# Patient Record
Sex: MALE | Race: BLACK OR AFRICAN AMERICAN | Employment: FULL TIME | URBAN - METROPOLITAN AREA
[De-identification: names, ages, dates, MRNs, and addresses within clinical notes are randomized per-mention and may not be internally consistent; named-entity substitution may affect disease eponyms.]

---

## 2018-01-23 ENCOUNTER — APPOINTMENT (OUTPATIENT)
Dept: CT IMAGING | Age: 43
DRG: 392 | End: 2018-01-23
Attending: EMERGENCY MEDICINE
Payer: SELF-PAY

## 2018-01-23 ENCOUNTER — HOSPITAL ENCOUNTER (INPATIENT)
Age: 43
LOS: 3 days | Discharge: HOME | DRG: 392 | End: 2018-01-26
Attending: EMERGENCY MEDICINE | Admitting: SURGERY
Payer: SELF-PAY

## 2018-01-23 ENCOUNTER — APPOINTMENT (OUTPATIENT)
Dept: GENERAL RADIOLOGY | Age: 43
DRG: 392 | End: 2018-01-23
Attending: EMERGENCY MEDICINE
Payer: SELF-PAY

## 2018-01-23 DIAGNOSIS — K57.20 DIVERTICULITIS OF LARGE INTESTINE WITH PERFORATION AND ABSCESS WITHOUT BLEEDING: Primary | ICD-10-CM

## 2018-01-23 PROBLEM — K57.92 DIVERTICULITIS: Status: ACTIVE | Noted: 2018-01-23

## 2018-01-23 LAB
ALBUMIN SERPL-MCNC: 3.3 G/DL (ref 3.5–5)
ALBUMIN/GLOB SERPL: 0.7 {RATIO} (ref 1.1–2.2)
ALP SERPL-CCNC: 72 U/L (ref 45–117)
ALT SERPL-CCNC: 17 U/L (ref 12–78)
ANION GAP SERPL CALC-SCNC: 9 MMOL/L (ref 5–15)
APPEARANCE UR: CLEAR
AST SERPL-CCNC: 23 U/L (ref 15–37)
BACTERIA URNS QL MICRO: NEGATIVE /HPF
BASOPHILS # BLD: 0 K/UL (ref 0–0.1)
BASOPHILS NFR BLD: 0 % (ref 0–1)
BILIRUB SERPL-MCNC: 0.5 MG/DL (ref 0.2–1)
BILIRUB UR QL: NEGATIVE
BUN SERPL-MCNC: 16 MG/DL (ref 6–20)
BUN/CREAT SERPL: 16 (ref 12–20)
CALCIUM SERPL-MCNC: 8.9 MG/DL (ref 8.5–10.1)
CHLORIDE SERPL-SCNC: 105 MMOL/L (ref 97–108)
CO2 SERPL-SCNC: 25 MMOL/L (ref 21–32)
COLOR UR: ABNORMAL
CREAT SERPL-MCNC: 1.02 MG/DL (ref 0.7–1.3)
DIFFERENTIAL METHOD BLD: ABNORMAL
EOSINOPHIL # BLD: 0.3 K/UL (ref 0–0.4)
EOSINOPHIL NFR BLD: 3 % (ref 0–7)
EPITH CASTS URNS QL MICRO: ABNORMAL /LPF
ERYTHROCYTE [DISTWIDTH] IN BLOOD BY AUTOMATED COUNT: 18.7 % (ref 11.5–14.5)
GLOBULIN SER CALC-MCNC: 4.6 G/DL (ref 2–4)
GLUCOSE SERPL-MCNC: 114 MG/DL (ref 65–100)
GLUCOSE UR STRIP.AUTO-MCNC: NEGATIVE MG/DL
HCT VFR BLD AUTO: 42.8 % (ref 36.6–50.3)
HGB BLD-MCNC: 15.1 G/DL (ref 12.1–17)
HGB UR QL STRIP: ABNORMAL
HYALINE CASTS URNS QL MICRO: ABNORMAL /LPF (ref 0–5)
KETONES UR QL STRIP.AUTO: 15 MG/DL
LEUKOCYTE ESTERASE UR QL STRIP.AUTO: NEGATIVE
LIPASE SERPL-CCNC: 39 U/L (ref 73–393)
LYMPHOCYTES # BLD: 1.4 K/UL (ref 0.8–3.5)
LYMPHOCYTES NFR BLD: 15 % (ref 12–49)
MCH RBC QN AUTO: 24.2 PG (ref 26–34)
MCHC RBC AUTO-ENTMCNC: 35.3 G/DL (ref 30–36.5)
MCV RBC AUTO: 68.6 FL (ref 80–99)
MONOCYTES # BLD: 0.9 K/UL (ref 0–1)
MONOCYTES NFR BLD: 10 % (ref 5–13)
NEUTS SEG # BLD: 6.4 K/UL (ref 1.8–8)
NEUTS SEG NFR BLD: 72 % (ref 32–75)
NITRITE UR QL STRIP.AUTO: NEGATIVE
PH UR STRIP: 5.5 [PH] (ref 5–8)
PLATELET # BLD AUTO: 267 K/UL (ref 150–400)
POTASSIUM SERPL-SCNC: 3.7 MMOL/L (ref 3.5–5.1)
PROT SERPL-MCNC: 7.9 G/DL (ref 6.4–8.2)
PROT UR STRIP-MCNC: NEGATIVE MG/DL
RBC # BLD AUTO: 6.24 M/UL (ref 4.1–5.7)
RBC #/AREA URNS HPF: ABNORMAL /HPF (ref 0–5)
RBC MORPH BLD: ABNORMAL
RBC MORPH BLD: ABNORMAL
SODIUM SERPL-SCNC: 139 MMOL/L (ref 136–145)
SP GR UR REFRACTOMETRY: 1.01 (ref 1–1.03)
UROBILINOGEN UR QL STRIP.AUTO: 2 EU/DL (ref 0.2–1)
WBC # BLD AUTO: 9 K/UL (ref 4.1–11.1)
WBC URNS QL MICRO: ABNORMAL /HPF (ref 0–4)

## 2018-01-23 PROCEDURE — 81001 URINALYSIS AUTO W/SCOPE: CPT | Performed by: EMERGENCY MEDICINE

## 2018-01-23 PROCEDURE — 74011250636 HC RX REV CODE- 250/636: Performed by: NURSE PRACTITIONER

## 2018-01-23 PROCEDURE — 74011000258 HC RX REV CODE- 258: Performed by: EMERGENCY MEDICINE

## 2018-01-23 PROCEDURE — 96365 THER/PROPH/DIAG IV INF INIT: CPT

## 2018-01-23 PROCEDURE — 74011250636 HC RX REV CODE- 250/636: Performed by: SURGERY

## 2018-01-23 PROCEDURE — 96367 TX/PROPH/DG ADDL SEQ IV INF: CPT

## 2018-01-23 PROCEDURE — 36415 COLL VENOUS BLD VENIPUNCTURE: CPT | Performed by: EMERGENCY MEDICINE

## 2018-01-23 PROCEDURE — 74177 CT ABD & PELVIS W/CONTRAST: CPT

## 2018-01-23 PROCEDURE — 74011000250 HC RX REV CODE- 250: Performed by: EMERGENCY MEDICINE

## 2018-01-23 PROCEDURE — 99284 EMERGENCY DEPT VISIT MOD MDM: CPT

## 2018-01-23 PROCEDURE — 74019 RADEX ABDOMEN 2 VIEWS: CPT

## 2018-01-23 PROCEDURE — 74011000258 HC RX REV CODE- 258: Performed by: SURGERY

## 2018-01-23 PROCEDURE — 83690 ASSAY OF LIPASE: CPT | Performed by: EMERGENCY MEDICINE

## 2018-01-23 PROCEDURE — 96366 THER/PROPH/DIAG IV INF ADDON: CPT

## 2018-01-23 PROCEDURE — 85025 COMPLETE CBC W/AUTO DIFF WBC: CPT | Performed by: EMERGENCY MEDICINE

## 2018-01-23 PROCEDURE — 74011250636 HC RX REV CODE- 250/636: Performed by: STUDENT IN AN ORGANIZED HEALTH CARE EDUCATION/TRAINING PROGRAM

## 2018-01-23 PROCEDURE — 74011636320 HC RX REV CODE- 636/320: Performed by: EMERGENCY MEDICINE

## 2018-01-23 PROCEDURE — 80053 COMPREHEN METABOLIC PANEL: CPT | Performed by: EMERGENCY MEDICINE

## 2018-01-23 PROCEDURE — 65270000032 HC RM SEMIPRIVATE

## 2018-01-23 PROCEDURE — 74011250636 HC RX REV CODE- 250/636: Performed by: EMERGENCY MEDICINE

## 2018-01-23 RX ORDER — METRONIDAZOLE 500 MG/100ML
500 INJECTION, SOLUTION INTRAVENOUS EVERY 6 HOURS
Status: DISCONTINUED | OUTPATIENT
Start: 2018-01-23 | End: 2018-01-23

## 2018-01-23 RX ORDER — SODIUM CHLORIDE AND POTASSIUM CHLORIDE .9; .15 G/100ML; G/100ML
SOLUTION INTRAVENOUS CONTINUOUS
Status: DISCONTINUED | OUTPATIENT
Start: 2018-01-23 | End: 2018-01-26 | Stop reason: HOSPADM

## 2018-01-23 RX ORDER — SODIUM CHLORIDE 0.9 % (FLUSH) 0.9 %
10 SYRINGE (ML) INJECTION
Status: COMPLETED | OUTPATIENT
Start: 2018-01-23 | End: 2018-01-23

## 2018-01-23 RX ORDER — MORPHINE SULFATE 4 MG/ML
4 INJECTION, SOLUTION INTRAMUSCULAR; INTRAVENOUS
Status: DISCONTINUED | OUTPATIENT
Start: 2018-01-23 | End: 2018-01-25

## 2018-01-23 RX ORDER — METRONIDAZOLE 500 MG/100ML
500 INJECTION, SOLUTION INTRAVENOUS
Status: COMPLETED | OUTPATIENT
Start: 2018-01-23 | End: 2018-01-23

## 2018-01-23 RX ORDER — MORPHINE SULFATE 4 MG/ML
4 INJECTION, SOLUTION INTRAMUSCULAR; INTRAVENOUS ONCE
Status: COMPLETED | OUTPATIENT
Start: 2018-01-23 | End: 2018-01-23

## 2018-01-23 RX ORDER — LEVOFLOXACIN 5 MG/ML
750 INJECTION, SOLUTION INTRAVENOUS
Status: COMPLETED | OUTPATIENT
Start: 2018-01-23 | End: 2018-01-23

## 2018-01-23 RX ADMIN — MORPHINE SULFATE 4 MG: 4 INJECTION, SOLUTION INTRAMUSCULAR; INTRAVENOUS at 20:30

## 2018-01-23 RX ADMIN — SODIUM CHLORIDE AND POTASSIUM CHLORIDE: 9; 1.49 INJECTION, SOLUTION INTRAVENOUS at 22:21

## 2018-01-23 RX ADMIN — SODIUM CHLORIDE 100 ML: 900 INJECTION, SOLUTION INTRAVENOUS at 05:00

## 2018-01-23 RX ADMIN — METRONIDAZOLE 500 MG: 500 INJECTION, SOLUTION INTRAVENOUS at 05:54

## 2018-01-23 RX ADMIN — SODIUM CHLORIDE 1000 ML: 900 INJECTION, SOLUTION INTRAVENOUS at 05:54

## 2018-01-23 RX ADMIN — LEVOFLOXACIN 750 MG: 5 INJECTION, SOLUTION INTRAVENOUS at 06:58

## 2018-01-23 RX ADMIN — MORPHINE SULFATE 4 MG: 4 INJECTION, SOLUTION INTRAMUSCULAR; INTRAVENOUS at 11:21

## 2018-01-23 RX ADMIN — MORPHINE SULFATE 4 MG: 4 INJECTION, SOLUTION INTRAMUSCULAR; INTRAVENOUS at 23:19

## 2018-01-23 RX ADMIN — SODIUM CHLORIDE AND POTASSIUM CHLORIDE: 9; 1.49 INJECTION, SOLUTION INTRAVENOUS at 16:05

## 2018-01-23 RX ADMIN — MORPHINE SULFATE 4 MG: 4 INJECTION, SOLUTION INTRAMUSCULAR; INTRAVENOUS at 09:40

## 2018-01-23 RX ADMIN — PIPERACILLIN SODIUM AND TAZOBACTAM SODIUM 3.38 G: 3; .375 INJECTION, POWDER, LYOPHILIZED, FOR SOLUTION INTRAVENOUS at 11:15

## 2018-01-23 RX ADMIN — Medication 10 ML: at 05:00

## 2018-01-23 RX ADMIN — MORPHINE SULFATE 4 MG: 4 INJECTION, SOLUTION INTRAMUSCULAR; INTRAVENOUS at 14:35

## 2018-01-23 RX ADMIN — MORPHINE SULFATE 4 MG: 4 INJECTION, SOLUTION INTRAMUSCULAR; INTRAVENOUS at 17:23

## 2018-01-23 RX ADMIN — PIPERACILLIN AND TAZOBACTAM 10.12 G: 3; .375 INJECTION, POWDER, FOR SOLUTION INTRAVENOUS at 12:13

## 2018-01-23 RX ADMIN — IOPAMIDOL 100 ML: 755 INJECTION, SOLUTION INTRAVENOUS at 05:00

## 2018-01-23 NOTE — ED PROVIDER NOTES
Patient is a 36 y.o. male presenting with abdominal pain and constipation. Abdominal Pain    Associated symptoms include constipation. Pertinent negatives include no fever, no diarrhea, no nausea, no vomiting, no dysuria, no hematuria, no headaches and no chest pain. Constipation    Associated symptoms include abdominal pain and constipation. Pertinent negatives include no dysuria, no chills, no fever, no nausea, no vomiting and no diarrhea. 37 yo AAM presents with constipation. Pt states he's been having trouble having a BM over the past few days. Has done 2 enemas and drank mg citrate. Passed a small amount of stool on Sunday and had some relief. Today symptoms worsening. Pt thinks this started after he recently changed his diet. Denies fever, chills, nausea, vomiting. C/o rectal pain and mid abdominal pain, 9/10. Is now having trouble urinating. History reviewed. No pertinent past medical history. History reviewed. No pertinent surgical history. History reviewed. No pertinent family history. Social History     Social History    Marital status: SINGLE     Spouse name: N/A    Number of children: N/A    Years of education: N/A     Occupational History    Not on file. Social History Main Topics    Smoking status: Current Every Day Smoker     Packs/day: 1.00     Years: 15.00    Smokeless tobacco: Never Used    Alcohol use Not on file    Drug use: No    Sexual activity: Not on file     Other Topics Concern    Not on file     Social History Narrative    No narrative on file         ALLERGIES: Review of patient's allergies indicates no known allergies. Review of Systems   Constitutional: Negative for chills and fever. Respiratory: Negative for cough and shortness of breath. Cardiovascular: Negative for chest pain. Gastrointestinal: Positive for abdominal pain and constipation. Negative for blood in stool, diarrhea, nausea and vomiting.    Genitourinary: Negative for dysuria and hematuria. Skin: Negative for rash and wound. Neurological: Negative for headaches. All other systems reviewed and are negative.       Vitals:    01/23/18 0313   BP: 136/78   Pulse: (!) 103   Resp: 16   Temp: 98.3 °F (36.8 °C)   SpO2: 97%   Weight: 146.1 kg (322 lb 3.2 oz)   Height: 5' 6\" (1.676 m)            Physical Exam   Physical Examination: General appearance - alert, mild distress, oriented to person, place, and time and normal appearing weight  Eyes - pupils equal and reactive, extraocular eye movements intact  Neck - supple, no significant adenopathy  Chest - clear to auscultation, no wheezes, rales or rhonchi, symmetric air entry  Heart - normal rate, regular rhythm, normal S1, S2, no murmurs, rubs, clicks or gallops  Abdomen - obese, mild lower abdominal tenderness, no rebound/guarding/peritoneal sign, soft, nondistended, no masses or organomegaly  Back exam - full range of motion, no tenderness, palpable spasm or pain on motion  Neurological - alert, oriented, normal speech, no focal findings or movement disorder noted  Musculoskeletal - no joint tenderness, deformity or swelling  Extremities - peripheral pulses normal, no pedal edema, no clubbing or cyanosis  Skin - normal coloration and turgor, no rashes, no suspicious skin lesions noted  Rectal-nontender, no gross blood, no stool in rectum  MDM  Number of Diagnoses or Management Options  Diverticulitis of large intestine with perforation and abscess without bleeding:      Amount and/or Complexity of Data Reviewed  Clinical lab tests: ordered and reviewed  Tests in the radiology section of CPT®: ordered and reviewed  Decide to obtain previous medical records or to obtain history from someone other than the patient: yes  Review and summarize past medical records: yes  Discuss the patient with other providers: yes (General surgery)  Independent visualization of images, tracings, or specimens: yes    Patient Progress  Patient progress: improved    ED Course       Procedures  5:43 AM  Pt updated on test results and plan for admission for perforated diverticulitis. 5:50 AM  Discussed with Dr. Shani Christian, general surgery. Will see and admit.

## 2018-01-23 NOTE — ROUTINE PROCESS
TRANSFER - OUT REPORT:    Verbal report given to Lulu COVARRUBIAS(name) on Layla Lin  being transferred to [facility](unit) for routine progression of care       Report consisted of patients Situation, Background, Assessment and   Recommendations(SBAR). Information from the following report(s) ED Summary was reviewed with the receiving nurse. Lines:   Peripheral IV 01/23/18 Right Antecubital (Active)   Site Assessment Clean, dry, & intact 1/23/2018  4:07 AM   Phlebitis Assessment 0 1/23/2018  4:07 AM   Infiltration Assessment 0 1/23/2018  4:07 AM   Dressing Status Clean, dry, & intact 1/23/2018  4:07 AM   Dressing Type Topical skin adhesive;Transparent 1/23/2018  4:07 AM   Hub Color/Line Status Pink;Capped;Flushed;Patent 1/23/2018  4:07 AM   Action Taken Blood drawn 1/23/2018  4:07 AM        Opportunity for questions and clarification was provided.       Patient transported with:   [employer]

## 2018-01-23 NOTE — IP AVS SNAPSHOT
[address] 
[phone] Patient: Jerri Bermudez MRN: [id number] MLQ:[id number] About your hospitalization You were admitted on:  January 23, 2018 You last received care in the:  [place] [address] You were discharged on:  January 26, 2018 Why you were hospitalized Your primary diagnosis was:  Not on File Your diagnoses also included:  Diverticulitis Of Colon With Perforation, Diverticulitis Follow-up Information Follow up With Details Comments Contact Info [place] Financial Counselor  Call for questions regarding Care Card application status and hospital bill arrangements. [phone] Jonathon Santamaria NP On 2/8/2018 Hospital follow up new PCP appointment on Thursday, 2/8/18 @ 1:30 p.m. Patient needs to arrive 30 minutes early with picture ID, insurance card and a listing of all medications. [address] [place] [address],[address] [place] [address] 
[phone] None   None (395) Patient stated that they have no PCP Your Scheduled Appointments Thursday February 08, 2018  1:30 PM EST PHYSICAL PRE OP with Jonathon Santamaria NP  
[address] [facility] [address] [place] [address] [id number] [phone] Discharge Orders None A check syed indicates which time of day the medication should be taken. My Medications START taking these medications Instructions Each Dose to Equal  
 Morning Noon Evening Bedtime  
 amoxicillin-clavulanate 875-125 mg per tablet Commonly known as:  AUGMENTIN Your last dose was: Your next dose is: Take 1 Tab by mouth two (2) times a day for 10 days. 1 Tab HYDROcodone-acetaminophen 5-325 mg per tablet Commonly known as:  Callahan Helms Your last dose was: Your next dose is: Take 1 Tab by mouth every six (6) hours as needed for Pain. Max Daily Amount: 4 Tabs. 1 Tab CONTINUE taking these medications Instructions Each Dose to Equal  
 Morning Noon Evening Bedtime BC HEADACHE POWDER PO Your last dose was: Your next dose is: Take  by mouth as needed. Where to Get Your Medications Information on where to get these meds will be given to you by the nurse or doctor. ! Ask your nurse or doctor about these medications  
  amoxicillin-clavulanate 875-125 mg per tablet HYDROcodone-acetaminophen 5-325 mg per tablet Discharge Instructions Please call for a 2 week follow up appointment with your surgeon Dr Eulalio Alvarado: 
 
[place] [facility] [address] [facility], [address] [place]  [address]. 
[phone] Diverticulitis: Care Instructions Your Care Instructions Diverticulitis occurs when pouches form in the wall of the colon and become inflamed or infected. It can be very painful. Doctors aren't sure what causes diverticulitis. There is no proof that foods such as nuts, seeds, or berries cause it or make it worse. A low-fiber diet may cause the colon to work harder to push stool forward. Pouches may form because of this extra work. It may be hard to think about healthy eating while you're in pain. But as you recover, you might think about how you can use healthy eating for overall better health. Healthy eating may help you avoid future attacks. Follow-up care is a key part of your treatment and safety. Be sure to make and go to all appointments, and call your doctor if you are having problems. It's also a good idea to know your test results and keep a list of the medicines you take. How can you care for yourself at home?  
· Drink plenty of fluids, enough so that your urine is light yellow or clear like water. If you have kidney, heart, or liver disease and have to limit fluids, talk with your doctor before you increase the amount of fluids you drink. · Stick to liquids or a bland diet (plain rice, bananas, dry toast or crackers, applesauce) until you are feeling better. Then you can return to regular foods and gradually increase the amount of fiber in your diet. · Use a heating pad set on low on your belly to relieve mild cramps and pain. · Get extra rest until you are feeling better. · Be safe with medicines. Read and follow all instructions on the label. ¨ If the doctor gave you a prescription medicine for pain, take it as prescribed. ¨ If you are not taking a prescription pain medicine, ask your doctor if you can take an over-the-counter medicine. · If your doctor prescribed antibiotics, take them as directed. Do not stop taking them just because you feel better. You need to take the full course of antibiotics. To prevent future attacks of diverticulitis · Avoid constipation: 
¨ Include fruits, vegetables, beans, and whole grains in your diet each day. These foods are high in fiber. ¨ Drink plenty of fluids, enough so that your urine is light yellow or clear like water. If you have kidney, heart, or liver disease and have to limit fluids, talk with your doctor before you increase the amount of fluids you drink. ¨ Get some exercise every day. Build up slowly to 30 to 60 minutes a day on 5 or more days of the week. ¨ Take a fiber supplement, such as Citrucel or Metamucil, every day if needed. Read and follow all instructions on the label. ¨ Schedule time each day for a bowel movement. Having a daily routine may help. Take your time and do not strain when having a bowel movement. When should you call for help? Call your doctor now or seek immediate medical care if: 
? · You have a fever. ? · You are vomiting. ? · You have new or worse belly pain. ? · You cannot pass stools or gas. ?Watch closely for changes in your health, and be sure to contact your doctor if you have any problems. Where can you learn more? Go to http://lanie-vandana.info/. Enter H901 in the search box to learn more about \"Diverticulitis: Care Instructions. \" Current as of: May 12, 2017 Content Version: 11.4 © 2829-7546 [employer]. Care instructions adapted under license by [employer] (which disclaims liability or warranty for this information). If you have questions about a medical condition or this instruction, always ask your healthcare professional. [address] any warranty or liability for your use of this information. Voice123 Announcement We are excited to announce that we are making your provider's discharge notes available to you in Voice123. You will see these notes when they are completed and signed by the physician that discharged you from your recent hospital stay. If you have any questions or concerns about any information you see in Voice123, please call the Health Information Department where you were seen or reach out to your Primary Care Provider for more information about your plan of care. Introducing [facility]! Anayeli Gallardo introduces Voice123 patient portal. Now you can access parts of your medical record, email your doctor's office, and request medication refills online. 1. In your internet browser, go to [email or web address] 2. Click on the First Time User? Click Here link in the Sign In box. You will see the New Member Sign Up page. 3. Enter your Voice123 Access Code exactly as it appears below. You will not need to use this code after youve completed the sign-up process. If you do not sign up before the expiration date, you must request a new code. · Voice123 Access Code: [email or web address] Expires: 4/25/2018  2:39 PM 
 
 4. Enter the last four digits of your Social Security Number (xxxx) and Date of Birth (mm/dd/yyyy) as indicated and click Submit. You will be taken to the next sign-up page. 5. Create a [email or web address] ID. This will be your [email or web address] login ID and cannot be changed, so think of one that is secure and easy to remember. 6. Create a [email or web address] password. You can change your password at any time. 7. Enter your Password Reset Question and Answer. This can be used at a later time if you forget your password. 8. Enter your e-mail address. You will receive e-mail notification when new information is available in [address] 9. Click Sign Up. You can now view and download portions of your medical record. 10. Click the Download Summary menu link to download a portable copy of your medical information. If you have questions, please visit the Frequently Asked Questions section of the [email or web address] website. Remember, [email or web address] is NOT to be used for urgent needs. For medical emergencies, dial 911. Now available from your iPhone and Android! Providers Seen During Your Hospitalization Provider Specialty Primary office phone Deepa Lake MD Emergency Medicine [phone] Anders Reza MD Surgery [phone] Your Primary Care Physician (PCP) Primary Care Physician Office Phone Office Fax NONE ** None ** ** None ** You are allergic to the following No active allergies Recent Documentation Height Weight BMI Smoking Status 1.676 m 146.1 kg 52 kg/m2 Current Every Day Smoker Emergency Contacts Name Discharge Info Relation Home Work Mobile Franchesca Swift YES [1] Sister [23] [phone] Patient Belongings The following personal items are in your possession at time of discharge: 
  Dental Appliances: None  Visual Aid: None      Home Medications: None   Jewelry: None  Clothing: Socks, Footwear, Pants, Shirt    Other Valuables: Cell Phone Please provide this summary of care documentation to your next provider. Signatures-by signing, you are acknowledging that this After Visit Summary has been reviewed with you and you have received a copy. Patient Signature:  ____________________________________________________________ Date:  ____________________________________________________________  
  
[facility] Provider Signature:  ____________________________________________________________ Date:  ____________________________________________________________

## 2018-01-23 NOTE — ED TRIAGE NOTES
Triage note: Patient arriving c/o constipation after changing his diet. Reports trying 'everything' but  Nothing is working. Patient reports 'whatever it is, must be too big to come out'.

## 2018-01-23 NOTE — PROGRESS NOTES
TRANSFER - IN REPORT:    Verbal report received from Arlin(name) on Martha Gretchen  being received from E.R for routine progression of care      Report consisted of patients Situation, Background, Assessment and   Recommendations(SBAR). Information from the following report(s) ED Summary was reviewed with the receiving nurse. Opportunity for questions and clarification was provided. Assessment completed upon patients arrival to unit and care assumed.

## 2018-01-23 NOTE — PROGRESS NOTES
Admission Medication Reconciliation:    Comments/Recommendations: This medication history was obtained from patient; (s)he appears to be a reliable historian. An RX Query is not available. Medications added: none  Medications deleted: none  Medications amended: none    Last doses of medication: patient reports taking BC powder approx at midnight  Review of Allergies: nkda      Significant PMH/Disease States:   History reviewed. No pertinent past medical history. Chief Complaint for this Admission:    Chief Complaint   Patient presents with    Abdominal Pain    Constipation       Allergies:  Review of patient's allergies indicates no known allergies. Prior to Admission Medications:   Prior to Admission Medications   Prescriptions Last Dose Informant Patient Reported? Taking? ASPIRIN/SALICYLAMIDE/CAFFEINE (BC HEADACHE POWDER PO) 1/23/2018 at 0000  Yes Yes   Sig: Take  by mouth as needed. Facility-Administered Medications: None         Thank you for allowing me to participate in the care of this patient. Please contact the pharmacy () or the medication reconciliation pharmacy () with any questions.     Jose Bonds, IwonaD

## 2018-01-23 NOTE — CONSULTS
Surgery consultation    Patient: Darryl Bain MRN: [id number]  SSN: [id number]    YOB: 1975  Age: 36 y.o. Sex: male      Subjective:      Darryl Bain is a 36 y.o. male who presents with abdominal pain since Friday (today is Tues) who felt he was constipated and he actually felt better after a small BM on Sunday- but then Monday had a great increase in lower abdominal pain and the feeling that he needed to have a BM. He presented to ER with abdominal pain and CT suggests acute diverticulitis with perforation- see below. History reviewed. No pertinent past medical history. History reviewed. No pertinent surgical history. History reviewed. No pertinent family history. Social History   Substance Use Topics    Smoking status: Current Every Day Smoker     Packs/day: 1.00     Years: 15.00    Smokeless tobacco: Never Used    Alcohol use Not on file      Prior to Admission medications    Not on File        No Known Allergies    Review of Systems:  Constitutional: negative for fevers, chills and weight loss  Cardiovascular: negative for chest pain, dyspnea, palpitations  Gastrointestinal: negative for odynophagia, nausea, vomiting and jaundice     Patient does report a brief syncopal episode while sitting on the toilet previously    Objective:     Vitals:    01/23/18 0313 01/23/18 0548 01/23/18 0600 01/23/18 0700   BP: 136/78 126/81 134/79 131/82   Pulse: (!) 103      Resp: 16      Temp: 98.3 °F (36.8 °C)      SpO2: 97%  97% 95%   Weight: 322 lb 3.2 oz (146.1 kg)      Height: 5' 6\" (1.676 m)        Body mass index is 52 kg/(m^2). Physical Exam:  GENERAL: alert, cooperative, no distress, appears stated age  HEART: regular rate and rhythm  ABDOMEN: soft, non-tender.  Bowel sounds normal. No masses,  no organomegaly  EXTREMITIES:  extremities normal, atraumatic, no cyanosis or edema    CT images reviewed by me in detail on monitor - para rectal air/fluid collection - perhaps early abscess  Small locules of free air seen elsewhere as well    Radiology report-  FINDINGS:   LUNG BASES: Clear. INCIDENTALLY IMAGED HEART AND MEDIASTINUM: Unremarkable. LIVER: No mass or biliary dilatation. GALLBLADDER: Unremarkable. SPLEEN: No mass. PANCREAS: No mass or ductal dilatation. ADRENALS: Unremarkable. KIDNEYS: No mass, calculus, or hydronephrosis. STOMACH: Unremarkable. SMALL BOWEL: No dilatation or wall thickening. COLON: There is wall thickening of the sigmoid colon with several diverticula  within this. There is stranding in the adjacent fat. There is an air-fluid  collection along the left side of the rectum on series 3 image 74 measuring 2.6  x 3.1 cm. This continues up along the left iliac vessels towards the  bifurcation. APPENDIX: Unremarkable. PERITONEUM: Multiple locules of free air are seen rising into the upper abdomen. These interdigitate along the hepatic hilum. RETROPERITONEUM: No lymphadenopathy or aortic aneurysm. REPRODUCTIVE ORGANS: The bladder and seminal vesicles are unremarkable. URINARY BLADDER: No mass or calculus. BONES: No destructive bone lesion. ADDITIONAL COMMENTS: There is a fat-containing umbilical hernia.     IMPRESSION  IMPRESSION:  Findings likely represent perforated acute diverticulitis with an air-fluid  collection in the left perirectal space extending up along the left pelvic  sidewall. Multiple locules of free air rise into the upper abdomen.       Assessment:     Hospital Problems  Never Reviewed          Codes Class Noted POA    Diverticulitis of colon with perforation ICD-10-CM: K57.20  ICD-9-CM: 562.11  1/23/2018 Unknown           Morbid Obesity BMI 50-59.9    Plan:     Npo, iv ABX, serial exams  May need re-eval by follow up CT for possible abscess development pararectal / L pelvic sidewall area     Signed By: Nohemi Hayes MD     January 23, 2018

## 2018-01-23 NOTE — LETTER
NOTIFICATION OF RETURN TO WORK / SCHOOL 
 
1/26/2018 11:20 AM 
 
Mr. Azalea Reynolds [address] [address] [place] To Whom It May Concern: 
 
Azalea Reynolds was hospitalized under the care of Dr. Prisca Knowles  from 1/22/2018  to present . He will be able to return to work/school on Tuesday 1/30/2018  with no restrictions. If there are questions or concerns please have the patient contact our office. Sincerely, Konrad Dolan PAC

## 2018-01-24 PROCEDURE — 74011250636 HC RX REV CODE- 250/636: Performed by: SURGERY

## 2018-01-24 PROCEDURE — 74011250636 HC RX REV CODE- 250/636: Performed by: NURSE PRACTITIONER

## 2018-01-24 PROCEDURE — 65270000032 HC RM SEMIPRIVATE

## 2018-01-24 PROCEDURE — 74011250637 HC RX REV CODE- 250/637: Performed by: NURSE PRACTITIONER

## 2018-01-24 RX ORDER — HYDROCODONE BITARTRATE AND ACETAMINOPHEN 5; 325 MG/1; MG/1
1-2 TABLET ORAL
Status: DISCONTINUED | OUTPATIENT
Start: 2018-01-24 | End: 2018-01-26 | Stop reason: HOSPADM

## 2018-01-24 RX ORDER — ONDANSETRON 2 MG/ML
4 INJECTION INTRAMUSCULAR; INTRAVENOUS
Status: DISCONTINUED | OUTPATIENT
Start: 2018-01-24 | End: 2018-01-25

## 2018-01-24 RX ORDER — DIPHENHYDRAMINE HYDROCHLORIDE 50 MG/ML
25 INJECTION, SOLUTION INTRAMUSCULAR; INTRAVENOUS
Status: DISCONTINUED | OUTPATIENT
Start: 2018-01-24 | End: 2018-01-26 | Stop reason: HOSPADM

## 2018-01-24 RX ADMIN — SODIUM CHLORIDE AND POTASSIUM CHLORIDE: 9; 1.49 INJECTION, SOLUTION INTRAVENOUS at 16:18

## 2018-01-24 RX ADMIN — HYDROCODONE BITARTRATE AND ACETAMINOPHEN 1 TABLET: 5; 325 TABLET ORAL at 16:20

## 2018-01-24 RX ADMIN — PIPERACILLIN AND TAZOBACTAM 10.12 G: 3; .375 INJECTION, POWDER, FOR SOLUTION INTRAVENOUS at 11:54

## 2018-01-24 RX ADMIN — SODIUM CHLORIDE AND POTASSIUM CHLORIDE: 9; 1.49 INJECTION, SOLUTION INTRAVENOUS at 06:26

## 2018-01-24 RX ADMIN — HYDROCODONE BITARTRATE AND ACETAMINOPHEN 1 TABLET: 5; 325 TABLET ORAL at 20:26

## 2018-01-24 RX ADMIN — HYDROCODONE BITARTRATE AND ACETAMINOPHEN 1 TABLET: 5; 325 TABLET ORAL at 11:56

## 2018-01-24 NOTE — PROGRESS NOTES
Bedside shift change report given to Shelia (oncoming nurse) by Hilary Doherty (offgoing nurse). Report included the following information SBAR.

## 2018-01-24 NOTE — PROGRESS NOTES
Problem: Patient Education: Go to Patient Education Activity  Goal: Patient/Family Education  NUTRITION         Pt and family visited per pt request regarding diet education. Many questions regarding diet progression s/p perforated diverticulitis answered. Reviewed low fiber with gradual advancement to high fiber diet after 2 weeks. Handouts provided. Pt and family with good understanding and no additional questions at this time. Will follow for diet advancement/tolerance per surgery.    Maggy Jiang RD

## 2018-01-24 NOTE — PROGRESS NOTES
Daily Progress Note  [facility] at [address] Date: 2018    Subjective:     Last 24 hrs: Denies pain, + flatus. Only c/o is some soreness around is rectum. Eager to get something to drink. Many questions about diet going forward. Afebrile, on zosyn. Objective:     Blood pressure 126/80, pulse 85, temperature 98.6 °F (37 °C), resp. rate 18, height 5' 6\" (1.676 m), weight 146.1 kg (322 lb 3.2 oz), SpO2 97 %. Temp (24hrs), Av.3 °F (36.8 °C), Min:97.5 °F (36.4 °C), Max:98.8 °F (37.1 °C)      _____________________  Physical Exam:     Alert and Oriented, sitting up on side of bed, in no acute distress. Cardiovascular: RRR, no peripheral edema  Lungs:CTAB   Abdomen: + BS, obese, soft, NT. Umbilical hernia which is soft and reduces easily.        Assessment:   Active Problems:    Diverticulitis of colon with perforation (2018)      Diverticulitis (2018)            Plan:     Continue zosyn  Start clear liquids  Labs in am  Repeat imaging per Dr. Slim Powell, [address] [facility] at [place] [address], [address] [place] [address]  [phone]    Data Review:    Recent Labs      18   0408   WBC  9.0   HGB  15.1   HCT  42.8   PLT  267     Recent Labs      18   0408   NA  139   K  3.7   CL  105   CO2  25   GLU  114*   BUN  16   CREA  1.02   CA  8.9   ALB  3.3*   TBILI  0.5   SGOT  23   ALT  17     Recent Labs      18   0408   LPSE  39*           ______________________  Medications:    Current Facility-Administered Medications   Medication Dose Route Frequency    morphine injection 4 mg  4 mg IntraVENous Q3H PRN    piperacillin-tazobactam (ZOSYN) 10.125 g in  mL continuous 24 hr infusion  10.125 g IntraVENous Q24H    0.9% sodium chloride with KCl 20 mEq/L infusion   IntraVENous CONTINUOUS

## 2018-01-25 LAB
ANION GAP SERPL CALC-SCNC: 5 MMOL/L (ref 5–15)
BUN SERPL-MCNC: 12 MG/DL (ref 6–20)
BUN/CREAT SERPL: 12 (ref 12–20)
CALCIUM SERPL-MCNC: 8.5 MG/DL (ref 8.5–10.1)
CHLORIDE SERPL-SCNC: 106 MMOL/L (ref 97–108)
CO2 SERPL-SCNC: 28 MMOL/L (ref 21–32)
CREAT SERPL-MCNC: 1 MG/DL (ref 0.7–1.3)
ERYTHROCYTE [DISTWIDTH] IN BLOOD BY AUTOMATED COUNT: 18.4 % (ref 11.5–14.5)
GLUCOSE SERPL-MCNC: 84 MG/DL (ref 65–100)
HCT VFR BLD AUTO: 38.8 % (ref 36.6–50.3)
HGB BLD-MCNC: 13 G/DL (ref 12.1–17)
MCH RBC QN AUTO: 23.8 PG (ref 26–34)
MCHC RBC AUTO-ENTMCNC: 33.5 G/DL (ref 30–36.5)
MCV RBC AUTO: 70.9 FL (ref 80–99)
PLATELET # BLD AUTO: 243 K/UL (ref 150–400)
POTASSIUM SERPL-SCNC: 3.9 MMOL/L (ref 3.5–5.1)
RBC # BLD AUTO: 5.47 M/UL (ref 4.1–5.7)
SODIUM SERPL-SCNC: 139 MMOL/L (ref 136–145)
WBC # BLD AUTO: 4.9 K/UL (ref 4.1–11.1)

## 2018-01-25 PROCEDURE — 74011250636 HC RX REV CODE- 250/636: Performed by: SURGERY

## 2018-01-25 PROCEDURE — 74011250637 HC RX REV CODE- 250/637: Performed by: SURGERY

## 2018-01-25 PROCEDURE — 36415 COLL VENOUS BLD VENIPUNCTURE: CPT | Performed by: NURSE PRACTITIONER

## 2018-01-25 PROCEDURE — 74011250636 HC RX REV CODE- 250/636: Performed by: NURSE PRACTITIONER

## 2018-01-25 PROCEDURE — 80048 BASIC METABOLIC PNL TOTAL CA: CPT | Performed by: NURSE PRACTITIONER

## 2018-01-25 PROCEDURE — 65270000032 HC RM SEMIPRIVATE

## 2018-01-25 PROCEDURE — 74011250637 HC RX REV CODE- 250/637: Performed by: NURSE PRACTITIONER

## 2018-01-25 PROCEDURE — 85027 COMPLETE CBC AUTOMATED: CPT | Performed by: NURSE PRACTITIONER

## 2018-01-25 RX ORDER — ONDANSETRON 4 MG/1
4 TABLET, ORALLY DISINTEGRATING ORAL
Status: DISCONTINUED | OUTPATIENT
Start: 2018-01-25 | End: 2018-01-26 | Stop reason: HOSPADM

## 2018-01-25 RX ORDER — POLYETHYLENE GLYCOL 3350 17 G/17G
17 POWDER, FOR SOLUTION ORAL 2 TIMES DAILY
Status: DISCONTINUED | OUTPATIENT
Start: 2018-01-25 | End: 2018-01-26 | Stop reason: HOSPADM

## 2018-01-25 RX ADMIN — HYDROCODONE BITARTRATE AND ACETAMINOPHEN 2 TABLET: 5; 325 TABLET ORAL at 05:21

## 2018-01-25 RX ADMIN — POLYETHYLENE GLYCOL 3350 17 G: 17 POWDER, FOR SOLUTION ORAL at 10:58

## 2018-01-25 RX ADMIN — HYDROCODONE BITARTRATE AND ACETAMINOPHEN 2 TABLET: 5; 325 TABLET ORAL at 21:50

## 2018-01-25 RX ADMIN — HYDROCODONE BITARTRATE AND ACETAMINOPHEN 1 TABLET: 5; 325 TABLET ORAL at 15:47

## 2018-01-25 RX ADMIN — POLYETHYLENE GLYCOL 3350 17 G: 17 POWDER, FOR SOLUTION ORAL at 17:12

## 2018-01-25 RX ADMIN — HYDROCODONE BITARTRATE AND ACETAMINOPHEN 1 TABLET: 5; 325 TABLET ORAL at 10:58

## 2018-01-25 RX ADMIN — PIPERACILLIN AND TAZOBACTAM 10.12 G: 3; .375 INJECTION, POWDER, FOR SOLUTION INTRAVENOUS at 11:01

## 2018-01-25 RX ADMIN — SODIUM CHLORIDE AND POTASSIUM CHLORIDE: 9; 1.49 INJECTION, SOLUTION INTRAVENOUS at 11:03

## 2018-01-25 NOTE — PROGRESS NOTES
Bedside shift change report given to [address] (oncoming nurse) by Damion Garcia RN (offgoing nurse). Report included the following information SBAR, Kardex, Procedure Summary, Intake/Output, MAR, Accordion and Recent Results.

## 2018-01-25 NOTE — PROGRESS NOTES
Patient Vitals for the past 12 hrs:   Temp Pulse Resp BP SpO2   01/25/18 0819 97.6 °F (36.4 °C) 66 18 (!) 146/97 100 %   01/25/18 0054 97.5 °F (36.4 °C) 75 18 114/62 100 %     Feels well  Still feels he needs to move  His bowels more but did pass a small amount of stool yesterday  abd is much less tender with minimal vol guarding deep in lower abdomen no peritoneal signs    Plan  Tolerating po liquids- will advance to GI lite diet  Will give po laxatives- no enemas due to diverticulitis with microperf  Possible dc home in 1-2 days if continues to improve      CBC W/O DIFF    Collection Time: 01/25/18  5:33 AM   Result Value Ref Range    WBC 4.9 4.1 - 11.1 K/uL    RBC 5.47 4.10 - 5.70 M/uL    HGB 13.0 12.1 - 17.0 g/dL    HCT 38.8 36.6 - 50.3 %    MCV 70.9 (L) 80.0 - 99.0 FL    MCH 23.8 (L) 26.0 - 34.0 PG    MCHC 33.5 30.0 - 36.5 g/dL    RDW 18.4 (H) 11.5 - 14.5 %    PLATELET 904 951 - 976 K/uL

## 2018-01-25 NOTE — PROGRESS NOTES
Bedside shift change report given to Tere Francois RN (oncoming nurse) by Gianna Desir RN (offgoing nurse). Report included the following information SBAR, Kardex, Procedure Summary, Intake/Output, MAR and Recent Results.

## 2018-01-25 NOTE — PROGRESS NOTES
Patient listed as not having a primary care physician. Hospital follow-up PCP transitional care appointment has been scheduled with Elham Wallace NP for Thursday, 2/8/18 at 1:30 p.m. Pending patient discharge.   Nancy Sanchez, Care Management Specialist.

## 2018-01-25 NOTE — PROGRESS NOTES
Bedside and Verbal shift change report given to [place] [address] (oncoming nurse) by [facility] (offgoing nurse). Report included the following information SBAR.

## 2018-01-25 NOTE — PROGRESS NOTES
Patient is a 35 y/o single  male, self-pay/uninsured, admitted to [facility] 1/23/18 for diverticulitis of colon with perforation. Per surgery notes, anticipate medically clear for discharge 1-2 days. CM visited patient bedside. Patient A&Ox4, confirmed demographics on facesheet. Patient lives alone in [facility] apartment, independent with ADL/IADL's, no home DME, works. Patient states that he will have health insurance through his employer as of 2/1/18. Patient with no PCP, agrees for CM specialist to schedule appt with a [facility] provider any date after 2/1/18. CM provided patient with Care Card application and added  contact info to AVS. Also s/w CM specialist Hina to request PCP setup. Expect discharge home with family and no further CM needs. CM available for consult should new needs arise. ADAM Ovalles/MONICA    Care Management Interventions  PCP Verified by CM:  (NO PCP)  Mode of Transport at Discharge:  Other (see comment) (Family)  MyChart Signup: No  Discharge Durable Medical Equipment: No  Physical Therapy Consult: No  Occupational Therapy Consult: No  Speech Therapy Consult: No  Current Support Network: Lives Alone, Family Lives Nearby  Confirm Follow Up Transport: Family  Plan discussed with Pt/Family/Caregiver: Yes  Discharge Location  Discharge Placement: Home

## 2018-01-26 VITALS
RESPIRATION RATE: 16 BRPM | DIASTOLIC BLOOD PRESSURE: 72 MMHG | HEIGHT: 66 IN | BODY MASS INDEX: 50.62 KG/M2 | OXYGEN SATURATION: 95 % | WEIGHT: 315 LBS | HEART RATE: 70 BPM | SYSTOLIC BLOOD PRESSURE: 115 MMHG | TEMPERATURE: 97.8 F

## 2018-01-26 LAB
ERYTHROCYTE [DISTWIDTH] IN BLOOD BY AUTOMATED COUNT: 18.9 % (ref 11.5–14.5)
HCT VFR BLD AUTO: 36.3 % (ref 36.6–50.3)
HGB BLD-MCNC: 12.5 G/DL (ref 12.1–17)
MCH RBC QN AUTO: 24.1 PG (ref 26–34)
MCHC RBC AUTO-ENTMCNC: 34.4 G/DL (ref 30–36.5)
MCV RBC AUTO: 70.1 FL (ref 80–99)
NRBC # BLD: 0 K/UL (ref 0–0.01)
NRBC BLD-RTO: 0 PER 100 WBC
PLATELET # BLD AUTO: 252 K/UL (ref 150–400)
PMV BLD AUTO: 9.9 FL (ref 8.9–12.9)
RBC # BLD AUTO: 5.18 M/UL (ref 4.1–5.7)
WBC # BLD AUTO: 5.1 K/UL (ref 4.1–11.1)

## 2018-01-26 PROCEDURE — 85027 COMPLETE CBC AUTOMATED: CPT | Performed by: NURSE PRACTITIONER

## 2018-01-26 PROCEDURE — 36415 COLL VENOUS BLD VENIPUNCTURE: CPT | Performed by: NURSE PRACTITIONER

## 2018-01-26 PROCEDURE — 74011250637 HC RX REV CODE- 250/637: Performed by: SURGERY

## 2018-01-26 PROCEDURE — 74011250637 HC RX REV CODE- 250/637: Performed by: NURSE PRACTITIONER

## 2018-01-26 RX ORDER — AMOXICILLIN AND CLAVULANATE POTASSIUM 875; 125 MG/1; MG/1
1 TABLET, FILM COATED ORAL 2 TIMES DAILY
Qty: 20 TAB | Refills: 0 | Status: SHIPPED | OUTPATIENT
Start: 2018-01-26 | End: 2018-02-05

## 2018-01-26 RX ORDER — HYDROCODONE BITARTRATE AND ACETAMINOPHEN 5; 325 MG/1; MG/1
1 TABLET ORAL
Qty: 10 TAB | Refills: 0 | Status: SHIPPED | OUTPATIENT
Start: 2018-01-26 | End: 2019-02-01

## 2018-01-26 RX ADMIN — POLYETHYLENE GLYCOL 3350 17 G: 17 POWDER, FOR SOLUTION ORAL at 08:55

## 2018-01-26 RX ADMIN — HYDROCODONE BITARTRATE AND ACETAMINOPHEN 2 TABLET: 5; 325 TABLET ORAL at 04:54

## 2018-01-26 NOTE — DISCHARGE SUMMARY
Physician Discharge Summary     Patient ID:  Luis Enrique Heath  [id number]  54 y.o.  1975    Allergies: Review of patient's allergies indicates no known allergies. Admit Date: 1/23/2018    Discharge Date: 1/26/2018    * Admission Diagnoses: Diverticulitis of colon with perforation  Diverticulitis    * Discharge Diagnoses:    Hospital Problems as of 1/26/2018  Never Reviewed          Codes Class Noted - Resolved POA    Diverticulitis of colon with perforation ICD-10-CM: K57.20  ICD-9-CM: 562.11  1/23/2018 - Present Unknown        Diverticulitis ICD-10-CM: K57.92  ICD-9-CM: 562.11  1/23/2018 - Present Unknown               Admission Condition: Fair    * Discharge Condition: good and improved    * Procedures: * No surgery found [facility] Course:   Pt admitted with abdominal pain, CT reveals diverticulitis and abscess, collection, not amenable to drainage , pt admitted for IV abx, bowel rest and monitoring. Symptoms improved, bowel function WNL, pan resolved, tolerating regular diet    Pt DC to home on oral abx with 2 week follow up planned--earlier if symptoms recur. Consults: None    Significant Diagnostic Studies: radiology: CT scan: on admission     COLON: There is wall thickening of the sigmoid colon with several diverticula  within this. There is stranding in the adjacent fat. There is an air-fluid  collection along the left side of the rectum on series 3 image 74 measuring 2.6  x 3.1 cm. This continues up along the left iliac vessels towards the  bifurcation. APPENDIX: Unremarkable. PERITONEUM: Multiple locules of free air are seen rising into the upper abdomen. These interdigitate along the hepatic hilum. RETROPERITONEUM: No lymphadenopathy or aortic aneurysm. REPRODUCTIVE ORGANS: The bladder and seminal vesicles are unremarkable. URINARY BLADDER: No mass or calculus. BONES: No destructive bone lesion.   ADDITIONAL COMMENTS: There is a fat-containing umbilical hernia.     IMPRESSION  IMPRESSION:  Findings likely represent perforated acute diverticulitis with an air-fluid  collection in the left perirectal space extending up along the left pelvic  sidewall. Multiple locules of free air rise into the upper abdomen. * Disposition: Home    Discharge Medications:   Current Discharge Medication List      START taking these medications    Details   amoxicillin-clavulanate (AUGMENTIN) 875-125 mg per tablet Take 1 Tab by mouth two (2) times a day for 10 days. Qty: 20 Tab, Refills: 0      HYDROcodone-acetaminophen (NORCO) 5-325 mg per tablet Take 1 Tab by mouth every six (6) hours as needed for Pain. Max Daily Amount: 4 Tabs. Qty: 10 Tab, Refills: 0    Associated Diagnoses: Diverticulitis of large intestine with perforation and abscess without bleeding         CONTINUE these medications which have NOT CHANGED    Details   ASPIRIN/SALICYLAMIDE/CAFFEINE (BC HEADACHE POWDER PO) Take  by mouth as needed. * Follow-up Care/Patient Instructions: Activity: No driving while on analgesics and No heavy lifting for 2 weeks  Diet: Resume previous diet  Wound Care: None needed    Please call for a 2 week follow up appointment with your surgeon:    [id number] [facility] at [place] [address], [facility]  [phone]    Follow-up Information     Follow up With Details Comments [address] [facility]  Call for questions regarding Care Card application status and hospital bill arrangements. [phone]    Ly Roldan NP On 2/8/2018 Hospital follow up new PCP appointment on Thursday, 2/8/18 @ 1:30 p.m. Patient needs to arrive 30 minutes early with picture ID, insurance card and a listing of all medications.   [address] [facility]  [phone]      None   None ([phone]) Patient stated that they have no PCP          Follow-up tests/labs per Dr. Michael Beach at follow up visit RADHAMES Schreiber  1/26/2018  10:58 AM

## 2018-01-26 NOTE — PROGRESS NOTES
Spiritual Care Partner Volunteer visited patient in room 517/01 on 1.26.18 . Documented by: : Rev. Lindy Stuart.  Fernando Handing; [facility], to contact [phone] [address] call: [phone]

## 2018-01-26 NOTE — PROGRESS NOTES
· Surgery Progress Note    1/26/2018    Admit Date: 1/23/2018    Subjective:       Pt has complaint of wanting to go home, no acute issues overnight, denies pain, no issue taking po, overall feels quite well . Pts pain hs resolved . No SOB. No CP. Pt is ambulating. Patient 's current diet is gi lite, he ate Belgian toast for breakfast .. Pt reports  no nausea and no vomiting. Pt reports no fever or chills    Bowel Movements: Normal and Flatus        Objective:     Blood pressure 115/72, pulse 70, temperature 97.8 °F (36.6 °C), resp. rate 16, height 5' 6\" (1.676 m), weight 322 lb 3.2 oz (146.1 kg), SpO2 95 %.               General appearance: alert, cooperative, morbidly obese, oob in the chair   Lungs: clear to auscultation bilaterally  Heart: regular rate and rhythm  Abd:soft, nondistended, nontender, without guarding, without rebound  Extremities: extremities normal, atraumatic, no cyanosis or edema, no edema  Neurologic: Grossly normal    Data Review    Recent Results (from the past 12 hour(s))   CBC W/O DIFF    Collection Time: 01/26/18  4:27 AM   Result Value Ref Range    WBC 5.1 4.1 - 11.1 K/uL    RBC 5.18 4.10 - 5.70 M/uL    HGB 12.5 12.1 - 17.0 g/dL    HCT 36.3 (L) 36.6 - 50.3 %    MCV 70.1 (L) 80.0 - 99.0 FL    MCH 24.1 (L) 26.0 - 34.0 PG    MCHC 34.4 30.0 - 36.5 g/dL    RDW 18.9 (H) 11.5 - 14.5 %    PLATELET 012 279 - 142 K/uL    MPV 9.9 8.9 - 12.9 FL    NRBC 0.0 0  WBC    ABSOLUTE NRBC 0.00 0.00 - 0.01 K/uL       Assessment:     Active Problems:    Diverticulitis of colon with perforation (1/23/2018)      Diverticulitis (1/23/2018)        Plan:   Diettolerating GI lite with good po intake   Pain management  OOB  Antibiotics:  Zosyn--transition to Augmentin   OK for DC to home on PO abx x 10 days and will need outpatient follow up with Dr. Saul De La Paz in 2 weeks, earlier if symptoms recur   Instructions and plan reviewed with pt   Further/ plan per Dr Marianne Martin PA-C

## 2018-01-26 NOTE — PROGRESS NOTES
Reviewed discharge instructions with patient including follow up appointments, recommended diet and activity, medications, and signs and symptoms to notify physician about. Patient verbalizes no further questions. IV removed tip intact.

## 2018-01-26 NOTE — PROGRESS NOTES
Patient up ambulating in halls ad josephine up in chair for most of this morning, tolerating his diet well with no nausea/vomitting. Had two bowel movements overnight. Complains of mild abd pain, but is eager to go home.

## 2018-01-26 NOTE — PROGRESS NOTES
Bedside shift change report given to [address] (oncoming nurse) by Chana Vazquez RN (offgoing nurse). Report included the following information SBAR, Kardex, ED Summary, Intake/Output, MAR, Accordion and Recent Results.

## 2018-01-26 NOTE — DISCHARGE INSTRUCTIONS
Please call for a 2 week follow up appointment with your surgeon Dr Eulalio Alvarado:    [facility] at [facility], [address] [facility], [facility].  [phone]         Diverticulitis: Care Instructions  Your Care Instructions    Diverticulitis occurs when pouches form in the wall of the colon and become inflamed or infected. It can be very painful. Doctors aren't sure what causes diverticulitis. There is no proof that foods such as nuts, seeds, or berries cause it or make it worse. A low-fiber diet may cause the colon to work harder to push stool forward. Pouches may form because of this extra work. It may be hard to think about healthy eating while you're in pain. But as you recover, you might think about how you can use healthy eating for overall better health. Healthy eating may help you avoid future attacks. Follow-up care is a key part of your treatment and safety. Be sure to make and go to all appointments, and call your doctor if you are having problems. It's also a good idea to know your test results and keep a list of the medicines you take. How can you care for yourself at home? · Drink plenty of fluids, enough so that your urine is light yellow or clear like water. If you have kidney, heart, or liver disease and have to limit fluids, talk with your doctor before you increase the amount of fluids you drink. · Stick to liquids or a bland diet (plain rice, bananas, dry toast or crackers, applesauce) until you are feeling better. Then you can return to regular foods and gradually increase the amount of fiber in your diet. · Use a heating pad set on low on your belly to relieve mild cramps and pain. · Get extra rest until you are feeling better. · Be safe with medicines. Read and follow all instructions on the label. ¨ If the doctor gave you a prescription medicine for pain, take it as prescribed.   ¨ If you are not taking a prescription pain medicine, ask your doctor if you can take an over-the-counter medicine. · If your doctor prescribed antibiotics, take them as directed. Do not stop taking them just because you feel better. You need to take the full course of antibiotics. To prevent future attacks of diverticulitis  · Avoid constipation:  ¨ Include fruits, vegetables, beans, and whole grains in your diet each day. These foods are high in fiber. ¨ Drink plenty of fluids, enough so that your urine is light yellow or clear like water. If you have kidney, heart, or liver disease and have to limit fluids, talk with your doctor before you increase the amount of fluids you drink. ¨ Get some exercise every day. Build up slowly to 30 to 60 minutes a day on 5 or more days of the week. ¨ Take a fiber supplement, such as Citrucel or Metamucil, every day if needed. Read and follow all instructions on the label. ¨ Schedule time each day for a bowel movement. Having a daily routine may help. Take your time and do not strain when having a bowel movement. When should you call for help? Call your doctor now or seek immediate medical care if:  ? · You have a fever. ? · You are vomiting. ? · You have new or worse belly pain. ? · You cannot pass stools or gas. ? Watch closely for changes in your health, and be sure to contact your doctor if you have any problems. Where can you learn more? Go to http://lanie-vandana.info/. Enter H901 in the search box to learn more about \"Diverticulitis: Care Instructions. \"  Current as of: May 12, 2017  Content Version: 11.4  © 1063-9412 [employer]. Care instructions adapted under license by [employer] (which disclaims liability or warranty for this information). If you have questions about a medical condition or this instruction, always ask your healthcare professional. [address] any warranty or liability for your use of this information.

## 2018-01-26 NOTE — PROGRESS NOTES
Bedside shift change report given to Stanley Meckel, RN (oncoming nurse) by Carola Lopez RN (offgoing nurse). Report included the following information SBAR, Kardex, Intake/Output and Recent Results.

## 2019-01-26 ENCOUNTER — HOSPITAL ENCOUNTER (EMERGENCY)
Age: 44
Discharge: HOME | End: 2019-01-26
Attending: EMERGENCY MEDICINE
Payer: COMMERCIAL

## 2019-01-26 VITALS
DIASTOLIC BLOOD PRESSURE: 107 MMHG | RESPIRATION RATE: 16 BRPM | WEIGHT: 315 LBS | HEART RATE: 93 BPM | SYSTOLIC BLOOD PRESSURE: 179 MMHG | OXYGEN SATURATION: 97 % | BODY MASS INDEX: 50.62 KG/M2 | HEIGHT: 66 IN | TEMPERATURE: 98 F

## 2019-01-26 DIAGNOSIS — G51.0 BELL'S PALSY: Primary | ICD-10-CM

## 2019-01-26 PROCEDURE — 99282 EMERGENCY DEPT VISIT SF MDM: CPT

## 2019-01-26 RX ORDER — PREDNISONE 20 MG/1
60 TABLET ORAL DAILY
Qty: 15 TAB | Refills: 0 | Status: SHIPPED | OUTPATIENT
Start: 2019-01-26 | End: 2019-01-31

## 2019-01-26 RX ORDER — VALACYCLOVIR HYDROCHLORIDE 1 G/1
1000 TABLET, FILM COATED ORAL 3 TIMES DAILY
Qty: 21 TAB | Refills: 0 | Status: SHIPPED | OUTPATIENT
Start: 2019-01-26 | End: 2019-02-02

## 2019-01-26 NOTE — ED PROVIDER NOTES
39 y.o. male with no significant past medical history presents with complaints of left sided facial droop x 3 days. The pt states \"it started off with this pain behind my left ear. I noticed my face feeling numb around the same time three days ago. This morning I noticed the left side of my face didn't look right. \"  Pt denies dysarthria, ataxia, unilateral weakness, or vision changes. There are no other acute medical complaints at this time. PCP: None Cira Acosta PA-C Past Medical History:  
Diagnosis Date  Diverticulitis  Kidney infection No past surgical history on file. No family history on file. Social History Socioeconomic History  Marital status: SINGLE Spouse name: Not on file  Number of children: Not on file  Years of education: Not on file  Highest education level: Not on file Social Needs  Financial resource strain: Not on file  Food insecurity - worry: Not on file  Food insecurity - inability: Not on file  Transportation needs - medical: Not on file  Transportation needs - non-medical: Not on file Occupational History  Not on file Tobacco Use  Smoking status: Current Every Day Smoker Packs/day: 1.00 Years: 15.00 Pack years: 15.00  Smokeless tobacco: Never Used Substance and Sexual Activity  Alcohol use: Not on file  Drug use: No  
 Sexual activity: Not on file Other Topics Concern  Not on file Social History Narrative  Not on file ALLERGIES: Patient has no known allergies. Review of Systems Constitutional: Negative for chills, diaphoresis and fever. HENT: Negative for congestion, postnasal drip, rhinorrhea and sore throat. Eyes: Negative for photophobia, discharge, redness and visual disturbance. Respiratory: Negative for cough, chest tightness, shortness of breath and wheezing. Cardiovascular: Negative for chest pain, palpitations and leg swelling. Gastrointestinal: Negative for abdominal distention, abdominal pain, blood in stool, constipation, diarrhea, nausea and vomiting. Genitourinary: Negative for difficulty urinating, dysuria, frequency, hematuria and urgency. Musculoskeletal: Negative for arthralgias, back pain, joint swelling and myalgias. Skin: Negative for color change and rash. Neurological: Positive for facial asymmetry. Negative for dizziness, speech difficulty, weakness, light-headedness, numbness and headaches. Psychiatric/Behavioral: Negative for confusion. The patient is not nervous/anxious. All other systems reviewed and are negative. Vitals:  
 01/26/19 1253 01/26/19 1322 BP:  (!) 179/107 Pulse: 85 93 Resp:  16 Temp:  98 °F (36.7 °C) SpO2: 98% 97% Weight:  143.7 kg (316 lb 12.8 oz) Height:  5' 6\" (1.676 m) Physical Exam  
Constitutional: He is oriented to person, place, and time. He appears well-developed and well-nourished. No distress. HENT:  
Head: Normocephalic and atraumatic. Head is without raccoon's eyes, without Pierce's sign and without laceration. Right Ear: Hearing, tympanic membrane, external ear and ear canal normal. No foreign bodies. Tympanic membrane is not bulging. No hemotympanum. Left Ear: Hearing, tympanic membrane, external ear and ear canal normal. No foreign bodies. Tympanic membrane is not bulging. No hemotympanum. Nose: Nose normal. No mucosal edema or rhinorrhea. Right sinus exhibits no maxillary sinus tenderness and no frontal sinus tenderness. Left sinus exhibits no maxillary sinus tenderness and no frontal sinus tenderness. Mouth/Throat: Uvula is midline, oropharynx is clear and moist and mucous membranes are normal. No tonsillar abscesses. Eyes: Conjunctivae and EOM are normal. Pupils are equal, round, and reactive to light. Right eye exhibits no discharge. Left eye exhibits no discharge. Neck: Normal range of motion. Neck supple. Cardiovascular: Normal rate, regular rhythm and normal heart sounds. Exam reveals no gallop and no friction rub. No murmur heard. Regular rate and rhythm. No murmurs, gallops, rubs, or clicks. Pulmonary/Chest: Effort normal and breath sounds normal. No respiratory distress. He has no wheezes. He has no rales. No stridor or wheezes. No accessory muscle usage. No nasal flaring. Breath Sounds equal bilaterally. Musculoskeletal: Normal range of motion. He exhibits no edema, tenderness or deformity. Neurological: He is alert and oriented to person, place, and time. Left sided facial droop effecting forehead. Entire 7th cranial nerve appears to be involved. All other cranial nerves individually tested and are intact. Skin: He is not diaphoretic. Nursing note and vitals reviewed. MDM Number of Diagnoses or Management Options Bell's palsy:  
Diagnosis management comments: Pt afebrile and nontoxic appearing. Entire forehead is effected. Pt with trouble closing eye. Appears to have bells palsy. No sings of acute stroke. Bullock start on prednisone and antiviral medication. Discussed signs and symptoms of stroke and pt given strict return precautions. The pt verbalized his understanding of my instructions. Reviewed treatment plan with attending and they agree. Marla Mcintosh PA-C Procedures

## 2019-01-26 NOTE — ED TRIAGE NOTES
Triage note: Pt arrives with c/o left facial numbess and weakness since this morning around 0500. Pt notes a headache x 3 days. No other deficits noted.

## 2019-01-26 NOTE — DISCHARGE INSTRUCTIONS
Patient Education        Bell's Palsy: Care Instructions  Your Care Instructions    Bell's palsy is paralysis or weakness of the muscles on one side of the face. Often people with Bell's palsy have a droop on one side of the mouth and have trouble completely shutting the eye on the same side. Bell's palsy can also interfere with the sense of taste. These things happen when a nerve in your face becomes inflamed. Bell's palsy is not caused by a stroke. The cause of the nerve inflammation is not known. But some experts think that a virus may cause it. Because of this, doctors sometimes prescribe antiviral medicine to treat it. You also may get medicine to reduce swelling. Bell's palsy usually gets better on its own in a few weeks or months. Follow-up care is a key part of your treatment and safety. Be sure to make and go to all appointments, and call your doctor if you are having problems. It's also a good idea to know your test results and keep a list of the medicines you take. How can you care for yourself at home? · Take your medicines exactly as prescribed. Call your doctor if you think you are having a problem with your medicine. You will get more details on the specific medicines your doctor prescribes. · Use artificial tears or ointment if your eyes are too dry. Bell's palsy can make your lower eyelid droop, causing a dry eye. · If you cannot completely close your eye, consider using an eye patch while you sleep. · Help yourself blink by using your finger to close and open your eyelid. This may help keep your eye moist.  · Wear glasses or goggles to keep dust and dirt out of your eye. · As feeling comes back to your face, massage your forehead, cheeks, and lips. Massage may make the muscles in your face stronger. · Brush and floss your teeth often to help prevent tooth decay. Bell's palsy can dry up the spit on one side of your mouth. This increases the risk of tooth decay.   When should you call for help?  Call 911 anytime you think you may need emergency care. For example, call if:    · You have symptoms of a stroke. These may include:  ? Sudden numbness, tingling, weakness, or loss of movement in your face, arm, or leg, especially on only one side of your body. ? Sudden vision changes. ? Sudden trouble speaking. ? Sudden confusion or trouble understanding simple statements. ? Sudden problems with walking or balance. ? A sudden, severe headache that is different from past headaches.    Call your doctor now or seek immediate medical care if:    · You have numbness or weakness that spreads beyond one side of your face.     · You have a skin rash or eye pain or redness, or light bothers your eyes.     · You have a new or worse headache.    Watch closely for changes in your health, and be sure to contact your doctor if:    · You do not get better as expected. Where can you learn more? Go to http://lanie-vandana.info/. Enter P168 in the search box to learn more about \"Bell's Palsy: Care Instructions. \"  Current as of: Stephenie 3, 2018  Content Version: 11.9  © 9121-0559 [employer]. Care instructions adapted under license by [employer] (which disclaims liability or warranty for this information). If you have questions about a medical condition or this instruction, always ask your healthcare professional. [address] any warranty or liability for your use of this information. We hope that we have addressed all of your medical concerns. The examination and treatment you received in the Emergency Department were for an emergent problem and were not intended as complete care. It is important that you follow up with your healthcare provider(s) for ongoing care. If your symptoms worsen or do not improve as expected, and you are unable to reach your usual health care provider(s), you should return to the Emergency Department.       Today's healthcare is undergoing tremendous change, and patient satisfaction surveys are one of the many tools to assess the quality of medical care. You may receive a survey from the [employer] regarding your experience in the Emergency Department. I hope that your experience has been completely positive, particularly the medical care that I provided. As such, please participate in the survey; anything less than excellent does not meet my expectations or intentions. [facility] and [facility] participate in nationally recognized quality of care measures. If your blood pressure is greater than 120/80, as reported below, we urge that you seek medical care to address the potential of high blood pressure, commonly known as hypertension. Hypertension can be hereditary or can be caused by certain medical conditions, pain, stress, or \"white coat syndrome. \"       Please make an appointment with your health care provider(s) for follow up of your Emergency Department visit. VITALS:   Patient Vitals for the past 8 hrs:   Temp Pulse Resp BP SpO2   01/26/19 1322 98 °F (36.7 °C) 93 16 (!) 179/107 97 %   01/26/19 1253 -- 85 -- -- 98 %          Thank you for allowing us to provide you with medical care today. We realize that you have many choices for your emergency care needs. Please choose us in the future for any continued health care needs. Radha Garber, [address]: [phone]            No results found for this or any previous visit (from the past 24 hour(s)). No results found.

## 2019-02-01 ENCOUNTER — APPOINTMENT (OUTPATIENT)
Dept: CT IMAGING | Age: 44
End: 2019-02-01
Attending: EMERGENCY MEDICINE
Payer: COMMERCIAL

## 2019-02-01 ENCOUNTER — HOSPITAL ENCOUNTER (EMERGENCY)
Age: 44
Discharge: HOME | End: 2019-02-01
Attending: EMERGENCY MEDICINE
Payer: COMMERCIAL

## 2019-02-01 VITALS
TEMPERATURE: 98.2 F | OXYGEN SATURATION: 99 % | BODY MASS INDEX: 51.94 KG/M2 | SYSTOLIC BLOOD PRESSURE: 157 MMHG | WEIGHT: 311.73 LBS | RESPIRATION RATE: 16 BRPM | DIASTOLIC BLOOD PRESSURE: 102 MMHG | HEIGHT: 65 IN | HEART RATE: 84 BPM

## 2019-02-01 DIAGNOSIS — G51.0 BELL'S PALSY: Primary | ICD-10-CM

## 2019-02-01 DIAGNOSIS — I10 ESSENTIAL HYPERTENSION: ICD-10-CM

## 2019-02-01 PROCEDURE — 99282 EMERGENCY DEPT VISIT SF MDM: CPT

## 2019-02-01 PROCEDURE — 70450 CT HEAD/BRAIN W/O DYE: CPT

## 2019-02-01 PROCEDURE — 74011250636 HC RX REV CODE- 250/636: Performed by: EMERGENCY MEDICINE

## 2019-02-01 PROCEDURE — 96372 THER/PROPH/DIAG INJ SC/IM: CPT

## 2019-02-01 RX ORDER — GABAPENTIN 300 MG/1
300 CAPSULE ORAL 3 TIMES DAILY
Qty: 42 CAP | Refills: 0 | Status: SHIPPED | OUTPATIENT
Start: 2019-02-01 | End: 2019-02-08

## 2019-02-01 RX ORDER — AMLODIPINE BESYLATE 5 MG/1
5 TABLET ORAL DAILY
Qty: 30 TAB | Refills: 2 | Status: SHIPPED | OUTPATIENT
Start: 2019-02-01 | End: 2019-02-08

## 2019-02-01 RX ORDER — NAPROXEN 500 MG/1
500 TABLET ORAL 2 TIMES DAILY WITH MEALS
Qty: 20 TAB | Refills: 0 | Status: SHIPPED | OUTPATIENT
Start: 2019-02-01 | End: 2019-02-08

## 2019-02-01 RX ORDER — KETOROLAC TROMETHAMINE 30 MG/ML
60 INJECTION, SOLUTION INTRAMUSCULAR; INTRAVENOUS
Status: COMPLETED | OUTPATIENT
Start: 2019-02-01 | End: 2019-02-01

## 2019-02-01 RX ORDER — HYDROCODONE BITARTRATE AND ACETAMINOPHEN 5; 325 MG/1; MG/1
1 TABLET ORAL
Qty: 15 TAB | Refills: 0 | Status: SHIPPED | OUTPATIENT
Start: 2019-02-01 | End: 2019-02-08

## 2019-02-01 RX ADMIN — KETOROLAC TROMETHAMINE 60 MG: 30 INJECTION, SOLUTION INTRAMUSCULAR at 07:20

## 2019-02-01 NOTE — ED TRIAGE NOTES
\"I was here last week with Bell's Palsy and as soon as I finished the medication, the pain and drooping started again. \"  Reports he wants something for the \"pain in my head. \"  LEFT sided facial droop noted involving the entirety of that side. Pt has not made an appt for followup. Noted to be hypertensive in triage, denies history, but was similar during last ER visit. Still taking Valtrex, completed course of prednisone, out of Norco.  Last took Tylenol at 1900, pt is driving.

## 2019-02-01 NOTE — LETTER
[address] 
[address] [facility] [address] 
[phone] Work/School Note Date: 2/1/2019 To Whom It May concern: 
 
Marcelino Jacob was seen and treated today in the emergency room by the following provider(s): 
Attending Provider: Jelena Lane MD.   
 
Marcelino Jacob may return to work on 2/4/19.  
 
Sincerely, 
 
 
 
 
Oscar Torres MD

## 2019-02-01 NOTE — ED PROVIDER NOTES
The patient presents to the ED with L sided facial droop and headache. He began with pain behind his L ear on 1/23. He developed L sided facial droop on 1/26 and was seen in the ED. He ws diagnosed with Bell's Palsy and prescribed Prednisone and Valtrex. He took 5 days of Prednisone. He notes continued L sided facial droop. He has severe L sided facial pain and headache. Pain is 10/10. He has tried [facility] powder with no relief. He denies any visual changes. He denies any difficulty speaking or swallowing. No arm numbness or weakness. He has no other concerns. The history is provided by the patient. Past Medical History:  
Diagnosis Date  Diverticulitis  Kidney infection History reviewed. No pertinent surgical history. History reviewed. No pertinent family history. Social History Socioeconomic History  Marital status: SINGLE Spouse name: Not on file  Number of children: Not on file  Years of education: Not on file  Highest education level: Not on file Social Needs  Financial resource strain: Not on file  Food insecurity - worry: Not on file  Food insecurity - inability: Not on file  Transportation needs - medical: Not on file  Transportation needs - non-medical: Not on file Occupational History  Not on file Tobacco Use  Smoking status: Current Every Day Smoker Packs/day: 1.00 Years: 15.00 Pack years: 15.00  Smokeless tobacco: Never Used Substance and Sexual Activity  Alcohol use: No  
  Frequency: Never  Drug use: No  
 Sexual activity: Not on file Other Topics Concern  Not on file Social History Narrative  Not on file ALLERGIES: Patient has no known allergies. Review of Systems Constitutional: Negative for appetite change and fever. HENT: Negative for congestion, nosebleeds and sore throat. Eyes: Negative for discharge and visual disturbance. Respiratory: Negative for cough and shortness of breath. Cardiovascular: Negative for chest pain. Gastrointestinal: Negative for abdominal pain, diarrhea, nausea and vomiting. Genitourinary: Negative for dysuria. Musculoskeletal: Negative. Skin: Negative for rash. Neurological: Positive for facial asymmetry, numbness (L face) and headaches. Negative for weakness. Hematological: Negative for adenopathy. Psychiatric/Behavioral: Negative. All other systems reviewed and are negative. Vitals:  
 02/01/19 6425 BP: (!) 164/116 Pulse: 90 Resp: 16 Temp: 98.2 °F (36.8 °C) SpO2: 99% Weight: 141.4 kg (311 lb 11.7 oz) Height: 5' 5\" (1.651 m) Physical Exam  
Constitutional: He is oriented to person, place, and time. He appears well-developed and well-nourished. HENT:  
Head: Normocephalic and atraumatic. Eyes: Conjunctivae and EOM are normal. Pupils are equal, round, and reactive to light. Neck: Normal range of motion. Neck supple. Cardiovascular: Normal rate, regular rhythm and normal heart sounds. Pulmonary/Chest: Effort normal and breath sounds normal.  
Abdominal: Soft. Bowel sounds are normal.  
Musculoskeletal: Normal range of motion. Neurological: He is alert and oriented to person, place, and time. A cranial nerve deficit (L sided facial droop. Unable to fully close his eye. ) and sensory deficit (L side of face numb per patient. ) is present. He exhibits normal muscle tone. Coordination normal.  
Skin: Skin is warm and dry. Capillary refill takes less than 2 seconds. Psychiatric: He has a normal mood and affect. Nursing note and vitals reviewed. MDM Procedures 7:30 AM 
CONSULT: 
Dr. Jason Barber - neurology - He notes that at times people with Bell's palsy will have pain. Recommends Neurontin 300 mg by mouth tid and Naproxen. F/U with neurology. A/P: 
1. Bell's palsy - Neurontin, Naproxen, Norco prn. Lacrilube. F/U with neurology 2. HTN - Begin Norvasc. Patient's results have been reviewed with them. Patient and/or family have verbally conveyed their understanding and agreement of the patient's signs, symptoms, diagnosis, treatment and prognosis and additionally agree to follow up as recommended or return to the Emergency Room should their condition change prior to follow-up. Discharge instructions have also been provided to the patient with some educational information regarding their diagnosis as well a list of reasons why they would want to return to the ER prior to their follow-up appointment should their condition change.

## 2019-02-01 NOTE — DISCHARGE INSTRUCTIONS
Patient Education        Bell's Palsy: Care Instructions  Your Care Instructions    Bell's palsy is paralysis or weakness of the muscles on one side of the face. Often people with Bell's palsy have a droop on one side of the mouth and have trouble completely shutting the eye on the same side. Bell's palsy can also interfere with the sense of taste. These things happen when a nerve in your face becomes inflamed. Bell's palsy is not caused by a stroke. The cause of the nerve inflammation is not known. But some experts think that a virus may cause it. Because of this, doctors sometimes prescribe antiviral medicine to treat it. You also may get medicine to reduce swelling. Bell's palsy usually gets better on its own in a few weeks or months. Follow-up care is a key part of your treatment and safety. Be sure to make and go to all appointments, and call your doctor if you are having problems. It's also a good idea to know your test results and keep a list of the medicines you take. How can you care for yourself at home? · Take your medicines exactly as prescribed. Call your doctor if you think you are having a problem with your medicine. You will get more details on the specific medicines your doctor prescribes. · Use artificial tears or ointment if your eyes are too dry. Bell's palsy can make your lower eyelid droop, causing a dry eye. · If you cannot completely close your eye, consider using an eye patch while you sleep. · Help yourself blink by using your finger to close and open your eyelid. This may help keep your eye moist.  · Wear glasses or goggles to keep dust and dirt out of your eye. · As feeling comes back to your face, massage your forehead, cheeks, and lips. Massage may make the muscles in your face stronger. · Brush and floss your teeth often to help prevent tooth decay. Bell's palsy can dry up the spit on one side of your mouth. This increases the risk of tooth decay.   When should you call for help?  Call 911 anytime you think you may need emergency care. For example, call if:    · You have symptoms of a stroke. These may include:  ? Sudden numbness, tingling, weakness, or loss of movement in your face, arm, or leg, especially on only one side of your body. ? Sudden vision changes. ? Sudden trouble speaking. ? Sudden confusion or trouble understanding simple statements. ? Sudden problems with walking or balance. ? A sudden, severe headache that is different from past headaches.    Call your doctor now or seek immediate medical care if:    · You have numbness or weakness that spreads beyond one side of your face.     · You have a skin rash or eye pain or redness, or light bothers your eyes.     · You have a new or worse headache.    Watch closely for changes in your health, and be sure to contact your doctor if:    · You do not get better as expected. Where can you learn more? Go to http://lanie-vandana.info/. Enter P168 in the search box to learn more about \"Bell's Palsy: Care Instructions. \"  Current as of: Stephenie 3, 2018  Content Version: 11.9  © 5066-9773 [employer]. Care instructions adapted under license by [employer] (which disclaims liability or warranty for this information). If you have questions about a medical condition or this instruction, always ask your healthcare professional. [address] any warranty or liability for your use of this information. Patient Education        High Blood Pressure: Care Instructions  Overview    It's normal for blood pressure to go up and down throughout the day. But if it stays up, you have high blood pressure. Another name for high blood pressure is hypertension. Despite what a lot of people think, high blood pressure usually doesn't cause headaches or make you feel dizzy or lightheaded. It usually has no symptoms.  But it does increase your risk of stroke, heart attack, and other problems. You and your doctor will talk about your risks of these problems based on your blood pressure. Your doctor will give you a goal for your blood pressure. Your goal will be based on your health and your age. Lifestyle changes, such as eating healthy and being active, are always important to help lower blood pressure. You might also take medicine to reach your blood pressure goal.  Follow-up care is a key part of your treatment and safety. Be sure to make and go to all appointments, and call your doctor if you are having problems. It's also a good idea to know your test results and keep a list of the medicines you take. How can you care for yourself at home? Medical treatment  · If you stop taking your medicine, your blood pressure will go back up. You may take one or more types of medicine to lower your blood pressure. Be safe with medicines. Take your medicine exactly as prescribed. Call your doctor if you think you are having a problem with your medicine. · Talk to your doctor before you start taking aspirin every day. Aspirin can help certain people lower their risk of a heart attack or stroke. But taking aspirin isn't right for everyone, because it can cause serious bleeding. · See your doctor regularly. You may need to see the doctor more often at first or until your blood pressure comes down. · If you are taking blood pressure medicine, talk to your doctor before you take decongestants or anti-inflammatory medicine, such as ibuprofen. Some of these medicines can raise blood pressure. · Learn how to check your blood pressure at home. Lifestyle changes  · Stay at a healthy weight. This is especially important if you put on weight around the waist. Losing even 10 pounds can help you lower your blood pressure. · If your doctor recommends it, get more exercise. Walking is a good choice. Bit by bit, increase the amount you walk every day. Try for at least 30 minutes on most days of the week.  You also may want to swim, bike, or do other activities. · Avoid or limit alcohol. Talk to your doctor about whether you can drink any alcohol. · Try to limit how much sodium you eat to less than 2,300 milligrams (mg) a day. Your doctor may ask you to try to eat less than 1,500 mg a day. · Eat plenty of fruits (such as bananas and oranges), vegetables, legumes, whole grains, and low-fat dairy products. · Lower the amount of saturated fat in your diet. Saturated fat is found in animal products such as milk, cheese, and meat. Limiting these foods may help you lose weight and also lower your risk for heart disease. · Do not smoke. Smoking increases your risk for heart attack and stroke. If you need help quitting, talk to your doctor about stop-smoking programs and medicines. These can increase your chances of quitting for good. When should you call for help? Call 911 anytime you think you may need emergency care. This may mean having symptoms that suggest that your blood pressure is causing a serious heart or blood vessel problem. Your blood pressure may be over 180/120.   For example, call 911 if:    · You have symptoms of a heart attack. These may include:  ? Chest pain or pressure, or a strange feeling in the chest.  ? Sweating. ? Shortness of breath. ? Nausea or vomiting. ? Pain, pressure, or a strange feeling in the back, neck, jaw, or upper belly or in one or both shoulders or arms. ? Lightheadedness or sudden weakness. ? A fast or irregular heartbeat.     · You have symptoms of a stroke. These may include:  ? Sudden numbness, tingling, weakness, or loss of movement in your face, arm, or leg, especially on only one side of your body. ? Sudden vision changes. ? Sudden trouble speaking. ? Sudden confusion or trouble understanding simple statements. ? Sudden problems with walking or balance. ? A sudden, severe headache that is different from past headaches.     · You have severe back or belly pain.  Do not wait until your blood pressure comes down on its own. Get help right away.   Call your doctor now or seek immediate care if:    · Your blood pressure is much higher than normal (such as 180/120 or higher), but you don't have symptoms.     · You think high blood pressure is causing symptoms, such as:  ? Severe headache.  ? Blurry vision.    Watch closely for changes in your health, and be sure to contact your doctor if:    · Your blood pressure measures higher than your doctor recommends at least 2 times. That means the top number is higher or the bottom number is higher, or both.     · You think you may be having side effects from your blood pressure medicine. Where can you learn more? Go to http://lanie-vandana.info/. Enter Z688 in the search box to learn more about \"High Blood Pressure: Care Instructions. \"  Current as of: July 22, 2018  Content Version: 11.9  © 3496-1078 [employer]. Care instructions adapted under license by [employer] (which disclaims liability or warranty for this information). If you have questions about a medical condition or this instruction, always ask your healthcare professional. [place] [address] any warranty or liability for your use of this information.

## 2019-02-01 NOTE — LETTER
[address] 
[address] [place], [place] [address] [address] 
[phone] Work/School Note Date: 2/1/2019 To Whom It May concern: 
 
Roslyn Michel was seen and treated today in the emergency room by the following provider(s): 
Attending Provider: Kiah Bhandari MD.   
 
Maria Alejandrayarelis Pearson Return to school work: 02/01/2019 Sincerely, Carmela Holstein, RN

## 2019-02-08 ENCOUNTER — HOSPITAL ENCOUNTER (EMERGENCY)
Age: 44
Discharge: HOME | End: 2019-02-08
Attending: EMERGENCY MEDICINE
Payer: COMMERCIAL

## 2019-02-08 VITALS
DIASTOLIC BLOOD PRESSURE: 83 MMHG | OXYGEN SATURATION: 98 % | HEART RATE: 61 BPM | TEMPERATURE: 96.7 F | RESPIRATION RATE: 16 BRPM | SYSTOLIC BLOOD PRESSURE: 129 MMHG

## 2019-02-08 DIAGNOSIS — R42 VERTIGO: Primary | ICD-10-CM

## 2019-02-08 PROCEDURE — 74011250637 HC RX REV CODE- 250/637: Performed by: EMERGENCY MEDICINE

## 2019-02-08 PROCEDURE — 99284 EMERGENCY DEPT VISIT MOD MDM: CPT

## 2019-02-08 PROCEDURE — 74011250636 HC RX REV CODE- 250/636: Performed by: EMERGENCY MEDICINE

## 2019-02-08 PROCEDURE — 74011000250 HC RX REV CODE- 250: Performed by: EMERGENCY MEDICINE

## 2019-02-08 RX ORDER — ONDANSETRON 4 MG/1
4 TABLET, ORALLY DISINTEGRATING ORAL
Status: COMPLETED | OUTPATIENT
Start: 2019-02-08 | End: 2019-02-08

## 2019-02-08 RX ORDER — MECLIZINE HCL 12.5 MG 12.5 MG/1
25 TABLET ORAL
Status: COMPLETED | OUTPATIENT
Start: 2019-02-08 | End: 2019-02-08

## 2019-02-08 RX ORDER — MECLIZINE HYDROCHLORIDE 25 MG/1
25 TABLET ORAL
Qty: 15 TAB | Refills: 0 | Status: ON HOLD | OUTPATIENT
Start: 2019-02-08 | End: 2020-08-10

## 2019-02-08 RX ORDER — ONDANSETRON 4 MG/1
4 TABLET, ORALLY DISINTEGRATING ORAL
Qty: 10 TAB | Refills: 0 | Status: ON HOLD | OUTPATIENT
Start: 2019-02-08 | End: 2020-08-10

## 2019-02-08 RX ADMIN — LIDOCAINE HYDROCHLORIDE 40 ML: 20 SOLUTION ORAL; TOPICAL at 11:31

## 2019-02-08 RX ADMIN — MECLIZINE 25 MG: 12.5 TABLET ORAL at 10:55

## 2019-02-08 RX ADMIN — ONDANSETRON 4 MG: 4 TABLET, ORALLY DISINTEGRATING ORAL at 10:55

## 2019-02-08 NOTE — ED NOTES
The PA has reviewed discharge instructions with the patient. The patient verbalized understanding. Patient ambulatory out of ER with steady gate.

## 2019-02-08 NOTE — ED TRIAGE NOTES
Arrived ambulatory from home for complaints of dizziness, weight loss,  
\" feeling hot\", shortness of breath, nausea, lack of appetite, hiccups, hearing difficulties and one episode of vomiting yesterday. Patient states he was dx with [place] palsy 2 weeks ago and has been having continued symptoms since his dx. [facility]

## 2019-02-08 NOTE — DISCHARGE INSTRUCTIONS
Antivert: 1 pill every 8 hours if needed for dizziness. Zofran:1 pill every 8 hours if needed for nausea. Return to ER for any fever, chills, vomiting despite zofran, headache, weakness. Vertigo: Care Instructions  Your Care Instructions    Vertigo is the feeling that you or your surroundings are moving when there is no actual movement. It is often described as a feeling of spinning, whirling, falling, or tilting. Vertigo may make you vomit or feel nauseated. You may have trouble standing or walking and may lose your balance. Vertigo is often related to an inner ear problem, but it can have other more serious causes. If vertigo continues, you may need more tests to find its cause. Follow-up care is a key part of your treatment and safety. Be sure to make and go to all appointments, and call your doctor if you are having problems. It's also a good idea to know your test results and keep a list of the medicines you take. How can you care for yourself at home? · Do not lie flat on your back. Prop yourself up slightly. This may reduce the spinning feeling. Keep your eyes open. · Move slowly so that you do not fall. · If your doctor recommends medicine, take it exactly as directed. · Do not drive while you are having vertigo. Certain exercises, called Malloy-Daroff exercises, can help decrease vertigo. To do Malloy-Daroff exercises:  · Sit on the edge of a bed or sofa and quickly lie down on the side that causes the worst vertigo. Lie on your side with your ear down. · Stay in this position for at least 30 seconds or until the vertigo goes away. · Sit up. If this causes vertigo, wait for it to stop. · Repeat the procedure on the other side. · Repeat this 10 times. Do these exercises 2 times a day until the vertigo is gone. When should you call for help? Call 911 anytime you think you may need emergency care.  For example, call if:    · You passed out (lost consciousness).     · You have symptoms of a stroke. These may include:  ? Sudden numbness, tingling, weakness, or loss of movement in your face, arm, or leg, especially on only one side of your body. ? Sudden vision changes. ? Sudden trouble speaking. ? Sudden confusion or trouble understanding simple statements. ? Sudden problems with walking or balance. ? A sudden, severe headache that is different from past headaches.    Call your doctor now or seek immediate medical care if:    · Vertigo occurs with a fever, a headache, or ringing in your ears.     · You have new or increased nausea and vomiting.    Watch closely for changes in your health, and be sure to contact your doctor if:    · Vertigo gets worse or happens more often.     · Vertigo has not gotten better after 2 weeks. Where can you learn more? Go to http://lanie-vandana.info/. Enter A062 in the search box to learn more about \"Vertigo: Care Instructions. \"  Current as of: March 27, 2018  Content Version: 11.9  © 3662-0147 [employer]. Care instructions adapted under license by [employer] (which disclaims liability or warranty for this information). If you have questions about a medical condition or this instruction, always ask your healthcare professional. [place] [address] any warranty or liability for your use of this information.

## 2019-02-08 NOTE — ED PROVIDER NOTES
40 yo male with dizziness. Pt was diagnosed with Emmaus Palsy 2 weeks ago. Pt reports continued dizziness. Dizziness described as room spinning sensation. Worse with positional change. Nausea started last night. Vomited x 1. Pt has been steroid twice. Currently on day 3 of 2nd round. Pt has followed up but does not known name of doctor. Pt has also noted decreased in hearing. No fever or chills. No chest pain, shortness of breath. Able to eat and drink. Social hx 
+smoker No alcohol The history is provided by the patient. Nausea Pertinent negatives include no chills, no fever, no abdominal pain, no headaches and no headaches. Past Medical History:  
Diagnosis Date  Diverticulitis  Kidney infection No past surgical history on file. No family history on file. Social History Socioeconomic History  Marital status: SINGLE Spouse name: Not on file  Number of children: Not on file  Years of education: Not on file  Highest education level: Not on file Social Needs  Financial resource strain: Not on file  Food insecurity - worry: Not on file  Food insecurity - inability: Not on file  Transportation needs - medical: Not on file  Transportation needs - non-medical: Not on file Occupational History  Not on file Tobacco Use  Smoking status: Current Every Day Smoker Packs/day: 1.00 Years: 15.00 Pack years: 15.00  Smokeless tobacco: Never Used Substance and Sexual Activity  Alcohol use: No  
  Frequency: Never  Drug use: No  
 Sexual activity: Not on file Other Topics Concern  Not on file Social History Narrative  Not on file ALLERGIES: Patient has no known allergies. Review of Systems Constitutional: Negative for chills and fever. HENT: Negative for congestion, ear discharge and ear pain. Respiratory: Negative for chest tightness and shortness of breath. Cardiovascular: Negative for chest pain. Gastrointestinal: Positive for nausea and vomiting. Negative for abdominal pain. Musculoskeletal: Negative for back pain and neck pain. Skin: Negative for color change and rash. Neurological: Positive for dizziness. Negative for light-headedness and headaches. Vitals:  
 02/08/19 1019 Pulse: 77 SpO2: 97% Physical Exam  
Constitutional: He is oriented to person, place, and time. He appears well-developed and well-nourished. No distress. HENT:  
Head: Normocephalic and atraumatic. Right Ear: External ear normal. No lacerations. No drainage, swelling or tenderness. No foreign bodies. No mastoid tenderness. Tympanic membrane is not injected. No middle ear effusion. No hemotympanum. No decreased hearing is noted. Left Ear: External ear normal. No lacerations. No drainage, swelling or tenderness. No foreign bodies. No mastoid tenderness. Tympanic membrane is not injected. No middle ear effusion. No hemotympanum. No decreased hearing is noted. Mouth/Throat: Oropharynx is clear and moist.  
Eyes: EOM are normal. Pupils are equal, round, and reactive to light. Neck: Normal range of motion. Neck supple. Cardiovascular: Normal rate and regular rhythm. Pulmonary/Chest: Effort normal and breath sounds normal.  
Abdominal: Soft. He exhibits no distension and no mass. There is no tenderness. There is no rebound and no guarding. Musculoskeletal: Normal range of motion. Neurological: He is alert and oriented to person, place, and time. A cranial nerve deficit is present. Unable to fully close left eye. Slight left droop of mouth Skin: Skin is warm and dry. Capillary refill takes less than 2 seconds. Psychiatric: He has a normal mood and affect. His behavior is normal.  
Nursing note and vitals reviewed. MDM Number of Diagnoses or Management Options Diagnosis management comments: 40 yo male presenting for dizziness and nausea. Following bells palsy dx. No headache. No  Weakness. alert and oriented. No distress. P: antivert, meclizine Progress Note:  
Pt has been reexamined by Edvin Beaulieu PA-C. Pt is feeling much better. Symptoms have improved. All available results have been reviewed with pt and any available family. Pt understands sx, dx, and tx in ED. Care plan has been outlined and questions have been answered. Pt is ready to go home. Will send home on zofran and antivert . Outpatient referral with PCP. Written by Edvin Beaulieu PA-C,1:15 PM 
 
Patient's results have been reviewed with them. Patient and/or family have verbally conveyed their understanding and agreement of the patient's signs, symptoms, diagnosis, treatment and prognosis and additionally agree to follow up as recommended or return to the Emergency Room should their condition change prior to follow-up. Discharge instructions have also been provided to the patient with some educational information regarding their diagnosis as well a list of reasons why they would want to return to the ER prior to their follow-up appointment should their condition change. Procedures Pt has been seen and evaluated by attending physician who has reviewed lab work and imaging tests. He agrees with discharge and prescription plan.

## 2019-03-24 ENCOUNTER — HOSPITAL ENCOUNTER (OUTPATIENT)
Dept: MRI IMAGING | Age: 44
Discharge: HOME | End: 2019-03-24
Attending: OTOLARYNGOLOGY
Payer: COMMERCIAL

## 2019-03-24 VITALS — BODY MASS INDEX: 50.75 KG/M2 | WEIGHT: 305 LBS

## 2019-03-24 DIAGNOSIS — H90.72: ICD-10-CM

## 2019-03-24 PROCEDURE — 70553 MRI BRAIN STEM W/O & W/DYE: CPT

## 2019-03-24 PROCEDURE — A9575 INJ GADOTERATE MEGLUMI 0.1ML: HCPCS | Performed by: OTOLARYNGOLOGY

## 2019-03-24 PROCEDURE — 74011250636 HC RX REV CODE- 250/636: Performed by: OTOLARYNGOLOGY

## 2019-03-24 RX ORDER — GADOTERATE MEGLUMINE 376.9 MG/ML
20 INJECTION INTRAVENOUS
Status: COMPLETED | OUTPATIENT
Start: 2019-03-24 | End: 2019-03-24

## 2019-03-24 RX ADMIN — GADOTERATE MEGLUMINE 20 ML: 376.9 INJECTION INTRAVENOUS at 11:00

## 2019-05-10 ENCOUNTER — HOSPITAL ENCOUNTER (EMERGENCY)
Age: 44
Discharge: HOME | End: 2019-05-10
Attending: EMERGENCY MEDICINE
Payer: COMMERCIAL

## 2019-05-10 ENCOUNTER — APPOINTMENT (OUTPATIENT)
Dept: GENERAL RADIOLOGY | Age: 44
End: 2019-05-10
Attending: EMERGENCY MEDICINE
Payer: COMMERCIAL

## 2019-05-10 VITALS
RESPIRATION RATE: 16 BRPM | OXYGEN SATURATION: 96 % | SYSTOLIC BLOOD PRESSURE: 133 MMHG | TEMPERATURE: 98.7 F | HEART RATE: 93 BPM | DIASTOLIC BLOOD PRESSURE: 95 MMHG

## 2019-05-10 DIAGNOSIS — L03.011 FELON OF FINGER OF RIGHT HAND: Primary | ICD-10-CM

## 2019-05-10 PROCEDURE — 74011250636 HC RX REV CODE- 250/636: Performed by: EMERGENCY MEDICINE

## 2019-05-10 PROCEDURE — 74011000250 HC RX REV CODE- 250: Performed by: EMERGENCY MEDICINE

## 2019-05-10 PROCEDURE — 90715 TDAP VACCINE 7 YRS/> IM: CPT | Performed by: EMERGENCY MEDICINE

## 2019-05-10 PROCEDURE — 87205 SMEAR GRAM STAIN: CPT

## 2019-05-10 PROCEDURE — 74011250637 HC RX REV CODE- 250/637: Performed by: EMERGENCY MEDICINE

## 2019-05-10 PROCEDURE — 87147 CULTURE TYPE IMMUNOLOGIC: CPT

## 2019-05-10 PROCEDURE — 75810000289 HC I&D ABSCESS SIMP/COMP/MULT

## 2019-05-10 PROCEDURE — 36415 COLL VENOUS BLD VENIPUNCTURE: CPT

## 2019-05-10 PROCEDURE — 90471 IMMUNIZATION ADMIN: CPT

## 2019-05-10 PROCEDURE — 99283 EMERGENCY DEPT VISIT LOW MDM: CPT

## 2019-05-10 PROCEDURE — 73140 X-RAY EXAM OF FINGER(S): CPT

## 2019-05-10 RX ORDER — CLINDAMYCIN HYDROCHLORIDE 150 MG/1
600 CAPSULE ORAL
Status: COMPLETED | OUTPATIENT
Start: 2019-05-10 | End: 2019-05-10

## 2019-05-10 RX ORDER — BUPIVACAINE HYDROCHLORIDE 5 MG/ML
2 INJECTION, SOLUTION EPIDURAL; INTRACAUDAL
Status: COMPLETED | OUTPATIENT
Start: 2019-05-10 | End: 2019-05-10

## 2019-05-10 RX ORDER — SULFAMETHOXAZOLE AND TRIMETHOPRIM 800; 160 MG/1; MG/1
1 TABLET ORAL 2 TIMES DAILY
Qty: 14 TAB | Refills: 0 | Status: SHIPPED | OUTPATIENT
Start: 2019-05-10 | End: 2019-05-17

## 2019-05-10 RX ADMIN — TETANUS TOXOID, REDUCED DIPHTHERIA TOXOID AND ACELLULAR PERTUSSIS VACCINE, ADSORBED 0.5 ML: 5; 2.5; 8; 8; 2.5 SUSPENSION INTRAMUSCULAR at 12:38

## 2019-05-10 RX ADMIN — BUPIVACAINE HYDROCHLORIDE 10 MG: 5 INJECTION, SOLUTION EPIDURAL; INTRACAUDAL; PERINEURAL at 12:41

## 2019-05-10 RX ADMIN — CLINDAMYCIN HYDROCHLORIDE 600 MG: 150 CAPSULE ORAL at 12:36

## 2019-05-10 NOTE — ED TRIAGE NOTES
Pt reports slamming right third finger in car door this past Sunday. Pt states his finger is more swollen and is throbbing.

## 2019-05-10 NOTE — DISCHARGE INSTRUCTIONS
Patient Education        Felon Infection: Care Instructions  Your Care Instructions  An infection of the pad of the finger is called a felon or pulp-space infection. The finger is made up of several small areas of tissue. Because of this, pus from an infection can build up with no place to go. Then the infection can spread deeper into the finger. Sometimes it can spread into the bone. A finger infection can happen after a cut, a scrape, a puncture, or some other injury. Sometimes the cause isn't known. Your finger may be painful and red. Mild finger infections may be treated with antibiotics alone. You also may soak your finger in warm water. If the infection is deeper or there is a lot of pus, the doctor may open the area to drain the pus. This is sometimes done in an operating room. Follow-up care is a key part of your treatment and safety. Be sure to make and go to all appointments, and call your doctor if you are having problems. It's also a good idea to know your test results and keep a list of the medicines you take. How can you care for yourself at home? · If your doctor prescribed antibiotics, take them as directed. Do not stop taking them just because you feel better. You need to take the full course of antibiotics. · Be safe with medicines. Read and follow all instructions on the label. ? If the doctor gave you a prescription medicine for pain, take it as prescribed. ? If you are not taking a prescription pain medicine, ask your doctor if you can take an over-the-counter medicine. · Prop up your hand on a pillow anytime you sit or lie down during the next 3 days. Try to keep the area above the level of your heart. This will help reduce swelling. · If your doctor told you how to care for your wound, follow your doctor's instructions. If you did not get instructions, follow this general advice:  ? Wash the area with clean water 2 times a day.  Don't use hydrogen peroxide or alcohol, which can slow healing. ? Cover with a nonstick bandage. · If the area was packed with gauze:  ? Go to your follow-up appointments to have the gauze changed or removed. ? Your doctor may ask you to remove the gauze. If so, gently pull out all of the gauze when your doctor tells you to. When should you call for help? Call your doctor now or seek immediate medical care if:    · You have signs that the infection is getting worse, such as:  ? Increased pain, swelling, warmth, or redness. ? Red streaks leading from the area. ? Pus draining from the area. ? A fever.    Watch closely for changes in your health, and be sure to contact your doctor if:    · You do not get better as expected. Where can you learn more? Go to http://lanie-vandaan.info/. Enter K580 in the search box to learn more about \"Felon Infection: Care Instructions. \"  Current as of: September 20, 2018  Content Version: 11.9  © 4475-3499 [employer]. Care instructions adapted under license by [employer] (which disclaims liability or warranty for this information). If you have questions about a medical condition or this instruction, always ask your healthcare professional. [place] [address] any warranty or liability for your use of this information.

## 2019-05-10 NOTE — ED PROVIDER NOTES
HPI Pt states that he caught his right third finger in the car door about 5 days ago. He states that he has been soaking his finger in salt water and taking tylenol without relief. Skin integrity is intact. There is gross swelling of the distal tuft area but no obvious bony deformity. Good neurovascular sensation. No apparent tendon or nerve injury. (see photos) Past Medical History:  
Diagnosis Date  Bell's palsy 01/25/2019  Diverticulitis  Hypertension  Kidney infection  [place] Hunt syndrome (geniculate herpes zoster) History reviewed. No pertinent surgical history. History reviewed. No pertinent family history. Social History Socioeconomic History  Marital status: SINGLE Spouse name: Not on file  Number of children: Not on file  Years of education: Not on file  Highest education level: Not on file Occupational History  Not on file Social Needs  Financial resource strain: Not on file  Food insecurity:  
  Worry: Not on file Inability: Not on file  Transportation needs:  
  Medical: Not on file Non-medical: Not on file Tobacco Use  Smoking status: Current Every Day Smoker Packs/day: 1.00 Years: 15.00 Pack years: 15.00  Smokeless tobacco: Never Used Substance and Sexual Activity  Alcohol use: No  
  Frequency: Never Comment: rarely  Drug use: No  
 Sexual activity: Not on file Lifestyle  Physical activity:  
  Days per week: Not on file Minutes per session: Not on file  Stress: Not on file Relationships  Social connections:  
  Talks on phone: Not on file Gets together: Not on file Attends Latter-day service: Not on file Active member of club or organization: Not on file Attends meetings of clubs or organizations: Not on file Relationship status: Not on file  Intimate partner violence:  
  Fear of current or ex partner: Not on file Emotionally abused: Not on file Physically abused: Not on file Forced sexual activity: Not on file Other Topics Concern  Not on file Social History Narrative  Not on file ALLERGIES: Meclizine Review of Systems Constitutional: Positive for activity change. Negative for appetite change, fever and unexpected weight change. HENT: Negative for congestion and trouble swallowing. Eyes: Negative for visual disturbance. Respiratory: Negative for cough, shortness of breath and wheezing. Cardiovascular: Negative for chest pain, palpitations and leg swelling. Gastrointestinal: Negative for abdominal pain, diarrhea, rectal pain and vomiting. Genitourinary: Negative for dysuria. Musculoskeletal: Positive for joint swelling. Skin: Positive for color change and wound. Neurological: Negative for dizziness, weakness and headaches. All other systems reviewed and are negative. Vitals:  
 05/10/19 1130 05/10/19 1152 BP:  (!) 163/112 Pulse: (!) 110 63 Resp:  16 Temp:  99.1 °F (37.3 °C) SpO2: 98% 95% Physical Exam  
Constitutional: He is oriented to person, place, and time. Morbidly obese black male; smoker HENT:  
Right Ear: External ear normal.  
Left Ear: External ear normal.  
Mouth/Throat: Oropharynx is clear and moist.  
Neck: Normal range of motion. Neck supple. Cardiovascular: Normal rate and regular rhythm. Pulmonary/Chest: Effort normal and breath sounds normal.  
Musculoskeletal: He exhibits edema and tenderness. He exhibits no deformity. Right third finger distal tuft is tender, swollen consistent with felon; Skin integrity is intact. There is no obvious bony deformity. Good neurovascular sensation. No apparent tendon or nerve injury. Lymphadenopathy:  
  He has no cervical adenopathy. Neurological: He is alert and oriented to person, place, and time. Nursing note and vitals reviewed. MDM Procedures Consult ortho [facility]) came to the ED to evaluate; he plans to remove the fingernail; incise and drain the felon and splint. Discussed plan of care with Dr. Merle Adames. 1:24 PM 
Patient's results and plan of care have been reviewed with him. Patient has  verbally conveyed his understanding and agreement of his signs, symptoms, diagnosis, treatment and prognosis and additionally agrees to follow up as recommended or return to the Emergency Room should  his condition change prior to follow-up. Discharge instructions have also been provided to the patient with some educational information regarding his diagnosis as well a list of reasons why he  would want to return to the ER prior to his follow-up appointment should his condition change. Pt is scheduled to be rechecked on Monday by Dr. Alexis Favre.  Dale Maldonado, JERRY

## 2019-05-10 NOTE — CONSULTS
ORTHO HAND CONSULT    Subjective:     Date of Consultation:  May 10, 2019    Escobar Stone is a 38 y.o.  male who is being seen for right long finger pain and swelling. He caught his finger in a car door 4 days ago. His swelling is getting progressively worse. He has noticed a white appearance of the nailbed and tuft of the finger. He denies fever or chills. Xrays are negative for fracture. ER provider is concerned about a finger felon. Patient Active Problem List    Diagnosis Date Noted    Diverticulitis of colon with perforation 01/23/2018    Diverticulitis 01/23/2018     History reviewed. No pertinent family history. Social History     Tobacco Use    Smoking status: Current Every Day Smoker     Packs/day: 1.00     Years: 15.00     Pack years: 15.00    Smokeless tobacco: Never Used   Substance Use Topics    Alcohol use: No     Frequency: Never     Comment: rarely     Past Medical History:   Diagnosis Date    Bell's palsy 01/25/2019    Diverticulitis     Hypertension     Kidney infection     [place] Hunt syndrome (geniculate herpes zoster)       History reviewed. No pertinent surgical history. Prior to Admission medications    Medication Sig Start Date End Date Taking? Authorizing Provider   meclizine (ANTIVERT) 25 mg tablet Take 1 Tab by mouth three (3) times daily as needed. 2/8/19   Scotty Waters PA-C   ondansetron (ZOFRAN ODT) 4 mg disintegrating tablet Take 1 Tab by mouth every eight (8) hours as needed for Nausea. 2/8/19   Scotty Waters PA-C     No current facility-administered medications for this encounter. Current Outpatient Medications   Medication Sig    meclizine (ANTIVERT) 25 mg tablet Take 1 Tab by mouth three (3) times daily as needed.  ondansetron (ZOFRAN ODT) 4 mg disintegrating tablet Take 1 Tab by mouth every eight (8) hours as needed for Nausea.      Allergies   Allergen Reactions    Meclizine Rash        Review of Systems:  A comprehensive review of systems was negative except for that written in the HPI. Objective:     Patient Vitals for the past 8 hrs:   BP Temp Pulse Resp SpO2   05/10/19 1152 (!) 163/112 99.1 °F (37.3 °C) 63 16 95 %   05/10/19 1130   (!) 110  98 %     Temp (24hrs), Av.1 °F (37.3 °C), Min:99.1 °F (37.3 °C), Max:99.1 °F (37.3 °C)    PHYSICAL EXAM: General: alert, cooperative, no distress, appears stated age  Musculoskeletal: Right long finger swollen from MCP to the tip, nail is elevated from the nailbed, apparent pus beneath skin around the nail and beneath the nail itself, finger is exquisitely tender   Neurological: motor and sensation intact in right long finger  Skin: see images, erythema and depigmentation noted on right long finger    IMAGING REVIEW:  XR 3RD FINGER RT MIN 2 V  INDICATION: Trauma. Shut right middle finger in car door  with swelling  COMPARISON: None. FINDINGS: Three views of the right third finger demonstrate no fracture or  malalignment. There is soft tissue swelling at the base of the nailbed. Subchondral lucencies are noted at the base middle phalanx of the middle finger. While there is no adjacent joint space loss these are likely degenerative. . . IMPRESSION:   No acute fracture.     Assessment/Plan:     Right long finger felon and subungual abscess  I&D and nail removal performed at bedside in ER (see procedure note)  F/U with hand surgery in 3-4 days  Discussed with Dr. Ritesh Swain PA

## 2019-05-10 NOTE — ED TRIAGE NOTES
Pt reports pain to right 3rd (middle) finger after shutting finger in car door on Sunday.  Finger swelling noted/

## 2019-05-10 NOTE — LETTER
NOTIFICATION RETURN TO WORK / SCHOOL 
 
5/10/2019 1:53 PM 
 
Mr. Bernarda Jordan [address] [place] [address] [phone] To Whom It May Concern: 
 
Bernarda Jordan is currently under the care of [facility]. He will return to work/school on: 5/14/19 If there are questions or concerns please have the patient contact our office. Sincerely, Shelby Martini NP

## 2019-05-10 NOTE — ED NOTES
Pt ambulatory out of ED with discharge instructions and prescriptions in hand given by Eleazar Troncoso NP; pt verbalized understanding of discharge paperwork and time allotted for questions. VSS. Pt alert and oriented.

## 2019-05-10 NOTE — PROCEDURES
I&D Finger Operative Report    Preoperative Diagnosis: Right long finger felon, subungual abscess    Postoperative Diagnosis:  Right long finger felon, subungual abscess    Surgeon: RADHAMES Hughes     Anesthesia: Local    Allergy:   Allergies   Allergen Reactions    Meclizine Rash       Procedure Details: The risks,benefits and alternatives of a incision and drainage were explained and consent was obtained for the procedure. The procedure is being done for therapeutic purposes. The right long finger was cleansed using Betadine and draped. Anesthetic using 0.5% marcaine was infiltrated locally to block the digit. Using a #15 blade, a small incision was made to the ulnar aspect of the right long finger tuft. Purulence was immediately encountered and culture was taken. A hemostat was used to break up loculations in the finger tuft. The nail was removed easily and purulence was noted beneath the nail as well. The nailbed and incisional area was then copiously irrigated with betadine tinged sterile saline. Xeroform and a sterile bulky dressing was applied to the finger. The patient tolerated the procedure well. Orders on Fluid: culture and sensitivity    Estimated Blood Loss: none    Specimens: culture of drainge         Complications:  None; patient tolerated the procedure well.              Signed By: RADHAMES Hughes

## 2019-05-12 LAB
BACTERIA SPEC CULT: ABNORMAL
GRAM STN SPEC: ABNORMAL
GRAM STN SPEC: ABNORMAL
SERVICE CMNT-IMP: ABNORMAL

## 2019-05-12 RX ORDER — AMOXICILLIN 500 MG/1
500 TABLET, FILM COATED ORAL 2 TIMES DAILY
Qty: 14 TAB | Refills: 0 | Status: SHIPPED | OUTPATIENT
Start: 2019-05-12 | End: 2019-05-19

## 2019-05-12 NOTE — PROGRESS NOTES
Attempted to reach patient.  Message left for call back concerning culture result and update on finger condition

## 2019-05-12 NOTE — PROGRESS NOTES
Pt returned call. His finger has been bandaged since procedure. Has not seen ortho yet.  
He would like to treat finger with amoxicillin 
erx for amoxicillin 500 mg bid x 7 d to [facility] and alec

## 2020-08-09 ENCOUNTER — HOSPITAL ENCOUNTER (INPATIENT)
Age: 45
LOS: 4 days | Discharge: LEFT BEFORE DISCHARGE | DRG: 871 | End: 2020-08-13
Attending: EMERGENCY MEDICINE | Admitting: INTERNAL MEDICINE
Payer: COMMERCIAL

## 2020-08-09 ENCOUNTER — APPOINTMENT (OUTPATIENT)
Dept: CT IMAGING | Age: 45
DRG: 871 | End: 2020-08-09
Attending: EMERGENCY MEDICINE
Payer: COMMERCIAL

## 2020-08-09 DIAGNOSIS — E66.01 CLASS 3 SEVERE OBESITY DUE TO EXCESS CALORIES WITH BODY MASS INDEX (BMI) OF 50.0 TO 59.9 IN ADULT, UNSPECIFIED WHETHER SERIOUS COMORBIDITY PRESENT (HCC): ICD-10-CM

## 2020-08-09 DIAGNOSIS — K57.20 COLONIC DIVERTICULAR ABSCESS: ICD-10-CM

## 2020-08-09 DIAGNOSIS — K57.92 DIVERTICULITIS: Primary | ICD-10-CM

## 2020-08-09 DIAGNOSIS — K57.20 DIVERTICULITIS OF LARGE INTESTINE WITH PERFORATION WITHOUT BLEEDING: ICD-10-CM

## 2020-08-09 LAB
ALBUMIN SERPL-MCNC: 3.2 G/DL (ref 3.5–5)
ALBUMIN/GLOB SERPL: 0.7 {RATIO} (ref 1.1–2.2)
ALP SERPL-CCNC: 97 U/L (ref 45–117)
ALT SERPL-CCNC: 78 U/L (ref 12–78)
ANION GAP SERPL CALC-SCNC: 6 MMOL/L (ref 5–15)
APPEARANCE UR: CLEAR
AST SERPL-CCNC: 60 U/L (ref 15–37)
BACTERIA URNS QL MICRO: NEGATIVE /HPF
BASOPHILS # BLD: 0 K/UL (ref 0–0.1)
BASOPHILS NFR BLD: 0 % (ref 0–1)
BILIRUB SERPL-MCNC: 2.1 MG/DL (ref 0.2–1)
BILIRUB UR QL CFM: POSITIVE
BUN SERPL-MCNC: 10 MG/DL (ref 6–20)
BUN/CREAT SERPL: 10 (ref 12–20)
CALCIUM SERPL-MCNC: 9 MG/DL (ref 8.5–10.1)
CHLORIDE SERPL-SCNC: 103 MMOL/L (ref 97–108)
CO2 SERPL-SCNC: 26 MMOL/L (ref 21–32)
COLOR UR: ABNORMAL
COMMENT, HOLDF: NORMAL
CREAT SERPL-MCNC: 0.97 MG/DL (ref 0.7–1.3)
DIFFERENTIAL METHOD BLD: ABNORMAL
EOSINOPHIL # BLD: 0 K/UL (ref 0–0.4)
EOSINOPHIL NFR BLD: 0 % (ref 0–7)
EPITH CASTS URNS QL MICRO: ABNORMAL /LPF
ERYTHROCYTE [DISTWIDTH] IN BLOOD BY AUTOMATED COUNT: 19.3 % (ref 11.5–14.5)
GLOBULIN SER CALC-MCNC: 4.4 G/DL (ref 2–4)
GLUCOSE SERPL-MCNC: 118 MG/DL (ref 65–100)
GLUCOSE UR STRIP.AUTO-MCNC: NEGATIVE MG/DL
HCT VFR BLD AUTO: 50 % (ref 36.6–50.3)
HGB BLD-MCNC: 17 G/DL (ref 12.1–17)
HGB UR QL STRIP: NEGATIVE
IMM GRANULOCYTES # BLD AUTO: 0.1 K/UL (ref 0–0.04)
IMM GRANULOCYTES NFR BLD AUTO: 1 % (ref 0–0.5)
KETONES UR QL STRIP.AUTO: 15 MG/DL
LEUKOCYTE ESTERASE UR QL STRIP.AUTO: ABNORMAL
LIPASE SERPL-CCNC: 18 U/L (ref 73–393)
LYMPHOCYTES # BLD: 1 K/UL (ref 0.8–3.5)
LYMPHOCYTES NFR BLD: 11 % (ref 12–49)
MCH RBC QN AUTO: 25.3 PG (ref 26–34)
MCHC RBC AUTO-ENTMCNC: 34 G/DL (ref 30–36.5)
MCV RBC AUTO: 74.3 FL (ref 80–99)
MONOCYTES # BLD: 0.8 K/UL (ref 0–1)
MONOCYTES NFR BLD: 8 % (ref 5–13)
NEUTS SEG # BLD: 8 K/UL (ref 1.8–8)
NEUTS SEG NFR BLD: 80 % (ref 32–75)
NITRITE UR QL STRIP.AUTO: POSITIVE
NRBC # BLD: 0 K/UL (ref 0–0.01)
NRBC BLD-RTO: 0 PER 100 WBC
PH UR STRIP: 6.5 [PH] (ref 5–8)
PLATELET # BLD AUTO: 192 K/UL (ref 150–400)
PMV BLD AUTO: 10.2 FL (ref 8.9–12.9)
POTASSIUM SERPL-SCNC: 3.6 MMOL/L (ref 3.5–5.1)
PROT SERPL-MCNC: 7.6 G/DL (ref 6.4–8.2)
PROT UR STRIP-MCNC: NEGATIVE MG/DL
RBC # BLD AUTO: 6.73 M/UL (ref 4.1–5.7)
RBC #/AREA URNS HPF: ABNORMAL /HPF (ref 0–5)
SAMPLES BEING HELD,HOLD: NORMAL
SODIUM SERPL-SCNC: 135 MMOL/L (ref 136–145)
SP GR UR REFRACTOMETRY: 1.02 (ref 1–1.03)
UR CULT HOLD, URHOLD: NORMAL
UROBILINOGEN UR QL STRIP.AUTO: 1 EU/DL (ref 0.2–1)
WBC # BLD AUTO: 9.9 K/UL (ref 4.1–11.1)
WBC URNS QL MICRO: ABNORMAL /HPF (ref 0–4)

## 2020-08-09 PROCEDURE — 93005 ELECTROCARDIOGRAM TRACING: CPT

## 2020-08-09 PROCEDURE — 65660000000 HC RM CCU STEPDOWN

## 2020-08-09 PROCEDURE — 81001 URINALYSIS AUTO W/SCOPE: CPT

## 2020-08-09 PROCEDURE — 74011250636 HC RX REV CODE- 250/636: Performed by: EMERGENCY MEDICINE

## 2020-08-09 PROCEDURE — 74177 CT ABD & PELVIS W/CONTRAST: CPT

## 2020-08-09 PROCEDURE — 99285 EMERGENCY DEPT VISIT HI MDM: CPT

## 2020-08-09 PROCEDURE — 83690 ASSAY OF LIPASE: CPT

## 2020-08-09 PROCEDURE — 96375 TX/PRO/DX INJ NEW DRUG ADDON: CPT

## 2020-08-09 PROCEDURE — 80053 COMPREHEN METABOLIC PANEL: CPT

## 2020-08-09 PROCEDURE — 36415 COLL VENOUS BLD VENIPUNCTURE: CPT

## 2020-08-09 PROCEDURE — 74011000258 HC RX REV CODE- 258: Performed by: RADIOLOGY

## 2020-08-09 PROCEDURE — 74011000258 HC RX REV CODE- 258: Performed by: INTERNAL MEDICINE

## 2020-08-09 PROCEDURE — 74011250636 HC RX REV CODE- 250/636: Performed by: INTERNAL MEDICINE

## 2020-08-09 PROCEDURE — 96374 THER/PROPH/DIAG INJ IV PUSH: CPT

## 2020-08-09 PROCEDURE — 74011250637 HC RX REV CODE- 250/637: Performed by: INTERNAL MEDICINE

## 2020-08-09 PROCEDURE — 74011636320 HC RX REV CODE- 636/320: Performed by: RADIOLOGY

## 2020-08-09 PROCEDURE — 85025 COMPLETE CBC W/AUTO DIFF WBC: CPT

## 2020-08-09 RX ORDER — HYDROMORPHONE HYDROCHLORIDE 2 MG/ML
1 INJECTION, SOLUTION INTRAMUSCULAR; INTRAVENOUS; SUBCUTANEOUS ONCE
Status: COMPLETED | OUTPATIENT
Start: 2020-08-09 | End: 2020-08-09

## 2020-08-09 RX ORDER — ACETAMINOPHEN 650 MG/1
650 SUPPOSITORY RECTAL
Status: DISCONTINUED | OUTPATIENT
Start: 2020-08-09 | End: 2020-08-13 | Stop reason: HOSPADM

## 2020-08-09 RX ORDER — ONDANSETRON 2 MG/ML
4 INJECTION INTRAMUSCULAR; INTRAVENOUS
Status: COMPLETED | OUTPATIENT
Start: 2020-08-09 | End: 2020-08-09

## 2020-08-09 RX ORDER — SODIUM CHLORIDE 9 MG/ML
100 INJECTION, SOLUTION INTRAVENOUS CONTINUOUS
Status: DISCONTINUED | OUTPATIENT
Start: 2020-08-09 | End: 2020-08-13 | Stop reason: HOSPADM

## 2020-08-09 RX ORDER — DM/P-EPHED/ACETAMINOPH/DOXYLAM 30-7.5/3
2 LIQUID (ML) ORAL
Status: DISCONTINUED | OUTPATIENT
Start: 2020-08-09 | End: 2020-08-13 | Stop reason: HOSPADM

## 2020-08-09 RX ORDER — HYDROMORPHONE HYDROCHLORIDE 1 MG/ML
1 INJECTION, SOLUTION INTRAMUSCULAR; INTRAVENOUS; SUBCUTANEOUS
Status: DISCONTINUED | OUTPATIENT
Start: 2020-08-09 | End: 2020-08-13 | Stop reason: HOSPADM

## 2020-08-09 RX ORDER — SODIUM CHLORIDE 0.9 % (FLUSH) 0.9 %
10 SYRINGE (ML) INJECTION
Status: COMPLETED | OUTPATIENT
Start: 2020-08-09 | End: 2020-08-09

## 2020-08-09 RX ORDER — SODIUM CHLORIDE 0.9 % (FLUSH) 0.9 %
5-40 SYRINGE (ML) INJECTION EVERY 8 HOURS
Status: DISCONTINUED | OUTPATIENT
Start: 2020-08-09 | End: 2020-08-13 | Stop reason: HOSPADM

## 2020-08-09 RX ORDER — MORPHINE SULFATE 2 MG/ML
1 INJECTION, SOLUTION INTRAMUSCULAR; INTRAVENOUS
Status: DISCONTINUED | OUTPATIENT
Start: 2020-08-09 | End: 2020-08-09

## 2020-08-09 RX ORDER — LEVOFLOXACIN 5 MG/ML
750 INJECTION, SOLUTION INTRAVENOUS EVERY 24 HOURS
Status: DISCONTINUED | OUTPATIENT
Start: 2020-08-10 | End: 2020-08-12

## 2020-08-09 RX ORDER — IBUPROFEN 200 MG
1 TABLET ORAL EVERY 24 HOURS
Status: DISCONTINUED | OUTPATIENT
Start: 2020-08-09 | End: 2020-08-13 | Stop reason: HOSPADM

## 2020-08-09 RX ORDER — ONDANSETRON 2 MG/ML
4 INJECTION INTRAMUSCULAR; INTRAVENOUS
Status: DISCONTINUED | OUTPATIENT
Start: 2020-08-09 | End: 2020-08-13 | Stop reason: HOSPADM

## 2020-08-09 RX ORDER — ACETAMINOPHEN 325 MG/1
650 TABLET ORAL
Status: DISCONTINUED | OUTPATIENT
Start: 2020-08-09 | End: 2020-08-13 | Stop reason: HOSPADM

## 2020-08-09 RX ORDER — METRONIDAZOLE 500 MG/100ML
500 INJECTION, SOLUTION INTRAVENOUS EVERY 12 HOURS
Status: DISCONTINUED | OUTPATIENT
Start: 2020-08-09 | End: 2020-08-12

## 2020-08-09 RX ORDER — PROMETHAZINE HYDROCHLORIDE 25 MG/1
12.5 TABLET ORAL
Status: DISCONTINUED | OUTPATIENT
Start: 2020-08-09 | End: 2020-08-13 | Stop reason: HOSPADM

## 2020-08-09 RX ORDER — SODIUM CHLORIDE 0.9 % (FLUSH) 0.9 %
5-40 SYRINGE (ML) INJECTION AS NEEDED
Status: DISCONTINUED | OUTPATIENT
Start: 2020-08-09 | End: 2020-08-13 | Stop reason: HOSPADM

## 2020-08-09 RX ADMIN — HYDROMORPHONE HYDROCHLORIDE 1 MG: 2 INJECTION INTRAMUSCULAR; INTRAVENOUS; SUBCUTANEOUS at 18:43

## 2020-08-09 RX ADMIN — SODIUM CHLORIDE 100 ML: 900 INJECTION, SOLUTION INTRAVENOUS at 18:52

## 2020-08-09 RX ADMIN — NICOTINE POLACRILEX 2 MG: 2 LOZENGE ORAL at 22:21

## 2020-08-09 RX ADMIN — HYDROMORPHONE HYDROCHLORIDE 1 MG: 2 INJECTION INTRAMUSCULAR; INTRAVENOUS; SUBCUTANEOUS at 22:22

## 2020-08-09 RX ADMIN — PIPERACILLIN AND TAZOBACTAM 3.38 G: 3; .375 INJECTION, POWDER, LYOPHILIZED, FOR SOLUTION INTRAVENOUS at 22:22

## 2020-08-09 RX ADMIN — IOPAMIDOL 100 ML: 755 INJECTION, SOLUTION INTRAVENOUS at 18:52

## 2020-08-09 RX ADMIN — ONDANSETRON 4 MG: 2 INJECTION INTRAMUSCULAR; INTRAVENOUS at 18:43

## 2020-08-09 RX ADMIN — Medication 10 ML: at 18:52

## 2020-08-09 RX ADMIN — SODIUM CHLORIDE 1000 ML: 9 INJECTION, SOLUTION INTRAVENOUS at 22:22

## 2020-08-09 NOTE — ED PROVIDER NOTES
HPI .  Patient has a history of Bell's palsy, pyelonephritis, diverticulitis, hypertension, and herpes zoster/Rosebud Hernandez syndrome. Patient thinks he had kidney stone pain as a teenager but not since. Patient reports similar pain with diverticulitis in the past.  Pain is described as severe and sharp. Pain is periumbilical and constant. He says if he pushes on his umbilical area it causes him to burp. Milk of magnesia and Prilosec did not decrease the pain. Patient has been hungry today but only ate one banana because he was concerned food would make the pain worse. Past Medical History:   Diagnosis Date    Bell's palsy 01/25/2019    Diverticulitis     Hypertension     Kidney infection     [place] Hunt syndrome (geniculate herpes zoster)        History reviewed. No pertinent surgical history. History reviewed. No pertinent family history.     Social History     Socioeconomic History    Marital status: SINGLE     Spouse name: Not on file    Number of children: Not on file    Years of education: Not on file    Highest education level: Not on file   Occupational History    Not on file   Social Needs    Financial resource strain: Not on file    Food insecurity     Worry: Not on file     Inability: Not on file    Transportation needs     Medical: Not on file     Non-medical: Not on file   Tobacco Use    Smoking status: Current Every Day Smoker     Packs/day: 1.00     Years: 15.00     Pack years: 15.00    Smokeless tobacco: Never Used   Substance and Sexual Activity    Alcohol use: No     Frequency: Never     Comment: rarely    Drug use: No    Sexual activity: Not on file   Lifestyle    Physical activity     Days per week: Not on file     Minutes per session: Not on file    Stress: Not on file   Relationships    Social connections     Talks on phone: Not on file     Gets together: Not on file     Attends Adventism service: Not on file     Active member of club or organization: Not on file Attends meetings of clubs or organizations: Not on file     Relationship status: Not on file    Intimate partner violence     Fear of current or ex partner: Not on file     Emotionally abused: Not on file     Physically abused: Not on file     Forced sexual activity: Not on file   Other Topics Concern    Not on file   Social History Narrative    Not on file         ALLERGIES: Meclizine    Review of Systems   Constitutional: Negative for appetite change. HENT: Positive for congestion. Eyes: Negative for redness. Respiratory: Negative for chest tightness and shortness of breath. Cardiovascular: Negative for chest pain. Gastrointestinal: Positive for abdominal pain. Negative for abdominal distention. Genitourinary: Positive for difficulty urinating. Urine seems to be a red color   Neurological: Negative for speech difficulty. Psychiatric/Behavioral: Positive for agitation and behavioral problems. The patient is nervous/anxious. Vitals:    08/09/20 1748   BP: (!) 155/100   Pulse: (!) 123   Resp: 16   Temp: 100 °F (37.8 °C)   SpO2: 96%            Physical Exam  Vitals signs and nursing note reviewed. Constitutional:       Comments: Morbidly obese   HENT:      Head: Normocephalic and atraumatic. Eyes:      Pupils: Pupils are equal, round, and reactive to light. Neck:      Musculoskeletal: Normal range of motion and neck supple. Cardiovascular:      Rate and Rhythm: Regular rhythm. Heart sounds: Normal heart sounds. No murmur. No friction rub. No gallop. Comments: Pulse rate 105  Pulmonary:      Effort: Pulmonary effort is normal. No respiratory distress. Breath sounds: No wheezing or rales. Abdominal:      Palpations: Abdomen is soft. Tenderness: There is generalized abdominal tenderness. There is no rebound. Musculoskeletal: Normal range of motion. General: No tenderness. Skin:     Findings: No erythema.    Neurological:      Mental Status: He is alert. Cranial Nerves: No cranial nerve deficit. Comments: Motor; symmetric   Psychiatric:         Behavior: Behavior normal.          MDM       Procedures        ED EKG interpretation:  Rhythm: sinus tachycardia; and regular . Rate (approx.): 105; Axis: normal; P wave: normal; QRS interval: normal ; ST/T wave: non-specific changes; in  Lead: V1  and V2 ; Other findings: . This EKG was interpreted by Rubin Mcmillan MD,ED Provider. 6:36 PM           Hospitalist [place] Text for Admission  9:34 PM    ED Room Number: ER12/12  Patient Name and age:  Galo Patterson 54 y.o.  male  Working Diagnosis:   1. Diverticulitis    2.  Colonic diverticular abscess      Readmission: no  Isolation Requirements:  no  Recommended Level of Care:  med/surg  Code Status:  FULL  Other:    Department:[facility] Adult ED - 21

## 2020-08-09 NOTE — ED TRIAGE NOTES
TRIAGE NOTE:  Patient arrives with c/o lower abdominal pain that sometimes radiates into right side. Patient reports still have appendix and a history of diverticulitis.        Late at yesterday morning at breakfast.

## 2020-08-10 LAB
ALBUMIN SERPL-MCNC: 2.9 G/DL (ref 3.5–5)
ALBUMIN/GLOB SERPL: 0.7 {RATIO} (ref 1.1–2.2)
ALP SERPL-CCNC: 90 U/L (ref 45–117)
ALT SERPL-CCNC: 66 U/L (ref 12–78)
ANION GAP SERPL CALC-SCNC: 8 MMOL/L (ref 5–15)
AST SERPL-CCNC: 54 U/L (ref 15–37)
ATRIAL RATE: 107 BPM
BILIRUB DIRECT SERPL-MCNC: 3.5 MG/DL (ref 0–0.2)
BILIRUB SERPL-MCNC: 3 MG/DL (ref 0.2–1)
BUN SERPL-MCNC: 11 MG/DL (ref 6–20)
BUN/CREAT SERPL: 10 (ref 12–20)
CALCIUM SERPL-MCNC: 8.8 MG/DL (ref 8.5–10.1)
CALCULATED R AXIS, ECG10: -25 DEGREES
CALCULATED T AXIS, ECG11: 7 DEGREES
CHLORIDE SERPL-SCNC: 102 MMOL/L (ref 97–108)
CO2 SERPL-SCNC: 25 MMOL/L (ref 21–32)
CREAT SERPL-MCNC: 1.05 MG/DL (ref 0.7–1.3)
DIAGNOSIS, 93000: NORMAL
ERYTHROCYTE [DISTWIDTH] IN BLOOD BY AUTOMATED COUNT: 19 % (ref 11.5–14.5)
GLOBULIN SER CALC-MCNC: 4.3 G/DL (ref 2–4)
GLUCOSE BLD STRIP.AUTO-MCNC: 101 MG/DL (ref 65–100)
GLUCOSE BLD STRIP.AUTO-MCNC: 118 MG/DL (ref 65–100)
GLUCOSE BLD STRIP.AUTO-MCNC: 98 MG/DL (ref 65–100)
GLUCOSE SERPL-MCNC: 97 MG/DL (ref 65–100)
HCT VFR BLD AUTO: 46.9 % (ref 36.6–50.3)
HGB BLD-MCNC: 15.8 G/DL (ref 12.1–17)
LACTATE SERPL-SCNC: 1 MMOL/L (ref 0.4–2)
MCH RBC QN AUTO: 25.2 PG (ref 26–34)
MCHC RBC AUTO-ENTMCNC: 33.7 G/DL (ref 30–36.5)
MCV RBC AUTO: 74.8 FL (ref 80–99)
NRBC # BLD: 0 K/UL (ref 0–0.01)
NRBC BLD-RTO: 0 PER 100 WBC
P-R INTERVAL, ECG05: 160 MS
PLATELET # BLD AUTO: 175 K/UL (ref 150–400)
PMV BLD AUTO: 9.8 FL (ref 8.9–12.9)
POTASSIUM SERPL-SCNC: 3.6 MMOL/L (ref 3.5–5.1)
PROT SERPL-MCNC: 7.2 G/DL (ref 6.4–8.2)
Q-T INTERVAL, ECG07: 304 MS
QRS DURATION, ECG06: 98 MS
QTC CALCULATION (BEZET), ECG08: 405 MS
RBC # BLD AUTO: 6.27 M/UL (ref 4.1–5.7)
SERVICE CMNT-IMP: ABNORMAL
SERVICE CMNT-IMP: ABNORMAL
SERVICE CMNT-IMP: NORMAL
SODIUM SERPL-SCNC: 135 MMOL/L (ref 136–145)
VENTRICULAR RATE, ECG03: 107 BPM
WBC # BLD AUTO: 14.6 K/UL (ref 4.1–11.1)

## 2020-08-10 PROCEDURE — 65270000032 HC RM SEMIPRIVATE

## 2020-08-10 PROCEDURE — 99222 1ST HOSP IP/OBS MODERATE 55: CPT | Performed by: SURGERY

## 2020-08-10 PROCEDURE — 36415 COLL VENOUS BLD VENIPUNCTURE: CPT

## 2020-08-10 PROCEDURE — 74011250637 HC RX REV CODE- 250/637: Performed by: INTERNAL MEDICINE

## 2020-08-10 PROCEDURE — 85027 COMPLETE CBC AUTOMATED: CPT

## 2020-08-10 PROCEDURE — 74011250636 HC RX REV CODE- 250/636: Performed by: INTERNAL MEDICINE

## 2020-08-10 PROCEDURE — 74011250637 HC RX REV CODE- 250/637: Performed by: SURGERY

## 2020-08-10 PROCEDURE — 80053 COMPREHEN METABOLIC PANEL: CPT

## 2020-08-10 PROCEDURE — 82962 GLUCOSE BLOOD TEST: CPT

## 2020-08-10 PROCEDURE — 82248 BILIRUBIN DIRECT: CPT

## 2020-08-10 PROCEDURE — 83605 ASSAY OF LACTIC ACID: CPT

## 2020-08-10 RX ORDER — OXYCODONE HYDROCHLORIDE 5 MG/1
5 TABLET ORAL
Status: DISCONTINUED | OUTPATIENT
Start: 2020-08-10 | End: 2020-08-13 | Stop reason: HOSPADM

## 2020-08-10 RX ORDER — IBUPROFEN 200 MG
200-400 TABLET ORAL AS NEEDED
COMMUNITY
End: 2020-08-14

## 2020-08-10 RX ADMIN — NICOTINE POLACRILEX 2 MG: 2 LOZENGE ORAL at 17:52

## 2020-08-10 RX ADMIN — SODIUM CHLORIDE 100 ML/HR: 900 INJECTION, SOLUTION INTRAVENOUS at 02:05

## 2020-08-10 RX ADMIN — LEVOFLOXACIN 750 MG: 5 INJECTION, SOLUTION INTRAVENOUS at 05:47

## 2020-08-10 RX ADMIN — HYDROMORPHONE HYDROCHLORIDE 1 MG: 1 INJECTION, SOLUTION INTRAMUSCULAR; INTRAVENOUS; SUBCUTANEOUS at 07:23

## 2020-08-10 RX ADMIN — Medication 10 ML: at 21:32

## 2020-08-10 RX ADMIN — HYDROMORPHONE HYDROCHLORIDE 1 MG: 1 INJECTION, SOLUTION INTRAMUSCULAR; INTRAVENOUS; SUBCUTANEOUS at 02:15

## 2020-08-10 RX ADMIN — ACETAMINOPHEN 650 MG: 325 TABLET ORAL at 03:42

## 2020-08-10 RX ADMIN — METRONIDAZOLE 500 MG: 500 INJECTION, SOLUTION INTRAVENOUS at 22:35

## 2020-08-10 RX ADMIN — HYDROMORPHONE HYDROCHLORIDE 1 MG: 1 INJECTION, SOLUTION INTRAMUSCULAR; INTRAVENOUS; SUBCUTANEOUS at 13:54

## 2020-08-10 RX ADMIN — OXYCODONE 5 MG: 5 TABLET ORAL at 21:32

## 2020-08-10 RX ADMIN — ACETAMINOPHEN 650 MG: 325 TABLET ORAL at 14:29

## 2020-08-10 RX ADMIN — METRONIDAZOLE 500 MG: 500 INJECTION, SOLUTION INTRAVENOUS at 11:34

## 2020-08-10 RX ADMIN — OXYCODONE 5 MG: 5 TABLET ORAL at 17:34

## 2020-08-10 NOTE — PROGRESS NOTES
Clinical Pharmacy Note: Metronidazole Dosing    Please note that the metronidazole dose for Amaya Bennett has been changed to 500 mg  q12h per [facility]-approved protocol. Please contact the pharmacy with any questions.     Parth [facility]

## 2020-08-10 NOTE — ROUTINE PROCESS
TRANSFER - OUT REPORT: 
 
Verbal report given to [place] on Racquel Loving  being transferred to [facility] for routine progression of care Report consisted of patients Situation, Background, Assessment and  
Recommendations(SBAR). Information from the following report(s) SBAR, Kardex, ED Summary, Procedure Summary, Intake/Output, MAR, Recent Results and Cardiac Rhythm NSR was reviewed with the receiving nurse. Lines:  
Peripheral IV 08/10/20 Right Antecubital (Active) Site Assessment Clean, dry, & intact 08/10/20 1206 Phlebitis Assessment 0 08/10/20 1206 Infiltration Assessment 0 08/10/20 1206 Dressing Status Clean, dry, & intact 08/10/20 1206 Dressing Type Transparent 08/10/20 1206 Hub Color/Line Status Pink;Capped 08/10/20 1206 Alcohol Cap Used Yes 08/10/20 1206 Opportunity for questions and clarification was provided. Patient transported with: 
 Monitor Tech

## 2020-08-10 NOTE — ROUTINE PROCESS
Bedside shift change report given to Gregoria Figueroa (oncoming nurse) by Gloria Wren RN (offgoing nurse). Report included the following information SBAR, Kardex, Intake/Output, MAR and Recent Results.

## 2020-08-10 NOTE — PROGRESS NOTES
Transition of Care:   RUR: 5%    Reason for Admission:  diverticulitis                    RUR Score: 5%                    Plan for utilizing home health:  No needs indicated at this time       PCP: First and Last name:  Patient states he does not have a PCP; CM will give patient the PCP list   Name of Practice: n/a   Are you a current patient: Yes/No: n/a   Approximate date of last visit: n/a   Can you participate in a virtual visit with your PCP: n/a                    Current Advanced Directive/Advance Care Plan: no ACP docs; Full code    Care Management Interventions  PCP Verified by CM: No(pt states he has no PCP; will give him list)  Mode of Transport at Discharge: Other (see comment)(home with sister by car)  Transition of Care Consult (CM Consult):  Other(no HH/CM orders at this time)  Physical Therapy Consult: No  Occupational Therapy Consult: No  Speech Therapy Consult: No  Current Support Network: Lives Alone  Confirm Follow Up Transport: Family(with sister by car)  Discharge Location  Discharge Placement: Home                         Transition of Care Plan:      CM met with patient at bedside; patient is alert and oriented; patient lives in an apartment alone and uses no assistive devices nor any home O2; patients emergency contact is his sister; Adolfo Rice [phone]); his sister will pick him up at discharge time; patient states he does not have a PCP; CM will give him updated PCP list; patients preferred pharmacy is the [facility] on [address] [place] [address]  Baseline: patient is independent, still works and lives alone in an apartment  Disposition: per general surgery-Patient is currently getting IV antibiotics for acute sigmoid diverticulitis and will need a CT scan in 2-3 days and possibly discharged on oral antibiotics; no CM orders at this time; patient to likely discharge home; CM will continue to follow    1630: PCP list given to patient    CM following  Terrie Olson RN, CRM

## 2020-08-10 NOTE — H&P
Hospitalist Admission Note    NAME: Sahara Light   :  1975   MRN:  [id number]     Date/Time:  2020 10:21 PM    Patient PCP: None  ________________________________________________________________________    My assessment of this patient's clinical condition and my plan of care is as follows. Assessment / Plan:  Acute sigmoid diverticulitis with possible small abscess POA  -CT confirms diverticulitis with possibility of small abscess  -Keep n.p.o.  Start IV hydration with normal saline. Start Levaquin and Flagyl. Consult general surgery. Pain control. Elevated hemoglobin likely from hemoconcentration  -Hemoglobin is 17 and patient is clinically dehydrated  -Continue IV hydration with normal saline and check CBC in a.m.    Mild elevation of AST  Total Hyperbilirubinemia  -AST is elevated at 60. Could be related to obesity and hepatic steatosis. Check CMP in a.m.  -Further work up on based on cmp in am.   -I spoke to him about the importance of weight loss    Morbid obesity  History of diverticulitis in the past  History of Bell's palsy  Hypertension  -He was counseled regarding weight loss  -Not on any medications for blood pressure at home  -Pharmacy consulted for medication reconciliation  -Consider starting on antihypertensives if blood pressure is high          Code Status: Full code      DVT Prophylaxis: SCDs in view of anticipated surgery      Baseline: From home independent        Subjective:   CHIEF COMPLAINT: Abdominal pain, nausea    HISTORY OF PRESENT ILLNESS:     Bree Chambers is a 54 y.o.   male who presents with past medical history of diverticulitis coming the hospital chief complaints of abdominal pain, nausea vomiting. Patient reports pain is generalized, 3 x 10, radiating to the back, without any aggravating or relieving factors. He reports nausea along with vomiting and decreased oral intake. He does not report any fever or chills.   Denies chest pain, shortness of breath, cough or phlegm. He reports that Prilosec and milk of magnesia did not provide any improvement in his symptoms. Denies any diarrhea or constipation. Denies weakness, tingling or numbness. On arrival to the hospital, he was tachycardic and blood pressure was 155/100. On labs CBC was within normal limits except for hemoglobin of 17. CMP showed mild elevation of AST. Lipase was normal.  Creatinine was normal.  He had a CT emergency department which showed evidence of diverticulitis with small abscess. He was given Zosyn in the emergency department. We were asked to admit for work up and evaluation of the above problems. Past Medical History:   Diagnosis Date    Bell's palsy 01/25/2019    Diverticulitis     Hypertension     Kidney infection     Haresh Hunt syndrome (geniculate herpes zoster)         History reviewed. No pertinent surgical history. Social History     Tobacco Use    Smoking status: Current Every Day Smoker     Packs/day: 1.00     Years: 15.00     Pack years: 15.00    Smokeless tobacco: Never Used   Substance Use Topics    Alcohol use: No     Frequency: Never     Comment: rarely        Family history  No coronary artery disease in the family  Allergies   Allergen Reactions    Meclizine Rash        Prior to Admission medications    Medication Sig Start Date End Date Taking? Authorizing Provider   meclizine (ANTIVERT) 25 mg tablet Take 1 Tab by mouth three (3) times daily as needed. 2/8/19   Rasta Holloway PA-C   ondansetron (ZOFRAN ODT) 4 mg disintegrating tablet Take 1 Tab by mouth every eight (8) hours as needed for Nausea. 2/8/19   Rasta Holloway PA-C       REVIEW OF SYSTEMS:     I am not able to complete the review of systems because:    The patient is intubated and sedated    The patient has altered mental status due to his acute medical problems    The patient has baseline aphasia from prior stroke(s)    The patient has baseline dementia and is not reliable historian    The patient is in acute medical distress and unable to provide information           Total of 12 systems reviewed as follows:       POSITIVE= underlined text  Negative = text not underlined  General:  fever, chills, sweats, generalized weakness, weight loss/gain,      loss of appetite   Eyes:    blurred vision, eye pain, loss of vision, double vision  ENT:    rhinorrhea, pharyngitis   Respiratory:   cough, sputum production, SOB, ARREOLA, wheezing, pleuritic pain   Cardiology:   chest pain, palpitations, orthopnea, PND, edema, syncope   Gastrointestinal:  abdominal pain , N/V, diarrhea, dysphagia, constipation, bleeding   Genitourinary:  frequency, urgency, dysuria, hematuria, incontinence   Muskuloskeletal :  arthralgia, myalgia, back pain  Hematology:  easy bruising, nose or gum bleeding, lymphadenopathy   Dermatological: rash, ulceration, pruritis, color change / jaundice  Endocrine:   hot flashes or polydipsia   Neurological:  headache, dizziness, confusion, focal weakness, paresthesia,     Speech difficulties, memory loss, gait difficulty  Psychological: Feelings of anxiety, depression, agitation    Objective:   VITALS:    Visit Vitals  /70   Pulse (!) 123   Temp 100 °F (37.8 °C)   Resp 16   SpO2 91%       PHYSICAL EXAM:    General:    Alert, cooperative, no distress, appears stated age. HEENT: Atraumatic, anicteric sclerae, pink conjunctivae     No oral ulcers, mucosa moist, throat clear, dentition fair  Neck:  Supple, symmetrical,  thyroid: non tender  Lungs:   Clear to auscultation bilaterally. No Wheezing or Rhonchi. No rales. Chest wall:  No tenderness  No Accessory muscle use. Heart:   Regular  rhythm,  No  murmur   No edema  Abdomen:   Soft, obese, lower abdominal tenderness present  Extremities: No cyanosis. No clubbing,      Skin turgor normal, Capillary refill normal, Radial dial pulse 2+  Skin:     Not pale.   Not Jaundiced  No rashes   Psych:  Not anxious or agitated. Neurologic: EOMs intact. No facial asymmetry. No aphasia or slurred speech. Symmetrical strength, Sensation grossly intact. Alert and oriented X 4.     _______________________________________________________________________  Care Plan discussed with:    Comments   Patient y    Family      RN y    Care Manager                    Consultant:      _______________________________________________________________________  Expected  Disposition:   Home with Family y   HH/PT/OT/RN    SNF/LTC    HOPE    ________________________________________________________________________  TOTAL TIME:  61  Minutes    Critical Care Provided     Minutes non procedure based      Comments    y Reviewed previous records   >50% of visit spent in counseling and coordination of care y Discussion with patient and/or family and questions answered       ________________________________________________________________________  Signed: Abhishek Arana MD    Procedures: see electronic medical records for all procedures/Xrays and details which were not copied into this note but were reviewed prior to creation of Plan.     LAB DATA REVIEWED:    Recent Results (from the past 24 hour(s))   EKG, 12 LEAD, INITIAL    Collection Time: 08/09/20  6:32 PM   Result Value Ref Range    Ventricular Rate 107 BPM    Atrial Rate 107 BPM    P-R Interval 160 ms    QRS Duration 98 ms    Q-T Interval 304 ms    QTC Calculation (Bezet) 405 ms    Calculated R Axis -25 degrees    Calculated T Axis 7 degrees    Diagnosis       Sinus tachycardia  Septal infarct , age undetermined  When compared with ECG of 09-JUN-2009 21:56,  Nonspecific T wave abnormality, worse in Inferior leads     CBC WITH AUTOMATED DIFF    Collection Time: 08/09/20  6:37 PM   Result Value Ref Range    WBC 9.9 4.1 - 11.1 K/uL    RBC 6.73 (H) 4.10 - 5.70 M/uL    HGB 17.0 12.1 - 17.0 g/dL    HCT 50.0 36.6 - 50.3 %    MCV 74.3 (L) 80.0 - 99.0 FL    MCH 25.3 (L) 26.0 - 34.0 PG    MCHC 34.0 30.0 - 36.5 g/dL    RDW 19.3 (H) 11.5 - 14.5 %    PLATELET 506 045 - 036 K/uL    MPV 10.2 8.9 - 12.9 FL    NRBC 0.0 0  WBC    ABSOLUTE NRBC 0.00 0.00 - 0.01 K/uL    NEUTROPHILS 80 (H) 32 - 75 %    LYMPHOCYTES 11 (L) 12 - 49 %    MONOCYTES 8 5 - 13 %    EOSINOPHILS 0 0 - 7 %    BASOPHILS 0 0 - 1 %    IMMATURE GRANULOCYTES 1 (H) 0.0 - 0.5 %    ABS. NEUTROPHILS 8.0 1.8 - 8.0 K/UL    ABS. LYMPHOCYTES 1.0 0.8 - 3.5 K/UL    ABS. MONOCYTES 0.8 0.0 - 1.0 K/UL    ABS. EOSINOPHILS 0.0 0.0 - 0.4 K/UL    ABS. BASOPHILS 0.0 0.0 - 0.1 K/UL    ABS. IMM. GRANS. 0.1 (H) 0.00 - 0.04 K/UL    DF AUTOMATED     METABOLIC PANEL, COMPREHENSIVE    Collection Time: 08/09/20  6:37 PM   Result Value Ref Range    Sodium 135 (L) 136 - 145 mmol/L    Potassium 3.6 3.5 - 5.1 mmol/L    Chloride 103 97 - 108 mmol/L    CO2 26 21 - 32 mmol/L    Anion gap 6 5 - 15 mmol/L    Glucose 118 (H) 65 - 100 mg/dL    BUN 10 6 - 20 MG/DL    Creatinine 0.97 0.70 - 1.30 MG/DL    BUN/Creatinine ratio 10 (L) 12 - 20      GFR est AA >60 >60 ml/min/1.73m2    GFR est non-AA >60 >60 ml/min/1.73m2    Calcium 9.0 8.5 - 10.1 MG/DL    Bilirubin, total 2.1 (H) 0.2 - 1.0 MG/DL    ALT (SGPT) 78 12 - 78 U/L    AST (SGOT) 60 (H) 15 - 37 U/L    Alk.  phosphatase 97 45 - 117 U/L    Protein, total 7.6 6.4 - 8.2 g/dL    Albumin 3.2 (L) 3.5 - 5.0 g/dL    Globulin 4.4 (H) 2.0 - 4.0 g/dL    A-G Ratio 0.7 (L) 1.1 - 2.2     LIPASE    Collection Time: 08/09/20  6:37 PM   Result Value Ref Range    Lipase 18 (L) 73 - 393 U/L   URINALYSIS W/MICROSCOPIC    Collection Time: 08/09/20  6:37 PM   Result Value Ref Range    Color DARK YELLOW      Appearance CLEAR CLEAR      Specific gravity 1.024 1.003 - 1.030      pH (UA) 6.5 5.0 - 8.0      Protein Negative NEG mg/dL    Glucose Negative NEG mg/dL    Ketone 15 (A) NEG mg/dL    Blood Negative NEG      Urobilinogen 1.0 0.2 - 1.0 EU/dL    Nitrites Positive (A) NEG      Leukocyte Esterase SMALL (A) NEG      WBC 0-4 0 - 4 /hpf    RBC 0-5 0 - 5 /hpf Epithelial cells FEW FEW /lpf    Bacteria Negative NEG /hpf   URINE CULTURE HOLD SAMPLE    Collection Time: 08/09/20  6:37 PM    Specimen: Serum; Urine   Result Value Ref Range    Urine culture hold        Urine on hold in Microbiology dept for 2 days. If unpreserved urine is submitted, it cannot be used for addtional testing after 24 hours, recollection will be required. SAMPLES BEING HELD    Collection Time: 08/09/20  6:37 PM   Result Value Ref Range    SAMPLES BEING HELD 1red,1blu     COMMENT        Add-on orders for these samples will be processed based on acceptable specimen integrity and analyte stability, which may vary by analyte.    BILIRUBIN, CONFIRM    Collection Time: 08/09/20  6:37 PM   Result Value Ref Range    Bilirubin UA, confirm Positive (A) NEG

## 2020-08-10 NOTE — PROGRESS NOTES
Admission Medication Reconciliation:    Information obtained from:  Patient  RxQuery data available¹:  NO    Comments/Recommendations: Updated PTA meds/reviewed patient's allergies. 1)  Spoke with patient via telephone. Patient reports no prescription medications taken at home, just some occasional Advil as needed and vitamins. 2)  Medication changes (since last review): Added  - Cyanocobalamin 1tab po daily (unknown strength)  - Calcium 1tab po daily (unknown salt, strength)  - Ibuprofen 200mg 1tab po as needed    Removed  - Meclizine 25mg 1tab po 3 times daily as needed  - Ondansetron ODT 4mg 1tab po every 8 hours as needed     ¹RxQuery pharmacy benefit data reflects medications filled and processed through the patient's insurance, however   this data does NOT capture whether the medication was picked up or is currently being taken by the patient. Allergies:  Meclizine    Significant PMH/Disease States:   Past Medical History:   Diagnosis Date    Bell's palsy 01/25/2019    Diverticulitis     Hypertension     Kidney infection     [place] Hunt syndrome (geniculate herpes zoster)      Chief Complaint for this Admission:    Chief Complaint   Patient presents with    Abdominal Pain     Prior to Admission Medications:   Prior to Admission Medications   Prescriptions Last Dose Informant Taking? CALCIUM CARBONATE PO   Yes   Sig: Take 1 Tab by mouth daily. Unknown strength   CYANOCOBALAMIN, VITAMIN B-12, PO   Yes   Sig: Take 1 Tab by mouth daily. Unknown strength   ibuprofen (MOTRIN) 200 mg tablet   Yes   Sig: Take 200 mg by mouth as needed for Pain. Facility-Administered Medications: None       Please contact the main inpatient pharmacy with any questions or concerns at [phone] and we will direct you to the clinical pharmacist covering this patient's care while in-house.    Nash Lara, ALFREDOD

## 2020-08-10 NOTE — CONSULTS
Consult Date: 8/10/2020    Consults  #1-diverticulitis, recurrent with 2 cm air perforation medially in the pelvis, likely difficult to drain percutaneously. Patient not toxic or acutely surgical    Recommendations  -Continue with IV antibiotics, would not advance past p.o. clear liquids until sure that patient's white blood cell count is trending downward, was elevated at 14,000 today, despite being normal on admission, would recommend follow-up CT scan in 2 or 3 days and if no worse and/or improving then discharged home on oral antibiotics. Patient is aware that of the potential risks of surgery urgently bleeding infection colostomy which will be very challenging and difficult given his morbid obesity. Did recommend patient consider surgical follow-up to consider elective colectomy since he has had diverticulitis in the past.  For now nothing acutely surgical    #2-multiple medical issues including morbid obesity, I did recommend patient lose weight    #3-hyperbilirubinemia, total was elevated at 3 this morning early and direct is 3.5 suggesting this is not Guilbert syndrome. Defer work-up to hospitalist, patient does not give biliary symptoms and nothing noted on CT scan.,  May be fatty liver    Subjective   Patient is a 58-year-old male with morbid obesity, patient admitted with day or 2 history of left lower quadrant abdominal pain, no blood per rectum hematuria hematemesis or significant nausea or vomiting, history of diverticulitis in the past admitted to the medical service with evidence of diverticulitis with 2 cm area of air and fluid off the sigmoid colon on CT scan consistent with that diverticulitis perforation but abdomen not surgical.  Patient also has morbid obesity, and hyperbilirubinemia. The hyperbilirubinemia is mostly direct suggesting not Guilbert syndrome. See discussion above.   Past Medical History:   Diagnosis Date    Bell's palsy 01/25/2019    Diverticulitis     Hypertension     Kidney infection     Haresh Hunt syndrome (geniculate herpes zoster)       History reviewed. No pertinent surgical history. History reviewed. No pertinent family history. Social History     Tobacco Use    Smoking status: Current Every Day Smoker     Packs/day: 1.00     Years: 15.00     Pack years: 15.00    Smokeless tobacco: Never Used   Substance Use Topics    Alcohol use: No     Frequency: Never     Comment: rarely       Current Facility-Administered Medications   Medication Dose Route Frequency Provider Last Rate Last Dose    nicotine buccal (POLACRILEX) lozenge 2 mg  2 mg Oral Q2H PRN Mikayla Rodriguez MD   2 mg at 08/09/20 2221    nicotine (NICODERM CQ) 14 mg/24 hr patch 1 Patch  1 Patch TransDERmal Q24H Mikayla Rodriguez MD   1 Patch at 08/09/20 2221    sodium chloride (NS) flush 5-40 mL  5-40 mL IntraVENous Q8H Azucena Kapadia MD        sodium chloride (NS) flush 5-40 mL  5-40 mL IntraVENous PRN Mikayla Rodriguez MD        acetaminophen (TYLENOL) tablet 650 mg  650 mg Oral Q6H PRN Mikayla Rodriguez MD   650 mg at 08/10/20 9363    Or    acetaminophen (TYLENOL) suppository 650 mg  650 mg Rectal Q6H PRN Mikayla Rodriguez MD        promethazine (PHENERGAN) tablet 12.5 mg  12.5 mg Oral Q6H PRN Mikayla Rodriguez MD        Or    ondansetron (ZOFRAN) injection 4 mg  4 mg IntraVENous Q6H PRN Mikayla Rodriguez MD        0.9% sodium chloride infusion  100 mL/hr IntraVENous CONTINUOUS Mikayla Rodriguez  mL/hr at 08/10/20 0205 100 mL/hr at 08/10/20 0205    metroNIDAZOLE (FLAGYL) IVPB premix 500 mg  500 mg IntraVENous Q12H Mikayla Rodriguez MD        levoFLOXacin (LEVAQUIN) 750 mg in D5W IVPB  750 mg IntraVENous Q24H Camilla CHEN  mL/hr at 08/10/20 0547 750 mg at 08/10/20 0547    HYDROmorphone (PF) (DILAUDID) injection 1 mg  1 mg IntraVENous Q4H PRN Mikayla Rodriguez MD   1 mg at 08/10/20 8800        Review of Systems   Constitutional: Positive for fatigue. Respiratory: Negative.     Cardiovascular: Negative. Gastrointestinal: Positive for abdominal pain. Musculoskeletal: Negative. Neurological: Negative. Psychiatric/Behavioral: Negative. All other systems reviewed and are negative. Objective     Vital signs for last 24 hours:  Visit Vitals  BP 98/62 (BP Patient Position: At rest)   Pulse 94   Temp 99 °F (37.2 °C)   Resp 18   Ht 5' 5\" (1.651 m)   Wt 334 lb 10.5 oz (151.8 kg)   SpO2 99%   BMI 55.69 kg/m²       Intake/Output this shift:  Current Shift: No intake/output data recorded. Last 3 Shifts: No intake/output data recorded. Data Review:   Recent Results (from the past 24 hour(s))   EKG, 12 LEAD, INITIAL    Collection Time: 08/09/20  6:32 PM   Result Value Ref Range    Ventricular Rate 107 BPM    Atrial Rate 107 BPM    P-R Interval 160 ms    QRS Duration 98 ms    Q-T Interval 304 ms    QTC Calculation (Bezet) 405 ms    Calculated R Axis -25 degrees    Calculated T Axis 7 degrees    Diagnosis       Sinus tachycardia  Septal infarct , age undetermined  When compared with ECG of 09-JUN-2009 21:56,  Nonspecific T wave abnormality, worse in Inferior leads     CBC WITH AUTOMATED DIFF    Collection Time: 08/09/20  6:37 PM   Result Value Ref Range    WBC 9.9 4.1 - 11.1 K/uL    RBC 6.73 (H) 4.10 - 5.70 M/uL    HGB 17.0 12.1 - 17.0 g/dL    HCT 50.0 36.6 - 50.3 %    MCV 74.3 (L) 80.0 - 99.0 FL    MCH 25.3 (L) 26.0 - 34.0 PG    MCHC 34.0 30.0 - 36.5 g/dL    RDW 19.3 (H) 11.5 - 14.5 %    PLATELET 808 732 - 700 K/uL    MPV 10.2 8.9 - 12.9 FL    NRBC 0.0 0  WBC    ABSOLUTE NRBC 0.00 0.00 - 0.01 K/uL    NEUTROPHILS 80 (H) 32 - 75 %    LYMPHOCYTES 11 (L) 12 - 49 %    MONOCYTES 8 5 - 13 %    EOSINOPHILS 0 0 - 7 %    BASOPHILS 0 0 - 1 %    IMMATURE GRANULOCYTES 1 (H) 0.0 - 0.5 %    ABS. NEUTROPHILS 8.0 1.8 - 8.0 K/UL    ABS. LYMPHOCYTES 1.0 0.8 - 3.5 K/UL    ABS. MONOCYTES 0.8 0.0 - 1.0 K/UL    ABS. EOSINOPHILS 0.0 0.0 - 0.4 K/UL    ABS. BASOPHILS 0.0 0.0 - 0.1 K/UL    ABS. IMM.  GRANS. 0.1 (H) 0.00 - 0.04 K/UL    DF AUTOMATED     METABOLIC PANEL, COMPREHENSIVE    Collection Time: 08/09/20  6:37 PM   Result Value Ref Range    Sodium 135 (L) 136 - 145 mmol/L    Potassium 3.6 3.5 - 5.1 mmol/L    Chloride 103 97 - 108 mmol/L    CO2 26 21 - 32 mmol/L    Anion gap 6 5 - 15 mmol/L    Glucose 118 (H) 65 - 100 mg/dL    BUN 10 6 - 20 MG/DL    Creatinine 0.97 0.70 - 1.30 MG/DL    BUN/Creatinine ratio 10 (L) 12 - 20      GFR est AA >60 >60 ml/min/1.73m2    GFR est non-AA >60 >60 ml/min/1.73m2    Calcium 9.0 8.5 - 10.1 MG/DL    Bilirubin, total 2.1 (H) 0.2 - 1.0 MG/DL    ALT (SGPT) 78 12 - 78 U/L    AST (SGOT) 60 (H) 15 - 37 U/L    Alk. phosphatase 97 45 - 117 U/L    Protein, total 7.6 6.4 - 8.2 g/dL    Albumin 3.2 (L) 3.5 - 5.0 g/dL    Globulin 4.4 (H) 2.0 - 4.0 g/dL    A-G Ratio 0.7 (L) 1.1 - 2.2     LIPASE    Collection Time: 08/09/20  6:37 PM   Result Value Ref Range    Lipase 18 (L) 73 - 393 U/L   URINALYSIS W/MICROSCOPIC    Collection Time: 08/09/20  6:37 PM   Result Value Ref Range    Color DARK YELLOW      Appearance CLEAR CLEAR      Specific gravity 1.024 1.003 - 1.030      pH (UA) 6.5 5.0 - 8.0      Protein Negative NEG mg/dL    Glucose Negative NEG mg/dL    Ketone 15 (A) NEG mg/dL    Blood Negative NEG      Urobilinogen 1.0 0.2 - 1.0 EU/dL    Nitrites Positive (A) NEG      Leukocyte Esterase SMALL (A) NEG      WBC 0-4 0 - 4 /hpf    RBC 0-5 0 - 5 /hpf    Epithelial cells FEW FEW /lpf    Bacteria Negative NEG /hpf   URINE CULTURE HOLD SAMPLE    Collection Time: 08/09/20  6:37 PM    Specimen: Serum; Urine   Result Value Ref Range    Urine culture hold        Urine on hold in Microbiology dept for 2 days. If unpreserved urine is submitted, it cannot be used for addtional testing after 24 hours, recollection will be required.    SAMPLES BEING HELD    Collection Time: 08/09/20  6:37 PM   Result Value Ref Range    SAMPLES BEING HELD 1red,1blu     COMMENT        Add-on orders for these samples will be processed based on acceptable specimen integrity and analyte stability, which may vary by analyte. BILIRUBIN, CONFIRM    Collection Time: 08/09/20  6:37 PM   Result Value Ref Range    Bilirubin UA, confirm Positive (A) NEG     METABOLIC PANEL, COMPREHENSIVE    Collection Time: 08/10/20  2:20 AM   Result Value Ref Range    Sodium 135 (L) 136 - 145 mmol/L    Potassium 3.6 3.5 - 5.1 mmol/L    Chloride 102 97 - 108 mmol/L    CO2 25 21 - 32 mmol/L    Anion gap 8 5 - 15 mmol/L    Glucose 97 65 - 100 mg/dL    BUN 11 6 - 20 MG/DL    Creatinine 1.05 0.70 - 1.30 MG/DL    BUN/Creatinine ratio 10 (L) 12 - 20      GFR est AA >60 >60 ml/min/1.73m2    GFR est non-AA >60 >60 ml/min/1.73m2    Calcium 8.8 8.5 - 10.1 MG/DL    Bilirubin, total 3.0 (H) 0.2 - 1.0 MG/DL    ALT (SGPT) 66 12 - 78 U/L    AST (SGOT) 54 (H) 15 - 37 U/L    Alk. phosphatase 90 45 - 117 U/L    Protein, total 7.2 6.4 - 8.2 g/dL    Albumin 2.9 (L) 3.5 - 5.0 g/dL    Globulin 4.3 (H) 2.0 - 4.0 g/dL    A-G Ratio 0.7 (L) 1.1 - 2.2     CBC W/O DIFF    Collection Time: 08/10/20  2:20 AM   Result Value Ref Range    WBC 14.6 (H) 4.1 - 11.1 K/uL    RBC 6.27 (H) 4.10 - 5.70 M/uL    HGB 15.8 12.1 - 17.0 g/dL    HCT 46.9 36.6 - 50.3 %    MCV 74.8 (L) 80.0 - 99.0 FL    MCH 25.2 (L) 26.0 - 34.0 PG    MCHC 33.7 30.0 - 36.5 g/dL    RDW 19.0 (H) 11.5 - 14.5 %    PLATELET 267 351 - 281 K/uL    MPV 9.8 8.9 - 12.9 FL    NRBC 0.0 0  WBC    ABSOLUTE NRBC 0.00 0.00 - 0.01 K/uL   GLUCOSE, POC    Collection Time: 08/10/20  7:51 AM   Result Value Ref Range    Glucose (POC) 118 (H) 65 - 100 mg/dL    Performed by Ivone Jackson, DIRECT    Collection Time: 08/10/20 10:01 AM   Result Value Ref Range    Bilirubin, direct 3.5 (H) 0.0 - 0.2 MG/DL   GLUCOSE, POC    Collection Time: 08/10/20 11:06 AM   Result Value Ref Range    Glucose (POC) 101 (H) 65 - 100 mg/dL    Performed by Mera Yates        Physical Exam  Vitals signs and nursing note reviewed.    Constitutional: General: He is not in acute distress. Appearance: Normal appearance. He is obese. He is not ill-appearing. Cardiovascular:      Rate and Rhythm: Normal rate and regular rhythm. Pulmonary:      Effort: Pulmonary effort is normal.      Breath sounds: Normal breath sounds. Abdominal:      Comments: Very obese, soft, minimal tenderness left lower abdomen rest of the abdomen is soft flat nontender with no peritoneal signs or guarding or rebound tenderness, no obvious hernias but could be obscured by his obesity   Skin:     General: Skin is warm and dry. Neurological:      General: No focal deficit present. Mental Status: He is alert and oriented to person, place, and time. Psychiatric:         Mood and Affect: Mood normal.         Behavior: Behavior normal.         Thought Content:  Thought content normal.         Judgment: Judgment normal.       Face-to-face time and review of medical records and imaging, and care of patient 45 minutes

## 2020-08-10 NOTE — PROGRESS NOTES
Hospitalist Service  Progress Note    Daily Progress Note: 8/10/2020    Assessment/Plan:   #Acute sigmoid diverticulitis with possible small abscess present on admission improving   Feels significantly better  - I will continue Levaquin and Flagyl   Advance very clear diet   Possible need for follow-up CT to ensure that the abscess is improving    #Hyperbilirubinemia however the lab reported that total bilirubin is less than direct bilirubin I will repeat the bilirubin level tomorrow there is now no associated elevated LFTs  This could be secondary also to fatty liver disease given the patient morbid obesity    Hypertension uncontrolled   Could be possibly secondary to pain  Monitor for now             Subjective:     Feels significantly      Review of Systems:       Objective:   Physical examination  Visit Vitals  /67   Pulse 99   Temp 100.3 °F (37.9 °C)   Resp 19   Ht 5' 5\" (1.651 m)   Wt 151.8 kg (334 lb 10.5 oz)   SpO2 95%   BMI 55.69 kg/m²      Temp (24hrs), Av.4 °F (38 °C), Min:99 °F (37.2 °C), Max:101.8 °F (38.8 °C)         O2 Device: Room air  Patient Vitals for the past 24 hrs:   Temp Pulse Resp BP SpO2   08/10/20 1415 100.3 °F (37.9 °C) 99 19 108/67 95 %   08/10/20 1206 99.9 °F (37.7 °C) 94 19 99/64 93 %   08/10/20 0830 99 °F (37.2 °C) 94 18 98/62 99 %   08/10/20 0400 (!) 100.7 °F (38.2 °C) 98      08/10/20 0333 (!) 101.8 °F (38.8 °C) 98 18 134/81 91 %   08/10/20 0131 (!) 100.8 °F (38.2 °C) 100 18 117/77 93 %   08/10/20 0000  100      20 2030    133/78 94 %   20    (!) 154/92 90 %   20 1930    144/70 91 %   20 1900    147/67 90 %       Intake/Output Summary (Last 24 hours) at 8/10/2020 1841  Last data filed at 8/10/2020 1206  Gross per 24 hour   Intake 300 ml   Output    Net 300 ml     Last shift:    08/10 07 - 08/10 1900  In: 300 [P.O.:300]  Out: -   Last 3 shifts:    No intake/output data recorded.     General: Alert, cooperative, no acute distress   Head:   Atraumatic   Eyes:   Conjunctivae clear   ENT:  Oral mucosa normal   Neck:  Supple, trachea midline, no adenopathy   No JVD   Back:    No CVA tenderness    Chest wall:    No tenderness or deformities    Lungs:   Clear to auscultation bilaterally    Heart:   Regular rhythm, no murmur   Abdomen:    Soft, non-tender   No masses or organomegaly    Extremities:  No edema or DVT signs   Pulses:  Symmetric all extremities   Skin:  Warm and dry    No rashes or lesions   Neurologic:  Oriented   No focal deficits   Psychiatric:          Data Review:       Recent Labs     08/10/20  0220 08/09/20  1837   WBC 14.6* 9.9   HGB 15.8 17.0   HCT 46.9 50.0    192     Recent Labs     08/10/20  0220 08/09/20  1837   * 135*   K 3.6 3.6    103   CO2 25 26   GLU 97 118*   BUN 11 10   CREA 1.05 0.97   CA 8.8 9.0   ALB 2.9* 3.2*   ALT 66 78     No results for input(s): PH, PCO2, PO2, HCO3, FIO2 in the last 72 hours. Lab Results   Component Value Date/Time    Glucose 97 08/10/2020 02:20 AM        All Micro Results     Procedure Component Value Units Date/Time    URINE CULTURE HOLD SAMPLE [[id number]] Collected:  08/09/20 1837    Order Status:  Completed Specimen:  Urine from Serum Updated:  08/09/20 1853     Urine culture hold       Urine on hold in Microbiology dept for 2 days. If unpreserved urine is submitted, it cannot be used for addtional testing after 24 hours, recollection will be required. No results found for: EN  Lab Results   Component Value Date/Time    Culture result: (A) 05/10/2019 01:31 PM     HEAVY STREPTOCOCCI, BETA HEMOLYTIC GROUP B Penicillin and ampicillin are drugs of choice for treatment of beta-hemolytic streptococcal infections.  Susceptibility testing of penicillins and beta-lactams approved by the FDA for treatment of beta-hemolytic streptococcal infections need not be performed routinely, because nonsusceptible isolates are extremely rare.  CLSI 2012     Medications reviewed  Current Facility-Administered Medications   Medication Dose Route Frequency    oxyCODONE IR (ROXICODONE) tablet 5 mg  5 mg Oral Q4H PRN    nicotine buccal (POLACRILEX) lozenge 2 mg  2 mg Oral Q2H PRN    nicotine (NICODERM CQ) 14 mg/24 hr patch 1 Patch  1 Patch TransDERmal Q24H    sodium chloride (NS) flush 5-40 mL  5-40 mL IntraVENous Q8H    sodium chloride (NS) flush 5-40 mL  5-40 mL IntraVENous PRN    acetaminophen (TYLENOL) tablet 650 mg  650 mg Oral Q6H PRN    Or    acetaminophen (TYLENOL) suppository 650 mg  650 mg Rectal Q6H PRN    promethazine (PHENERGAN) tablet 12.5 mg  12.5 mg Oral Q6H PRN    Or    ondansetron (ZOFRAN) injection 4 mg  4 mg IntraVENous Q6H PRN    0.9% sodium chloride infusion  100 mL/hr IntraVENous CONTINUOUS    metroNIDAZOLE (FLAGYL) IVPB premix 500 mg  500 mg IntraVENous Q12H    levoFLOXacin (LEVAQUIN) 750 mg in D5W IVPB  750 mg IntraVENous Q24H    HYDROmorphone (PF) (DILAUDID) injection 1 mg  1 mg IntraVENous Q4H PRN         Signed:   Antelmo Frye MD   Internist / Hospitalist

## 2020-08-11 ENCOUNTER — APPOINTMENT (OUTPATIENT)
Dept: ULTRASOUND IMAGING | Age: 45
DRG: 871 | End: 2020-08-11
Attending: INTERNAL MEDICINE
Payer: COMMERCIAL

## 2020-08-11 LAB
ALBUMIN SERPL-MCNC: 2.6 G/DL (ref 3.5–5)
ALBUMIN/GLOB SERPL: 0.6 {RATIO} (ref 1.1–2.2)
ALP SERPL-CCNC: 91 U/L (ref 45–117)
ALT SERPL-CCNC: 57 U/L (ref 12–78)
ANION GAP SERPL CALC-SCNC: 9 MMOL/L (ref 5–15)
AST SERPL-CCNC: 69 U/L (ref 15–37)
BILIRUB SERPL-MCNC: 3.6 MG/DL (ref 0.2–1)
BUN SERPL-MCNC: 11 MG/DL (ref 6–20)
BUN/CREAT SERPL: 12 (ref 12–20)
CALCIUM SERPL-MCNC: 8.4 MG/DL (ref 8.5–10.1)
CHLORIDE SERPL-SCNC: 103 MMOL/L (ref 97–108)
CO2 SERPL-SCNC: 23 MMOL/L (ref 21–32)
CREAT SERPL-MCNC: 0.92 MG/DL (ref 0.7–1.3)
ERYTHROCYTE [DISTWIDTH] IN BLOOD BY AUTOMATED COUNT: 18.1 % (ref 11.5–14.5)
GLOBULIN SER CALC-MCNC: 4.3 G/DL (ref 2–4)
GLUCOSE SERPL-MCNC: 88 MG/DL (ref 65–100)
HCT VFR BLD AUTO: 43.2 % (ref 36.6–50.3)
HGB BLD-MCNC: 14.9 G/DL (ref 12.1–17)
LACTATE SERPL-SCNC: 1.4 MMOL/L (ref 0.4–2)
MCH RBC QN AUTO: 25.3 PG (ref 26–34)
MCHC RBC AUTO-ENTMCNC: 34.5 G/DL (ref 30–36.5)
MCV RBC AUTO: 73.3 FL (ref 80–99)
NRBC # BLD: 0 K/UL (ref 0–0.01)
NRBC BLD-RTO: 0 PER 100 WBC
PLATELET # BLD AUTO: 151 K/UL (ref 150–400)
PMV BLD AUTO: 10.1 FL (ref 8.9–12.9)
POTASSIUM SERPL-SCNC: 3.5 MMOL/L (ref 3.5–5.1)
PROT SERPL-MCNC: 6.9 G/DL (ref 6.4–8.2)
RBC # BLD AUTO: 5.89 M/UL (ref 4.1–5.7)
SODIUM SERPL-SCNC: 135 MMOL/L (ref 136–145)
WBC # BLD AUTO: 9.4 K/UL (ref 4.1–11.1)

## 2020-08-11 PROCEDURE — 85027 COMPLETE CBC AUTOMATED: CPT

## 2020-08-11 PROCEDURE — 94760 N-INVAS EAR/PLS OXIMETRY 1: CPT

## 2020-08-11 PROCEDURE — 74011250637 HC RX REV CODE- 250/637: Performed by: INTERNAL MEDICINE

## 2020-08-11 PROCEDURE — 83605 ASSAY OF LACTIC ACID: CPT

## 2020-08-11 PROCEDURE — 74011250636 HC RX REV CODE- 250/636: Performed by: INTERNAL MEDICINE

## 2020-08-11 PROCEDURE — 80053 COMPREHEN METABOLIC PANEL: CPT

## 2020-08-11 PROCEDURE — 74011250637 HC RX REV CODE- 250/637: Performed by: SURGERY

## 2020-08-11 PROCEDURE — 99232 SBSQ HOSP IP/OBS MODERATE 35: CPT | Performed by: SURGERY

## 2020-08-11 PROCEDURE — 36415 COLL VENOUS BLD VENIPUNCTURE: CPT

## 2020-08-11 PROCEDURE — 65270000032 HC RM SEMIPRIVATE

## 2020-08-11 PROCEDURE — 76700 US EXAM ABDOM COMPLETE: CPT

## 2020-08-11 RX ORDER — IBUPROFEN 200 MG
1 TABLET ORAL ONCE
Status: COMPLETED | OUTPATIENT
Start: 2020-08-11 | End: 2020-08-12

## 2020-08-11 RX ADMIN — Medication 10 ML: at 14:00

## 2020-08-11 RX ADMIN — METRONIDAZOLE 500 MG: 500 INJECTION, SOLUTION INTRAVENOUS at 10:34

## 2020-08-11 RX ADMIN — HYDROMORPHONE HYDROCHLORIDE 1 MG: 1 INJECTION, SOLUTION INTRAMUSCULAR; INTRAVENOUS; SUBCUTANEOUS at 17:50

## 2020-08-11 RX ADMIN — LEVOFLOXACIN 750 MG: 5 INJECTION, SOLUTION INTRAVENOUS at 06:58

## 2020-08-11 RX ADMIN — Medication 10 ML: at 07:01

## 2020-08-11 RX ADMIN — OXYCODONE 5 MG: 5 TABLET ORAL at 15:00

## 2020-08-11 RX ADMIN — ONDANSETRON 4 MG: 2 INJECTION INTRAMUSCULAR; INTRAVENOUS at 22:33

## 2020-08-11 RX ADMIN — OXYCODONE 5 MG: 5 TABLET ORAL at 09:36

## 2020-08-11 RX ADMIN — Medication 10 ML: at 22:38

## 2020-08-11 RX ADMIN — OXYCODONE 5 MG: 5 TABLET ORAL at 05:01

## 2020-08-11 RX ADMIN — HYDROMORPHONE HYDROCHLORIDE 1 MG: 1 INJECTION, SOLUTION INTRAMUSCULAR; INTRAVENOUS; SUBCUTANEOUS at 22:33

## 2020-08-11 RX ADMIN — SODIUM CHLORIDE 100 ML/HR: 900 INJECTION, SOLUTION INTRAVENOUS at 17:51

## 2020-08-11 RX ADMIN — METRONIDAZOLE 500 MG: 500 INJECTION, SOLUTION INTRAVENOUS at 22:34

## 2020-08-11 NOTE — PROGRESS NOTES
Hospitalist Service  Progress Note    Daily Progress Note: 2020    Assessment/Plan:   #. Acute sigmoid diverticulitis:  with small pelvic abscess  #. Sepsis: with fever and tachycardia. - continue Levaquin and Flagyl Clear liquid diet- Advance slowly  - Possible need for follow-up CT to ensure that the abscess is improving    #. Hyperbilirubinemia: mainly direct bili, get US RUQ- GI following  #. Hypertension uncontrolled possibly secondary to pain Monitor for now    Code: full  DVT px: scd  Dispo: TBD- per primary team     Subjective:     Feels ok, complained a lot of uncomfortable bed. Causing back pain and lack of sleep      Review of Systems:       Objective:   Physical examination  Visit Vitals  /74 (BP 1 Location: Left arm, BP Patient Position: At rest)   Pulse 88   Temp 98.3 °F (36.8 °C)   Resp 20   Ht 5' 5\" (1.651 m)   Wt 151.8 kg (334 lb 10.5 oz)   SpO2 96%   BMI 55.69 kg/m²      Temp (24hrs), Av.2 °F (37.3 °C), Min:98.3 °F (36.8 °C), Max:100.3 °F (37.9 °C)         O2 Device: Room air  Patient Vitals for the past 24 hrs:   Temp Pulse Resp BP SpO2   20 0710 98.3 °F (36.8 °C) 88 20 122/74 96 %   20 0411 98.5 °F (36.9 °C) 95 18 (!) 138/91 96 %   20 0031 99.4 °F (37.4 °C) (!) 110 18 120/78 97 %   08/10/20 2023 98.7 °F (37.1 °C) 87 18 120/82 95 %   08/10/20 1415 100.3 °F (37.9 °C) 99 19 108/67 95 %   08/10/20 1206 99.9 °F (37.7 °C) 94 19 99/64 93 %       Intake/Output Summary (Last 24 hours) at 2020 0957  Last data filed at 2020 0500  Gross per 24 hour   Intake 300 ml   Output 1400 ml   Net -1100 ml     Last shift:    No intake/output data recorded.   Last 3 shifts:     1901 -  0700  In: 300 [P.O.:300]  Out: 1400 [Urine:1400]    General:   Alert, cooperative, no acute distress                       Chest wall:    No tenderness or deformities    Lungs:   Clear to auscultation bilaterally    Heart:   Regular rhythm, no murmur Abdomen:    Soft, non-tender   No masses or organomegaly    Extremities:  No edema or DVT signs       Skin:  Warm and dry    No rashes or lesions   Neurologic:  Oriented   No focal deficits   Psychiatric:  no agitation         Data Review:       Recent Labs     08/11/20  0517 08/10/20  0220 08/09/20  1837   WBC 9.4 14.6* 9.9   HGB 14.9 15.8 17.0   HCT 43.2 46.9 50.0    175 192     Recent Labs     08/11/20  0517 08/10/20  0220 08/09/20  1837   * 135* 135*   K 3.5 3.6 3.6    102 103   CO2 23 25 26   GLU 88 97 118*   BUN 11 11 10   CREA 0.92 1.05 0.97   CA 8.4* 8.8 9.0   ALB 2.6* 2.9* 3.2*   ALT 57 66 78     No results for input(s): PH, PCO2, PO2, HCO3, FIO2 in the last 72 hours. Lab Results   Component Value Date/Time    Glucose 88 08/11/2020 05:17 AM        All Micro Results     Procedure Component Value Units Date/Time    URINE CULTURE HOLD SAMPLE [[id number]] Collected:  08/09/20 1837    Order Status:  Completed Specimen:  Urine from Serum Updated:  08/09/20 1853     Urine culture hold       Urine on hold in Microbiology dept for 2 days. If unpreserved urine is submitted, it cannot be used for addtional testing after 24 hours, recollection will be required. No results found for: SDES  Lab Results   Component Value Date/Time    Culture result: (A) 05/10/2019 01:31 PM     HEAVY STREPTOCOCCI, BETA HEMOLYTIC GROUP B Penicillin and ampicillin are drugs of choice for treatment of beta-hemolytic streptococcal infections. Susceptibility testing of penicillins and beta-lactams approved by the FDA for treatment of beta-hemolytic streptococcal infections need not be performed routinely, because nonsusceptible isolates are extremely rare.  CLSI 2012     Medications reviewed  Current Facility-Administered Medications   Medication Dose Route Frequency    oxyCODONE IR (ROXICODONE) tablet 5 mg  5 mg Oral Q4H PRN    nicotine buccal (POLACRILEX) lozenge 2 mg  2 mg Oral Q2H PRN    nicotine (NICODERM CQ) 14 mg/24 hr patch 1 Patch  1 Patch TransDERmal Q24H    sodium chloride (NS) flush 5-40 mL  5-40 mL IntraVENous Q8H    sodium chloride (NS) flush 5-40 mL  5-40 mL IntraVENous PRN    acetaminophen (TYLENOL) tablet 650 mg  650 mg Oral Q6H PRN    Or    acetaminophen (TYLENOL) suppository 650 mg  650 mg Rectal Q6H PRN    promethazine (PHENERGAN) tablet 12.5 mg  12.5 mg Oral Q6H PRN    Or    ondansetron (ZOFRAN) injection 4 mg  4 mg IntraVENous Q6H PRN    0.9% sodium chloride infusion  100 mL/hr IntraVENous CONTINUOUS    metroNIDAZOLE (FLAGYL) IVPB premix 500 mg  500 mg IntraVENous Q12H    levoFLOXacin (LEVAQUIN) 750 mg in D5W IVPB  750 mg IntraVENous Q24H    HYDROmorphone (PF) (DILAUDID) injection 1 mg  1 mg IntraVENous Q4H PRN         Signed:   Chuckie Alexis MD   Internist / Demetrice Boogie

## 2020-08-11 NOTE — ROUTINE PROCESS
Bedside and Verbal shift change report given to Yehuda Child (oncoming nurse) by Alirio Leon (offgoing nurse).  Report included the following information SBAR, Kardex, [facility] and Recent Results. '

## 2020-08-11 NOTE — PROGRESS NOTES
Admit Date: 2020    POD * No surgery found *    Procedure:  * No surgery found *  Levaquin and Flagyl, hospital day #2  Subjective:     Patient patient states he feels much better, less abdominal pain, no right upper quadrant abdominal pain. White blood cell count normal, total bili stable about 3.5 mostly direct, he does not have any previous history of liver disease other than obesity possible fatty liver changes and no biliary colic type symptoms. His lower abdominal pain is improved. He is tolerating clear liquids adequately. Review of Systems   Constitutional: Negative. Eyes: Negative. Respiratory: Negative. Gastrointestinal:        Improved abdominal pain left lower abdomen no other symptoms   Psychiatric/Behavioral: Negative. All other systems reviewed and are negative. Objective:     Blood pressure 122/74, pulse 88, temperature 98.3 °F (36.8 °C), resp. rate 20, height 5' 5\" (1.651 m), weight 334 lb 10.5 oz (151.8 kg), SpO2 96 %. Temp (24hrs), Av.2 °F (37.3 °C), Min:98.3 °F (36.8 °C), Max:100.3 °F (37.9 °C)          Physical Exam  Vitals signs and nursing note reviewed. Constitutional:       General: He is not in acute distress. Appearance: Normal appearance. He is obese. He is not ill-appearing. Cardiovascular:      Rate and Rhythm: Normal rate. Pulmonary:      Effort: Pulmonary effort is normal.   Abdominal:      General: There is no distension. Tenderness: There is no guarding. Comments: Obese soft minimally tender left lower abdomen but the rest of the abdomen is soft flat and nontender no peritoneal signs or guarding   Skin:     General: Skin is dry. Neurological:      General: No focal deficit present. Mental Status: He is oriented to person, place, and time. Psychiatric:         Mood and Affect: Mood normal.         Behavior: Behavior normal.         Thought Content:  Thought content normal.         Judgment: Judgment normal. Labs:   Recent Results (from the past 24 hour(s))   LACTIC ACID    Collection Time: 08/10/20 11:58 AM   Result Value Ref Range    Lactic acid 1.0 0.4 - 2.0 MMOL/L   GLUCOSE, POC    Collection Time: 08/10/20  4:24 PM   Result Value Ref Range    Glucose (POC) 98 65 - 100 mg/dL    Performed by CYNTHIA MARTINEZ    LACTIC ACID    Collection Time: 08/11/20  5:17 AM   Result Value Ref Range    Lactic acid 1.4 0.4 - 2.0 MMOL/L   METABOLIC PANEL, COMPREHENSIVE    Collection Time: 08/11/20  5:17 AM   Result Value Ref Range    Sodium 135 (L) 136 - 145 mmol/L    Potassium 3.5 3.5 - 5.1 mmol/L    Chloride 103 97 - 108 mmol/L    CO2 23 21 - 32 mmol/L    Anion gap 9 5 - 15 mmol/L    Glucose 88 65 - 100 mg/dL    BUN 11 6 - 20 MG/DL    Creatinine 0.92 0.70 - 1.30 MG/DL    BUN/Creatinine ratio 12 12 - 20      GFR est AA >60 >60 ml/min/1.73m2    GFR est non-AA >60 >60 ml/min/1.73m2    Calcium 8.4 (L) 8.5 - 10.1 MG/DL    Bilirubin, total 3.6 (H) 0.2 - 1.0 MG/DL    ALT (SGPT) 57 12 - 78 U/L    AST (SGOT) 69 (H) 15 - 37 U/L    Alk.  phosphatase 91 45 - 117 U/L    Protein, total 6.9 6.4 - 8.2 g/dL    Albumin 2.6 (L) 3.5 - 5.0 g/dL    Globulin 4.3 (H) 2.0 - 4.0 g/dL    A-G Ratio 0.6 (L) 1.1 - 2.2     CBC W/O DIFF    Collection Time: 08/11/20  5:17 AM   Result Value Ref Range    WBC 9.4 4.1 - 11.1 K/uL    RBC 5.89 (H) 4.10 - 5.70 M/uL    HGB 14.9 12.1 - 17.0 g/dL    HCT 43.2 36.6 - 50.3 %    MCV 73.3 (L) 80.0 - 99.0 FL    MCH 25.3 (L) 26.0 - 34.0 PG    MCHC 34.5 30.0 - 36.5 g/dL    RDW 18.1 (H) 11.5 - 14.5 %    PLATELET 689 750 - 133 K/uL    MPV 10.1 8.9 - 12.9 FL    NRBC 0.0 0  WBC    ABSOLUTE NRBC 0.00 0.00 - 0.01 K/uL       Data Review images and reports reviewed    Assessment:     Active Problems:    Diverticulitis (1/23/2018)        Plan/Recommendations/Medical Decision Making:     Continue present treatment   Continue IV Levaquin and Flagyl, clear liquids,    Have ordered CT scan abdomen and pelvis follow-up tomorrow and if improved or stable and tolerating diet then advance to GI light and discharge home with outpatient follow-up in surgery office and with PCP.   Patient understands need for colonoscopy and possible elective colectomy in the future benefits risks alternatives reviewed    Torito Jones MD , [facility]

## 2020-08-12 ENCOUNTER — APPOINTMENT (OUTPATIENT)
Dept: CT IMAGING | Age: 45
DRG: 871 | End: 2020-08-12
Attending: SURGERY
Payer: COMMERCIAL

## 2020-08-12 PROBLEM — E66.01 CLASS 3 SEVERE OBESITY IN ADULT (HCC): Status: ACTIVE | Noted: 2020-08-12

## 2020-08-12 PROCEDURE — 74177 CT ABD & PELVIS W/CONTRAST: CPT

## 2020-08-12 PROCEDURE — 65270000032 HC RM SEMIPRIVATE

## 2020-08-12 PROCEDURE — 74011250637 HC RX REV CODE- 250/637: Performed by: NURSE PRACTITIONER

## 2020-08-12 PROCEDURE — 99232 SBSQ HOSP IP/OBS MODERATE 35: CPT | Performed by: SURGERY

## 2020-08-12 PROCEDURE — 74011636320 HC RX REV CODE- 636/320: Performed by: RADIOLOGY

## 2020-08-12 PROCEDURE — 74011250636 HC RX REV CODE- 250/636: Performed by: SURGERY

## 2020-08-12 PROCEDURE — 74011000258 HC RX REV CODE- 258: Performed by: SURGERY

## 2020-08-12 PROCEDURE — 74011000258 HC RX REV CODE- 258: Performed by: RADIOLOGY

## 2020-08-12 PROCEDURE — 74011250636 HC RX REV CODE- 250/636: Performed by: INTERNAL MEDICINE

## 2020-08-12 PROCEDURE — 74011250637 HC RX REV CODE- 250/637: Performed by: INTERNAL MEDICINE

## 2020-08-12 PROCEDURE — 74011250637 HC RX REV CODE- 250/637: Performed by: SURGERY

## 2020-08-12 RX ORDER — SODIUM CHLORIDE 0.9 % (FLUSH) 0.9 %
10 SYRINGE (ML) INJECTION
Status: COMPLETED | OUTPATIENT
Start: 2020-08-12 | End: 2020-08-12

## 2020-08-12 RX ORDER — PANTOPRAZOLE SODIUM 40 MG/1
40 TABLET, DELAYED RELEASE ORAL
Status: DISCONTINUED | OUTPATIENT
Start: 2020-08-12 | End: 2020-08-13 | Stop reason: HOSPADM

## 2020-08-12 RX ADMIN — LEVOFLOXACIN 750 MG: 5 INJECTION, SOLUTION INTRAVENOUS at 06:30

## 2020-08-12 RX ADMIN — PIPERACILLIN AND TAZOBACTAM 3.38 G: 3; .375 INJECTION, POWDER, LYOPHILIZED, FOR SOLUTION INTRAVENOUS at 13:06

## 2020-08-12 RX ADMIN — Medication 10 ML: at 07:31

## 2020-08-12 RX ADMIN — ACETAMINOPHEN 650 MG: 325 TABLET ORAL at 13:17

## 2020-08-12 RX ADMIN — OXYCODONE 5 MG: 5 TABLET ORAL at 11:01

## 2020-08-12 RX ADMIN — IOPAMIDOL 100 ML: 755 INJECTION, SOLUTION INTRAVENOUS at 07:31

## 2020-08-12 RX ADMIN — METRONIDAZOLE 500 MG: 500 INJECTION, SOLUTION INTRAVENOUS at 11:02

## 2020-08-12 RX ADMIN — OXYCODONE 5 MG: 5 TABLET ORAL at 18:21

## 2020-08-12 RX ADMIN — SODIUM CHLORIDE 100 ML: 900 INJECTION, SOLUTION INTRAVENOUS at 07:32

## 2020-08-12 RX ADMIN — ACETAMINOPHEN 650 MG: 325 TABLET ORAL at 02:30

## 2020-08-12 RX ADMIN — IOHEXOL 50 ML: 240 INJECTION, SOLUTION INTRATHECAL; INTRAVASCULAR; INTRAVENOUS; ORAL at 07:32

## 2020-08-12 RX ADMIN — PANTOPRAZOLE SODIUM 40 MG: 40 TABLET, DELAYED RELEASE ORAL at 01:14

## 2020-08-12 RX ADMIN — PIPERACILLIN AND TAZOBACTAM 3.38 G: 3; .375 INJECTION, POWDER, LYOPHILIZED, FOR SOLUTION INTRAVENOUS at 19:32

## 2020-08-12 RX ADMIN — Medication 10 ML: at 21:40

## 2020-08-12 RX ADMIN — Medication 10 ML: at 13:11

## 2020-08-12 RX ADMIN — ONDANSETRON 4 MG: 2 INJECTION INTRAMUSCULAR; INTRAVENOUS at 08:20

## 2020-08-12 RX ADMIN — Medication 10 ML: at 06:31

## 2020-08-12 RX ADMIN — PANTOPRAZOLE SODIUM 40 MG: 40 TABLET, DELAYED RELEASE ORAL at 08:20

## 2020-08-12 RX ADMIN — OXYCODONE 5 MG: 5 TABLET ORAL at 02:30

## 2020-08-12 NOTE — PROGRESS NOTES
Admit Date: 2020    POD * No surgery found *    Procedure:  * No surgery found *  Zosyn day #1 previous Levaquin and Flagyl  Subjective:     Patient *feels better, white blood cell count Normal, tolerating liquids previously well, but CT scan abdomen and pelvis with increasing gas and fluid and some small bowel inflammatory changes but no obstruction. Discordance between patient symptom improvement and CT scan findings but will default to treating the patient clinically. Patient's main complaint is of the bed uncomfortable  Review of Systems   Gastrointestinal:        Improving abdominal pain   All other systems reviewed and are negative. Objective:     Blood pressure 124/83, pulse 81, temperature 97.7 °F (36.5 °C), resp. rate 18, height 5' 5\" (1.651 m), weight 334 lb 10.5 oz (151.8 kg), SpO2 98 %. Temp (24hrs), Av.9 °F (37.2 °C), Min:97.7 °F (36.5 °C), Max:99.8 °F (37.7 °C)          Physical Exam  Vitals signs and nursing note reviewed. Constitutional:       General: He is not in acute distress. Appearance: Normal appearance. He is obese. He is not ill-appearing. Cardiovascular:      Rate and Rhythm: Normal rate. Pulmonary:      Effort: Pulmonary effort is normal.   Abdominal:      Comments: Obese soft flat minimally tender, no acute peritoneal signs or guarding   Neurological:      Mental Status: He is alert. Labs: No results found for this or any previous visit (from the past 24 hour(s)). Data Review images and reports reviewed    Assessment:     Active Problems:    Diverticulitis (2018)      Class 3 severe obesity in adult [facility]) (2020)        Plan/Recommendations/Medical Decision Making:     Continue present treatment     Lengthy discussion with patient, do not think he needs surgical intervention at this point but his exam is remarkably benign.   Patient has been on clear liquids, will recommend making n.p.o. entirely, switch his antibiotics to Zosyn, will defer to hospitalist whether we should consider meropenem or other or ID consult, would prefer to avoid surgical intervention on this  patient unless deteriorating or intractable course since his obesity will certainly complicate not only healing if necessary. Benefits risks alternatives reviewed with patient and he concurs.   Gbut also creation of any diverting ostomy or colostomy    We will try to see if there is another bed type available for him for comfort    Face to fce time and imaging review and discussion 25 min     Kalee Samuel MD , [facility]  ED [facility]

## 2020-08-12 NOTE — PROGRESS NOTES
Pt off unit to CT       Bedside and Verbal shift change report given to [facility] (oncoming nurse) by [facility] RN (offgoing nurse). Report included the following information SBAR, Kardex, Intake/Output, MAR and Recent Results.

## 2020-08-12 NOTE — PROGRESS NOTES
8:30 AM  Patient back from CT. Complaining of abdominal pain. PRN pain medication given. 6:42 PM  Patient has been up in chair majority of the day. Complaining of abdominal pain and headaches. Patient stated he is frustrated with providers and isn't sure why he has to stay till Sunday. Mainly saying he doesn't understand medication orders and diet orders. 8:03 PM  Bedside and Verbal shift change report given to Chapincito Gooden RN (oncoming nurse) by [employer], RN (offgoing nurse). Report included the following information SBAR, Kardex, MAR and Recent Results.

## 2020-08-12 NOTE — PROGRESS NOTES
Clinical Pharmacy Note - Extended Infusion Piperacillin/Tazobactam Dosing    This patient has been ordered piperacillin/tazobactam at 4.5 g IV every 8 hours. P&T protocol allows automatic substitution to extended infusion piperacillin/tazobactam and dose adjustment based on indication and renal function (adjustment required if creatinine clearance < 20 mL/min). Indication: Intra-abdominal infection    CrCl: >20 mL/min    Per P&T protocol, the piperacillin/tazobactam dosing will be adjusted to 3.375 g IV every 8 hours (each dose will be infused over 4 hours). Please call pharmacy with any questions.     Pita Valdivia, PharmD  Clinical Pharmacist, Orthopedics and Med/Surg () [id number] [phone]

## 2020-08-12 NOTE — ROUTINE PROCESS
Bedside and Verbal shift change report given to Silvano Maravilla (oncoming nurse) by Matthew King (offgoing nurse). Report included the following information SBAR, Kardex, MAR and Recent Results.

## 2020-08-12 NOTE — PROGRESS NOTES
Hospitalist Service  Progress Note    Daily Progress Note: 2020    Assessment/Plan:   #. Acute sigmoid diverticulitis:  with small pelvic abscess  #. Sepsis: with fever and tachycardia. - continue Levaquin and Flagyl Clear liquid diet- Advance slowly  - Possible need for follow-up CT to ensure that the abscess is improving    #. Hyperbilirubinemia: mainly direct bili, US RUQ: unremarkable- monitor  #. Hypertension uncontrolled possibly secondary to pain Monitor for now    Code: full  DVT px: scd  Dispo: TBD- per primary team     Subjective:     Feels ok, CT findings explained, he is disappointed as he can't be discharged. No fever, mild abdo pain. Review of Systems:       Objective:   Physical examination  Visit Vitals  /83   Pulse 81   Temp 97.7 °F (36.5 °C)   Resp 18   Ht 5' 5\" (1.651 m)   Wt 151.8 kg (334 lb 10.5 oz)   SpO2 98%   BMI 55.69 kg/m²      Temp (24hrs), Av.7 °F (37.1 °C), Min:97.7 °F (36.5 °C), Max:99.8 °F (37.7 °C)         O2 Device: Room air  Patient Vitals for the past 24 hrs:   Temp Pulse Resp BP SpO2   20 0816 97.7 °F (36.5 °C) 81 18 124/83 98 %   20 0030 98.5 °F (36.9 °C) 69 16 113/73 94 %   20 2140 99.8 °F (37.7 °C) (!) 102 20 137/82 97 %   20 1455 99.6 °F (37.6 °C) 94 18 149/87 98 %   20 1202 97.8 °F (36.6 °C) 90 18 136/90 98 %       Intake/Output Summary (Last 24 hours) at 2020 1117  Last data filed at 2020  Gross per 24 hour   Intake    Output 500 ml   Net -500 ml     Last shift:    No intake/output data recorded.   Last 3 shifts:    08/10 1901 -  0700  In: -   Out: 2100 [Urine:2100]    General:   Alert, cooperative, no acute distress                       Chest wall:    No tenderness or deformities    Lungs:   Clear to auscultation bilaterally    Heart:   Regular rhythm, no murmur   Abdomen:    Soft, non-tender   No masses or organomegaly    Extremities:  No edema or DVT signs       Skin:  Warm and dry    No rashes or lesions   Neurologic:  Oriented   No focal deficits   Psychiatric:  no agitation         Data Review:       Recent Labs     08/11/20  0517 08/10/20  0220 08/09/20  1837   WBC 9.4 14.6* 9.9   HGB 14.9 15.8 17.0   HCT 43.2 46.9 50.0    175 192     Recent Labs     08/11/20  0517 08/10/20  0220 08/09/20  1837   * 135* 135*   K 3.5 3.6 3.6    102 103   CO2 23 25 26   GLU 88 97 118*   BUN 11 11 10   CREA 0.92 1.05 0.97   CA 8.4* 8.8 9.0   ALB 2.6* 2.9* 3.2*   ALT 57 66 78     No results for input(s): PH, PCO2, PO2, HCO3, FIO2 in the last 72 hours. Lab Results   Component Value Date/Time    Glucose 88 08/11/2020 05:17 AM        All Micro Results     Procedure Component Value Units Date/Time    URINE CULTURE HOLD SAMPLE [[id number]] Collected:  08/09/20 1837    Order Status:  Completed Specimen:  Urine from Serum Updated:  08/09/20 1853     Urine culture hold       Urine on hold in Microbiology dept for 2 days. If unpreserved urine is submitted, it cannot be used for addtional testing after 24 hours, recollection will be required. No results found for: EN  Lab Results   Component Value Date/Time    Culture result: (A) 05/10/2019 01:31 PM     HEAVY STREPTOCOCCI, BETA HEMOLYTIC GROUP B Penicillin and ampicillin are drugs of choice for treatment of beta-hemolytic streptococcal infections. Susceptibility testing of penicillins and beta-lactams approved by the FDA for treatment of beta-hemolytic streptococcal infections need not be performed routinely, because nonsusceptible isolates are extremely rare.  CLSI 2012     Medications reviewed  Current Facility-Administered Medications   Medication Dose Route Frequency    pantoprazole (PROTONIX) tablet 40 mg  40 mg Oral ACB    nicotine (NICODERM CQ) 14 mg/24 hr patch 1 Patch  1 Patch TransDERmal ONCE    oxyCODONE IR (ROXICODONE) tablet 5 mg  5 mg Oral Q4H PRN    nicotine buccal (POLACRILEX) lozenge 2 mg  2 mg Oral Q2H PRN    nicotine (NICODERM CQ) 14 mg/24 hr patch 1 Patch  1 Patch TransDERmal Q24H    sodium chloride (NS) flush 5-40 mL  5-40 mL IntraVENous Q8H    sodium chloride (NS) flush 5-40 mL  5-40 mL IntraVENous PRN    acetaminophen (TYLENOL) tablet 650 mg  650 mg Oral Q6H PRN    Or    acetaminophen (TYLENOL) suppository 650 mg  650 mg Rectal Q6H PRN    promethazine (PHENERGAN) tablet 12.5 mg  12.5 mg Oral Q6H PRN    Or    ondansetron (ZOFRAN) injection 4 mg  4 mg IntraVENous Q6H PRN    0.9% sodium chloride infusion  100 mL/hr IntraVENous CONTINUOUS    metroNIDAZOLE (FLAGYL) IVPB premix 500 mg  500 mg IntraVENous Q12H    levoFLOXacin (LEVAQUIN) 750 mg in D5W IVPB  750 mg IntraVENous Q24H    HYDROmorphone (PF) (DILAUDID) injection 1 mg  1 mg IntraVENous Q4H PRN         Signed:   Noé Henderson MD   Internist / Rina Sheikh

## 2020-08-13 VITALS
BODY MASS INDEX: 52.48 KG/M2 | WEIGHT: 315 LBS | HEART RATE: 97 BPM | SYSTOLIC BLOOD PRESSURE: 135 MMHG | DIASTOLIC BLOOD PRESSURE: 85 MMHG | OXYGEN SATURATION: 98 % | TEMPERATURE: 98.6 F | RESPIRATION RATE: 18 BRPM | HEIGHT: 65 IN

## 2020-08-13 LAB
ANION GAP SERPL CALC-SCNC: 7 MMOL/L (ref 5–15)
BUN SERPL-MCNC: 6 MG/DL (ref 6–20)
BUN/CREAT SERPL: 8 (ref 12–20)
CALCIUM SERPL-MCNC: 8 MG/DL (ref 8.5–10.1)
CHLORIDE SERPL-SCNC: 103 MMOL/L (ref 97–108)
CO2 SERPL-SCNC: 26 MMOL/L (ref 21–32)
CREAT SERPL-MCNC: 0.75 MG/DL (ref 0.7–1.3)
ERYTHROCYTE [DISTWIDTH] IN BLOOD BY AUTOMATED COUNT: 17.7 % (ref 11.5–14.5)
GLUCOSE SERPL-MCNC: 92 MG/DL (ref 65–100)
HCT VFR BLD AUTO: 41.3 % (ref 36.6–50.3)
HGB BLD-MCNC: 14.1 G/DL (ref 12.1–17)
MCH RBC QN AUTO: 25.1 PG (ref 26–34)
MCHC RBC AUTO-ENTMCNC: 34.1 G/DL (ref 30–36.5)
MCV RBC AUTO: 73.6 FL (ref 80–99)
NRBC # BLD: 0 K/UL (ref 0–0.01)
NRBC BLD-RTO: 0 PER 100 WBC
PLATELET # BLD AUTO: 192 K/UL (ref 150–400)
PMV BLD AUTO: 9.8 FL (ref 8.9–12.9)
POTASSIUM SERPL-SCNC: 3.6 MMOL/L (ref 3.5–5.1)
RBC # BLD AUTO: 5.61 M/UL (ref 4.1–5.7)
SODIUM SERPL-SCNC: 136 MMOL/L (ref 136–145)
WBC # BLD AUTO: 7 K/UL (ref 4.1–11.1)

## 2020-08-13 PROCEDURE — 74011250637 HC RX REV CODE- 250/637: Performed by: SURGERY

## 2020-08-13 PROCEDURE — 74011250637 HC RX REV CODE- 250/637: Performed by: NURSE PRACTITIONER

## 2020-08-13 PROCEDURE — 80048 BASIC METABOLIC PNL TOTAL CA: CPT

## 2020-08-13 PROCEDURE — 85027 COMPLETE CBC AUTOMATED: CPT

## 2020-08-13 PROCEDURE — 99232 SBSQ HOSP IP/OBS MODERATE 35: CPT | Performed by: SURGERY

## 2020-08-13 PROCEDURE — 74011250636 HC RX REV CODE- 250/636: Performed by: SURGERY

## 2020-08-13 PROCEDURE — 74011000258 HC RX REV CODE- 258: Performed by: SURGERY

## 2020-08-13 PROCEDURE — 36415 COLL VENOUS BLD VENIPUNCTURE: CPT

## 2020-08-13 RX ORDER — METRONIDAZOLE 500 MG/1
500 TABLET ORAL 3 TIMES DAILY
Qty: 30 TAB | Refills: 0 | Status: SHIPPED | OUTPATIENT
Start: 2020-08-13 | End: 2020-08-23

## 2020-08-13 RX ORDER — LEVOFLOXACIN 750 MG/1
750 TABLET ORAL DAILY
Qty: 10 TAB | Refills: 0 | Status: SHIPPED | OUTPATIENT
Start: 2020-08-13 | End: 2020-08-23

## 2020-08-13 RX ADMIN — PIPERACILLIN AND TAZOBACTAM 3.38 G: 3; .375 INJECTION, POWDER, LYOPHILIZED, FOR SOLUTION INTRAVENOUS at 04:42

## 2020-08-13 RX ADMIN — OXYCODONE 5 MG: 5 TABLET ORAL at 01:14

## 2020-08-13 RX ADMIN — PANTOPRAZOLE SODIUM 40 MG: 40 TABLET, DELAYED RELEASE ORAL at 08:09

## 2020-08-13 RX ADMIN — OXYCODONE 5 MG: 5 TABLET ORAL at 08:36

## 2020-08-13 NOTE — DISCHARGE INSTRUCTIONS
Discharge Instructions       PATIENT ID: Jessica Reveles  MRN: [id number]   YOB: 1975    DATE OF ADMISSION: 8/9/2020  5:55 PM    DATE OF DISCHARGE: 8/13/2020    PRIMARY CARE PROVIDER: None     ATTENDING PHYSICIAN: Davis Bernardo MD  DISCHARGING PROVIDER: Clay Mayen MD    To contact this individual call [phone] and ask the  to page. If unavailable ask to be transferred the Adult Hospitalist Department. DISCHARGE DIAGNOSES perforation/ diverticulitis    CONSULTATIONS: IP CONSULT TO GENERAL SURGERY  IP CONSULT TO GENERAL SURGERY    PROCEDURES/SURGERIES: * No surgery found *    PENDING TEST RESULTS:   At the time of discharge the following test results are still pending:     FOLLOW UP APPOINTMENTS:   Follow-up Information     Follow up With Specialties Details Why Contact Info    None    None ([phone]) Patient stated that they have no PCP      Damon Jones MD General Surgery In 1 week  [address] [place] [address] 3 [address]  [phone]             ADDITIONAL CARE RECOMMENDATIONS:     DIET: NPO      ACTIVITY: Activity as tolerated    WOUND CARE:     EQUIPMENT needed:       DISCHARGE MEDICATIONS:   See Medication Reconciliation Form    · It is important that you take the medication exactly as they are prescribed. · Keep your medication in the bottles provided by the pharmacist and keep a list of the medication names, dosages, and times to be taken in your wallet. · Do not take other medications without consulting your doctor. NOTIFY YOUR PHYSICIAN FOR ANY OF THE FOLLOWING:   Fever over 101 degrees for 24 hours. Chest pain, shortness of breath, fever, chills, nausea, vomiting, diarrhea, change in mentation, falling, weakness, bleeding. Severe pain or pain not relieved by medications. Or, any other signs or symptoms that you may have questions about.       DISPOSITION:    Home With:   OT  PT  formerly [facility]  RN       SNF/Inpatient Rehab/LTAC Independent/assisted living    Hospice    Other: AMA     CDMP Checked:   Yes x     PROBLEM LIST Updated:  Yes x       Signed:   Jazmyn Seay MD  8/13/2020  11:45 AM

## 2020-08-13 NOTE — ROUTINE PROCESS
Hospital follow-up visit has been scheduled with [facility] for 8/17/2020.  Office will contact patient prior to arrival.  Baldomero Resendiz, Care Management Specialist.

## 2020-08-13 NOTE — PROGRESS NOTES
Hospitalist Service  Progress Note    Daily Progress Note: 2020    Assessment/Plan:   #. Acute sigmoid diverticulitis:  with small pelvic abscess CT from  worsening of the collection discussed with surgery patient unwilling to stay in the hospital he can leave AMA with Levaquin Flagyl if does not want to stay in the hospital but we strongly recommend against leaving the hospital    #. Sepsis: with fever and tachycardia. - continue Levaquin and Flagyl Clear liquid diet- Advance slowly  - Possible need for follow-up CT to ensure that the abscess is improving    #. Hyperbilirubinemia: mainly direct bili, US RUQ: unremarkable- monitor  #. Hypertension uncontrolled possibly secondary to pain Monitor for now    Code: full  DVT px: scd  Dispo: TBD-      Subjective:     Feels ok, CT findings explained, he is disappointed as he can't be discharged. No fever, mild abdo pain. Review of Systems:       Objective:   Physical examination  Visit Vitals  /85   Pulse 97   Temp 98.6 °F (37 °C)   Resp 18   Ht 5' 5\" (1.651 m)   Wt 151.8 kg (334 lb 10.5 oz)   SpO2 98%   BMI 55.69 kg/m²      Temp (24hrs), Av.8 °F (37.1 °C), Min:98.6 °F (37 °C), Max:99 °F (37.2 °C)         O2 Device: Room air  Patient Vitals for the past 24 hrs:   Temp Pulse Resp BP SpO2   20 0826 98.6 °F (37 °C) 97 18 135/85 98 %   20 2345 99 °F (37.2 °C) 99 18 145/81 94 %     No intake or output data in the 24 hours ending 20 1020  Last shift:    No intake/output data recorded.   Last 3 shifts:     1901 -  0700  In: -   Out: 500 [Urine:500]    General:   Alert, cooperative, no acute distress                       Chest wall:    No tenderness or deformities    Lungs:   Clear to auscultation bilaterally    Heart:   Regular rhythm, no murmur   Abdomen:    Soft, non-tender   No masses or organomegaly    Extremities:  No edema or DVT signs       Skin:  Warm and dry    No rashes or lesions   Neurologic:  Oriented No focal deficits   Psychiatric:  no agitation         Data Review:       Recent Labs     08/13/20  0500 08/11/20  0517   WBC 7.0 9.4   HGB 14.1 14.9   HCT 41.3 43.2    151     Recent Labs     08/13/20  0500 08/11/20  0517    135*   K 3.6 3.5    103   CO2 26 23   GLU 92 88   BUN 6 11   CREA 0.75 0.92   CA 8.0* 8.4*   ALB  --  2.6*   ALT  --  57     No results for input(s): PH, PCO2, PO2, HCO3, FIO2 in the last 72 hours. Lab Results   Component Value Date/Time    Glucose 92 08/13/2020 05:00 AM        All Micro Results     Procedure Component Value Units Date/Time    URINE CULTURE HOLD SAMPLE [[id number]] Collected:  08/09/20 1837    Order Status:  Completed Specimen:  Urine from Serum Updated:  08/09/20 1853     Urine culture hold       Urine on hold in Microbiology dept for 2 days. If unpreserved urine is submitted, it cannot be used for addtional testing after 24 hours, recollection will be required. No results found for: SDES  Lab Results   Component Value Date/Time    Culture result: (A) 05/10/2019 01:31 PM     HEAVY STREPTOCOCCI, BETA HEMOLYTIC GROUP B Penicillin and ampicillin are drugs of choice for treatment of beta-hemolytic streptococcal infections. Susceptibility testing of penicillins and beta-lactams approved by the FDA for treatment of beta-hemolytic streptococcal infections need not be performed routinely, because nonsusceptible isolates are extremely rare.  CLSI 2012     Medications reviewed  Current Facility-Administered Medications   Medication Dose Route Frequency    pantoprazole (PROTONIX) tablet 40 mg  40 mg Oral ACB    piperacillin-tazobactam (ZOSYN) 3.375 g in 0.9% sodium chloride (MBP/ADV) 100 mL  3.375 g IntraVENous Q8H    oxyCODONE IR (ROXICODONE) tablet 5 mg  5 mg Oral Q4H PRN    nicotine buccal (POLACRILEX) lozenge 2 mg  2 mg Oral Q2H PRN    nicotine (NICODERM CQ) 14 mg/24 hr patch 1 Patch  1 Patch TransDERmal Q24H    sodium chloride (NS) flush 5-40 mL  5-40 mL IntraVENous Q8H    sodium chloride (NS) flush 5-40 mL  5-40 mL IntraVENous PRN    acetaminophen (TYLENOL) tablet 650 mg  650 mg Oral Q6H PRN    Or    acetaminophen (TYLENOL) suppository 650 mg  650 mg Rectal Q6H PRN    promethazine (PHENERGAN) tablet 12.5 mg  12.5 mg Oral Q6H PRN    Or    ondansetron (ZOFRAN) injection 4 mg  4 mg IntraVENous Q6H PRN    0.9% sodium chloride infusion  100 mL/hr IntraVENous CONTINUOUS    HYDROmorphone (PF) (DILAUDID) injection 1 mg  1 mg IntraVENous Q4H PRN         Signed:   Peng Becker MD   Internist / Wendy Guerra

## 2020-08-13 NOTE — PROGRESS NOTES
8:30  Patient sitting up in chair. Complaining of back pain, gave prn medication. Waiting to discuss plans with doctors. 11:40 AM  Per MD's notes, Cande Cronin and Rafael, discussed with patient leaving AMA. I followed-up with patient and patient stated he would like to leave. Marysol Simon MD.    11:51 AM  Patient signed AMA form. IV removed. Given two prescriptions for antibiotics.  Patient told how to exit hospital.

## 2020-08-13 NOTE — ROUTINE PROCESS
Hospital follow-up PCP transitional care appointment has been scheduled with Dr. Lázaro Hines for Friday, Aug 28 @ 1:10PM as this was the earliest appt with Dr. Shruthi Hilton. Dr. Shruthi Hilton is on a rotating scheduled as explained by the  and will not be in the office until the appt date. Pending patient discharge.   Jose Guadalupe Gamboa, Care Management Specialist.

## 2020-08-13 NOTE — DISCHARGE SUMMARY
Discharge Summary       PATIENT ID: Caren Heimlich  MRN: [id number]   YOB: 1975    DATE OF ADMISSION: 8/9/2020  5:55 PM    DATE OF DISCHARGE: 8/13/20   PRIMARY CARE PROVIDER: None     ATTENDING PHYSICIAN: Jeny Cunha  DISCHARGING PROVIDER: Ramon Closs, MD    To contact this individual call [phone] and ask the  to page. If unavailable ask to be transferred the Adult Hospitalist Department. CONSULTATIONS: IP CONSULT TO GENERAL SURGERY  IP CONSULT TO GENERAL SURGERY    PROCEDURES/SURGERIES: * No surgery found *    ADMITTING [address] [place] [address] COURSE:     DISCHARGE DIAGNOSES / PLAN:       #. Acute sigmoid diverticulitis:  with small pelvic abscess CT from 8 / 12 worsening of the collection discussed with surgery patient unwilling to stay in the hospital he can leave AMA with Levaquin Flagyl if does not want to stay in the hospital but we strongly recommend against leaving the hospital  1.       ADDITIONAL CARE RECOMMENDATIONS:        PENDING TEST RESULTS:   At the time of discharge the following test results are still pending:     FOLLOW UP APPOINTMENTS:    Follow-up Information     Follow up With Specialties Details Why Contact Info    None    None ([phone]) Patient stated that they have no PCP      Sherwin Ruelas MD General Surgery In 1 week  [address] [place] [address] 3 [address] [place] [address]  [phone]               DIET: NPO      ACTIVITY: Activity as tolerated    WOUND CARE:     EQUIPMENT needed:       DISCHARGE MEDICATIONS:  Current Discharge Medication List      START taking these medications    Details   levoFLOXacin (Levaquin) 750 mg tablet Take 1 Tab by mouth daily for 10 days. Qty: 10 Tab, Refills: 0      metroNIDAZOLE (FlagyL) 500 mg tablet Take 1 Tab by mouth three (3) times daily for 10 days. Qty: 30 Tab, Refills: 0         CONTINUE these medications which have NOT CHANGED    Details   CYANOCOBALAMIN, VITAMIN B-12, PO Take 1 Tab by mouth daily.  Unknown strength      CALCIUM CARBONATE PO Take 1 Tab by mouth daily. Unknown strength      ibuprofen (MOTRIN) 200 mg tablet Take 200 mg by mouth as needed for Pain. NOTIFY YOUR PHYSICIAN FOR ANY OF THE FOLLOWING:   Fever over 101 degrees for 24 hours. Chest pain, shortness of breath, fever, chills, nausea, vomiting, diarrhea, change in mentation, falling, weakness, bleeding. Severe pain or pain not relieved by medications. Or, any other signs or symptoms that you may have questions about. DISPOSITION:  x  Home With:   OT  PT  HH  RN       Long term SNF/Inpatient Rehab    Independent/assisted living    Hospice    Other:       PATIENT CONDITION AT DISCHARGE:     Functional status    Poor    x Deconditioned     Independent      Cognition   x  Lucid     Forgetful     Dementia      Catheters/lines (plus indication)    [place]     PICC     PEG    x None      Code status   x  Full code     DNR      PHYSICAL EXAMINATION AT DISCHARGE:  General:          Alert, cooperative, no distress, appears stated age. HEENT:           Atraumatic, anicteric sclerae, pink conjunctivae                          No oral ulcers, mucosa moist, throat clear, dentition fair  Neck:               Supple, symmetrical  Lungs:             Clear to auscultation bilaterally. No Wheezing or Rhonchi. No rales. Chest wall:      No tenderness  No Accessory muscle use. Heart:              Regular  rhythm,  No  murmur   No edema  Abdomen:        Soft, non-tender. Not distended. Bowel sounds normal  Extremities:     No cyanosis. No clubbing,                            Skin turgor normal, Capillary refill normal  Skin:                Not pale. Not Jaundiced  No rashes   Psych:             Not anxious or agitated.   Neurologic:      Alert, moves all extremities, answers questions appropriately and responds to commands       CHRONIC MEDICAL DIAGNOSES:  Problem List as of 8/13/2020 Never Reviewed          Codes Class Noted - Resolved    Class 3 severe obesity in adult [facility]) ICD-10-CM: E66.01  ICD-9-CM: 278.01  8/12/2020 - Present        Diverticulitis of colon with perforation ICD-10-CM: K57.20  ICD-9-CM: 562.11  1/23/2018 - Present        Diverticulitis ICD-10-CM: K57.92  ICD-9-CM: 562.11  1/23/2018 - Present              Greater than 30  minutes were spent with the patient on counseling and coordination of care    Signed:   Yefri Love MD  8/13/2020  11:47 AM

## 2020-08-14 ENCOUNTER — HOSPITAL ENCOUNTER (INPATIENT)
Age: 45
LOS: 4 days | Discharge: HOME | DRG: 392 | End: 2020-08-18
Attending: EMERGENCY MEDICINE | Admitting: HOSPITALIST
Payer: COMMERCIAL

## 2020-08-14 ENCOUNTER — APPOINTMENT (OUTPATIENT)
Dept: CT IMAGING | Age: 45
DRG: 392 | End: 2020-08-14
Attending: EMERGENCY MEDICINE
Payer: COMMERCIAL

## 2020-08-14 DIAGNOSIS — I42.0 DILATED CARDIOMYOPATHY (HCC): ICD-10-CM

## 2020-08-14 DIAGNOSIS — K57.20 DIVERTICULITIS OF LARGE INTESTINE WITH PERFORATION WITHOUT BLEEDING: Primary | ICD-10-CM

## 2020-08-14 PROBLEM — I10 HYPERTENSION: Status: ACTIVE | Noted: 2020-08-14

## 2020-08-14 LAB
ALBUMIN SERPL-MCNC: 2.8 G/DL (ref 3.5–5)
ALBUMIN/GLOB SERPL: 0.6 {RATIO} (ref 1.1–2.2)
ALP SERPL-CCNC: 89 U/L (ref 45–117)
ALT SERPL-CCNC: 52 U/L (ref 12–78)
ANION GAP SERPL CALC-SCNC: 4 MMOL/L (ref 5–15)
APPEARANCE UR: CLEAR
AST SERPL-CCNC: 81 U/L (ref 15–37)
BILIRUB SERPL-MCNC: 1.6 MG/DL (ref 0.2–1)
BILIRUB UR QL: NEGATIVE
BUN SERPL-MCNC: 6 MG/DL (ref 6–20)
BUN/CREAT SERPL: 7 (ref 12–20)
CALCIUM SERPL-MCNC: 8.9 MG/DL (ref 8.5–10.1)
CHLORIDE SERPL-SCNC: 101 MMOL/L (ref 97–108)
CO2 SERPL-SCNC: 31 MMOL/L (ref 21–32)
COLOR UR: ABNORMAL
CREAT SERPL-MCNC: 0.88 MG/DL (ref 0.7–1.3)
ERYTHROCYTE [DISTWIDTH] IN BLOOD BY AUTOMATED COUNT: 18.9 % (ref 11.5–14.5)
GLOBULIN SER CALC-MCNC: 4.9 G/DL (ref 2–4)
GLUCOSE SERPL-MCNC: 96 MG/DL (ref 65–100)
GLUCOSE UR STRIP.AUTO-MCNC: NEGATIVE MG/DL
HCT VFR BLD AUTO: 44.4 % (ref 36.6–50.3)
HGB BLD-MCNC: 15.4 G/DL (ref 12.1–17)
HGB UR QL STRIP: NEGATIVE
KETONES UR QL STRIP.AUTO: 15 MG/DL
LEUKOCYTE ESTERASE UR QL STRIP.AUTO: NEGATIVE
MCH RBC QN AUTO: 25.5 PG (ref 26–34)
MCHC RBC AUTO-ENTMCNC: 34.7 G/DL (ref 30–36.5)
MCV RBC AUTO: 73.4 FL (ref 80–99)
NITRITE UR QL STRIP.AUTO: NEGATIVE
NRBC # BLD: 0 K/UL (ref 0–0.01)
NRBC BLD-RTO: 0 PER 100 WBC
PH UR STRIP: 6 [PH] (ref 5–8)
PLATELET # BLD AUTO: 239 K/UL (ref 150–400)
PMV BLD AUTO: 9.6 FL (ref 8.9–12.9)
POTASSIUM SERPL-SCNC: 3.6 MMOL/L (ref 3.5–5.1)
PROT SERPL-MCNC: 7.7 G/DL (ref 6.4–8.2)
PROT UR STRIP-MCNC: NEGATIVE MG/DL
RBC # BLD AUTO: 6.05 M/UL (ref 4.1–5.7)
SODIUM SERPL-SCNC: 136 MMOL/L (ref 136–145)
SP GR UR REFRACTOMETRY: 1.01 (ref 1–1.03)
UROBILINOGEN UR QL STRIP.AUTO: 0.2 EU/DL (ref 0.2–1)
WBC # BLD AUTO: 6.5 K/UL (ref 4.1–11.1)

## 2020-08-14 PROCEDURE — 99284 EMERGENCY DEPT VISIT MOD MDM: CPT

## 2020-08-14 PROCEDURE — 65660000000 HC RM CCU STEPDOWN

## 2020-08-14 PROCEDURE — 81003 URINALYSIS AUTO W/O SCOPE: CPT

## 2020-08-14 PROCEDURE — 80053 COMPREHEN METABOLIC PANEL: CPT

## 2020-08-14 PROCEDURE — 74011250637 HC RX REV CODE- 250/637: Performed by: HOSPITALIST

## 2020-08-14 PROCEDURE — 74011250636 HC RX REV CODE- 250/636: Performed by: EMERGENCY MEDICINE

## 2020-08-14 PROCEDURE — 74177 CT ABD & PELVIS W/CONTRAST: CPT

## 2020-08-14 PROCEDURE — 36415 COLL VENOUS BLD VENIPUNCTURE: CPT

## 2020-08-14 PROCEDURE — 96375 TX/PRO/DX INJ NEW DRUG ADDON: CPT

## 2020-08-14 PROCEDURE — 74011000258 HC RX REV CODE- 258: Performed by: EMERGENCY MEDICINE

## 2020-08-14 PROCEDURE — 99221 1ST HOSP IP/OBS SF/LOW 40: CPT | Performed by: SURGERY

## 2020-08-14 PROCEDURE — 74011250636 HC RX REV CODE- 250/636: Performed by: HOSPITALIST

## 2020-08-14 PROCEDURE — 74011636320 HC RX REV CODE- 636/320: Performed by: EMERGENCY MEDICINE

## 2020-08-14 PROCEDURE — 96365 THER/PROPH/DIAG IV INF INIT: CPT

## 2020-08-14 PROCEDURE — 74011250636 HC RX REV CODE- 250/636: Performed by: SURGERY

## 2020-08-14 PROCEDURE — 85027 COMPLETE CBC AUTOMATED: CPT

## 2020-08-14 RX ORDER — LABETALOL HYDROCHLORIDE 5 MG/ML
10 INJECTION, SOLUTION INTRAVENOUS
Status: DISCONTINUED | OUTPATIENT
Start: 2020-08-14 | End: 2020-08-18 | Stop reason: HOSPADM

## 2020-08-14 RX ORDER — SODIUM CHLORIDE 0.9 % (FLUSH) 0.9 %
10 SYRINGE (ML) INJECTION
Status: COMPLETED | OUTPATIENT
Start: 2020-08-14 | End: 2020-08-14

## 2020-08-14 RX ORDER — ACETAMINOPHEN 650 MG/1
650 SUPPOSITORY RECTAL
Status: DISCONTINUED | OUTPATIENT
Start: 2020-08-14 | End: 2020-08-18 | Stop reason: HOSPADM

## 2020-08-14 RX ORDER — ENOXAPARIN SODIUM 100 MG/ML
40 INJECTION SUBCUTANEOUS EVERY 24 HOURS
Status: DISCONTINUED | OUTPATIENT
Start: 2020-08-15 | End: 2020-08-18 | Stop reason: HOSPADM

## 2020-08-14 RX ORDER — MORPHINE SULFATE 4 MG/ML
4 INJECTION INTRAVENOUS
Status: DISCONTINUED | OUTPATIENT
Start: 2020-08-14 | End: 2020-08-14

## 2020-08-14 RX ORDER — ACETAMINOPHEN 325 MG/1
650 TABLET ORAL
Status: DISCONTINUED | OUTPATIENT
Start: 2020-08-14 | End: 2020-08-18 | Stop reason: HOSPADM

## 2020-08-14 RX ORDER — ONDANSETRON 2 MG/ML
4 INJECTION INTRAMUSCULAR; INTRAVENOUS
Status: DISCONTINUED | OUTPATIENT
Start: 2020-08-14 | End: 2020-08-18 | Stop reason: HOSPADM

## 2020-08-14 RX ORDER — SODIUM CHLORIDE 0.9 % (FLUSH) 0.9 %
5-40 SYRINGE (ML) INJECTION AS NEEDED
Status: DISCONTINUED | OUTPATIENT
Start: 2020-08-14 | End: 2020-08-18 | Stop reason: HOSPADM

## 2020-08-14 RX ORDER — MORPHINE SULFATE 4 MG/ML
2-4 INJECTION INTRAVENOUS
Status: DISCONTINUED | OUTPATIENT
Start: 2020-08-14 | End: 2020-08-18 | Stop reason: HOSPADM

## 2020-08-14 RX ORDER — MORPHINE SULFATE 2 MG/ML
4 INJECTION, SOLUTION INTRAMUSCULAR; INTRAVENOUS
Status: DISCONTINUED | OUTPATIENT
Start: 2020-08-14 | End: 2020-08-14 | Stop reason: ALTCHOICE

## 2020-08-14 RX ORDER — ENOXAPARIN SODIUM 100 MG/ML
40 INJECTION SUBCUTANEOUS EVERY 12 HOURS
Status: DISCONTINUED | OUTPATIENT
Start: 2020-08-14 | End: 2020-08-14

## 2020-08-14 RX ORDER — OXYCODONE HYDROCHLORIDE 5 MG/1
5 TABLET ORAL
Status: DISCONTINUED | OUTPATIENT
Start: 2020-08-14 | End: 2020-08-18 | Stop reason: HOSPADM

## 2020-08-14 RX ORDER — METRONIDAZOLE 500 MG/100ML
500 INJECTION, SOLUTION INTRAVENOUS EVERY 12 HOURS
Status: DISCONTINUED | OUTPATIENT
Start: 2020-08-14 | End: 2020-08-14

## 2020-08-14 RX ORDER — METRONIDAZOLE 500 MG/100ML
500 INJECTION, SOLUTION INTRAVENOUS EVERY 8 HOURS
Status: DISCONTINUED | OUTPATIENT
Start: 2020-08-14 | End: 2020-08-18 | Stop reason: HOSPADM

## 2020-08-14 RX ORDER — PROMETHAZINE HYDROCHLORIDE 25 MG/1
12.5 TABLET ORAL
Status: DISCONTINUED | OUTPATIENT
Start: 2020-08-14 | End: 2020-08-18 | Stop reason: HOSPADM

## 2020-08-14 RX ORDER — DOCUSATE SODIUM 100 MG/1
100 CAPSULE, LIQUID FILLED ORAL 2 TIMES DAILY
Status: DISCONTINUED | OUTPATIENT
Start: 2020-08-14 | End: 2020-08-18 | Stop reason: HOSPADM

## 2020-08-14 RX ORDER — SODIUM CHLORIDE AND POTASSIUM CHLORIDE .9; .15 G/100ML; G/100ML
SOLUTION INTRAVENOUS CONTINUOUS
Status: DISCONTINUED | OUTPATIENT
Start: 2020-08-14 | End: 2020-08-17

## 2020-08-14 RX ORDER — SODIUM CHLORIDE 0.9 % (FLUSH) 0.9 %
5-40 SYRINGE (ML) INJECTION EVERY 8 HOURS
Status: DISCONTINUED | OUTPATIENT
Start: 2020-08-14 | End: 2020-08-18 | Stop reason: HOSPADM

## 2020-08-14 RX ORDER — LEVOFLOXACIN 5 MG/ML
750 INJECTION, SOLUTION INTRAVENOUS EVERY 24 HOURS
Status: DISCONTINUED | OUTPATIENT
Start: 2020-08-14 | End: 2020-08-18 | Stop reason: HOSPADM

## 2020-08-14 RX ADMIN — ENOXAPARIN SODIUM 40 MG: 40 INJECTION SUBCUTANEOUS at 18:13

## 2020-08-14 RX ADMIN — OXYCODONE 5 MG: 5 TABLET ORAL at 18:13

## 2020-08-14 RX ADMIN — MORPHINE SULFATE 4 MG: 4 INJECTION, SOLUTION INTRAMUSCULAR; INTRAVENOUS at 12:54

## 2020-08-14 RX ADMIN — PIPERACILLIN AND TAZOBACTAM 3.38 G: 3; .375 INJECTION, POWDER, LYOPHILIZED, FOR SOLUTION INTRAVENOUS at 12:46

## 2020-08-14 RX ADMIN — Medication 10 ML: at 13:41

## 2020-08-14 RX ADMIN — Medication 10 ML: at 17:07

## 2020-08-14 RX ADMIN — LEVOFLOXACIN 750 MG: 5 INJECTION, SOLUTION INTRAVENOUS at 14:59

## 2020-08-14 RX ADMIN — POTASSIUM CHLORIDE AND SODIUM CHLORIDE: 900; 150 INJECTION, SOLUTION INTRAVENOUS at 16:48

## 2020-08-14 RX ADMIN — METRONIDAZOLE 500 MG: 500 INJECTION, SOLUTION INTRAVENOUS at 22:05

## 2020-08-14 RX ADMIN — Medication 10 ML: at 22:05

## 2020-08-14 RX ADMIN — IOPAMIDOL 100 ML: 755 INJECTION, SOLUTION INTRAVENOUS at 13:41

## 2020-08-14 RX ADMIN — DOCUSATE SODIUM 100 MG: 100 CAPSULE, LIQUID FILLED ORAL at 18:13

## 2020-08-14 NOTE — H&P
Hospitalist Admission Note    NAME: Pepe Way   :  1975   MRN:  [id number]     Date/Time:  2020 4:35 PM    Patient PCP: None  ______________________________________________________________________   Assessment & Plan:  Persistent sigmoid diverticulitis with local perforation, POA  Ketonuria  --appreciate general surgery consult. NPO except meds and ice chips  --IVF, not being aggressive due to lower extremity edema  --IV abx with levaquin and flagyl  --this is his second episode of diverticulitis in sigmoid with perforation since  and would benefit from eventual surgical evaluation for partial colectomy    Rising AST and improving T. Bili with right sided abdominal pain  --could be fatty liver vs. medication related since has been on zosyn. Denies alcohol use  --monitor level. Check hepatitis panel  --check coags, GGT    Elevated blood pressures, POA  Bilateral lower leg edema, POA  --patient denies hx HTN despite it written in his PMH and not on medications. --put on labetalol 10mg IV q6h prn SBP >160 or DBP >100. If BP remains persistently high, would consider start hctz once no longer npo. --check TSH, echo. Morbid obesity  --ordered bariatric bed  Body mass index is 54.11 kg/m². Code: full  DVT prophylaxis:  lovenox  Surrogate decision maker: Mother or sister    Case management consult to set up with pcp        Subjective:   CHIEF COMPLAINT:  Right sided abdominal pain, diverticulitis    HISTORY OF PRESENT ILLNESS:     Pepe Way is a 54 y.o. male with PMH sigmoid diverticulitis, obesity who was admitted to [facility] on  with sigmoid diverticulitis with microperforation and small diverticular abscess. Repeat CT abdomen  showed interval worsening of the contained perforation associated with the sigmoid diverticulitis with predominantly air in the right retroperitoneal collection.   Inflammatory changes of the adjacent loop of small bowel without evidence of small bowel obstruction. Was on zosyn, seen by general surgery there. He was unhappy with care there, development of low back pain, uncomfortable bed and left AMA on 8/13. Discharged on levaquin and flagyl. He reports having small bowel yesterday after discharge after not having BM for 4-5 days. Still feeling ill so came to [facility] today. Denies fever, chills, SOB, chest pain, nausea, vomiting. Repeat CT abdomen/pelvis today without no definite changes with persistent sigmoid diverticulitis with local perforation. Never had colonoscopy. Was admitted 1/2018 with acute sigmoid diverticulitis with perforation, treated medically with IV abx. We were asked to admit for work up and evaluation of the above problems. Past Medical History:   Diagnosis Date    Bell's palsy 01/25/2019    Diverticulitis     Hypertension     Kidney infection     Haresh Hunt syndrome (geniculate herpes zoster)       History reviewed. No pertinent surgical history. 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 Lives alone, single    Family History   Problem Relation Age of Onset    Hypertension Mother     Cancer Father         multiple myeloma     Allergies   Allergen Reactions    Meclizine Rash        Prior to Admission medications    Medication Sig Start Date End Date Taking? Authorizing Provider   levoFLOXacin (Levaquin) 750 mg tablet Take 1 Tab by mouth daily for 10 days. 8/13/20 8/23/20 Yes Chelsea Lara MD   metroNIDAZOLE (FlagyL) 500 mg tablet Take 1 Tab by mouth three (3) times daily for 10 days. 8/13/20 8/23/20 Yes Chelsea Lara MD     REVIEW OF SYSTEMS:  POSITIVE= Bold.   Negative = normal text  General:  fever, chills, sweats, generalized weakness, weight loss/gain, loss of appetite  Eyes:  blurred vision, eye pain, loss of vision, diplopia  Ear Nose and Throat:  rhinorrhea, pharyngitis  Respiratory:   cough, sputum production, SOB, wheezing, ARREOLA, pleuritic pain  Cardiology:  chest pain, palpitations, orthopnea, PND, edema, syncope   Gastrointestinal:  abdominal pain, N/V, dysphagia, diarrhea, constipation, bleeding  Genitourinary:  frequency, urgency, dysuria, hematuria, incontinence  Muskuloskeletal :  arthralgia, myalgia  Hematology:  easy bruising, bleeding, lymphadenopathy  Dermatological:  rash, ulceration, pruritis  Endocrine:  hot flashes or polydipsia  Neurological:  headache, dizziness, confusion, focal weakness, paresthesia, memory loss, gait disturbance  Psychological: anxiety, depression, agitation      Objective:   VITALS:    Visit Vitals  /83   Pulse 85   Temp 98.9 °F (37.2 °C)   Resp 18   Wt 147.5 kg (325 lb 2.9 oz)   SpO2 96%   BMI 54.11 kg/m²     Temp (24hrs), Av.4 °F (36.9 °C), Min:97.9 °F (36.6 °C), Max:98.9 °F (37.2 °C)    Body mass index is 54.11 kg/m². PHYSICAL EXAM:    General:    Alert, morbidly obese, sitting in recliner, cooperative, no distress, appears stated age. HEENT: Atraumatic, anicteric sclerae, pink conjunctivae     No oral ulcers, mucosa dry, throat clear. Hearing intact. Neck:  Supple, symmetrical,  thyroid: non tender  Lungs:   Clear to auscultation bilaterally. No Wheezing or Rhonchi. No rales. Chest wall:  No tenderness  No Accessory muscle use. Heart:   Regular  rhythm,  No  murmur   No gallop. 1+ pitting edema b/l feet  Abdomen:   Obese, Soft, moderate tender in right mid and RLQ. Bowel sounds normal. No masses  Extremities: No cyanosis. No clubbing  Skin:     Not pale Not Jaundiced  No rashes   Psych:  Good insight. Not depressed. Not anxious or agitated. Neurologic: EOMs intact. No facial asymmetry. No aphasia or slurred speech.  Symmetrical strength, Alert and oriented X 3.     IMAGING RESULTS:   []       I have personally reviewed the actual   []     CXR  []     CT scan  CXR:  CT :  EKG:   ________________________________________________________________________  Care Plan discussed with:    Comments   Patient y    [facility]:      ________________________________________________________________________  Prophylaxis:  GI none   DVT lovenox   ________________________________________________________________________  Recommended Disposition:   Home with Family y   HH/PT/OT/RN    SNF/LTC    HOPE    ________________________________________________________________________  Code Status:  Full Code y   DNR/DNI    ________________________________________________________________________  TOTAL TIME: 50 minutes      Comments    y Reviewed previous records   >50% of visit spent in counseling and coordination of care  Discussion with patient and/or family and questions answered         ______________________________________________________________________  Tone Chun MD      Procedures: see electronic medical records for all procedures/Xrays and details which were not copied into this note but were reviewed prior to creation of Plan.     LAB DATA REVIEWED:    Recent Results (from the past 24 hour(s))   CBC W/O DIFF    Collection Time: 08/14/20 10:38 AM   Result Value Ref Range    WBC 6.5 4.1 - 11.1 K/uL    RBC 6.05 (H) 4.10 - 5.70 M/uL    HGB 15.4 12.1 - 17.0 g/dL    HCT 44.4 36.6 - 50.3 %    MCV 73.4 (L) 80.0 - 99.0 FL    MCH 25.5 (L) 26.0 - 34.0 PG    MCHC 34.7 30.0 - 36.5 g/dL    RDW 18.9 (H) 11.5 - 14.5 %    PLATELET 229 964 - 545 K/uL    MPV 9.6 8.9 - 12.9 FL    NRBC 0.0 0  WBC    ABSOLUTE NRBC 0.00 0.00 - 2.25 K/uL   METABOLIC PANEL, COMPREHENSIVE    Collection Time: 08/14/20 10:38 AM   Result Value Ref Range    Sodium 136 136 - 145 mmol/L    Potassium 3.6 3.5 - 5.1 mmol/L    Chloride 101 97 - 108 mmol/L    CO2 31 21 - 32 mmol/L    Anion gap 4 (L) 5 - 15 mmol/L    Glucose 96 65 - 100 mg/dL    BUN 6 6 - 20 MG/DL    Creatinine 0.88 0.70 - 1.30 MG/DL    BUN/Creatinine ratio 7 (L) 12 - 20      GFR est AA >60 >60 ml/min/1.73m2    GFR est non-AA >60 >60 ml/min/1.73m2    Calcium 8.9 8.5 - 10.1 MG/DL    Bilirubin, total 1.6 (H) 0.2 - 1.0 MG/DL    ALT (SGPT) 52 12 - 78 U/L    AST (SGOT) 81 (H) 15 - 37 U/L    Alk.  phosphatase 89 45 - 117 U/L    Protein, total 7.7 6.4 - 8.2 g/dL    Albumin 2.8 (L) 3.5 - 5.0 g/dL    Globulin 4.9 (H) 2.0 - 4.0 g/dL    A-G Ratio 0.6 (L) 1.1 - 2.2     URINALYSIS W/ RFLX MICROSCOPIC    Collection Time: 08/14/20 10:38 AM   Result Value Ref Range    Color YELLOW/STRAW      Appearance CLEAR CLEAR      Specific gravity 1.006 1.003 - 1.030      pH (UA) 6.0 5.0 - 8.0      Protein Negative NEG mg/dL    Glucose Negative NEG mg/dL    Ketone 15 (A) NEG mg/dL    Bilirubin Negative NEG      Blood Negative NEG      Urobilinogen 0.2 0.2 - 1.0 EU/dL    Nitrites Negative NEG      Leukocyte Esterase Negative NEG

## 2020-08-14 NOTE — PROGRESS NOTES
Pharmacy Medication Reconciliation     The patient was interviewed regarding current PTA medication list, use and drug allergies;  patient only present in room and obtained permission from patient to discuss drug regimen. The patient was questioned regarding use of any other inhalers, topical products, over the counter medications, herbal medications, vitamin products or ophthalmic/nasal/otic medication use. Allergy Update: Meclizine    Recommendations/Findings: The following amendments were made to the patient's active medication list on file at [address]:   1) Additions: none    2) Deletions:   -cyanocobalamin  -ibuprofen    3) Changes: none    Pertinent Findings: None    -Clarified PTA med list with Rx query, and patient interview. PTA medication list was corrected to the following:     Prior to Admission Medications   Prescriptions Last Dose Informant Taking?   levoFLOXacin (Levaquin) 750 mg tablet 8/14/2020 at 0800  Yes   Sig: Take 1 Tab by mouth daily for 10 days. metroNIDAZOLE (FlagyL) 500 mg tablet 8/14/2020 at 0800  Yes   Sig: Take 1 Tab by mouth three (3) times daily for 10 days.       Facility-Administered Medications: None          Thank you,  Cami Wall,  Bekah Wellington

## 2020-08-14 NOTE — PROGRESS NOTES
Pharmacy Automatic Renal Dosing Protocol - Antimicrobials    Indication for Antimicrobials:  intra abdoinal infection     Current Regimen of Each Antimicrobial:  Levaquin 750 mg IV q24h (Start Date 8/14; Day #1)  Flagyl 500 mg IV q8h (Start Date 8/14; Day #1)  Previous Antimicrobial Therapy:    Impression/Plan:   Adjusted flagyl to 500 mg IV q12h  Antimicrobial stop date pending     Pharmacy will follow daily and adjust medications as appropriate for renal function and/or serum levels.     Thank you,  Maddie Sheikh, PHARMD

## 2020-08-14 NOTE — PROGRESS NOTES
General Surgery End of Shift Nursing Note    Bedside shift change report given to Laz Nava RN (oncoming nurse) by Roby Cannon RN (offgoing nurse). Report included the following information SBAR, Kardex and MAR. Shift worked:   [id number]   Summary of shift:  Received patient to the floor and oriented him to the room. Patient is Npo with ice chips. He had blake urine output. Patient pain was managed with PRN medications.     Issues for physician to address:   None      Number times ambulated in hallway past shift: 0    Number of times OOB to chair past shift: 2    Pain Management:  Current medication: See MAR   Patient states pain is manageable on current pain medication: YES    GI:    Current diet:  DIET NPO With Ice Chips, Except Meds    Tolerating current diet: YES  Passing flatus: NO  Last Bowel Movement: today   Appearance: Small, formed per patient    Patient Safety:  Falls Score: 1    Keara Sadler RN

## 2020-08-14 NOTE — ED PROVIDER NOTES
EMERGENCY DEPARTMENT HISTORY AND PHYSICAL EXAM      Date: 8/14/2020  Patient Name: Reggie Bain    History of Presenting Illness     Chief Complaint   Patient presents with    Diverticulitis     Patient was admittes to [facility] and left [place] yesterday. He was sent home with antibiotics and has been compliant. Today he is feeling worse. History Provided By: Patient    HPI: Reggie Bain, 54 y.o. male presents to the ED with cc of diverticulitis. Patient was previously admitted to [facility] and left [place]. Patient symptoms began Sunday, approximately 5 days ago. Patient reports pain is right lower quadrant. Pain was 10 out of 10 and constant. Was diagnosed with diverticulitis with small microabscess. Was admitted for IV antibiotics. Patient states that repeat CT showed worsening despite antibiotics. Patient was not happy with the care there and left [place]. Was discharged on Levaquin and Flagyl PO. Patient states his pain has improved, but still endorses pain in the right lower quadrant. He has been able to move his bowels for the first time today but still only a small amount. No nausea or vomiting. No fevers. Patient presents emergency department for further work-up stating he still feels unwell. There are no other complaints, changes, or physical findings at this time. PCP: None    No current facility-administered medications on file prior to encounter. Current Outpatient Medications on File Prior to Encounter   Medication Sig Dispense Refill    levoFLOXacin (Levaquin) 750 mg tablet Take 1 Tab by mouth daily for 10 days. 10 Tab 0    metroNIDAZOLE (FlagyL) 500 mg tablet Take 1 Tab by mouth three (3) times daily for 10 days. 30 Tab 0    [DISCONTINUED] CYANOCOBALAMIN, VITAMIN B-12, PO Take 1 Tab by mouth daily. Unknown strength      [DISCONTINUED] CALCIUM CARBONATE PO Take 1 Tab by mouth daily.  Unknown strength      [DISCONTINUED] ibuprofen (MOTRIN) 200 mg tablet Take 200-400 mg by mouth as needed for Pain. Past History     Past Medical History:  Past Medical History:   Diagnosis Date    Bell's palsy 01/25/2019    Diverticulitis     Hypertension     Kidney infection     Haresh Hunt syndrome (geniculate herpes zoster)        Past Surgical History:  History reviewed. No pertinent surgical history. Family History:  History reviewed. No pertinent family history. Social History:  Social History     Tobacco Use    Smoking status: Current Every Day Smoker     Packs/day: 1.00     Years: 15.00     Pack years: 15.00    Smokeless tobacco: Never Used   Substance Use Topics    Alcohol use: No     Frequency: Never     Comment: rarely    Drug use: No       Allergies: Allergies   Allergen Reactions    Meclizine Rash         Review of Systems   Review of Systems   Constitutional: Negative for fever. HENT: Negative for voice change. Eyes: Negative for pain and redness. Respiratory: Negative for cough and chest tightness. Cardiovascular: Negative for chest pain and leg swelling. Gastrointestinal: Positive for abdominal pain. Negative for diarrhea, nausea and vomiting. Genitourinary: Negative for hematuria. Musculoskeletal: Negative for gait problem. Skin: Negative for color change, pallor and rash. Neurological: Negative for facial asymmetry, weakness and headaches. Hematological: Does not bruise/bleed easily. Psychiatric/Behavioral: Negative for behavioral problems. All other systems reviewed and are negative. Physical Exam   Physical Exam  Vitals signs and nursing note reviewed. Constitutional:       Comments: 58-year-old male, resting in bed, no distress   HENT:      Head: Normocephalic and atraumatic. Nose: Nose normal.      Mouth/Throat:      Mouth: Mucous membranes are moist.   Eyes:      Pupils: Pupils are equal, round, and reactive to light. Neck:      Musculoskeletal: Normal range of motion.    Cardiovascular:      Rate and Rhythm: Normal rate and regular rhythm. Pulses: Normal pulses. Heart sounds: No murmur. No friction rub. No gallop. Pulmonary:      Effort: Pulmonary effort is normal.      Breath sounds: Normal breath sounds. No wheezing, rhonchi or rales. Abdominal:      General: Abdomen is flat. There is no distension. Palpations: Abdomen is soft. Tenderness: There is abdominal tenderness. Comments: Right lower quadrant tenderness. No guarding. No rebound. Musculoskeletal: Normal range of motion. Right lower leg: No edema. Left lower leg: No edema. Skin:     General: Skin is warm and dry. Capillary Refill: Capillary refill takes less than 2 seconds. Neurological:      General: No focal deficit present. Mental Status: He is alert.    Psychiatric:         Mood and Affect: Mood normal.         Diagnostic Study Results     Labs -     Recent Results (from the past 12 hour(s))   CBC W/O DIFF    Collection Time: 08/14/20 10:38 AM   Result Value Ref Range    WBC 6.5 4.1 - 11.1 K/uL    RBC 6.05 (H) 4.10 - 5.70 M/uL    HGB 15.4 12.1 - 17.0 g/dL    HCT 44.4 36.6 - 50.3 %    MCV 73.4 (L) 80.0 - 99.0 FL    MCH 25.5 (L) 26.0 - 34.0 PG    MCHC 34.7 30.0 - 36.5 g/dL    RDW 18.9 (H) 11.5 - 14.5 %    PLATELET 319 978 - 694 K/uL    MPV 9.6 8.9 - 12.9 FL    NRBC 0.0 0  WBC    ABSOLUTE NRBC 0.00 0.00 - 9.74 K/uL   METABOLIC PANEL, COMPREHENSIVE    Collection Time: 08/14/20 10:38 AM   Result Value Ref Range    Sodium 136 136 - 145 mmol/L    Potassium 3.6 3.5 - 5.1 mmol/L    Chloride 101 97 - 108 mmol/L    CO2 31 21 - 32 mmol/L    Anion gap 4 (L) 5 - 15 mmol/L    Glucose 96 65 - 100 mg/dL    BUN 6 6 - 20 MG/DL    Creatinine 0.88 0.70 - 1.30 MG/DL    BUN/Creatinine ratio 7 (L) 12 - 20      GFR est AA >60 >60 ml/min/1.73m2    GFR est non-AA >60 >60 ml/min/1.73m2    Calcium 8.9 8.5 - 10.1 MG/DL    Bilirubin, total 1.6 (H) 0.2 - 1.0 MG/DL    ALT (SGPT) 52 12 - 78 U/L    AST (SGOT) 81 (H) 15 - 37 U/L Alk. phosphatase 89 45 - 117 U/L    Protein, total 7.7 6.4 - 8.2 g/dL    Albumin 2.8 (L) 3.5 - 5.0 g/dL    Globulin 4.9 (H) 2.0 - 4.0 g/dL    A-G Ratio 0.6 (L) 1.1 - 2.2     URINALYSIS W/ RFLX MICROSCOPIC    Collection Time: 08/14/20 10:38 AM   Result Value Ref Range    Color YELLOW/STRAW      Appearance CLEAR CLEAR      Specific gravity 1.006 1.003 - 1.030      pH (UA) 6.0 5.0 - 8.0      Protein Negative NEG mg/dL    Glucose Negative NEG mg/dL    Ketone 15 (A) NEG mg/dL    Bilirubin Negative NEG      Blood Negative NEG      Urobilinogen 0.2 0.2 - 1.0 EU/dL    Nitrites Negative NEG      Leukocyte Esterase Negative NEG         Radiologic Studies -   CT ABD PELV W CONT   Final Result   IMPRESSION:   No definite changes, diverticulitis with local perforation        CT Results  (Last 48 hours)               08/14/20 1341  CT ABD PELV W CONT Final result    Impression:  IMPRESSION:   No definite changes, diverticulitis with local perforation       Narrative:  EXAM: CT ABD PELV W CONT       INDICATION: diverticulitis       COMPARISON: August 12        CONTRAST: 100 mL of Isovue-370. TECHNIQUE:    Following the uneventful intravenous administration of contrast, thin axial   images were obtained through the abdomen and pelvis. Coronal and sagittal   reconstructions were generated. Oral contrast was not administered. CT dose   reduction was achieved through use of a standardized protocol tailored for this   examination and automatic exposure control for dose modulation. FINDINGS:    LOWER THORAX: Cardiomegaly. LIVER: No mass. BILIARY TREE: Gallbladder is within normal limits. CBD is not dilated. SPLEEN: within normal limits. PANCREAS: No mass or ductal dilatation. ADRENALS: Unremarkable. KIDNEYS: No mass, calculus, or hydronephrosis. STOMACH: Unremarkable. SMALL BOWEL: No dilatation or wall thickening.    COLON: Findings compatible with perforated sigmoid diverticulitis once again demonstrated. No new fluid collections. The bladder show no acute findings. Some   mild wall thickening in the adjacent small bowel. APPENDIX: Unremarkable. PERITONEUM: No ascites or pneumoperitoneum. RETROPERITONEUM: No lymphadenopathy or aortic aneurysm. REPRODUCTIVE ORGANS:   URINARY BLADDER: No mass or calculus. BONES: No destructive bone lesion. ABDOMINAL WALL: Supraumbilical fat-containing hernia   ADDITIONAL COMMENTS: N/A               CXR Results  (Last 48 hours)    None          Medical Decision Making   I am the first provider for this patient. I reviewed the vital signs, available nursing notes, past medical history, past surgical history, family history and social history. Vital Signs-Reviewed the patient's vital signs. Patient Vitals for the past 12 hrs:   Temp Pulse Resp BP SpO2   08/14/20 1501    (!) 154/94 95 %   08/14/20 1200    (!) 168/94 97 %   08/14/20 1022 97.9 °F (36.6 °C) 94 20 (!) 185/106 99 %       Records Reviewed: Nursing Notes, Old Medical Records and Previous Radiology Studies    Provider Notes (Medical Decision Making):     50-year-old male with a past medical history as above presents with chief complaint of abdominal pain. Patient was admitted at an outside hospital for diverticulitis with microabscess. Left AMA due to not being happy with care. Has been taking p.o. antibiotics. Still states he feels unwell. Plan to check basic labs. Continue Zosyn as he was on this previously. CT abdomen and pelvis. Discussed with surgery. ED Course:   Initial assessment performed. The patients presenting problems have been discussed, and they are in agreement with the care plan formulated and outlined with them. I have encouraged them to ask questions as they arise throughout their visit. ED Course as of Aug 14 1530   Fri Aug 14, 2020   1319 Spoke to Dr. Kristie Terrell via telephone recommends repeat CT scan.  Labs reviewed with mild elevation of AST although baseline is upper limit of normal.    [MB]   [address] admission, ice chips but npo. Reviewed CT with Dr. Malia Mercer. [MB]   1423 CT ABD PELV W CONT [MB]      ED Course User Index  [MB] MD Evelia Sams MD      Disposition:    Admit to medicine    Diagnosis     Clinical Impression:   1. Diverticulitis of large intestine with perforation without bleeding        Attestations:    Evelia Oh MD    Please note that this dictation was completed with [email or web address], the computer voice recognition software. Quite often unanticipated grammatical, syntax, homophones, and other interpretive errors are inadvertently transcribed by the computer software. Please disregard these errors. Please excuse any errors that have escaped final proofreading. Thank you.

## 2020-08-14 NOTE — PROGRESS NOTES
Pharmacy - Enoxaparin (Lovenox®) Monitoring      Indication: DVT Prophylaxis     Current Dose: Enoxaparin 40 mg subcutaneously every 24 hours    Creatinine Clearance (mL/min): 142    Current Weight: 147 Kg    Labs:  Recent Labs     08/14/20  1038 08/13/20  0500   CREA 0.88 0.75   HGB 15.4 14.1    192     Wt Readings from Last 1 Encounters:   08/14/20 147.5 kg (325 lb 2.9 oz)     Ht Readings from Last 1 Encounters:   08/10/20 165.1 cm (65\")       Impression/Plan:   Enoxaparin adjusted to 40 mg Q12H per protocol for BMI 40 and above     Thanks,  Jamin Garay, [facility]      [email or web address]. pdf

## 2020-08-14 NOTE — ED NOTES
TRANSFER - OUT REPORT:    Verbal report given to [address] on Aline Pallas  being transferred to [facility](unit) for routine progression of care       Report consisted of patients Situation, Background, Assessment and   Recommendations(SBAR). Information from the following report(s) SBAR and ED Summary was reviewed with the receiving nurse. Lines:   Peripheral IV 08/14/20 Left Antecubital (Active)   Site Assessment Clean, dry, & intact 08/14/20 1245   Phlebitis Assessment 0 08/14/20 1245   Infiltration Assessment 0 08/14/20 1245   Dressing Status Clean, dry, & intact 08/14/20 1245        Opportunity for questions and clarification was provided.       Patient transported with:   Monitor

## 2020-08-14 NOTE — CONSULTS
Surgery Consult    Subjective:      Aline Pallas is a 54 y.o. male presenting to ED for admission. He was admitted on 8/9/2020 to [facility] by the medical service for recurrent sigmoid diverticulitis with contained microperforation in the medial pelvis, not amenable to drainage. He was seen by Dr. Cyndi Mcbride as a surgical consultation and has been stable. He signed himself out AMA yesterday. He states that he is not yet well and needs to be readmitted. He denies any fevers or chills. He had a BM yesterday. He states he is not eating because he was told he was not allowed to. He states he still has pain from the umbilicus to the right pubic ramus. He complains of worsening bilateral pedal edema. He had elevated bilirubin at [facility] which has not been explained. Past Medical History:   Diagnosis Date    Bell's palsy 01/25/2019    Diverticulitis     Hypertension     Kidney infection     Tolstoy Hunt syndrome (geniculate herpes zoster)      History reviewed. No pertinent surgical history. History reviewed. No pertinent family history. Social History     Tobacco Use    Smoking status: Current Every Day Smoker     Packs/day: 1.00     Years: 15.00     Pack years: 15.00    Smokeless tobacco: Never Used   Substance Use Topics    Alcohol use: No     Frequency: Never     Comment: rarely      Prior to Admission medications    Medication Sig Start Date End Date Taking? Authorizing Provider   levoFLOXacin (Levaquin) 750 mg tablet Take 1 Tab by mouth daily for 10 days. 8/13/20 8/23/20 Yes Radha Chaudhary MD   metroNIDAZOLE (FlagyL) 500 mg tablet Take 1 Tab by mouth three (3) times daily for 10 days. 8/13/20 8/23/20 Yes Radha Chaudhary MD   CYANOCOBALAMIN, VITAMIN B-12, PO Take 1 Tab by mouth daily. Unknown strength   Yes Provider, Historical   CALCIUM CARBONATE PO Take 1 Tab by mouth daily. Unknown strength   Yes Provider, Historical   ibuprofen (MOTRIN) 200 mg tablet Take 200-400 mg by mouth as needed for Pain.    Yes Provider, Historical      Allergies   Allergen Reactions    Meclizine Rash       Review of Systems   Constitutional: Negative for chills, diaphoresis and fever. Respiratory: Negative for shortness of breath and wheezing. Cardiovascular: Negative for chest pain and palpitations. Gastrointestinal: Positive for abdominal pain. Negative for diarrhea, nausea and vomiting. Musculoskeletal: Negative for myalgias. Hematological: Does not bruise/bleed easily. Objective:     Patient Vitals for the past 8 hrs:   BP Temp Pulse Resp SpO2 Weight   20 1022 (!) 185/106 97.9 °F (36.6 °C) 94 20 99 % 325 lb 2.9 oz (147.5 kg)       Temp (24hrs), Av.9 °F (36.6 °C), Min:97.9 °F (36.6 °C), Max:97.9 °F (36.6 °C)      Physical Exam  Constitutional:       General: He is not in acute distress. Appearance: He is well-developed. HENT:      Head: Normocephalic and atraumatic. Cardiovascular:      Rate and Rhythm: Normal rate and regular rhythm. Heart sounds: Normal heart sounds. Pulmonary:      Breath sounds: Normal breath sounds. No wheezing or rales. Abdominal:      General: Abdomen is protuberant. Bowel sounds are normal. There is no distension. Palpations: Abdomen is soft. There is no mass. Tenderness: There is abdominal tenderness in the periumbilical area and suprapubic area. There is no guarding or rebound. Negative signs include McBurney's sign. Comments: Morbidly obese without peritoneal signs, mass or erythema   Musculoskeletal: Normal range of motion. Lymphadenopathy:      Cervical: No cervical adenopathy. Assessment:     55 yo with stable sigmoid diverticulitis with normal WBC, having BMs. He has mild to moderate tenderness without peritoneal signs and CT is stable with no drainable collection and no increase in size from prior study. Plan:     Recommend readmit to medical service with iv levaquin and flagyl. Would keep npo x ice chips and meds for now. Repeat labs in am and possibly try po tomorrow if feeling better. Do not expect he will require surgical intervention during this hospitalization, but should be considered for elective colectomy once acute process resolved in 6-8 weeks. We will follow along for now.

## 2020-08-15 ENCOUNTER — APPOINTMENT (OUTPATIENT)
Dept: NON INVASIVE DIAGNOSTICS | Age: 45
DRG: 392 | End: 2020-08-15
Attending: HOSPITALIST
Payer: COMMERCIAL

## 2020-08-15 LAB
ALBUMIN SERPL-MCNC: 2.3 G/DL (ref 3.5–5)
ALBUMIN/GLOB SERPL: 0.5 {RATIO} (ref 1.1–2.2)
ALP SERPL-CCNC: 71 U/L (ref 45–117)
ALT SERPL-CCNC: 44 U/L (ref 12–78)
ANION GAP SERPL CALC-SCNC: 8 MMOL/L (ref 5–15)
APTT PPP: 30.8 SEC (ref 22.1–32)
AST SERPL-CCNC: 69 U/L (ref 15–37)
BILIRUB SERPL-MCNC: 1.4 MG/DL (ref 0.2–1)
BUN SERPL-MCNC: 9 MG/DL (ref 6–20)
BUN/CREAT SERPL: 12 (ref 12–20)
CALCIUM SERPL-MCNC: 8.2 MG/DL (ref 8.5–10.1)
CHLORIDE SERPL-SCNC: 104 MMOL/L (ref 97–108)
CO2 SERPL-SCNC: 26 MMOL/L (ref 21–32)
CREAT SERPL-MCNC: 0.78 MG/DL (ref 0.7–1.3)
ERYTHROCYTE [DISTWIDTH] IN BLOOD BY AUTOMATED COUNT: 19.2 % (ref 11.5–14.5)
GGT SERPL-CCNC: 225 U/L (ref 15–85)
GLOBULIN SER CALC-MCNC: 4.4 G/DL (ref 2–4)
GLUCOSE SERPL-MCNC: 82 MG/DL (ref 65–100)
HAV IGM SER QL: NONREACTIVE
HBV CORE IGM SER QL: NONREACTIVE
HBV SURFACE AG SER QL: <0.1 INDEX
HBV SURFACE AG SER QL: NEGATIVE
HCT VFR BLD AUTO: 41.8 % (ref 36.6–50.3)
HCV AB SER IA-ACNC: >11 INDEX
HCV AB SERPL QL IA: REACTIVE
HCV COMMENT,HCGAC: ABNORMAL
HGB BLD-MCNC: 14.3 G/DL (ref 12.1–17)
INR PPP: 1.1 (ref 0.9–1.1)
MCH RBC QN AUTO: 25.4 PG (ref 26–34)
MCHC RBC AUTO-ENTMCNC: 34.2 G/DL (ref 30–36.5)
MCV RBC AUTO: 74.1 FL (ref 80–99)
NRBC # BLD: 0 K/UL (ref 0–0.01)
NRBC BLD-RTO: 0 PER 100 WBC
PLATELET # BLD AUTO: 221 K/UL (ref 150–400)
PMV BLD AUTO: 10.1 FL (ref 8.9–12.9)
POTASSIUM SERPL-SCNC: 3.7 MMOL/L (ref 3.5–5.1)
PROT SERPL-MCNC: 6.7 G/DL (ref 6.4–8.2)
PROTHROMBIN TIME: 11.8 SEC (ref 9–11.1)
RBC # BLD AUTO: 5.64 M/UL (ref 4.1–5.7)
SODIUM SERPL-SCNC: 138 MMOL/L (ref 136–145)
SP1: ABNORMAL
SP2: ABNORMAL
SP3: ABNORMAL
THERAPEUTIC RANGE,PTTT: NORMAL SECS (ref 58–77)
TSH SERPL DL<=0.05 MIU/L-ACNC: 4.69 UIU/ML (ref 0.36–3.74)
WBC # BLD AUTO: 6.2 K/UL (ref 4.1–11.1)

## 2020-08-15 PROCEDURE — 74011250636 HC RX REV CODE- 250/636: Performed by: SURGERY

## 2020-08-15 PROCEDURE — 82977 ASSAY OF GGT: CPT

## 2020-08-15 PROCEDURE — 84443 ASSAY THYROID STIM HORMONE: CPT

## 2020-08-15 PROCEDURE — 65660000000 HC RM CCU STEPDOWN

## 2020-08-15 PROCEDURE — 85610 PROTHROMBIN TIME: CPT

## 2020-08-15 PROCEDURE — 74011250637 HC RX REV CODE- 250/637: Performed by: HOSPITALIST

## 2020-08-15 PROCEDURE — 80053 COMPREHEN METABOLIC PANEL: CPT

## 2020-08-15 PROCEDURE — 36415 COLL VENOUS BLD VENIPUNCTURE: CPT

## 2020-08-15 PROCEDURE — 85027 COMPLETE CBC AUTOMATED: CPT

## 2020-08-15 PROCEDURE — 74011250636 HC RX REV CODE- 250/636: Performed by: HOSPITALIST

## 2020-08-15 PROCEDURE — 85730 THROMBOPLASTIN TIME PARTIAL: CPT

## 2020-08-15 PROCEDURE — 80074 ACUTE HEPATITIS PANEL: CPT

## 2020-08-15 RX ADMIN — METRONIDAZOLE 500 MG: 500 INJECTION, SOLUTION INTRAVENOUS at 06:53

## 2020-08-15 RX ADMIN — POTASSIUM CHLORIDE AND SODIUM CHLORIDE: 900; 150 INJECTION, SOLUTION INTRAVENOUS at 18:18

## 2020-08-15 RX ADMIN — ENOXAPARIN SODIUM 40 MG: 40 INJECTION SUBCUTANEOUS at 18:18

## 2020-08-15 RX ADMIN — ACETAMINOPHEN 650 MG: 325 TABLET ORAL at 12:38

## 2020-08-15 RX ADMIN — Medication 10 ML: at 22:14

## 2020-08-15 RX ADMIN — Medication 10 ML: at 06:54

## 2020-08-15 RX ADMIN — LEVOFLOXACIN 750 MG: 5 INJECTION, SOLUTION INTRAVENOUS at 14:39

## 2020-08-15 RX ADMIN — METRONIDAZOLE 500 MG: 500 INJECTION, SOLUTION INTRAVENOUS at 16:43

## 2020-08-15 RX ADMIN — Medication 10 ML: at 13:39

## 2020-08-15 RX ADMIN — DOCUSATE SODIUM 100 MG: 100 CAPSULE, LIQUID FILLED ORAL at 08:08

## 2020-08-15 RX ADMIN — METRONIDAZOLE 500 MG: 500 INJECTION, SOLUTION INTRAVENOUS at 22:14

## 2020-08-15 RX ADMIN — DOCUSATE SODIUM 100 MG: 100 CAPSULE, LIQUID FILLED ORAL at 18:18

## 2020-08-15 NOTE — PROGRESS NOTES
Problem: Pain  Goal: *Control of Pain  Outcome: Progressing Towards Goal     Problem: Patient Education: Go to Patient Education Activity  Goal: Patient/Family Education  Outcome: Progressing Towards Goal     Problem: Nausea/Vomiting (Adult)  Goal: *Absence of nausea/vomiting  Outcome: Progressing Towards Goal     Problem: Patient Education: Go to Patient Education Activity  Goal: Patient/Family Education  Outcome: Progressing Towards Goal     Problem: Constipation - Risk of  Goal: *Prevention of constipation  Outcome: Progressing Towards Goal     Problem: Patient Education: Go to Patient Education Activity  Goal: Patient/Family Education  Outcome: Progressing Towards Goal     Problem: Constipation - Risk of  Goal: *Prevention of constipation  Outcome: Progressing Towards Goal

## 2020-08-15 NOTE — PROGRESS NOTES
SURGERY PROGRESS NOTE      Admit Date: 2020    Subjective:     Patient states pain has resolved.   Passing flatus      Objective:     Visit Vitals  BP (!) 181/109   Pulse 77   Temp 97.5 °F (36.4 °C)   Resp 16   Wt 147.5 kg (325 lb 2.9 oz)   SpO2 97%   BMI 54.11 kg/m²        Temp (24hrs), Av.4 °F (36.9 °C), Min:97.5 °F (36.4 °C), Max:98.9 °F (37.2 °C)      08/15 0701 - 08/15 1900  In: 064 [I.V.:321]  Out: 300 [Urine:300]  1901 - 08/15 0700  In: 7483 [I.V.:1663]  Out: 9222 [Urine:1220]    Physical Exam:    General:  alert, cooperative, no distress, appears stated age   Abdomen: soft, bowel sounds active, non-tender   Incision:   healing well, no drainage, no erythema, no hernia, no seroma, no swelling, no dehiscence, incision well approximated           Lab Results   Component Value Date/Time    WBC 6.2 08/15/2020 04:02 AM    HGB 14.3 08/15/2020 04:02 AM    HCT 41.8 08/15/2020 04:02 AM    PLATELET 030  04:02 AM    MCV 74.1 (L) 08/15/2020 04:02 AM     Lab Results   Component Value Date/Time    GFR est non-AA >60 08/15/2020 04:02 AM    GFR est AA >60 08/15/2020 04:02 AM    Creatinine 0.78 08/15/2020 04:02 AM    BUN 9 08/15/2020 04:02 AM    Sodium 138 08/15/2020 04:02 AM    Potassium 3.7 08/15/2020 04:02 AM    Chloride 104 08/15/2020 04:02 AM    CO2 26 08/15/2020 04:02 AM       Assessment:     Active Problems:    Hypertension (2020)      Diverticulitis of large intestine with perforation (2020)    Symptomatically Improving    Plan:       Advance diet  Continue IV antibiotics   If continues to do well, likely Discharge on oral antibiotics in 24 to 48 hours

## 2020-08-15 NOTE — PROGRESS NOTES
Spiritual Care Assessment/Progress Note  [facility]      NAME: Werner Dupont      MRN: [id number]  AGE: 54 y.o.  SEX: male  Yazidi Affiliation: Non Mosque   Language: English     8/15/2020     Total Time (in minutes): 30     Spiritual Assessment begun in [facility] GENERAL SURGERY through conversation with:         [x]Patient        [] Family    [] Friend(s)        Reason for Consult: Advance medical directive consult     Spiritual beliefs: (Please include comment if needed)     [x] Identifies with a tiffanie tradition:         [x] Supported by a tiffanie community:            [] Claims no spiritual orientation:           [] Seeking spiritual identity:                [] Adheres to an individual form of spirituality:           [] Not able to assess:                           Identified resources for coping:      [x] Prayer                               [] Music                  [] Guided Imagery     [x] Family/friends                 [] Pet visits     [] Devotional reading                         [] Unknown     [] Other:                                               Interventions offered during this visit: (See comments for more details)    Patient Interventions: Affirmation of tiffanie, Affirmation of emotions/emotional suffering, Yazidi beliefs/image of God discussed           Plan of Care:     [] Support spiritual and/or cultural needs    [] Support AMD and/or advance care planning process      [] Support grieving process   [] Coordinate Rites and/or Rituals    [] Coordination with community clergy   [x] No spiritual needs identified at this time   [] Detailed Plan of Care below (See Comments)  [] Make referral to Music Therapy  [] Make referral to Pet Therapy     [] Make referral to Addiction services  [] Make referral to [facility]  [] Make referral to Spiritual Care Partner  [] No future visits requested        [] Follow up visits as needed     Comments:  Responded to request by In Basket to assist with Advance Medical Directive (AMD) in Gen Surge. Consulted with patients nurse. Explained document to patient. Patient did not have visitors at the time. Patient decided that he would like to discussed the document with family and that he will notify a nurse if he wishes to complete an AMD. Left paper work with patient. Patient shared about his spirituality and tiffanie. Patient shared about his occupation and mentioned his mother. Provided assurance to patient that spiritual support is available . Advised of  availability. Chaplains will follow as able and/or needed. Rev.  Linda Lambert EdD MDiv     For  Assistance Page [phone])

## 2020-08-15 NOTE — PROGRESS NOTES
General Surgery End of Shift Nursing Note    Bedside shift change report given to Lisa Santiago RN(oncoming nurse) by MARCI Mason (offgoing nurse). Report included the following information SBAR, Kardex and MAR. Shift worked:   [id number]   Summary of shift:    Patient had no complaints of pain during shift. He tolerated his diet advancement and was flatulent. No BMs for shift. His ECHO was rescheduled for tomorrow due to other STAT orders taking priority. Patient states that he is feeling much better. Urine output is appropriate but still concentrated.      Issues for physician to address:   None      Number times ambulated in hallway past shift: 0    Number of times OOB to chair past shift: 5    Pain Management:  Current medication: See MAR   Patient states pain is manageable on current pain medication: YES    GI:    Current diet:  DIET CLEAR LIQUID    Tolerating current diet: YES  Passing flatus: YES  Last Bowel Movement: Yesterday     Patient Safety:  Falls Score: 1    Savanna Sears RN

## 2020-08-15 NOTE — ACP (ADVANCE CARE PLANNING)
Responded to request by In Basket to assist with Advance Medical Directive (AMD) in Gen Surge. Consulted with patients nurse. Explained document to patient. Patient did not have visitors at the time. Patient decided that he would like to discussed the document with family and that he will notify a nurse if he wishes to complete an AMD. Left paper work with patient. Rev.  Gladis Gallo EdD MDiv     For  Assistance Page [phone])

## 2020-08-15 NOTE — PROGRESS NOTES
.General Surgery End of Shift Nursing Note    Bedside shift change report given to Sukumar Peña RN (oncoming nurse) by Alex Boogie RN (offgoing nurse). Report included the following information SBAR, Kardex, ED Summary, Procedure Summary, Intake/Output, MAR and Recent Results. Shift worked:   [id number]   Summary of shift:   Pt has been educated on consuming ice chips. He was given a super large container on day shift and thought that was normal intake of ice. No BM - a lot of flatus  Urine is very dark blake/tea colored urine. No c/o pain  He has been place on a bariatric bed  No ambulatory needs   Issues for physician to address:  pt scheduled for echo this am     Number times ambulated in hallway past shift:0  Number of times OOB to chair past shift: 2    Pain Management:  Current medication: no pain meds given  Patient states pain is manageable on current pain medication: no pain    GI:    Current diet:  DIET NPO With Ice Chips, Except Meds    Tolerating current diet: yes  Passing flatus: yes  Last Bowel Movement: 8/14/20  Respiratory:    Incentive Spirometer at bedside: no  Patient instructed on use: no    Patient Safety:    Falls Score: 1  Bed Alarm On?no  ]Sitter?  no    Princess Krissy RN

## 2020-08-15 NOTE — PROGRESS NOTES
Hospitalist Progress Note    NAME: Margret Humphrey   :  1975   MRN:  [id number]     Interim Hospital Summary: 54 y.o. male whom presented on 2020 with      Assessment / Plan:    Persistent sigmoid diverticulitis with local perforation, POA  -admitted to [facility] on  with sigmoid diverticulitis with microperforation and small diverticular abscess. Repeat CT abdomen  showed interval worsening of the contained perforation associated with the sigmoid diverticulitis with predominantly air in the right retroperitoneal collection.  Inflammatory changes of the adjacent loop of small bowel without evidence of small bowel obstruction. Was on zosyn, seen by general surgery there. He was unhappy with care there, development of low back pain, uncomfortable bed and left AMA on .    --IV abx with levaquin and flagyl  --started on clear liquids  --this is his second episode of diverticulitis in sigmoid with perforation since  and would benefit from eventual colectomy once acute process has resolved in 6-8 weeks. He may need colonoscopy prior to surgery. Discussed with patient and his mom.      Elevated LFTs  Hep C ab (+), new dx  -ABD US Hepatosplenomegaly. Normal appearance of the liver parenchyma. Small amount of gallbladder sludge without gallstones. No biliary duct dilatation.   -denies IVDU or prior transfusions. He has had several sex partners over the years  -needs outpatient referral to GI / hepatology to consider treatment. Patient express understanding     Elevated blood pressures, POA  Bilateral lower leg edema, POA  -patient denies hx HTN despite it written in his PMH  -check TSH, echo. Still pending  -continue labetalol IV prn.   -consider starting BP agent in AM if still elevated. His pain is better today. -explained to him that he needs to establish with a PCP to follow up on his BP. He can have his BP checked at work also.    Morbid obesity  Body mass index is 54.11 kg/m². 40 or above Morbid obesity / Body mass index is 54.11 kg/m². Code status: Full  Prophylaxis: Lovenox  Recommended Disposition: Home w/Family       Subjective:     Chief Complaint / Reason for Physician Visit  Follow up of  Diverticulitis HTN   Chart reviewed in detail. Discussed with RN events overnight. Review of Systems:  Symptom Y/N Comments  Symptom Y/N Comments   Fever/Chills    Chest Pain     Poor Appetite    Edema y    Cough    Abdominal Pain y    Sputum    Joint Pain     SOB/ARREOLA    Pruritis/Rash     Nausea/vomit    Tolerating PT/OT     Diarrhea    Tolerating Diet     Constipation    Other       Could NOT obtain due to:      PO intake:   Patient Vitals for the past 72 hrs:   % Diet Eaten   08/15/20 1231 25 %   08/15/20 0809 0 %   08/14/20 1629 0 %     Objective:     VITALS:   Last 24hrs VS reviewed since prior progress note. Most recent are:  Patient Vitals for the past 24 hrs:   Temp Pulse Resp BP SpO2   08/15/20 1226  76  (!) 159/96    08/15/20 1143 97.5 °F (36.4 °C) 77 16 (!) 181/109 97 %   08/15/20 0739 98.4 °F (36.9 °C) 71 16 153/87 100 %   08/15/20 0332 98.5 °F (36.9 °C) 70 18 169/84 100 %   08/14/20 2347 98.6 °F (37 °C) 87 19 143/67 99 %   08/14/20 1851 98.6 °F (37 °C) 87 17 147/84 98 %   08/14/20 1539 98.9 °F (37.2 °C) 85 18 159/83 96 %   08/14/20 1501    (!) 154/94 95 %       Intake/Output Summary (Last 24 hours) at 8/15/2020 1341  Last data filed at 8/15/2020 1231  Gross per 24 hour   Intake 2384 ml   Output 1970 ml   Net 414 ml        PHYSICAL EXAM:  General: WD, WN. Alert, cooperative, no acute distress    EENT:  EOMI. Anicteric sclerae. MMM  Resp:  CTA bilaterally, no wheezing or rales.   No accessory muscle use  CV:  Regular  rhythm,  (+) edema  GI:  Soft, Non distended, (+) mild mid abd tender.  +Bowel sounds  Neurologic:  Alert and oriented X 3, normal speech,   Psych:   Good insight. Not anxious nor agitated  Skin:  No isaias. No jaundice    Reviewed most current lab test results and cultures  YES  Reviewed most current radiology test results   YES  Review and summation of old records today    NO  Reviewed patient's current orders and MAR    YES  PMH/SH reviewed - no change compared to H&P  ________________________________________________________________________  Care Plan discussed with:    Comments   Patient x    Family  x mom   RN     Care Manager     Consultant                        Multidiciplinary team rounds were held today with , nursing, pharmacist and clinical coordinator. Patient's plan of care was discussed; medications were reviewed and discharge planning was addressed. ________________________________________________________________________  Total NON critical care TIME:  25   Minutes    Total CRITICAL CARE TIME Spent:   Minutes non procedure based      Comments   >50% of visit spent in counseling and coordination of care x     This includes time during multidisciplinary rounds if indicated above   ________________________________________________________________________  Leeanna Merritt MD     Procedures: see electronic medical records for all procedures/Xrays and details which were not copied into this note but were reviewed prior to creation of Plan. LABS:  I reviewed today's most current labs and imaging studies.   Pertinent labs include:  Recent Labs     08/15/20  0402 08/14/20  1038 08/13/20  0500   WBC 6.2 6.5 7.0   HGB 14.3 15.4 14.1   HCT 41.8 44.4 41.3    239 192     Recent Labs     08/15/20  0402 08/14/20  1038 08/13/20  0500    136 136   K 3.7 3.6 3.6    101 103   CO2 26 31 26   GLU 82 96 92   BUN 9 6 6   CREA 0.78 0.88 0.75   CA 8.2* 8.9 8.0*   ALB 2.3* 2.8*  --    TBILI 1.4* 1.6*  --    ALT 44 52  --    INR 1.1  --   --

## 2020-08-16 ENCOUNTER — APPOINTMENT (OUTPATIENT)
Dept: NON INVASIVE DIAGNOSTICS | Age: 45
DRG: 392 | End: 2020-08-16
Attending: HOSPITALIST
Payer: COMMERCIAL

## 2020-08-16 LAB
ALBUMIN SERPL-MCNC: 2.5 G/DL (ref 3.5–5)
ALBUMIN/GLOB SERPL: 0.6 {RATIO} (ref 1.1–2.2)
ALP SERPL-CCNC: 82 U/L (ref 45–117)
ALT SERPL-CCNC: 41 U/L (ref 12–78)
ANION GAP SERPL CALC-SCNC: 6 MMOL/L (ref 5–15)
AST SERPL-CCNC: 69 U/L (ref 15–37)
BILIRUB SERPL-MCNC: 1.5 MG/DL (ref 0.2–1)
BUN SERPL-MCNC: 8 MG/DL (ref 6–20)
BUN/CREAT SERPL: 11 (ref 12–20)
CALCIUM SERPL-MCNC: 8.5 MG/DL (ref 8.5–10.1)
CHLORIDE SERPL-SCNC: 107 MMOL/L (ref 97–108)
CO2 SERPL-SCNC: 25 MMOL/L (ref 21–32)
CREAT SERPL-MCNC: 0.75 MG/DL (ref 0.7–1.3)
ECHO AO ROOT DIAM: 3.04 CM
ECHO AV AREA PEAK VELOCITY: 2.52 CM2
ECHO AV CUSP MM: 2.41 CM
ECHO AV PEAK GRADIENT: 7.87 MMHG
ECHO AV PEAK VELOCITY: 140.24 CM/S
ECHO LA AREA 4C: 24.61 CM2
ECHO LA TO AORTIC ROOT RATIO: 1.18
ECHO LA TO AORTIC ROOT RATIO: 1.18
ECHO LA VOL 2C: 81.82 ML (ref 18–58)
ECHO LA VOL 4C: 78.86 ML (ref 18–58)
ECHO LA VOL BP: 85.88 ML (ref 18–58)
ECHO LV E' SEPTAL VELOCITY: 7.71 CM/S
ECHO LV INTERNAL DIMENSION DIASTOLIC MMODE: 5.66 CM
ECHO LV INTERNAL DIMENSION SYSTOLIC MMODE: 4.4 CM
ECHO LV IVSD MMODE: 1.59 CM
ECHO LV IVSS MMODE: 1.69 CM
ECHO LV POSTERIOR WALL DIASTOLIC MMODE: 1.87 CM
ECHO LVOT DIAM: 2.1 CM
ECHO LVOT PEAK GRADIENT: 4.14 MMHG
ECHO LVOT PEAK VELOCITY: 101.69 CM/S
ECHO MV A VELOCITY: 65.52 CM/S
ECHO MV E DECELERATION TIME (DT): 0.18 S
ECHO MV E VELOCITY: 100.6 CM/S
ECHO MV E/A RATIO: 1.54
ECHO MV E/E' SEPTAL: 13.05
ECHO PV MAX VELOCITY: 112.84 CM/S
ECHO PV PEAK INSTANTANEOUS GRADIENT SYSTOLIC: 5.11 MMHG
ECHO PV REGURGITANT MAX VELOCITY: 133.72 CM/S
ECHO PV REGURGITANT MAX VELOCITY: 334.94 CM/S
ECHO TV REGURGITANT MAX VELOCITY: 238.57 CM/S
ECHO TV REGURGITANT PEAK GRADIENT: 22.79 MMHG
GLOBULIN SER CALC-MCNC: 4.2 G/DL (ref 2–4)
GLUCOSE SERPL-MCNC: 87 MG/DL (ref 65–100)
MAGNESIUM SERPL-MCNC: 2 MG/DL (ref 1.6–2.4)
POTASSIUM SERPL-SCNC: 3.7 MMOL/L (ref 3.5–5.1)
PROT SERPL-MCNC: 6.7 G/DL (ref 6.4–8.2)
SODIUM SERPL-SCNC: 138 MMOL/L (ref 136–145)

## 2020-08-16 PROCEDURE — 65660000000 HC RM CCU STEPDOWN

## 2020-08-16 PROCEDURE — 80053 COMPREHEN METABOLIC PANEL: CPT

## 2020-08-16 PROCEDURE — 83735 ASSAY OF MAGNESIUM: CPT

## 2020-08-16 PROCEDURE — 74011250636 HC RX REV CODE- 250/636: Performed by: INTERNAL MEDICINE

## 2020-08-16 PROCEDURE — 74011250637 HC RX REV CODE- 250/637: Performed by: HOSPITALIST

## 2020-08-16 PROCEDURE — C8929 TTE W OR WO FOL WCON,DOPPLER: HCPCS

## 2020-08-16 PROCEDURE — 74011250636 HC RX REV CODE- 250/636: Performed by: HOSPITALIST

## 2020-08-16 PROCEDURE — 74011250636 HC RX REV CODE- 250/636: Performed by: SURGERY

## 2020-08-16 PROCEDURE — 36415 COLL VENOUS BLD VENIPUNCTURE: CPT

## 2020-08-16 RX ADMIN — PERFLUTREN 2 ML: 6.52 INJECTION, SUSPENSION INTRAVENOUS at 10:00

## 2020-08-16 RX ADMIN — Medication 10 ML: at 13:03

## 2020-08-16 RX ADMIN — POTASSIUM CHLORIDE AND SODIUM CHLORIDE: 900; 150 INJECTION, SOLUTION INTRAVENOUS at 17:42

## 2020-08-16 RX ADMIN — ENOXAPARIN SODIUM 40 MG: 40 INJECTION SUBCUTANEOUS at 18:00

## 2020-08-16 RX ADMIN — OXYCODONE 5 MG: 5 TABLET ORAL at 05:12

## 2020-08-16 RX ADMIN — Medication 10 ML: at 05:12

## 2020-08-16 RX ADMIN — METRONIDAZOLE 500 MG: 500 INJECTION, SOLUTION INTRAVENOUS at 06:00

## 2020-08-16 RX ADMIN — DOCUSATE SODIUM 100 MG: 100 CAPSULE, LIQUID FILLED ORAL at 17:42

## 2020-08-16 RX ADMIN — OXYCODONE 5 MG: 5 TABLET ORAL at 23:59

## 2020-08-16 RX ADMIN — DOCUSATE SODIUM 100 MG: 100 CAPSULE, LIQUID FILLED ORAL at 09:23

## 2020-08-16 RX ADMIN — POTASSIUM CHLORIDE AND SODIUM CHLORIDE: 900; 150 INJECTION, SOLUTION INTRAVENOUS at 05:13

## 2020-08-16 RX ADMIN — LEVOFLOXACIN 750 MG: 5 INJECTION, SOLUTION INTRAVENOUS at 13:28

## 2020-08-16 RX ADMIN — METRONIDAZOLE 500 MG: 500 INJECTION, SOLUTION INTRAVENOUS at 14:04

## 2020-08-16 RX ADMIN — OXYCODONE 5 MG: 5 TABLET ORAL at 11:27

## 2020-08-16 RX ADMIN — Medication 10 ML: at 23:59

## 2020-08-16 RX ADMIN — METRONIDAZOLE 500 MG: 500 INJECTION, SOLUTION INTRAVENOUS at 23:59

## 2020-08-16 NOTE — PROGRESS NOTES
General Surgery End of Shift Nursing Note    Bedside shift change report given to [address] [place] [address] (oncoming nurse) by [facility] (offgoing nurse). Report included the following information SBAR, Kardex, Procedure Summary, Intake/Output, MAR, Accordion and Recent Results. Shift worked:   7a-7p   Summary of shift:   Pt was in the chair x3. Pt tolerating diet. No significant changes during this shift. Issues for physician to address:   none     Number times ambulated in hallway past shift: 0   Number of times OOB to chair past shift: 3    Pain Management:  Current medication: see mar  Patient states pain is manageable on current pain medication: yes    GI:    Current diet:  DIET FULL LIQUID    Tolerating current diet: yes  Passing flatus: yes  Last Bowel Movement: 8/15   Appearance: unk    Respiratory:    Incentive Spirometer at bedside: yes  Patient instructed on use: yes    Patient Safety:    Falls Score: 1  Bed Alarm On? no  Sitter?  no    Jocelyne Briggs

## 2020-08-16 NOTE — PROGRESS NOTES
Reason for Admission:   Diverticulitis                   RUR Score:       9              Plan for utilizing home health:      none    PCP: First and Last name:  Does not have one   Name of Practice:    Are you a current patient: Yes/No:    Approximate date of last visit:    Can you participate in a virtual visit with your PCP:                     Current Advanced Directive/Advance Care Plan: Spiritual care met with pt on yesterday 8/15/20 and pt reported that he  would like to discussed the document with family and that he will notify a nurse if he wishes to complete an AMD.     Transition of Care Plan:      Establish new pt appointment with PCP. Pt is a 54year old male and reports independence with ADL's and IADL's prior to admission    Pharmacy: [id number] [facility] provided pt with listing of [facility] PCP's he will review and request CM assistance on tomorrow 8/17/20 with contacting a provide to set up new patient appointment. Kary Schofield, ADAM  [facility]

## 2020-08-16 NOTE — PROGRESS NOTES
SURGERY PROGRESS NOTE      Admit Date: 2020      Subjective:     Patient denies pain. Tolerating liquids       Objective:     Visit Vitals  /74 (BP 1 Location: Left arm, BP Patient Position: Lying right side)   Pulse 76   Temp 98.2 °F (36.8 °C)   Resp 18   Wt 147.5 kg (325 lb 2.9 oz)   SpO2 97%   BMI 54.11 kg/m²        Temp (24hrs), Av.5 °F (36.9 °C), Min:97.5 °F (36.4 °C), Max:99.1 °F (37.3 °C)      701 - 1900  In: -   Out: 500 [Urine:500]  1901 -  0700  In: 4022.3 [P.O.:1170; I.V.:2852.3]  Out: 3850 [Urine:3850]    Physical Exam:    General:  alert, cooperative, no distress, appears stated age   Abdomen: soft, bowel sounds active, non-tender           Lab Results   Component Value Date/Time    WBC 6.2 08/15/2020 04:02 AM    HGB 14.3 08/15/2020 04:02 AM    HCT 41.8 08/15/2020 04:02 AM    PLATELET 568  04:02 AM    MCV 74.1 (L) 08/15/2020 04:02 AM     Lab Results   Component Value Date/Time    GFR est non-AA >60 2020 03:16 AM    GFR est AA >60 2020 03:16 AM    Creatinine 0.75 2020 03:16 AM    BUN 8 2020 03:16 AM    Sodium 138 2020 03:16 AM    Potassium 3.7 2020 03:16 AM    Chloride 107 2020 03:16 AM    CO2 25 2020 03:16 AM    Magnesium 2.0 2020 03:16 AM       Assessment:     Active Problems:    Hypertension (2020)      Diverticulitis of large intestine with perforation (2020)    improving    Plan:       Advance diet.   Likely D/C on oral antibiotics tomorrow

## 2020-08-16 NOTE — PROGRESS NOTES
.General Surgery End of Shift Nursing Note    Bedside shift change report given to , RN (oncoming nurse) by Mirza Guerra RN (offgoing nurse). Report included the following information SBAR, Kardex, ED Summary, Procedure Summary, Intake/Output, MAR and Recent Results. Shift worked:   [id number]   Summary of shift:  Pt has a very hard pellet med BM and one smear of soft. Educated on not to strain. Black coffee given this am as pt says that help him not be constipated. Medicated 1x for pain at right side of abdomen  Pt has very big differences in BP on left vs right arm. Ext high on right but wnl on left. Issues for physician to address:  pt scheduled for echo this am     Number times ambulated in hallway past shift:0  Number of times OOB to chair past shift: 2    Pain Management:  Current medication: no pain meds given  Patient states pain is manageable on current pain medication: no pain    GI:    Current diet:  DIET CLEAR LIQUID    Tolerating current diet: yes  Passing flatus: yes  Last Bowel Movement: 8/16/20  Respiratory:    Incentive Spirometer at bedside: no  Patient instructed on use: no    Patient Safety:    Falls Score: 1  Bed Alarm On?no  ]Sitter?  no    Faye Salazar, RN

## 2020-08-16 NOTE — PROGRESS NOTES
Hospitalist Progress Note    NAME: Bonita Cuba   :  1975   MRN:  [id number]       Assessment / Plan:    Persistent sigmoid diverticulitis with local perforation POA  - admitted to [facility] on  with sigmoid diverticulitis with microperforation          small diverticular abscess. - IV antibiotics with zosyn  - Repeat CT abdomen  showed interval worsening of the contained perforation associated with the sigmoid diverticulitis with predominantly air in the right retroperitoneal collection.    -- Signed out AMA, came to [facility]  --IV abx with levaquin and flagyl  -- Clinically feels better, still with some right sided pain  -- Clear liquids. ? Advance further  --Scond episode of diverticulitis in sigmoid with perforation since 2018        ? benefit from eventual colectomy once acute process has resolved in 6-8 weeks. -serial labs  - Discharge ? By Am if continues to improve, will await surgery recs     Elevated LFTs AST and ALT and T bili  Hep C ab (+), new dx  -ABD US Hepatosplenomegaly. Normal appearance of the liver parenchyma. Small amount of gallbladder sludge without gallstones. No biliary duct dilatation.   -denies IVDU or prior transfusions. He has had several sex partners over the years  -needs outpatient referral to GI / hepatology to consider treatment. Patient express understanding     Elevated blood pressures, POA  Bilateral lower leg edema, POA  -patient denies hx HTN despite it written in his PMH  -Normal TSH, echo still pending  -continue labetalol IV prn.   -consider starting BP agent in AM if still elevated. His pain is better today. -explained to him that he needs to establish with a PCP to follow up on his BP. He can have his BP checked at work also.        Morbid obesity  Body mass index is 54.11 kg/m². 40 or above Morbid obesity / Body mass index is 54.11 kg/m².     Code status: Full  Prophylaxis: Lovenox  Recommended Disposition: Home w/Family       Subjective:     Chief Complaint / Reason for Physician Visit  Follow up of  Diverticulitis HTN   \" still having a little pain, but definitely better than admission\"  Sore in RLQ, but improved  Getting echo right now  No N/V or diarrhea  Tolerating clears  Chart reviewed in detail. Discussed with RN events overnight. Review of Systems:  Symptom Y/N Comments  Symptom Y/N Comments   Fever/Chills    Chest Pain     Poor Appetite    Edema y    Cough    Abdominal Pain y    Sputum    Joint Pain     SOB/ARREOLA    Pruritis/Rash     Nausea/vomit    Tolerating PT/OT     Diarrhea    Tolerating Diet     Constipation    Other       Could NOT obtain due to:      PO intake:   Patient Vitals for the past 72 hrs:   % Diet Eaten   08/15/20 1600 75 %   08/15/20 1231 25 %   08/15/20 0809 0 %   08/14/20 1629 0 %     Objective:     VITALS:   Last 24hrs VS reviewed since prior progress note. Most recent are:  Patient Vitals for the past 24 hrs:   Temp Pulse Resp BP SpO2   08/16/20 0602 98.2 °F (36.8 °C) 76 18 127/74 97 %   08/16/20 0306 98.8 °F (37.1 °C) 71 18 153/90 99 %   08/15/20 2321 98.8 °F (37.1 °C) 79 18 156/85 100 %   08/15/20 1957 99.1 °F (37.3 °C) 72 18 158/88 100 %   08/15/20 1549 98.6 °F (37 °C) 76 17 153/77 98 %   08/15/20 1226  76  (!) 159/96    08/15/20 1143 97.5 °F (36.4 °C) 77 16 (!) 181/109 97 %       Intake/Output Summary (Last 24 hours) at 8/16/2020 0845  Last data filed at 8/16/2020 0802  Gross per 24 hour   Intake 2493.33 ml   Output 3230 ml   Net -736.67 ml        PHYSICAL EXAM:  General: WD, WN. Alert, cooperative, no acute distress    EENT:  EOMI. Anicteric sclerae. MMM  Resp:  CTA bilaterally, no wheezing or rales. No accessory muscle use  CV:  Regular  rhythm,  (+) edema  GI:  Soft, Non distended, (+) mild mid abd tender in RLQ.  +Bowel sounds  Neurologic:  Alert and oriented X 3, normal speech,   Psych:   Good insight. Not anxious nor agitated  Skin:  No rashes.   No jaundice    Reviewed most current lab test results and cultures  YES  Reviewed most current radiology test results   YES  Review and summation of old records today    NO  Reviewed patient's current orders and MAR    YES  PMH/SH reviewed - no change compared to H&P  ________________________________________________________________________  Care Plan discussed with:    Comments   Patient x    Family      RN     Care Manager     Consultant                        Multidiciplinary team rounds were held today with , nursing, pharmacist and clinical coordinator. Patient's plan of care was discussed; medications were reviewed and discharge planning was addressed. ________________________________________________________________________  Total NON critical care TIME:  25   Minutes    Total CRITICAL CARE TIME Spent:   Minutes non procedure based      Comments   >50% of visit spent in counseling and coordination of care x     This includes time during multidisciplinary rounds if indicated above   ________________________________________________________________________  Sang Kahn MD     Procedures: see electronic medical records for all procedures/Xrays and details which were not copied into this note but were reviewed prior to creation of Plan. LABS:  I reviewed today's most current labs and imaging studies.   Pertinent labs include:  Recent Labs     08/15/20  0402 08/14/20  1038   WBC 6.2 6.5   HGB 14.3 15.4   HCT 41.8 44.4    239     Recent Labs     08/16/20  0316 08/15/20  0402 08/14/20  1038    138 136   K 3.7 3.7 3.6    104 101   CO2 25 26 31   GLU 87 82 96   BUN 8 9 6   CREA 0.75 0.78 0.88   CA 8.5 8.2* 8.9   MG 2.0  --   --    ALB 2.5* 2.3* 2.8*   TBILI 1.5* 1.4* 1.6*   ALT 41 44 52   INR  --  1.1  --

## 2020-08-16 NOTE — PROGRESS NOTES
Problem: Pain  Goal: *Control of Pain  8/16/2020 0915 by George OWEN  Outcome: Progressing Towards Goal  8/16/2020 0914 by George Naylor A  Outcome: Progressing Towards Goal     Problem: Patient Education: Go to Patient Education Activity  Goal: Patient/Family Education  8/16/2020 0915 by George Naylor A  Outcome: Progressing Towards Goal  8/16/2020 0914 by Marvine Shelter  Outcome: Progressing Towards Goal     Problem: Nausea/Vomiting (Adult)  Goal: *Absence of nausea/vomiting  8/16/2020 0915 by George OWEN  Outcome: Progressing Towards Goal  8/16/2020 0914 by Marvine Shelter  Outcome: Progressing Towards Goal     Problem: Patient Education: Go to Patient Education Activity  Goal: Patient/Family Education  8/16/2020 0915 by George OWEN  Outcome: Progressing Towards Goal  8/16/2020 0914 by Marvine Shelter  Outcome: Progressing Towards Goal     Problem: Constipation - Risk of  Goal: *Prevention of constipation  8/16/2020 0915 by George OWEN  Outcome: Progressing Towards Goal  8/16/2020 0914 by Marvine Shelter  Outcome: Progressing Towards Goal     Problem: Patient Education: Go to Patient Education Activity  Goal: Patient/Family Education  8/16/2020 0915 by George OWEN  Outcome: Progressing Towards Goal  8/16/2020 0914 by Marvine Shelter  Outcome: Progressing Towards Goal     Problem: Falls - Risk of  Goal: *Absence of Falls  Description: Document Neetu Porter Fall Risk and appropriate interventions in the flowsheet.   8/16/2020 0915 by Marvine Shelter  Outcome: Progressing Towards Goal  Note: Fall Risk Interventions:            Medication Interventions: Teach patient to arise slowly, Patient to call before getting OOB                8/16/2020 0914 by George OWEN  Outcome: Progressing Towards Goal  Note: Fall Risk Interventions:            Medication Interventions: Teach patient to arise slowly, Patient to call before getting OOB                   Problem: Patient Education: Go to Patient Education Activity  Goal: Patient/Family Education  8/16/2020 0915 by Brian OWEN  Outcome: Progressing Towards Goal  8/16/2020 0914 by Brian OWEN  Outcome: Progressing Towards Goal

## 2020-08-16 NOTE — PROGRESS NOTES
Reason for Admission:   Diverticulities RUR Score:    9 Plan for utilizing home health:    None PCP: First and Last name:  No PCP  Name of Practice:  
 Are you a current patient: Yes/No:  
 Approximate date of last visit:  
 Can you participate in a virtual visit with your PCP:  
                 
Current Advanced Directive/Advance Care Plan:  
                      
Transition of Care Plan:  New pt appointment needed with PCP 
 
CM consulted regarding assisting pt with idenfitiy PCP

## 2020-08-16 NOTE — PROGRESS NOTES
Problem: Pain  Goal: *Control of Pain  Outcome: Progressing Towards Goal     Problem: Patient Education: Go to Patient Education Activity  Goal: Patient/Family Education  Outcome: Progressing Towards Goal     Problem: Nausea/Vomiting (Adult)  Goal: *Absence of nausea/vomiting  Outcome: Progressing Towards Goal     Problem: Patient Education: Go to Patient Education Activity  Goal: Patient/Family Education  Outcome: Progressing Towards Goal     Problem: Constipation - Risk of  Goal: *Prevention of constipation  Outcome: Progressing Towards Goal     Problem: Patient Education: Go to Patient Education Activity  Goal: Patient/Family Education  Outcome: Progressing Towards Goal     Problem: Falls - Risk of  Goal: *Absence of Falls  Description: Document Dilcia Fall Risk and appropriate interventions in the flowsheet.   Outcome: Progressing Towards Goal  Note: Fall Risk Interventions:            Medication Interventions: Teach patient to arise slowly                   Problem: Patient Education: Go to Patient Education Activity  Goal: Patient/Family Education  Outcome: Progressing Towards Goal

## 2020-08-17 PROBLEM — I42.0 DILATED CARDIOMYOPATHY (HCC): Status: ACTIVE | Noted: 2020-08-17

## 2020-08-17 LAB
ANION GAP SERPL CALC-SCNC: 4 MMOL/L (ref 5–15)
ATRIAL RATE: 81 BPM
BASOPHILS # BLD: 0.2 K/UL (ref 0–0.1)
BASOPHILS NFR BLD: 3 % (ref 0–1)
BNP SERPL-MCNC: 1182 PG/ML
BUN SERPL-MCNC: 7 MG/DL (ref 6–20)
BUN/CREAT SERPL: 8 (ref 12–20)
CALCIUM SERPL-MCNC: 7.9 MG/DL (ref 8.5–10.1)
CALCULATED P AXIS, ECG09: 37 DEGREES
CALCULATED R AXIS, ECG10: -20 DEGREES
CALCULATED T AXIS, ECG11: -7 DEGREES
CHLORIDE SERPL-SCNC: 108 MMOL/L (ref 97–108)
CK MB CFR SERPL CALC: NORMAL % (ref 0–2.5)
CK MB SERPL-MCNC: <1 NG/ML (ref 5–25)
CK SERPL-CCNC: 125 U/L (ref 39–308)
CO2 SERPL-SCNC: 27 MMOL/L (ref 21–32)
CREAT SERPL-MCNC: 0.88 MG/DL (ref 0.7–1.3)
DIAGNOSIS, 93000: NORMAL
DIFFERENTIAL METHOD BLD: ABNORMAL
EOSINOPHIL # BLD: 0.2 K/UL (ref 0–0.4)
EOSINOPHIL NFR BLD: 3 % (ref 0–7)
ERYTHROCYTE [DISTWIDTH] IN BLOOD BY AUTOMATED COUNT: 19 % (ref 11.5–14.5)
GLUCOSE SERPL-MCNC: 85 MG/DL (ref 65–100)
HCT VFR BLD AUTO: 44.5 % (ref 36.6–50.3)
HGB BLD-MCNC: 14.9 G/DL (ref 12.1–17)
IMM GRANULOCYTES # BLD AUTO: 0 K/UL (ref 0–0.04)
IMM GRANULOCYTES NFR BLD AUTO: 0 % (ref 0–0.5)
LYMPHOCYTES # BLD: 2 K/UL (ref 0.8–3.5)
LYMPHOCYTES NFR BLD: 31 % (ref 12–49)
MCH RBC QN AUTO: 24.9 PG (ref 26–34)
MCHC RBC AUTO-ENTMCNC: 33.5 G/DL (ref 30–36.5)
MCV RBC AUTO: 74.3 FL (ref 80–99)
MONOCYTES # BLD: 0.3 K/UL (ref 0–1)
MONOCYTES NFR BLD: 4 % (ref 5–13)
NEUTS SEG # BLD: 3.9 K/UL (ref 1.8–8)
NEUTS SEG NFR BLD: 59 % (ref 32–75)
NRBC # BLD: 0 K/UL (ref 0–0.01)
NRBC BLD-RTO: 0 PER 100 WBC
P-R INTERVAL, ECG05: 166 MS
PLATELET # BLD AUTO: 274 K/UL (ref 150–400)
PLATELET COMMENTS,PCOM: ABNORMAL
PMV BLD AUTO: 10.1 FL (ref 8.9–12.9)
POTASSIUM SERPL-SCNC: 3.8 MMOL/L (ref 3.5–5.1)
Q-T INTERVAL, ECG07: 388 MS
QRS DURATION, ECG06: 110 MS
QTC CALCULATION (BEZET), ECG08: 450 MS
RBC # BLD AUTO: 5.99 M/UL (ref 4.1–5.7)
RBC MORPH BLD: ABNORMAL
RBC MORPH BLD: ABNORMAL
SODIUM SERPL-SCNC: 139 MMOL/L (ref 136–145)
TROPONIN I SERPL-MCNC: <0.05 NG/ML
VENTRICULAR RATE, ECG03: 81 BPM
WBC # BLD AUTO: 6.6 K/UL (ref 4.1–11.1)

## 2020-08-17 PROCEDURE — 74011250636 HC RX REV CODE- 250/636: Performed by: SURGERY

## 2020-08-17 PROCEDURE — 85025 COMPLETE CBC W/AUTO DIFF WBC: CPT

## 2020-08-17 PROCEDURE — 93005 ELECTROCARDIOGRAM TRACING: CPT

## 2020-08-17 PROCEDURE — 80048 BASIC METABOLIC PNL TOTAL CA: CPT

## 2020-08-17 PROCEDURE — 74011250637 HC RX REV CODE- 250/637: Performed by: NURSE PRACTITIONER

## 2020-08-17 PROCEDURE — 74011250637 HC RX REV CODE- 250/637: Performed by: HOSPITALIST

## 2020-08-17 PROCEDURE — 65660000000 HC RM CCU STEPDOWN

## 2020-08-17 PROCEDURE — 82550 ASSAY OF CK (CPK): CPT

## 2020-08-17 PROCEDURE — 84484 ASSAY OF TROPONIN QUANT: CPT

## 2020-08-17 PROCEDURE — 83880 ASSAY OF NATRIURETIC PEPTIDE: CPT

## 2020-08-17 PROCEDURE — 36415 COLL VENOUS BLD VENIPUNCTURE: CPT

## 2020-08-17 PROCEDURE — 74011250636 HC RX REV CODE- 250/636: Performed by: HOSPITALIST

## 2020-08-17 PROCEDURE — 74011250637 HC RX REV CODE- 250/637: Performed by: INTERNAL MEDICINE

## 2020-08-17 RX ORDER — CARVEDILOL 3.12 MG/1
3.12 TABLET ORAL 2 TIMES DAILY WITH MEALS
Status: DISCONTINUED | OUTPATIENT
Start: 2020-08-17 | End: 2020-08-18 | Stop reason: HOSPADM

## 2020-08-17 RX ORDER — GUAIFENESIN 100 MG/5ML
81 LIQUID (ML) ORAL DAILY
Status: DISCONTINUED | OUTPATIENT
Start: 2020-08-17 | End: 2020-08-18 | Stop reason: HOSPADM

## 2020-08-17 RX ORDER — LOSARTAN POTASSIUM 25 MG/1
25 TABLET ORAL DAILY
Status: DISCONTINUED | OUTPATIENT
Start: 2020-08-17 | End: 2020-08-17

## 2020-08-17 RX ORDER — CARVEDILOL 6.25 MG/1
6.25 TABLET ORAL 2 TIMES DAILY WITH MEALS
Status: DISCONTINUED | OUTPATIENT
Start: 2020-08-17 | End: 2020-08-17

## 2020-08-17 RX ORDER — FUROSEMIDE 20 MG/1
20 TABLET ORAL ONCE
Status: COMPLETED | OUTPATIENT
Start: 2020-08-17 | End: 2020-08-17

## 2020-08-17 RX ADMIN — METRONIDAZOLE 500 MG: 500 INJECTION, SOLUTION INTRAVENOUS at 15:16

## 2020-08-17 RX ADMIN — ASPIRIN 81 MG CHEWABLE TABLET 81 MG: 81 TABLET CHEWABLE at 10:34

## 2020-08-17 RX ADMIN — METRONIDAZOLE 500 MG: 500 INJECTION, SOLUTION INTRAVENOUS at 06:39

## 2020-08-17 RX ADMIN — LOSARTAN POTASSIUM 25 MG: 25 TABLET, FILM COATED ORAL at 10:33

## 2020-08-17 RX ADMIN — Medication 10 ML: at 13:39

## 2020-08-17 RX ADMIN — Medication 10 ML: at 06:40

## 2020-08-17 RX ADMIN — OXYCODONE 5 MG: 5 TABLET ORAL at 18:26

## 2020-08-17 RX ADMIN — Medication 10 ML: at 21:01

## 2020-08-17 RX ADMIN — FUROSEMIDE 20 MG: 20 TABLET ORAL at 11:39

## 2020-08-17 RX ADMIN — CARVEDILOL 3.12 MG: 3.12 TABLET, FILM COATED ORAL at 10:34

## 2020-08-17 RX ADMIN — CARVEDILOL 3.12 MG: 3.12 TABLET, FILM COATED ORAL at 21:01

## 2020-08-17 RX ADMIN — LEVOFLOXACIN 750 MG: 5 INJECTION, SOLUTION INTRAVENOUS at 13:28

## 2020-08-17 RX ADMIN — ENOXAPARIN SODIUM 40 MG: 40 INJECTION SUBCUTANEOUS at 18:22

## 2020-08-17 RX ADMIN — METRONIDAZOLE 500 MG: 500 INJECTION, SOLUTION INTRAVENOUS at 22:17

## 2020-08-17 NOTE — PROGRESS NOTES
General Surgery End of Shift Nursing Note    Bedside shift change report given to Jazmyn COVARRUBIAS (oncoming nurse) by Sergey Valdovinos (offgoing nurse). Report included the following information SBAR, Kardex, Procedure Summary, Intake/Output, MAR, Accordion and Recent Results. Shift worked:   7a-7p   Summary of shift:   Pt was in the chair x3. Pt tolerating diet. No significant changes during this shift. Issues for physician to address:   none     Number times ambulated in hallway past shift: 0   Number of times OOB to chair past shift: 3    Pain Management:  Current medication: see mar  Patient states pain is manageable on current pain medication: yes    GI:    Current diet:  DIET CARDIAC Regular; 2-3 GM NA    Tolerating current diet: yes  Passing flatus: yes  Last Bowel Movement: 8/17   Appearance: unk    Respiratory:    Incentive Spirometer at bedside: yes  Patient instructed on use: yes    Patient Safety:    Falls Score: 1  Bed Alarm On? no  Sitter?  no    Mati Connolly

## 2020-08-17 NOTE — CONSULTS
[facility]     Date of  Admission: 8/14/2020 11:01 AM     Admission type:Emergency    Consult for: hf  Consult by: hospitalist      Subjective:     Jarocho Rivera is a 54 y.o. male with PMH HTN, diverticulitis who was admitted for Diverticulitis of large intestine with perforation [K57.20]  Hypertension [I10]. Per ED provider note Jarocho Rivera presented to the ED with c/o abd pain and was readmitted with diverticulitis (had previously left AMA). Cardiology consulted for ECHO showing reduced EF of 25-30%. On assessment, Jarocho Rivera endorses abd pain, but is feeling better. No n/v/d. Some slight swelling in his feet. He's felt slightly SOB during admission, but not prior. He denies chest pain, palpitations, orthopnea. Jarocho Rivera  Does not follow with a  Cardiologist.  ECHO this admit with EF 25-30%; dilated LV and mildly reduced RV function. Cardiac risk factors: smoking/ tobacco exposure, obesity, male gender, hypertension.       Patient Active Problem List    Diagnosis Date Noted    Dilated cardiomyopathy ([address] 08/17/2020    Hypertension 08/14/2020    Diverticulitis of large intestine with perforation 08/14/2020    Class 3 severe obesity in adult [facility]) 08/12/2020    Diverticulitis of colon with perforation 01/23/2018    Diverticulitis 01/23/2018      None  Past Medical History:   Diagnosis Date    Bell's palsy 01/25/2019    Diverticulitis of colon with perforation 01/2018    sigmoid    Diverticulitis of colon with perforation 08/09/2020    Hypertension     Kidney infection     [place] syndrome (geniculate herpes zoster)       Social History     Socioeconomic History    Marital status: SINGLE     Spouse name: Not on file    Number of children: Not on file    Years of education: Not on file    Highest education level: Not on file   Tobacco Use    Smoking status: Current Every Day Smoker     Packs/day: 1.00     Years: 15.00     Pack years: 15.00    Smokeless tobacco: Never Used   Substance and Sexual Activity    Alcohol use: Not Currently     Frequency: Never     Comment: rarely    Drug use: No     Allergies   Allergen Reactions    Meclizine Rash      Family History   Problem Relation Age of Onset    Hypertension Mother     Cancer Father         multiple myeloma      Current Facility-Administered Medications   Medication Dose Route Frequency    aspirin chewable tablet 81 mg  81 mg Oral DAILY    carvediloL (COREG) tablet 3.125 mg  3.125 mg Oral BID WITH MEALS    losartan (COZAAR) tablet 25 mg  25 mg Oral DAILY    levoFLOXacin (LEVAQUIN) 750 mg in D5W IVPB  750 mg IntraVENous Q24H    metroNIDAZOLE (FLAGYL) IVPB premix 500 mg  500 mg IntraVENous Q8H    morphine injection 2-4 mg  2-4 mg IntraVENous Q4H PRN    labetaloL (NORMODYNE;TRANDATE) injection 10 mg  10 mg IntraVENous Q4H PRN    docusate sodium (COLACE) capsule 100 mg  100 mg Oral BID    oxyCODONE IR (ROXICODONE) tablet 5 mg  5 mg Oral Q6H PRN    sodium chloride (NS) flush 5-40 mL  5-40 mL IntraVENous Q8H    sodium chloride (NS) flush 5-40 mL  5-40 mL IntraVENous PRN    acetaminophen (TYLENOL) tablet 650 mg  650 mg Oral Q6H PRN    Or    acetaminophen (TYLENOL) suppository 650 mg  650 mg Rectal Q6H PRN    promethazine (PHENERGAN) tablet 12.5 mg  12.5 mg Oral Q6H PRN    Or    ondansetron (ZOFRAN) injection 4 mg  4 mg IntraVENous Q6H PRN    enoxaparin (LOVENOX) injection 40 mg  40 mg SubCUTAneous Q24H        Review of Symptoms:   11 systems reviewed, negative other than as stated in the HPI        Objective:      Visit Vitals  /82   Pulse 78   Temp 98.3 °F (36.8 °C)   Resp 19   Wt 147.5 kg (325 lb 2.9 oz)   SpO2 97%   BMI 54.11 kg/m²       Physical:   General: pleasant, morbidly obese AAM sitting in a chair in NAD  Heart: RRR, no m/S3/JVD  Lungs: clear   Abdomen: Soft, +BS, NTND   Extremities: LE franca +DP/PT, trace edema BLE  Neurologic: Grossly normal    Data Review:   Recent Labs     08/17/20  0332 08/15/20  0402   WBC 6.6 6.2   HGB 14.9 14.3   HCT 44.5 41.8    221     Recent Labs     08/17/20  0332 08/16/20 0316 08/15/20  0402    138 138   K 3.8 3.7 3.7    107 104   CO2 27 25 26   GLU 85 87 82   BUN 7 8 9   CREA 0.88 0.75 0.78   CA 7.9* 8.5 8.2*   MG  --  2.0  --    ALB  --  2.5* 2.3*   TBILI  --  1.5* 1.4*   ALT  --  41 44   INR  --   --  1.1       Recent Labs     08/17/20 0332   TROIQ <0.05      CKMB <1.0         Intake/Output Summary (Last 24 hours) at 8/17/2020 1119  Last data filed at 8/16/2020 1858  Gross per 24 hour   Intake 1190.83 ml   Output    Net 1190.83 ml        Cardiographics    Telemetry: SR with PVCs and PJCs  ECG: ST with nonspecific changes   Echocardiogram: EF 25-30%; dilated LV and mildly reduced RV function. CXRAY: n/a       Assessment:       Active Problems:    Hypertension (8/14/2020)      Diverticulitis of large intestine with perforation (8/14/2020)      Dilated cardiomyopathy ([address] (8/17/2020)         Plan:     Werner Dupont is a 54 y.o. male who is admitted with diverticulitis. Cardiology consulted for ECHO showing an EF of 25-30%. TSH 4.69; No CXR. Troponin negative. proBNP 1182. · Newly identified dilated cardiomyopathy. Idiopathic. NYHA class I-II. · Started Coreg and losartan this morning. After discussion with patient, he has insurance and should be able to afford entresto, so will switch from ARB to entresto. · Will give one time dose of lasix since he's gotten slightly puffy with needed IVF for his diverticulitis. Lungs clear, so do not think he needs maintenance diuretic at this time. · Plan for out patient stress test vs elective cath in a few weeks to further evaluate for ischemic causes of cardiomyopathy. Since patient not having active symptoms, this will give his body further time to recover from diverticulitis. · Repeat ECHO in office in 3 months.     · On ASA and BB for CAD risk. Agree with checking lipids   · BB and entresto should address patient's HTN. Can increase dose of BB based on BP. Starting low to avoid hypotension. · I discussed smoking cessation, heart healthy diet, daily weights, and fluid intake with patient. · Patient transitioning to full diet. Will switch from regular to cardiac.            Thank you for consulting [facility]    Myah Frank NP  DNP, RN, AGACNP-BC

## 2020-08-17 NOTE — PROGRESS NOTES
Hospitalist Progress Note    NAME: Dennie Sobers   :  1975   MRN:  [id number]       Assessment / Plan:  Systolic CHF new diagnosis LVEF 25-30%  -Denies prior heart disease, no prior cardiology follow up  -Recent ARREOLA, no CP  -Echo(poor quality) LVEF 25-30%, dilated LV, mild MR  -Check cardiac enzymes and repeat EKG  - Spoke with [facility], they will see in consultation  - diagnosis and severity d/w patient  - ASA and low dose coreg  - Check lipid panel    Persistent sigmoid diverticulitis with local perforation POA  - admitted to [facility] on  with sigmoid diverticulitis with microperforation          small diverticular abscess. - IV antibiotics with zosyn at [facility]  - Repeat CT abdomen  showed interval worsening of the contained perforation associated with the sigmoid diverticulitis with predominantly air in the right retroperitoneal collection.    -- Signed out AMA, came to [id number]  -- IV abx with levaquin and flagyl  -- Clinically improving, right sided abdominal pain improving  -- tolerating full liquids, advance to GI lite  -- Scond episode of diverticulitis in sigmoid with perforation since    -serial labs  - Complete PO antibiotics at discharge     Hep C positive POA new dx  Elevated LFTs AST and ALT and T bili  - ABD US Hepatosplenomegaly. Normal appearance of the liver parenchyma. Small amount of gallbladder sludge without gallstones. No biliary duct dilatation. - denies IVDU or prior transfusions. He has had several sex partners over the years  - Diagnosis d/w patient, importance of follow up as treatments are available was dis  - refer to Dr Aric Scruggs as outpatient  - Check genotype and PCR     Elevated blood pressures, POA  Bilateral lower leg edema, POA  -patient denies hx HTN despite it written in his PMH  -Normal TSH  -continue labetalol IV prn.   - Coreg     Morbid obesity  Body mass index is 54.11 kg/m².     40 or above Morbid obesity / Body mass index is 54.11 kg/m². Code status: Full  Prophylaxis: Lovenox  Recommended Disposition: Home w/Family       Subjective:     Chief Complaint / Reason for Physician Visit  Follow up of  Diverticulitis HTN   \"I am feeling better\"  RLQ pain definitely improved  Echo shows LVEF 25 to 30%, pt denies prior CAD or cardiac disease  Recent ARREOLA, but no chest pain  No N/V or diarrhea  Tolerating full liquids, wants more solid food  Chart reviewed in detail. Discussed with RN events overnight. Review of Systems:  Symptom Y/N Comments  Symptom Y/N Comments   Fever/Chills n   Chest Pain     Poor Appetite    Edema y    Cough n   Abdominal Pain y    Sputum    Joint Pain     SOB/ARREOLA y   Pruritis/Rash     Nausea/vomit n   Tolerating PT/OT     Diarrhea n   Tolerating Diet y    Constipation    Other       Could NOT obtain due to:      PO intake:   Patient Vitals for the past 72 hrs:   % Diet Eaten   08/15/20 1600 75 %   08/15/20 1231 25 %   08/15/20 0809 0 %   08/14/20 1629 0 %     Objective:     VITALS:   Last 24hrs VS reviewed since prior progress note. Most recent are:  Patient Vitals for the past 24 hrs:   Temp Pulse Resp BP SpO2   08/17/20 0836 98.3 °F (36.8 °C) 78 19 141/82 97 %   08/17/20 0335 98 °F (36.7 °C) 69 19 131/73 98 %   08/17/20 0007 98.2 °F (36.8 °C) 73 18 142/90 99 %   08/16/20 1914 99.2 °F (37.3 °C) 72 22 141/87 98 %   08/16/20 1600  69      08/16/20 1534    (!) 149/92    08/16/20 1533    (!) 160/94    08/16/20 1514 98.4 °F (36.9 °C) 69 16 (!) 158/107 100 %   08/16/20 1148  68      08/16/20 1100 98.4 °F (36.9 °C) 68 18 (!) 151/95 99 %       Intake/Output Summary (Last 24 hours) at 8/17/2020 0858  Last data filed at 8/16/2020 1858  Gross per 24 hour   Intake 1190.83 ml   Output    Net 1190.83 ml        PHYSICAL EXAM:  General: WD, WN. Alert, cooperative, no acute distress    EENT:  EOMI. Anicteric sclerae. MMM  Resp:  CTA bilaterally, no wheezing or rales.   No accessory muscle use  CV:  Regular  rhythm,  minimal edema  GI:  Soft, Non distended, (+) mild mid abd tender in RLQ.  +Bowel sounds  Neurologic:  Alert and oriented X 3, normal speech,   Psych:   Good insight. Not anxious nor agitated  Skin:  No rashes. No jaundice    Reviewed most current lab test results and cultures  YES  Reviewed most current radiology test results   YES  Review and summation of old records today    NO  Reviewed patient's current orders and MAR    YES  PMH/SH reviewed - no change compared to H&P  ________________________________________________________________________  Care Plan discussed with:    Comments   Patient x    Family      RN x    Care Manager     Consultant  x Cardiology                     Multidiciplinary team rounds were held today with , nursing, pharmacist and clinical coordinator. Patient's plan of care was discussed; medications were reviewed and discharge planning was addressed. ________________________________________________________________________      Comments   >50% of visit spent in counseling and coordination of care x     This includes time during multidisciplinary rounds if indicated above   ________________________________________________________________________  Robin Godfrey MD     Procedures: see electronic medical records for all procedures/Xrays and details which were not copied into this note but were reviewed prior to creation of Plan. LABS:  I reviewed today's most current labs and imaging studies.   Pertinent labs include:  Recent Labs     08/17/20  0332 08/15/20  0402 08/14/20  1038   WBC 6.6 6.2 6.5   HGB 14.9 14.3 15.4   HCT 44.5 41.8 44.4    221 239     Recent Labs     08/17/20  0332 08/16/20  0316 08/15/20  0402 08/14/20  1038    138 138 136   K 3.8 3.7 3.7 3.6    107 104 101   CO2 27 25 26 31   GLU 85 87 82 96   BUN 7 8 9 6   CREA 0.88 0.75 0.78 0.88   CA 7.9* 8.5 8.2* 8.9   MG  --  2.0  --   --    ALB --  2.5* 2.3* 2.8*   TBILI  --  1.5* 1.4* 1.6*   ALT  --  41 44 52   INR  --   --  1.1  --

## 2020-08-17 NOTE — PROGRESS NOTES
.General Surgery End of Shift Nursing Note    Bedside shift change report given to Suzie Caro RN (oncoming nurse) by Aide Gardner RN (offgoing nurse). Report included the following information SBAR, Kardex, ED Summary, Procedure Summary, Intake/Output, MAR and Recent Results. Shift worked:   [id number]   Summary of shift:  Pt is self care. 1x medicated for pain with oxycodone. His urine is lightening up. 1 x BM of soft med stool. Issues for physician to address:  pt needs ECHO results explained. DC?? Number times ambulated in hallway past shift:0  Number of times OOB to chair past shift: 2    Pain Management:  Current medication: no pain meds given  Patient states pain is manageable on current pain medication: no pain    GI:    Current diet:  DIET FULL LIQUID    Tolerating current diet: yes  Passing flatus: yes  Last Bowel Movement: 8/14/20  Respiratory:    Incentive Spirometer at bedside: no  Patient instructed on use: no    Patient Safety:    Falls Score: 1  Bed Alarm On?no  ]Sitter?  no    Lonnie Schaefer RN

## 2020-08-17 NOTE — PROGRESS NOTES
SURGERY PROGRESS NOTE      Admit Date: 2020    POD * No surgery found *    Procedure: * No surgery found *      Subjective:     Patient is pain free and tolerating a diet        Objective:     Visit Vitals  /82   Pulse 78   Temp 98.3 °F (36.8 °C)   Resp 19   Wt 147.5 kg (325 lb 2.9 oz)   SpO2 97%   BMI 54.11 kg/m²        Temp (24hrs), Av.4 °F (36.9 °C), Min:98 °F (36.7 °C), Max:99.2 °F (37.3 °C)      No intake/output data recorded. 08/15 1901 -  0700  In: 1510.8 [P.O.:320;  I.V.:1190.8]  Out: 2280 [Urine:2280]    Physical Exam:    General:  alert, cooperative, no distress, appears stated age   Abdomen: soft, bowel sounds active, non-tender           Lab Results   Component Value Date/Time    WBC 6.6 2020 03:32 AM    HGB 14.9 2020 03:32 AM    HCT 44.5 2020 03:32 AM    PLATELET 409  03:32 AM    MCV 74.3 (L) 2020 03:32 AM     Lab Results   Component Value Date/Time    GFR est non-AA >60 2020 03:32 AM    GFR est AA >60 2020 03:32 AM    Creatinine 0.88 2020 03:32 AM    BUN 7 2020 03:32 AM    Sodium 139 2020 03:32 AM    Potassium 3.8 2020 03:32 AM    Chloride 108 2020 03:32 AM    CO2 27 2020 03:32 AM    Magnesium 2.0 2020 03:16 AM       Assessment:     Active Problems:    Hypertension (2020)      Diverticulitis of large intestine with perforation (2020)    doing well    Plan:       Can be discharged home on 2 weeks oral antibiotics with follow-up with GI for colonoscopy

## 2020-08-17 NOTE — PROGRESS NOTES
Problem: Patient Education: Go to Patient Education Activity  Goal: Patient/Family Education  8/17/2020 0756 by Ben OWEN  Outcome: Progressing Towards Goal  8/17/2020 0755 by Solitario Avila  Outcome: Progressing Towards Goal     Problem: Patient Education: Go to Patient Education Activity  Goal: Patient/Family Education  8/17/2020 0756 by Ben OWEN  Outcome: Progressing Towards Goal  8/17/2020 0755 by Ben OWEN  Outcome: Progressing Towards Goal     Problem: Falls - Risk of  Goal: *Absence of Falls  Description: Document Vicky Rosario Fall Risk and appropriate interventions in the flowsheet.   8/17/2020 0756 by Solitario Avila  Outcome: Progressing Towards Goal  Note: Fall Risk Interventions:            Medication Interventions: Patient to call before getting OOB, Teach patient to arise slowly                8/17/2020 0755 by Solitario Avila  Outcome: Progressing Towards Goal  Note: Fall Risk Interventions:            Medication Interventions: Patient to call before getting OOB, Teach patient to arise slowly                   Problem: Patient Education: Go to Patient Education Activity  Goal: Patient/Family Education  8/17/2020 0756 by Ben OWEN  Outcome: Progressing Towards Goal  8/17/2020 0755 by Ben OWEN  Outcome: Progressing Towards Goal

## 2020-08-17 NOTE — PROGRESS NOTES
Problem: Pain  Goal: *Control of Pain  Outcome: Progressing Towards Goal     Problem: Patient Education: Go to Patient Education Activity  Goal: Patient/Family Education  Outcome: Progressing Towards Goal     Problem: Nausea/Vomiting (Adult)  Goal: *Absence of nausea/vomiting  Outcome: Progressing Towards Goal     Problem: Patient Education: Go to Patient Education Activity  Goal: Patient/Family Education  Outcome: Progressing Towards Goal     Problem: Constipation - Risk of  Goal: *Prevention of constipation  Outcome: Progressing Towards Goal     Problem: Patient Education: Go to Patient Education Activity  Goal: Patient/Family Education  Outcome: Progressing Towards Goal     Problem: Falls - Risk of  Goal: *Absence of Falls  Description: Document Dilcia Fall Risk and appropriate interventions in the flowsheet.   Outcome: Progressing Towards Goal  Note: Fall Risk Interventions:            Medication Interventions: Patient to call before getting OOB                   Problem: Patient Education: Go to Patient Education Activity  Goal: Patient/Family Education  Outcome: Progressing Towards Goal

## 2020-08-18 VITALS
BODY MASS INDEX: 54.11 KG/M2 | DIASTOLIC BLOOD PRESSURE: 97 MMHG | HEART RATE: 70 BPM | WEIGHT: 315 LBS | OXYGEN SATURATION: 97 % | TEMPERATURE: 98.6 F | SYSTOLIC BLOOD PRESSURE: 159 MMHG | RESPIRATION RATE: 18 BRPM

## 2020-08-18 PROBLEM — K57.20 DIVERTICULITIS OF LARGE INTESTINE WITH PERFORATION: Status: RESOLVED | Noted: 2020-08-14 | Resolved: 2020-08-18

## 2020-08-18 LAB
ALBUMIN SERPL-MCNC: 2.4 G/DL (ref 3.5–5)
ALBUMIN/GLOB SERPL: 0.5 {RATIO} (ref 1.1–2.2)
ALP SERPL-CCNC: 97 U/L (ref 45–117)
ALT SERPL-CCNC: 34 U/L (ref 12–78)
ANION GAP SERPL CALC-SCNC: 6 MMOL/L (ref 5–15)
AST SERPL-CCNC: 62 U/L (ref 15–37)
BILIRUB SERPL-MCNC: 1 MG/DL (ref 0.2–1)
BUN SERPL-MCNC: 8 MG/DL (ref 6–20)
BUN/CREAT SERPL: 11 (ref 12–20)
CALCIUM SERPL-MCNC: 8.2 MG/DL (ref 8.5–10.1)
CHLORIDE SERPL-SCNC: 108 MMOL/L (ref 97–108)
CHOLEST SERPL-MCNC: 80 MG/DL
CO2 SERPL-SCNC: 23 MMOL/L (ref 21–32)
CREAT SERPL-MCNC: 0.76 MG/DL (ref 0.7–1.3)
GLOBULIN SER CALC-MCNC: 4.5 G/DL (ref 2–4)
GLUCOSE SERPL-MCNC: 87 MG/DL (ref 65–100)
HDLC SERPL-MCNC: 22 MG/DL
HDLC SERPL: 3.6 {RATIO} (ref 0–5)
LDLC SERPL CALC-MCNC: 40 MG/DL (ref 0–100)
LIPID PROFILE,FLP: NORMAL
POTASSIUM SERPL-SCNC: 3.8 MMOL/L (ref 3.5–5.1)
PROT SERPL-MCNC: 6.9 G/DL (ref 6.4–8.2)
SODIUM SERPL-SCNC: 137 MMOL/L (ref 136–145)
TRIGL SERPL-MCNC: 90 MG/DL (ref ?–150)
TROPONIN I SERPL-MCNC: <0.05 NG/ML
VLDLC SERPL CALC-MCNC: 18 MG/DL

## 2020-08-18 PROCEDURE — 80053 COMPREHEN METABOLIC PANEL: CPT

## 2020-08-18 PROCEDURE — 87900 PHENOTYPE INFECT AGENT DRUG: CPT

## 2020-08-18 PROCEDURE — 36415 COLL VENOUS BLD VENIPUNCTURE: CPT

## 2020-08-18 PROCEDURE — 74011250637 HC RX REV CODE- 250/637: Performed by: NURSE PRACTITIONER

## 2020-08-18 PROCEDURE — 87522 HEPATITIS C REVRS TRNSCRPJ: CPT

## 2020-08-18 PROCEDURE — 74011250636 HC RX REV CODE- 250/636: Performed by: HOSPITALIST

## 2020-08-18 PROCEDURE — 84484 ASSAY OF TROPONIN QUANT: CPT

## 2020-08-18 PROCEDURE — 74011250637 HC RX REV CODE- 250/637: Performed by: INTERNAL MEDICINE

## 2020-08-18 PROCEDURE — 99232 SBSQ HOSP IP/OBS MODERATE 35: CPT | Performed by: INTERNAL MEDICINE

## 2020-08-18 PROCEDURE — 87902 NFCT AGT GNTYP ALYS HEP C: CPT

## 2020-08-18 PROCEDURE — 80061 LIPID PANEL: CPT

## 2020-08-18 PROCEDURE — 86704 HEP B CORE ANTIBODY TOTAL: CPT

## 2020-08-18 RX ORDER — GUAIFENESIN 100 MG/5ML
81 LIQUID (ML) ORAL DAILY
Qty: 30 TAB | Refills: 0 | Status: SHIPPED | OUTPATIENT
Start: 2020-08-19

## 2020-08-18 RX ORDER — CARVEDILOL 3.12 MG/1
3.12 TABLET ORAL 2 TIMES DAILY WITH MEALS
Qty: 90 TAB | Refills: 0 | Status: SHIPPED | OUTPATIENT
Start: 2020-08-18

## 2020-08-18 RX ADMIN — Medication 10 ML: at 06:00

## 2020-08-18 RX ADMIN — SACUBITRIL AND VALSARTAN 1 TABLET: 24; 26 TABLET, FILM COATED ORAL at 10:57

## 2020-08-18 RX ADMIN — METRONIDAZOLE 500 MG: 500 INJECTION, SOLUTION INTRAVENOUS at 06:00

## 2020-08-18 RX ADMIN — CARVEDILOL 3.12 MG: 3.12 TABLET, FILM COATED ORAL at 08:32

## 2020-08-18 RX ADMIN — ASPIRIN 81 MG CHEWABLE TABLET 81 MG: 81 TABLET CHEWABLE at 08:32

## 2020-08-18 NOTE — PROGRESS NOTES
Reviewed D/C paperwork with patient, he verbalized understanding. All IV lines removed prior to D/C.

## 2020-08-18 NOTE — PROGRESS NOTES
General Surgery End of Shift Nursing Note    Bedside shift change report given to Harrison Vidal RN (oncoming nurse) by Richy Virgen RN (offgoing nurse). Report included the following information SBAR, Kardex, Procedure Summary, Intake/Output, MAR and Recent Results. Shift worked:   7p-7a   Summary of shift:    Patient in the chair most of the shift. Antibiotics continued overnight   Issues for physician to address:   None at this time     Number times ambulated in hallway past shift: 0    Number of times OOB to chair past shift: 2    GI:    Current diet:  DIET CARDIAC Regular; 2-3 GM NA    Tolerating current diet: YES    Patient Safety:    Bed Alarm On? No  Sitter?  No    Brittany Egan

## 2020-08-18 NOTE — PROGRESS NOTES
Telemetry Box ST 3 removed from patient and returned to Casey Adler, Unit secretary.      Zeeshan Castro RN

## 2020-08-18 NOTE — DISCHARGE SUMMARY
Hospitalist Discharge Summary     Patient ID:  Racquel Loving  [id number]  54 y.o.  1975  8/14/2020    PCP on record: None    Admit date: 8/14/2020  Discharge date and time: 8/18/2020    DISCHARGE DIAGNOSIS:    Systolic congestive heart failure, new diagnosis  Heart failure reduced ejection fraction EF 25 to 30%  Dilated cardiomyopathy  Sigmoid diverticulitis  Hep C positive new diagnosis  Transaminitis  Uncontrolled hypertension  Bilateral lower extremity edema    CONSULTATIONS:  IP CONSULT TO HOSPITALIST  IP CONSULT TO GENERAL SURGERY  IP CONSULT TO CARDIOLOGY    Excerpted HPI from H&P of Brittnee Duran MD:    ______________________________________________________________________  DISCHARGE SUMMARY/HOSPITAL COURSE:  for full details see H&P, daily progress notes, labs, consult notes. Systolic CHF new diagnosis LVEF 25-30%  -Denies prior heart disease, no prior cardiology follow up  -Recent ARREOLA, no CP  -Echo(poor quality) LVEF 25-30%, dilated LV, mild MR  Ischemic work-up as an outpatient as per cardiology recommendations  - diagnosis and severity d/w patient  - ASA and low dose coreg, with Entresto.   Patient tolerated Entresto in hospital without any complications     Persistent sigmoid diverticulitis with local perforation POA  - admitted to [facility] on 8/9 with sigmoid diverticulitis with microperforation          small diverticular abscess.    - IV antibiotics with zosyn at [address] abdomen 8/12 showed interval worsening of the contained perforation associated with the sigmoid diverticulitis with predominantly air in the right retroperitoneal collection.    -- Signed out AMA, came to [address]  -- IV abx with levaquin and flagyl, changed to p.o. as ordered  -- Clinically improving, right sided abdominal pain improving  -- Completed full diet  -- Scond episode of diverticulitis in sigmoid with perforation since 2018   - Complete PO antibiotics at discharge     Hep C positive POA new dx  Elevated LFTs AST and ALT and T bili  - ABD US Hepatosplenomegaly. Normal appearance of the liver parenchyma. Small amount of gallbladder sludge without gallstones. No biliary duct dilatation. - denies IVDU or prior transfusions. He has had several sex partners over the years  - Diagnosis d/w patient, importance of follow up   - refer to Dr Doretha Cramer as outpatient     Elevated blood pressures, POA  Bilateral lower leg edema, POA  -patient denies hx HTN despite it written in his PMH  -Normal TSH  - Coreg, Entresto        _______________________________________________________________________  Patient seen and examined by me on discharge day. Pertinent Findings:  Gen:    Not in distress  Chest: Clear lungs  CVS:   Regular rhythm. No edema  Abd:  Soft, not distended, not tender  Neuro:  Alert  _______________________________________________________________________  DISCHARGE MEDICATIONS:   Current Discharge Medication List      START taking these medications    Details   carvediloL (COREG) 3.125 mg tablet Take 1 Tab by mouth two (2) times daily (with meals). Qty: 90 Tab, Refills: 0      aspirin 81 mg chewable tablet Take 1 Tab by mouth daily. Qty: 30 Tab, Refills: 0      sacubitril-valsartan (ENTRESTO) 24 mg/26 mg tablet Take 1 Tab by mouth every twelve (12) hours. Qty: 60 Tab, Refills: 11         CONTINUE these medications which have NOT CHANGED    Details   levoFLOXacin (Levaquin) 750 mg tablet Take 1 Tab by mouth daily for 10 days. Qty: 10 Tab, Refills: 0      metroNIDAZOLE (FlagyL) 500 mg tablet Take 1 Tab by mouth three (3) times daily for 10 days. Qty: 30 Tab, Refills: 0               Patient Follow Up Instructions:    Activity: Activity as tolerated  Diet: Cardiac Diet  Wound Care: None needed      Follow-up Information     Follow up With Specialties Details Why Contact Info    Juan Luis Aleman MD Cardiology, [facility] Go on 9/11/2020 at 10am for cardiology follow up  [phone] [address]  [phone]      Memo Miller MD Internal Medicine On 8/27/2020 For new patient appointment at 11:30AM [address] [facility]  [phone]      None    None (395) Patient stated that they have no PCP          ________________________________________________________________    Risk of deterioration: Low    Condition at Discharge:  Stable  __________________________________________________________________    Disposition  Home with family, no needs    ____________________________________________________________________    Code Status: Full Code  ___________________________________________________________________      Total time in minutes spent coordinating this discharge (includes going over instructions, follow-up, prescriptions, and preparing report for sign off to her PCP) :  35 minutes    Signed:  Terri Song MD

## 2020-08-18 NOTE — PROGRESS NOTES
Plan:  -Home   -New BS PCP  -Family to transport at d/c       2:03PM  New BS PCP appt scheduled and added to AVS. Cardiology f/u appt scheduled and added to AVS. Pt ambulatory at time of d/c. Pt ready for d/c from CM perspective. Family to transport home. 8:55AM  CM in to speak with pt. D/c likely today v tomorrow, pending cardiology plan. CM in to speak with pt. Pt sitting up in chair at bedside. Pt currently does not have PCP. Pt [facility] [phone] [facility]. Appt pending. Pt states he will have a family member who can transport him home at d/c. CM will continue to follow and assist with d/c planning. Care Management Interventions  PCP Verified by CM: Yes  Mode of Transport at Discharge:  Other (see comment)(Family )  Transition of Care Consult (CM Consult): Discharge Planning  Current Support Network: Lives Alone  Confirm Follow Up Transport: Family  Discharge Location  Discharge Placement: Home      ADAM Best  Care Manager

## 2020-08-18 NOTE — PROGRESS NOTES
"Referring Physician: No ref. provider found    PCP: Scott Payne MD      CC: bilateral foot pain and lower back pain    Interval History:  Mr. Rodrigues is 85 yo male with continued bilateral peripheral neuropathy.  Sympathetic blocks previously did not improve his pain.  Ketamine compounding cream was not helpful.  His back pain is not currently of major concern.  Hydrocodone  mg q 6 h provides some benefit.  His lyrica regimen remains unchanged. He did have one trip and fall since [facility]. Pain today is 4/10.      Prior HPI:   Halley Rodrigues is a 87 y.o.  male with PMHx significant for HLD, HTN, CAD on Plavix, peripheral neuropathy who presents with CC: painful burning sensation in B feet x 5y+ duration. The pain is constant and awakes him in his sleep and makes the evenings the most difficult, pain- wise. It is worse with lying flat and improved with walking. He has taken Lyrica x 5y without any appreciable pain relief. He has tried 2%lidocaine cream, which completely "numbs his feet" and inhibits him from ambulation. He has taken Hydrocodone 7.5 mg 2-3 times QD which helps with his pain significantly. He also reports cLBP without preceding injury/trauma, specifically in the area of B SI joints (R worse than L), aching and stabbing in nature, without radiation. His back pain is worsened with prolonged standing and walking, which limits his activity and ultimately makes his foot pain worse 2/2 being sedentary. He has been having intermittent falls, which he attributes to generalized weakness versus foot numbness or back pain. He denies saddle anesthesia or B/B changes.     ROS:  CONSTITUTIONAL: No fevers, chills, night sweats, wt. loss, appetite changes  SKIN: no rashes or itching  ENT: No headaches, head trauma, vision changes, or eye pain  LYMPH NODES: None noticed   CV: No chest pain, palpitations.   RESP: No shortness of breath, dyspnea on exertion, cough, wheezing, or hemoptysis  GI: " [address] [place] [address]  [phone]      Cardiology Progress Note      8/18/2020 11:32 AM    Admit Date: 8/14/2020    Admit Diagnosis:   Diverticulitis of large intestine with perforation [K57.20]  Hypertension [I10]    Interval History/Subjective:     Caren Heimlich is a 54 y.o. male with PMH HTN, diverticulitis who was admitted for Diverticulitis of large intestine with perforation [K57.20]  Hypertension [I10]. -VSS  -labs steady  -Mr. Fredy Palma is feeling good. Some slight abd discomfort, but eating well. Tired from not sleeping much. No SOB or chest pain.      Visit Vitals  BP (!) 158/95   Pulse 70   Temp 98.6 °F (37 °C)   Resp 18   Wt 147.5 kg (325 lb 2.9 oz)   SpO2 97%   BMI 54.11 kg/m²       Current Facility-Administered Medications   Medication Dose Route Frequency    sacubitriL-valsartan (ENTRESTO) 24-26 mg tablet 1 Tab  1 Tab Oral Q12H    aspirin chewable tablet 81 mg  81 mg Oral DAILY    carvediloL (COREG) tablet 3.125 mg  3.125 mg Oral BID WITH MEALS    levoFLOXacin (LEVAQUIN) 750 mg in D5W IVPB  750 mg IntraVENous Q24H    metroNIDAZOLE (FLAGYL) IVPB premix 500 mg  500 mg IntraVENous Q8H    morphine injection 2-4 mg  2-4 mg IntraVENous Q4H PRN    labetaloL (NORMODYNE;TRANDATE) injection 10 mg  10 mg IntraVENous Q4H PRN    docusate sodium (COLACE) capsule 100 mg  100 mg Oral BID    oxyCODONE IR (ROXICODONE) tablet 5 mg  5 mg Oral Q6H PRN    sodium chloride (NS) flush 5-40 mL  5-40 mL IntraVENous Q8H    sodium chloride (NS) flush 5-40 mL  5-40 mL IntraVENous PRN    acetaminophen (TYLENOL) tablet 650 mg  650 mg Oral Q6H PRN    Or    acetaminophen (TYLENOL) suppository 650 mg  650 mg Rectal Q6H PRN    promethazine (PHENERGAN) tablet 12.5 mg  12.5 mg Oral Q6H PRN    Or    ondansetron (ZOFRAN) injection 4 mg  4 mg IntraVENous Q6H PRN    enoxaparin (LOVENOX) injection 40 mg  40 mg SubCUTAneous Q24H       Objective:      Physical Exam:  General: pleasant, No nausea, emesis, diarrhea, constipation, melena, hematochezia, pain.    : No dysuria, hematuria, urgency, or frequency   HEME: No easy bruising, bleeding problems  PSYCHIATRIC: No depression, anxiety, psychosis, hallucinations.  NEURO: No seizures, memory loss, dizziness or difficulty sleeping  MSK back pain, foot pain      Past Medical History:   Diagnosis Date    Anemia     Anemia due to chronic blood loss 7/25/2018    Anemia, chronic disease 7/25/2018    GERD (gastroesophageal reflux disease)     Heart disease     Hypertension     Neuropathy      Past Surgical History:   Procedure Laterality Date    BLOCK-LUMBAR-SYMPATHETIC N/A 12/5/2017    [facility]    BLOCK-LUMBAR-SYMPATHETIC Bilateral 5/9/2017    [facility] OR    CORONARY ARTERY BYPASS GRAFT       Family History   Problem Relation Age of Onset    Dementia Father      Social History     Socioeconomic History    Marital status:      Spouse name: Not on file    Number of children: Not on file    Years of education: Not on file    Highest education level: Not on file   Occupational History    Not on file   Social Needs    Financial resource strain: Not on file    Food insecurity:     Worry: Not on file     Inability: Not on file    Transportation needs:     Medical: Not on file     Non-medical: Not on file   Tobacco Use    Smoking status: Current Every Day Smoker     Types: Cigarettes    Smokeless tobacco: Never Used   Substance and Sexual Activity    Alcohol use: No    Drug use: No    Sexual activity: Not on file   Lifestyle    Physical activity:     Days per week: Not on file     Minutes per session: Not on file    Stress: Not on file   Relationships    Social connections:     Talks on phone: Not on file     Gets together: Not on file     Attends Christianity service: Not on file     Active member of club or organization: Not on file     Attends meetings of clubs or  morbidly obese AAM sitting in a chair in NAD  Heart: RRR, no m/S3/JVD  Lungs: clear   Abdomen: Soft, +BS, NTND   Extremities: LE franca +DP/PT, trace edema BLE  Neurologic: Grossly normal  Skin:  Warm and dry. Data Review:   Recent Labs     08/17/20  0332   WBC 6.6   HGB 14.9   HCT 44.5        Recent Labs     08/18/20  0305 08/17/20  0332 08/16/20  0316    139 138   K 3.8 3.8 3.7    108 107   CO2 23 27 25   GLU 87 85 87   BUN 8 7 8   CREA 0.76 0.88 0.75   CA 8.2* 7.9* 8.5   MG  --   --  2.0   ALB 2.4*  --  2.5*   TBILI 1.0  --  1.5*   ALT 34  --  41       Recent Labs     08/18/20 0305 08/17/20 0332   TROIQ <0.05 <0.05   CPK  --  125   CKMB  --  <1.0         Intake/Output Summary (Last 24 hours) at 8/18/2020 1132  Last data filed at 8/18/2020 0834  Gross per 24 hour   Intake 1250 ml   Output    Net 1250 ml      Telemetry: SR withocc  PVCs and PJCs  ECG: ST with nonspecific changes   Echocardiogram: EF 25-30%; dilated LV and mildly reduced RV function. CXRAY: n/a    Assessment:     Active Problems:    Hypertension (8/14/2020)      Diverticulitis of large intestine with perforation (8/14/2020)      Dilated cardiomyopathy ([address] (8/17/2020)        Plan:     Dilated cardiomyopathy:  NYHA class I-II. Some slight peripheral edema likely from IVF treatment of diverticulitis. EF 25-30%  · Continue coreg and entresto for cardiomyopathy. Do not believe he needs a maintenance diuretic at this time  · ASA and BB for CAD risk. Further ischemic evaluation out patient when better recovered from diverticulitis since patient not having active CAD or HF symptoms. · Counseled on smoking cessation, heart healthy diet, daily weights, and fluid intake. · Repeat ECHO in 3 months out patient. HTN: slightly elevated  · Recheck after entresto dose  · Will increase BB further out patient if BP allows        Follow up scheduled. Okay for discharge if vitals remain stable.        Amando Amos NP DNP, "organizations: Not on file     Relationship status: Not on file   Other Topics Concern    Not on file   Social History Narrative    Not on file         Medications/Allergies: See med card    Vitals:    07/30/19 1349   BP: (!) 148/68   Pulse: (!) 48   Weight: 73.5 kg (162 lb)   Height: 5' 10" (1.778 m)   PainSc:   4   PainLoc: Back         Physical exam:    GENERAL: A and O x3, the patient appears well groomed and is in no acute distress.  Skin: No rashes or obvious lesions  HEENT: normocephalic, atraumatic  CARDIOVASCULAR:  Bradycardia  LUNGS: non labored breathing  ABDOMEN: soft, nontender   UPPER EXTREMITIES: Normal alignment, normal range of motion, no atrophy, no skin changes,  hair growth and nail growth normal and equal bilaterally. No swelling, no tenderness.    LOWER EXTREMITIES:  Normal alignment, normal range of motion, no atrophy, dry skin, onchyomycosis in B feet. No swelling, no tenderness.    LUMBAR SPINE  Lumbar spine: ROM is moderately decreased l with flexion extension and oblique extension   Michel's test causes increased pain on both sides (R>>L)   Supine straight leg raise is negative bilaterally.    Internal and external rotation of the hip causes no increased pain on either side.  Myofascial exam:SI joint tenderness B      MENTAL STATUS: normal orientation, speech, language, and fund of knowledge for social situation.  Emotional state appropriate.    CRANIAL NERVES:  II:  PERRL bilaterally,   III,IV,VI: EOMI.    V:  Facial sensation equal bilaterally  VII:  Facial motor function normal.  VIII:  Hearing equal to finger rub bilaterally  IX/X: Gag normal, palate symmetric  XI:  Shoulder shrug equal, head turn equal  XII:  Tongue midline without fasciculations    MOTOR: Tone and bulk: normal bilateral upper and lower Strength: normal   Delt Bi Tri WE WF     R 5 5 5 5 5 5   L 5 5 5 5 5 5     IP ADD ABD Quad TA Gas HAM  R 5 5 5 5 5 5 5  L 5 5 5 5 5 5 5    SENSATION: Light touch and pinprick " RN, [facility]-BC intact bilaterally  REFLEXES: normal, symmetric, nonbrisk.  Toes down, no clonus. No hoffmans.  GAIT: normal rise, base, steps, and arm swing.      Assessment   Mr. Rodrigues is a 87 y.o. male with   1. Peripheral neuropathy, idiopathic    2. Other spondylosis, lumbar region    3. DDD (degenerative disc disease), lumbar    4. Sacroiliac joint pain      Plan:  1.  I have stressed the importance of physical activity and exercise to improve overall health. Continue PT exercises  2.  Hydrocodone 10 mg to q 6hrs as needed.  reviewed. 3 months. Previous UDS consistent. UDS next clinic visit  3.  May consider SCS trial, but patient is currently hesitant to proceed    4.  Continue lyrica as prescribed  5.  Follow up in 3 months  All medication management was performed by Dr. Shawn Elizabeth

## 2020-08-26 LAB
HCV GENTYP SERPL NAA+PROBE: NORMAL
PLEASE NOTE, 550474: NORMAL

## 2020-08-27 ENCOUNTER — VIRTUAL VISIT (OUTPATIENT)
Dept: FAMILY MEDICINE CLINIC | Age: 45
End: 2020-08-27
Payer: COMMERCIAL

## 2020-08-27 DIAGNOSIS — E55.9 VITAMIN D DEFICIENCY: ICD-10-CM

## 2020-08-27 DIAGNOSIS — R35.0 FREQUENCY OF URINATION: ICD-10-CM

## 2020-08-27 DIAGNOSIS — I10 HYPERTENSION GOAL BP (BLOOD PRESSURE) < 130/80: ICD-10-CM

## 2020-08-27 DIAGNOSIS — B17.10 ACUTE HEPATITIS C VIRUS INFECTION WITHOUT HEPATIC COMA: ICD-10-CM

## 2020-08-27 DIAGNOSIS — K57.20 DIVERTICULITIS OF LARGE INTESTINE WITH PERFORATION WITHOUT BLEEDING: ICD-10-CM

## 2020-08-27 DIAGNOSIS — I42.0 DILATED CARDIOMYOPATHY (HCC): ICD-10-CM

## 2020-08-27 DIAGNOSIS — Z13.1 SCREENING FOR DIABETES MELLITUS (DM): ICD-10-CM

## 2020-08-27 DIAGNOSIS — Z00.00 ENCOUNTER FOR MEDICAL EXAMINATION TO ESTABLISH CARE: Primary | ICD-10-CM

## 2020-08-27 DIAGNOSIS — E78.5 DYSLIPIDEMIA (HIGH LDL; LOW HDL): ICD-10-CM

## 2020-08-27 PROCEDURE — 99386 PREV VISIT NEW AGE 40-64: CPT | Performed by: INTERNAL MEDICINE

## 2020-08-27 PROCEDURE — 99204 OFFICE O/P NEW MOD 45 MIN: CPT | Performed by: INTERNAL MEDICINE

## 2020-08-27 RX ORDER — LEVOFLOXACIN 250 MG/1
250 TABLET ORAL DAILY
Qty: 10 TAB | Refills: 0 | Status: CANCELLED | OUTPATIENT
Start: 2020-08-27 | End: 2020-09-06

## 2020-08-27 RX ORDER — METRONIDAZOLE 500 MG/1
500 TABLET ORAL 3 TIMES DAILY
Qty: 30 TAB | Refills: 0 | Status: CANCELLED | OUTPATIENT
Start: 2020-08-27 | End: 2020-09-06

## 2020-08-27 NOTE — LETTER
NOTIFICATION RETURN TO WORK 
 
8/27/2020 12:30 PM 
 
Mr. Jolie Ansari [address] [address] To Whom It May Concern: 
 
Jolie Ansari is currently under the care of [facility]. He will return to work on: 8/31/2020 If there are questions or concerns please have the patient contact our office. Sincerely, Tao Joel MD

## 2020-08-27 NOTE — PROGRESS NOTES
Chief Complaint   Patient presents with    New Patient     was seen in hospital      HPI:  Dorene Babcock is a 54 y.o. male who was seen by synchronous (real-time) audio-video technology on 8/27/2020 for New Patient (was seen in hospital )    Assessment & Plan:   Diagnoses and all orders for this visit:    Encounter for medical examination to establish care  -     METABOLIC PANEL, COMPREHENSIVE  -     CBC W/O DIFF    Acute hepatitis C virus infection without hepatic coma  -     REFERRAL TO LIVER HEPATOLOGY  -     METABOLIC PANEL, COMPREHENSIVE    Dilated cardiomyopathy ([facility] [address]  -     REFERRAL TO CARDIOLOGY  -     METABOLIC PANEL, COMPREHENSIVE  -     CBC W/O DIFF    Hypertension goal BP (blood pressure) < 398/33  -     METABOLIC PANEL, COMPREHENSIVE    Diverticulitis of large intestine with perforation without bleeding  -     CBC W/O DIFF    Dyslipidemia (high LDL; low HDL)  -     LIPID PANEL    Screening for diabetes mellitus (DM)  -     HEMOGLOBIN A1C WITH EAG    Vitamin D deficiency  -     VITAMIN D, 25 HYDROXY    Frequency of urination  -     URINALYSIS W/ RFLX MICROSCOPIC    Other orders  -     Cancel: levoFLOXacin (LEVAQUIN) 250 mg tablet; Take 1 Tab by mouth daily for 10 days. , Normal, Disp-10 Tab,R-0  -     Cancel: metroNIDAZOLE (FLAGYL) 500 mg tablet; Take 1 Tab by mouth three (3) times daily for 10 days. , Normal, Disp-30 Tab,R-0      I spent at least 30 minutes on this visit with this new patient. 712  Subjective:   Dorene Babcock is a 54 y.o. AA male is seen as a hospital follow up. Patient was admitted at [facility] with newly diagnosed systolic congestive heart failure( EF 25 to 30%), Dilated cardiomyopathy. He has appt to follow up with Dr. Juan R Mello. Also found to have Hep C infection, Sigmoid diverticulitis, Uncontrolled hypertension. Patient was discharged on antibiotics that has completed. Admission record has been reviewed. Patient reports he is doing better and wants to return to work.     Prior to Admission medications    Medication Sig Start Date End Date Taking? Authorizing Provider   carvediloL (COREG) 3.125 mg tablet Take 1 Tab by mouth two (2) times daily (with meals). 8/18/20  Yes Melissa Guillen MD   aspirin 81 mg chewable tablet Take 1 Tab by mouth daily. 8/19/20  Yes Melissa Guillen MD   sacubitril-valsartan (ENTRESTO) 24 mg/26 mg tablet Take 1 Tab by mouth every twelve (12) hours. 8/18/20   Duke Whitt NP     Patient Active Problem List   Diagnosis Code    Diverticulitis of colon with perforation K57.20    Diverticulitis K57.92    Class 3 severe obesity in adult ([facility] [address] E66.01    Hypertension I10    Dilated cardiomyopathy ([facility] [address] I42.0     Current Outpatient Medications   Medication Sig Dispense Refill    carvediloL (COREG) 3.125 mg tablet Take 1 Tab by mouth two (2) times daily (with meals). 90 Tab 0    aspirin 81 mg chewable tablet Take 1 Tab by mouth daily. 30 Tab 0    sacubitril-valsartan (ENTRESTO) 24 mg/26 mg tablet Take 1 Tab by mouth every twelve (12) hours. 60 Tab 11     Allergies   Allergen Reactions    Meclizine Rash     Past Medical History:   Diagnosis Date    Bell's palsy 01/25/2019    Diverticulitis of colon with perforation 01/2018    sigmoid    Diverticulitis of colon with perforation 08/09/2020    Hypertension     Kidney infection     Barclay Hunt syndrome (geniculate herpes zoster)      History reviewed. No pertinent surgical history.   Family History   Problem Relation Age of Onset    Hypertension Mother     Cancer Father         multiple myeloma     Social History     Tobacco Use    Smoking status: Current Every Day Smoker     Packs/day: 1.00     Years: 15.00     Pack years: 15.00    Smokeless tobacco: Never Used   Substance Use Topics    Alcohol use: Not Currently     Frequency: Never     Comment: rarely     ROS:  General ROS: negative for - chills or fever  Psychological ROS: negative for - behavioral disorder or depression  Ophthalmic ROS: negative for - blurry vision  ENT ROS: negative for - nasal congestion or sinus pain  Endocrine ROS: negative for - polydipsia/polyuria or temperature intolerance  Respiratory ROS: no cough, shortness of breath, or wheezing  Cardiovascular ROS: no chest pain or dyspnea on exertion  Gastrointestinal ROS: negative for - abdominal pain or constipation  Genito-Urinary ROS: positive for - urinary frequency/urgency  Musculoskeletal ROS: negative  Neurological ROS: negative for - headaches    Objective:   No flowsheet data found. General: alert, cooperative, no distress   Mental  status: normal mood, behavior, speech, dress, motor activity, and thought processes, able to follow commands   HENT: NCAT   Neck: no visualized mass   Resp: no respiratory distress   Neuro: no gross deficits   Skin: no discoloration or lesions of concern on visible areas     Additional exam findings: We discussed the expected course, resolution and complications of the diagnosis(es) in detail. Medication risks, benefits, costs, interactions, and alternatives were discussed as indicated. I advised him to contact the office if his condition worsens, changes or fails to improve as anticipated. He expressed understanding with the diagnosis(es) and plan. Tom Small, who was evaluated through a patient-initiated, synchronous (real-time) audio-video encounter, and/or his healthcare decision maker, is aware that it is a billable service, with coverage as determined by his insurance carrier. He provided verbal consent to proceed: Yes, and patient identification was verified. It was conducted pursuant to the emergency declaration under the [address] [place] [address] authority and the [employer] Act. A caregiver was present when appropriate. Ability to conduct physical exam was limited. I was in the office. The patient was at home.       Thea Tafoya MD

## 2020-09-02 LAB
HCV LOG10: 5.3 LOG10 IU/ML
HCV NS5 MUT DET ISLT GENOTYP: NORMAL
HCV NS5A DRUG RESISTANCE ASSAY: NORMAL
HEPATITIS C QUANTITATION, 550291: NORMAL IU/ML
REF LAB TEST METHOD: NORMAL
TEST INFORMATION: NORMAL

## 2022-03-18 PROBLEM — I42.0 DILATED CARDIOMYOPATHY (HCC): Status: ACTIVE | Noted: 2020-08-17

## 2022-03-19 PROBLEM — I10 HYPERTENSION: Status: ACTIVE | Noted: 2020-08-14

## 2022-03-19 PROBLEM — E66.01 CLASS 3 SEVERE OBESITY IN ADULT (HCC): Status: ACTIVE | Noted: 2020-08-12

## 2022-03-20 PROBLEM — K57.92 DIVERTICULITIS: Status: ACTIVE | Noted: 2018-01-23

## 2022-03-20 PROBLEM — K57.20 DIVERTICULITIS OF COLON WITH PERFORATION: Status: ACTIVE | Noted: 2018-01-23

## 2022-05-03 ENCOUNTER — TELEPHONE (OUTPATIENT)
Dept: HEMATOLOGY | Age: 47
End: 2022-05-03

## 2022-08-01 NOTE — PROGRESS NOTES
Admit Date: 8/9/2020    POD * No surgery found *    Procedure:  * No surgery found *    Subjective:     Patient feels better no abdominal pain despite CT scan abdomen and pelvis follow-up August 12, 2020 showing worsening fluid and air  gas collections in the mesentery and pelvis. Patient not happy with his bed, feels uncomfortable, patient stating he was going to leave today regardless. I have cautioned patient that this best course would be to stay in the hospital on IV antibiotics n.p.o. and follow-up CT scan in another couple of days, if his diverticulitis worsens he could become toxic or septic and more aggressive outpatient therapy could lead to increased risk of failure and emergent surgery laparotomy colectomy and colostomy , but technically, laparotomy and colostomy would be very challenging in this patient given his obesity and although certainly can be done if necessary, I think based on his exam nonoperative intervention is the best course at this point until failure and so far we have not failed antibiotic therapy alone. Advised patient not to leave [address] but if he is going to leave make sure we send him home on antibiotics. I have him on Zosyn currently rather than Levaquin and Flagyl since the CT scan was worse yesterday but if he does go home will probably discharge him on Levaquin and Flagyl rather than Augmentin alone but will defer to internal medicine. Patient cautioned that he should not leave as this would risk his health and worsening course    Ultrasound abdomen did show hepatosplenomegaly and  Fatty liver is likely the source of his hyperbilirubinemia although will defer to medical input about this. Gallbladder sludge but no evidence of cholecystitis doubt this is the source of any of his bilirubinemia    Review of Systems   Respiratory: Negative. Cardiovascular: Negative. Gastrointestinal: Negative. Psychiatric/Behavioral: Negative.     All other systems [FreeTextEntry1] : 59f 12 days s/p I and D of superficial abscess\par \par Continue PT\par progress activties as tolerated\par return 2 weeks for wound check\par \par The patient was advised of the diagnosis. The natural history of the pathology was explained in full to the patient in layman's terms. All questions were answered. The risks and benefits of surgical and non-surgical treatment alternatives were explained in full to the patient. \par   reviewed and are negative. Objective:     Blood pressure 135/85, pulse 97, temperature 98.6 °F (37 °C), resp. rate 18, height 5' 5\" (1.651 m), weight 334 lb 10.5 oz (151.8 kg), SpO2 98 %. Temp (24hrs), Av.8 °F (37.1 °C), Min:98.6 °F (37 °C), Max:99 °F (37.2 °C)          Physical Exam  Vitals signs and nursing note reviewed. Constitutional:       General: He is not in acute distress. Appearance: Normal appearance. He is obese. He is not ill-appearing. Cardiovascular:      Rate and Rhythm: Normal rate. Pulmonary:      Effort: Pulmonary effort is normal.   Abdominal:      Comments: Obese soft nontender no peritoneal signs or guarding physical exam is unremarkable   Neurological:      General: No focal deficit present. Mental Status: He is alert and oriented to person, place, and time. Psychiatric:         Mood and Affect: Mood normal.         Behavior: Behavior normal.         Thought Content:  Thought content normal.         Judgment: Judgment normal.            Labs:   Recent Results (from the past 24 hour(s))   METABOLIC PANEL, BASIC    Collection Time: 20  5:00 AM   Result Value Ref Range    Sodium 136 136 - 145 mmol/L    Potassium 3.6 3.5 - 5.1 mmol/L    Chloride 103 97 - 108 mmol/L    CO2 26 21 - 32 mmol/L    Anion gap 7 5 - 15 mmol/L    Glucose 92 65 - 100 mg/dL    BUN 6 6 - 20 MG/DL    Creatinine 0.75 0.70 - 1.30 MG/DL    BUN/Creatinine ratio 8 (L) 12 - 20      GFR est AA >60 >60 ml/min/1.73m2    GFR est non-AA >60 >60 ml/min/1.73m2    Calcium 8.0 (L) 8.5 - 10.1 MG/DL   CBC W/O DIFF    Collection Time: 20  5:00 AM   Result Value Ref Range    WBC 7.0 4.1 - 11.1 K/uL    RBC 5.61 4.10 - 5.70 M/uL    HGB 14.1 12.1 - 17.0 g/dL    HCT 41.3 36.6 - 50.3 %    MCV 73.6 (L) 80.0 - 99.0 FL    MCH 25.1 (L) 26.0 - 34.0 PG    MCHC 34.1 30.0 - 36.5 g/dL    RDW 17.7 (H) 11.5 - 14.5 %    PLATELET 348 448 - 203 K/uL    MPV 9.8 8.9 - 12.9 FL    NRBC 0.0 0  WBC    ABSOLUTE NRBC 0.00 0.00 - 0.01 K/uL       Data Review images and reports reviewed    Assessment:     Active Problems:    Diverticulitis (1/23/2018)      Class 3 severe obesity in adult [facility]) (8/12/2020)        Plan/Recommendations/Medical Decision Making:     Continue present treatment  Patient feels better no abdominal pain despite CT scan abdomen and pelvis follow-up August 12, 2020 showing worsening fluid and air  gas collections in the mesentery and pelvis. Patient not happy with his bed, feels uncomfortable, patient stating he was going to leave today regardless. I have cautioned patient that this best course would be to stay in the hospital on IV antibiotics n.p.o. and follow-up CT scan in another couple of days, if his diverticulitis worsens he could become toxic or septic and more aggressive outpatient therapy could lead to increased risk of failure and emergent surgery laparotomy colectomy and colostomy , but technically, laparotomy and colostomy would be very challenging in this patient given his obesity and although certainly can be done if necessary, I think based on his exam nonoperative intervention is the best course at this point until failure and so far we have not failed antibiotic therapy alone. Advised patient not to leave [address] but if he is going to leave make sure we send him home on antibiotics. I have him on Zosyn currently rather than Levaquin and Flagyl since the CT scan was worse yesterday but if he does go home will probably discharge him on Levaquin and Flagyl rather than Augmentin alone but will defer to internal medicine. Patient cautioned that he should not leave as this would risk his health and worsening course    Ultrasound abdomen did show hepatosplenomegaly and  Fatty liver is likely the source of his hyperbilirubinemia although will defer to medical input about this.   Gallbladder sludge but no evidence of cholecystitis doubt this is the source of any of his bilirubinemia    Lengthy discussion and review with patient, exam and review of imaging and course, 25 minutes  Florencio Monte MD , [facility]

## 2023-01-01 ENCOUNTER — HOSPITAL ENCOUNTER (INPATIENT)
Age: 48
LOS: 2 days | DRG: 044 | End: 2023-04-26
Attending: EMERGENCY MEDICINE | Admitting: STUDENT IN AN ORGANIZED HEALTH CARE EDUCATION/TRAINING PROGRAM
Payer: MEDICAID

## 2023-01-01 ENCOUNTER — TELEPHONE (OUTPATIENT)
Dept: NEUROLOGY | Age: 48
End: 2023-01-01

## 2023-01-01 ENCOUNTER — APPOINTMENT (OUTPATIENT)
Dept: GENERAL RADIOLOGY | Age: 48
DRG: 044 | End: 2023-01-01
Attending: NURSE PRACTITIONER
Payer: MEDICAID

## 2023-01-01 ENCOUNTER — APPOINTMENT (OUTPATIENT)
Dept: CT IMAGING | Age: 48
DRG: 044 | End: 2023-01-01
Attending: EMERGENCY MEDICINE
Payer: MEDICAID

## 2023-01-01 VITALS
RESPIRATION RATE: 18 BRPM | HEART RATE: 94 BPM | DIASTOLIC BLOOD PRESSURE: 61 MMHG | WEIGHT: 315 LBS | BODY MASS INDEX: 50.41 KG/M2 | TEMPERATURE: 98.7 F | SYSTOLIC BLOOD PRESSURE: 92 MMHG | OXYGEN SATURATION: 93 %

## 2023-01-01 DIAGNOSIS — I61.5 RIGHT-SIDED NONTRAUMATIC INTRAVENTRICULAR INTRACEREBRAL HEMORRHAGE (HCC): Primary | ICD-10-CM

## 2023-01-01 LAB
ALBUMIN SERPL-MCNC: 3.1 G/DL (ref 3.5–5)
ALBUMIN/GLOB SERPL: 0.6 (ref 1.1–2.2)
ALP SERPL-CCNC: 79 U/L (ref 45–117)
ALT SERPL-CCNC: 109 U/L (ref 12–78)
AMPHET UR QL SCN: NEGATIVE
ANION GAP SERPL CALC-SCNC: 1 MMOL/L (ref 5–15)
ANION GAP SERPL CALC-SCNC: 2 MMOL/L (ref 5–15)
ANION GAP SERPL CALC-SCNC: 3 MMOL/L (ref 5–15)
ANION GAP SERPL CALC-SCNC: 3 MMOL/L (ref 5–15)
ANION GAP SERPL CALC-SCNC: 4 MMOL/L (ref 5–15)
ANION GAP SERPL CALC-SCNC: 8 MMOL/L (ref 5–15)
APPEARANCE UR: CLEAR
APTT PPP: 26.2 SEC (ref 22.1–31)
ARTERIAL PATENCY WRIST A: POSITIVE
ARTERIAL PATENCY WRIST A: POSITIVE
AST SERPL-CCNC: 71 U/L (ref 15–37)
BACTERIA URNS QL MICRO: NEGATIVE /HPF
BARBITURATES UR QL SCN: NEGATIVE
BASE DEFICIT BLD-SCNC: 0.2 MMOL/L
BASE EXCESS BLD CALC-SCNC: 1.1 MMOL/L
BASOPHILS # BLD: 0 K/UL (ref 0–0.1)
BASOPHILS # BLD: 0 K/UL (ref 0–0.1)
BASOPHILS NFR BLD: 0 % (ref 0–1)
BASOPHILS NFR BLD: 0 % (ref 0–1)
BDY SITE: ABNORMAL
BDY SITE: ABNORMAL
BENZODIAZ UR QL: POSITIVE
BILIRUB SERPL-MCNC: 0.6 MG/DL (ref 0.2–1)
BILIRUB UR QL: NEGATIVE
BLD PROD TYP BPU: NORMAL
BLD PROD TYP BPU: NORMAL
BPU ID: NORMAL
BPU ID: NORMAL
BUN SERPL-MCNC: 12 MG/DL (ref 6–20)
BUN SERPL-MCNC: 13 MG/DL (ref 6–20)
BUN/CREAT SERPL: 13 (ref 12–20)
BUN/CREAT SERPL: 13 (ref 12–20)
BUN/CREAT SERPL: 14 (ref 12–20)
BUN/CREAT SERPL: 9 (ref 12–20)
CALCIUM SERPL-MCNC: 8.9 MG/DL (ref 8.5–10.1)
CALCIUM SERPL-MCNC: 9 MG/DL (ref 8.5–10.1)
CALCIUM SERPL-MCNC: 9.2 MG/DL (ref 8.5–10.1)
CALCIUM SERPL-MCNC: 9.2 MG/DL (ref 8.5–10.1)
CALCIUM SERPL-MCNC: 9.4 MG/DL (ref 8.5–10.1)
CALCIUM SERPL-MCNC: 9.7 MG/DL (ref 8.5–10.1)
CALCULATED R AXIS, ECG10: 3 DEGREES
CALCULATED T AXIS, ECG11: 40 DEGREES
CANNABINOIDS UR QL SCN: NEGATIVE
CHLORIDE SERPL-SCNC: 102 MMOL/L (ref 97–108)
CHLORIDE SERPL-SCNC: 107 MMOL/L (ref 97–108)
CHLORIDE SERPL-SCNC: 118 MMOL/L (ref 97–108)
CHLORIDE SERPL-SCNC: 119 MMOL/L (ref 97–108)
CHLORIDE SERPL-SCNC: 120 MMOL/L (ref 97–108)
CHLORIDE SERPL-SCNC: 121 MMOL/L (ref 97–108)
CO2 SERPL-SCNC: 22 MMOL/L (ref 21–32)
CO2 SERPL-SCNC: 26 MMOL/L (ref 21–32)
CO2 SERPL-SCNC: 28 MMOL/L (ref 21–32)
CO2 SERPL-SCNC: 29 MMOL/L (ref 21–32)
COCAINE UR QL SCN: NEGATIVE
COLOR UR: ABNORMAL
COMMENT, HOLDF: NORMAL
CREAT SERPL-MCNC: 0.9 MG/DL (ref 0.7–1.3)
CREAT SERPL-MCNC: 0.95 MG/DL (ref 0.7–1.3)
CREAT SERPL-MCNC: 1.01 MG/DL (ref 0.7–1.3)
CREAT SERPL-MCNC: 1.41 MG/DL (ref 0.7–1.3)
CREAT SERPL-MCNC: 1.46 MG/DL (ref 0.7–1.3)
CREAT SERPL-MCNC: 1.52 MG/DL (ref 0.7–1.3)
DIAGNOSIS, 93000: NORMAL
DIFFERENTIAL METHOD BLD: ABNORMAL
DIFFERENTIAL METHOD BLD: ABNORMAL
DRUG SCRN COMMENT,DRGCM: ABNORMAL
EOSINOPHIL # BLD: 0 K/UL (ref 0–0.4)
EOSINOPHIL # BLD: 0 K/UL (ref 0–0.4)
EOSINOPHIL NFR BLD: 0 % (ref 0–7)
EOSINOPHIL NFR BLD: 0 % (ref 0–7)
EPITH CASTS URNS QL MICRO: ABNORMAL /LPF
ERYTHROCYTE [DISTWIDTH] IN BLOOD BY AUTOMATED COUNT: 19.6 % (ref 11.5–14.5)
ERYTHROCYTE [DISTWIDTH] IN BLOOD BY AUTOMATED COUNT: 19.9 % (ref 11.5–14.5)
ERYTHROCYTE [DISTWIDTH] IN BLOOD BY AUTOMATED COUNT: 20.2 % (ref 11.5–14.5)
GAS FLOW.O2 O2 DELIVERY SYS: ABNORMAL
GAS FLOW.O2 O2 DELIVERY SYS: ABNORMAL
GAS FLOW.O2 SETTING OXYMISER: 18 BPM
GLOBULIN SER CALC-MCNC: 4.9 G/DL (ref 2–4)
GLUCOSE BLD STRIP.AUTO-MCNC: 126 MG/DL (ref 65–117)
GLUCOSE BLD STRIP.AUTO-MCNC: 133 MG/DL (ref 65–117)
GLUCOSE BLD STRIP.AUTO-MCNC: 138 MG/DL (ref 65–117)
GLUCOSE BLD STRIP.AUTO-MCNC: 147 MG/DL (ref 65–117)
GLUCOSE BLD STRIP.AUTO-MCNC: 150 MG/DL (ref 65–117)
GLUCOSE BLD STRIP.AUTO-MCNC: 157 MG/DL (ref 65–117)
GLUCOSE BLD STRIP.AUTO-MCNC: 164 MG/DL (ref 65–117)
GLUCOSE BLD STRIP.AUTO-MCNC: 181 MG/DL (ref 65–117)
GLUCOSE BLD STRIP.AUTO-MCNC: 240 MG/DL (ref 65–117)
GLUCOSE SERPL-MCNC: 148 MG/DL (ref 65–100)
GLUCOSE SERPL-MCNC: 151 MG/DL (ref 65–100)
GLUCOSE SERPL-MCNC: 164 MG/DL (ref 65–100)
GLUCOSE SERPL-MCNC: 187 MG/DL (ref 65–100)
GLUCOSE SERPL-MCNC: 221 MG/DL (ref 65–100)
GLUCOSE SERPL-MCNC: 243 MG/DL (ref 65–100)
GLUCOSE UR STRIP.AUTO-MCNC: NEGATIVE MG/DL
HCO3 BLD-SCNC: 22.6 MMOL/L (ref 22–26)
HCO3 BLD-SCNC: 25.5 MMOL/L (ref 22–26)
HCT VFR BLD AUTO: 43.7 % (ref 36.6–50.3)
HCT VFR BLD AUTO: 45 % (ref 36.6–50.3)
HCT VFR BLD AUTO: 48 % (ref 36.6–50.3)
HGB BLD-MCNC: 14.1 G/DL (ref 12.1–17)
HGB BLD-MCNC: 15 G/DL (ref 12.1–17)
HGB BLD-MCNC: 15.8 G/DL (ref 12.1–17)
HGB UR QL STRIP: ABNORMAL
IMM GRANULOCYTES # BLD AUTO: 0.1 K/UL (ref 0–0.04)
IMM GRANULOCYTES # BLD AUTO: 0.1 K/UL (ref 0–0.04)
IMM GRANULOCYTES NFR BLD AUTO: 1 % (ref 0–0.5)
IMM GRANULOCYTES NFR BLD AUTO: 1 % (ref 0–0.5)
INR PPP: 1 (ref 0.9–1.1)
KETONES UR QL STRIP.AUTO: 15 MG/DL
LACTATE SERPL-SCNC: 3.4 MMOL/L (ref 0.4–2)
LEUKOCYTE ESTERASE UR QL STRIP.AUTO: NEGATIVE
LYMPHOCYTES # BLD: 1 K/UL (ref 0.8–3.5)
LYMPHOCYTES # BLD: 1.5 K/UL (ref 0.8–3.5)
LYMPHOCYTES NFR BLD: 10 % (ref 12–49)
LYMPHOCYTES NFR BLD: 12 % (ref 12–49)
MAGNESIUM SERPL-MCNC: 1.8 MG/DL (ref 1.6–2.4)
MAGNESIUM SERPL-MCNC: 2.1 MG/DL (ref 1.6–2.4)
MAGNESIUM SERPL-MCNC: 2.2 MG/DL (ref 1.6–2.4)
MCH RBC QN AUTO: 23.4 PG (ref 26–34)
MCH RBC QN AUTO: 23.5 PG (ref 26–34)
MCH RBC QN AUTO: 23.6 PG (ref 26–34)
MCHC RBC AUTO-ENTMCNC: 32.3 G/DL (ref 30–36.5)
MCHC RBC AUTO-ENTMCNC: 32.9 G/DL (ref 30–36.5)
MCHC RBC AUTO-ENTMCNC: 33.3 G/DL (ref 30–36.5)
MCV RBC AUTO: 70.6 FL (ref 80–99)
MCV RBC AUTO: 71.6 FL (ref 80–99)
MCV RBC AUTO: 72.5 FL (ref 80–99)
METHADONE UR QL: NEGATIVE
MONOCYTES # BLD: 0.4 K/UL (ref 0–1)
MONOCYTES # BLD: 0.7 K/UL (ref 0–1)
MONOCYTES NFR BLD: 4 % (ref 5–13)
MONOCYTES NFR BLD: 6 % (ref 5–13)
NEUTS SEG # BLD: 10.6 K/UL (ref 1.8–8)
NEUTS SEG # BLD: 8.4 K/UL (ref 1.8–8)
NEUTS SEG NFR BLD: 81 % (ref 32–75)
NEUTS SEG NFR BLD: 85 % (ref 32–75)
NITRITE UR QL STRIP.AUTO: NEGATIVE
NRBC # BLD: 0 K/UL (ref 0–0.01)
NRBC BLD-RTO: 0 PER 100 WBC
O2/TOTAL GAS SETTING VFR VENT: 100 %
OPIATES UR QL: NEGATIVE
PCO2 BLD: 31.9 MMHG (ref 35–45)
PCO2 BLD: 39 MMHG (ref 35–45)
PCP UR QL: NEGATIVE
PEEP RESPIRATORY: 5 CMH2O
PH BLD: 7.42 (ref 7.35–7.45)
PH BLD: 7.46 (ref 7.35–7.45)
PH UR STRIP: 6.5 (ref 5–8)
PHOSPHATE SERPL-MCNC: 2 MG/DL (ref 2.6–4.7)
PHOSPHATE SERPL-MCNC: 2.3 MG/DL (ref 2.6–4.7)
PHOSPHATE SERPL-MCNC: 2.4 MG/DL (ref 2.6–4.7)
PLATELET # BLD AUTO: 260 K/UL (ref 150–400)
PLATELET # BLD AUTO: 266 K/UL (ref 150–400)
PLATELET # BLD AUTO: 297 K/UL (ref 150–400)
PMV BLD AUTO: 9.3 FL (ref 8.9–12.9)
PMV BLD AUTO: 9.5 FL (ref 8.9–12.9)
PMV BLD AUTO: 9.7 FL (ref 8.9–12.9)
PO2 BLD: 303 MMHG (ref 80–100)
PO2 BLD: 68 MMHG (ref 80–100)
POTASSIUM SERPL-SCNC: 3.6 MMOL/L (ref 3.5–5.1)
POTASSIUM SERPL-SCNC: 4 MMOL/L (ref 3.5–5.1)
POTASSIUM SERPL-SCNC: 4.2 MMOL/L (ref 3.5–5.1)
POTASSIUM SERPL-SCNC: 4.2 MMOL/L (ref 3.5–5.1)
PROT SERPL-MCNC: 8 G/DL (ref 6.4–8.2)
PROT UR STRIP-MCNC: ABNORMAL MG/DL
PROTHROMBIN TIME: 10.7 SEC (ref 9–11.1)
Q-T INTERVAL, ECG07: 398 MS
QRS DURATION, ECG06: 106 MS
QTC CALCULATION (BEZET), ECG08: 444 MS
RBC # BLD AUTO: 6.03 M/UL (ref 4.1–5.7)
RBC # BLD AUTO: 6.37 M/UL (ref 4.1–5.7)
RBC # BLD AUTO: 6.7 M/UL (ref 4.1–5.7)
RBC #/AREA URNS HPF: ABNORMAL /HPF (ref 0–5)
SAMPLES BEING HELD,HOLD: NORMAL
SAO2 % BLD: 94.4 % (ref 92–97)
SAO2 % BLD: 99.9 % (ref 92–97)
SERVICE CMNT-IMP: ABNORMAL
SODIUM SERPL-SCNC: 132 MMOL/L (ref 136–145)
SODIUM SERPL-SCNC: 137 MMOL/L (ref 136–145)
SODIUM SERPL-SCNC: 149 MMOL/L (ref 136–145)
SODIUM SERPL-SCNC: 150 MMOL/L (ref 136–145)
SODIUM SERPL-SCNC: 151 MMOL/L (ref 136–145)
SODIUM SERPL-SCNC: 152 MMOL/L (ref 136–145)
SP GR UR REFRACTOMETRY: 1.02 (ref 1–1.03)
SPECIMEN TYPE: ABNORMAL
SPECIMEN TYPE: ABNORMAL
STATUS OF UNIT,%ST: NORMAL
STATUS OF UNIT,%ST: NORMAL
THERAPEUTIC RANGE,PTTT: NORMAL SECS (ref 58–77)
UNIT DIVISION, %UDIV: 0
UNIT DIVISION, %UDIV: 0
UR CULT HOLD, URHOLD: NORMAL
UROBILINOGEN UR QL STRIP.AUTO: 0.2 EU/DL (ref 0.2–1)
VENTILATION MODE VENT: ABNORMAL
VENTRICULAR RATE, ECG03: 75 BPM
VT SETTING VENT: 450 ML
WBC # BLD AUTO: 13 K/UL (ref 4.1–11.1)
WBC # BLD AUTO: 9.5 K/UL (ref 4.1–11.1)
WBC # BLD AUTO: 9.9 K/UL (ref 4.1–11.1)
WBC URNS QL MICRO: ABNORMAL /HPF (ref 0–4)

## 2023-01-01 PROCEDURE — 82803 BLOOD GASES ANY COMBINATION: CPT

## 2023-01-01 PROCEDURE — 86905 BLOOD TYPING RBC ANTIGENS: CPT

## 2023-01-01 PROCEDURE — 80048 BASIC METABOLIC PNL TOTAL CA: CPT

## 2023-01-01 PROCEDURE — 74011000250 HC RX REV CODE- 250: Performed by: NURSE PRACTITIONER

## 2023-01-01 PROCEDURE — 94003 VENT MGMT INPAT SUBQ DAY: CPT

## 2023-01-01 PROCEDURE — 86902 BLOOD TYPE ANTIGEN DONOR EA: CPT

## 2023-01-01 PROCEDURE — 82962 GLUCOSE BLOOD TEST: CPT

## 2023-01-01 PROCEDURE — 96375 TX/PRO/DX INJ NEW DRUG ADDON: CPT

## 2023-01-01 PROCEDURE — 85025 COMPLETE CBC W/AUTO DIFF WBC: CPT

## 2023-01-01 PROCEDURE — 71045 X-RAY EXAM CHEST 1 VIEW: CPT

## 2023-01-01 PROCEDURE — 81001 URINALYSIS AUTO W/SCOPE: CPT

## 2023-01-01 PROCEDURE — 93005 ELECTROCARDIOGRAM TRACING: CPT

## 2023-01-01 PROCEDURE — 36415 COLL VENOUS BLD VENIPUNCTURE: CPT

## 2023-01-01 PROCEDURE — 74011250636 HC RX REV CODE- 250/636: Performed by: STUDENT IN AN ORGANIZED HEALTH CARE EDUCATION/TRAINING PROGRAM

## 2023-01-01 PROCEDURE — 36600 WITHDRAWAL OF ARTERIAL BLOOD: CPT

## 2023-01-01 PROCEDURE — 74011250636 HC RX REV CODE- 250/636: Performed by: NURSE PRACTITIONER

## 2023-01-01 PROCEDURE — 96365 THER/PROPH/DIAG IV INF INIT: CPT

## 2023-01-01 PROCEDURE — 86922 COMPATIBILITY TEST ANTIGLOB: CPT

## 2023-01-01 PROCEDURE — 65270000029 HC RM PRIVATE

## 2023-01-01 PROCEDURE — 94640 AIRWAY INHALATION TREATMENT: CPT

## 2023-01-01 PROCEDURE — 83735 ASSAY OF MAGNESIUM: CPT

## 2023-01-01 PROCEDURE — 86880 COOMBS TEST DIRECT: CPT

## 2023-01-01 PROCEDURE — 65610000006 HC RM INTENSIVE CARE

## 2023-01-01 PROCEDURE — 86870 RBC ANTIBODY IDENTIFICATION: CPT

## 2023-01-01 PROCEDURE — 70450 CT HEAD/BRAIN W/O DYE: CPT

## 2023-01-01 PROCEDURE — 85730 THROMBOPLASTIN TIME PARTIAL: CPT

## 2023-01-01 PROCEDURE — 84100 ASSAY OF PHOSPHORUS: CPT

## 2023-01-01 PROCEDURE — 86920 COMPATIBILITY TEST SPIN: CPT

## 2023-01-01 PROCEDURE — 74011000250 HC RX REV CODE- 250: Performed by: STUDENT IN AN ORGANIZED HEALTH CARE EDUCATION/TRAINING PROGRAM

## 2023-01-01 PROCEDURE — 31500 INSERT EMERGENCY AIRWAY: CPT

## 2023-01-01 PROCEDURE — 80053 COMPREHEN METABOLIC PANEL: CPT

## 2023-01-01 PROCEDURE — 86901 BLOOD TYPING SEROLOGIC RH(D): CPT

## 2023-01-01 PROCEDURE — 74011000250 HC RX REV CODE- 250: Performed by: EMERGENCY MEDICINE

## 2023-01-01 PROCEDURE — 94002 VENT MGMT INPAT INIT DAY: CPT

## 2023-01-01 PROCEDURE — 80307 DRUG TEST PRSMV CHEM ANLYZR: CPT

## 2023-01-01 PROCEDURE — 36430 TRANSFUSION BLD/BLD COMPNT: CPT

## 2023-01-01 PROCEDURE — 85610 PROTHROMBIN TIME: CPT

## 2023-01-01 PROCEDURE — 86921 COMPATIBILITY TEST INCUBATE: CPT

## 2023-01-01 PROCEDURE — 0BH17EZ INSERTION OF ENDOTRACHEAL AIRWAY INTO TRACHEA, VIA NATURAL OR ARTIFICIAL OPENING: ICD-10-PCS | Performed by: STUDENT IN AN ORGANIZED HEALTH CARE EDUCATION/TRAINING PROGRAM

## 2023-01-01 PROCEDURE — 83605 ASSAY OF LACTIC ACID: CPT

## 2023-01-01 PROCEDURE — 96374 THER/PROPH/DIAG INJ IV PUSH: CPT

## 2023-01-01 PROCEDURE — P9073 PLATELETS PHERESIS PATH REDU: HCPCS

## 2023-01-01 PROCEDURE — 85027 COMPLETE CBC AUTOMATED: CPT

## 2023-01-01 PROCEDURE — 74011636637 HC RX REV CODE- 636/637: Performed by: NURSE PRACTITIONER

## 2023-01-01 PROCEDURE — 74018 RADEX ABDOMEN 1 VIEW: CPT

## 2023-01-01 PROCEDURE — 74011250636 HC RX REV CODE- 250/636: Performed by: EMERGENCY MEDICINE

## 2023-01-01 PROCEDURE — 5A1945Z RESPIRATORY VENTILATION, 24-96 CONSECUTIVE HOURS: ICD-10-PCS | Performed by: STUDENT IN AN ORGANIZED HEALTH CARE EDUCATION/TRAINING PROGRAM

## 2023-01-01 PROCEDURE — 99285 EMERGENCY DEPT VISIT HI MDM: CPT

## 2023-01-01 PROCEDURE — 74011000258 HC RX REV CODE- 258: Performed by: NURSE PRACTITIONER

## 2023-01-01 RX ORDER — SODIUM CHLORIDE 0.9 % (FLUSH) 0.9 %
5-40 SYRINGE (ML) INJECTION AS NEEDED
Status: DISCONTINUED | OUTPATIENT
Start: 2023-01-01 | End: 2023-01-01 | Stop reason: HOSPADM

## 2023-01-01 RX ORDER — ONDANSETRON 2 MG/ML
4 INJECTION INTRAMUSCULAR; INTRAVENOUS
Status: DISCONTINUED | OUTPATIENT
Start: 2023-01-01 | End: 2023-01-01 | Stop reason: HOSPADM

## 2023-01-01 RX ORDER — PROPOFOL 10 MG/ML
100 INJECTION, EMULSION INTRAVENOUS
Status: COMPLETED | OUTPATIENT
Start: 2023-01-01 | End: 2023-01-01

## 2023-01-01 RX ORDER — PROPOFOL 10 MG/ML
200 INJECTION, EMULSION INTRAVENOUS ONCE
Status: COMPLETED | OUTPATIENT
Start: 2023-01-01 | End: 2023-01-01

## 2023-01-01 RX ORDER — DIPHENHYDRAMINE HYDROCHLORIDE 50 MG/ML
25 INJECTION, SOLUTION INTRAMUSCULAR; INTRAVENOUS ONCE
Status: DISCONTINUED | OUTPATIENT
Start: 2023-01-01 | End: 2023-01-01

## 2023-01-01 RX ORDER — CHLORHEXIDINE GLUCONATE 0.12 MG/ML
15 RINSE ORAL EVERY 12 HOURS
Status: DISCONTINUED | OUTPATIENT
Start: 2023-01-01 | End: 2023-01-01

## 2023-01-01 RX ORDER — INSULIN LISPRO 100 [IU]/ML
INJECTION, SOLUTION INTRAVENOUS; SUBCUTANEOUS EVERY 4 HOURS
Status: DISCONTINUED | OUTPATIENT
Start: 2023-01-01 | End: 2023-01-01

## 2023-01-01 RX ORDER — POLYETHYLENE GLYCOL 3350 17 G/17G
17 POWDER, FOR SOLUTION ORAL DAILY PRN
Status: DISCONTINUED | OUTPATIENT
Start: 2023-01-01 | End: 2023-01-01 | Stop reason: HOSPADM

## 2023-01-01 RX ORDER — SODIUM CHLORIDE, SODIUM LACTATE, POTASSIUM CHLORIDE, CALCIUM CHLORIDE 600; 310; 30; 20 MG/100ML; MG/100ML; MG/100ML; MG/100ML
150 INJECTION, SOLUTION INTRAVENOUS CONTINUOUS
Status: DISCONTINUED | OUTPATIENT
Start: 2023-01-01 | End: 2023-01-01

## 2023-01-01 RX ORDER — NOREPINEPHRINE BITARTRATE 0.06 MG/ML
.5-3 INJECTION, SOLUTION INTRAVENOUS
Status: DISCONTINUED | OUTPATIENT
Start: 2023-01-01 | End: 2023-01-01

## 2023-01-01 RX ORDER — SODIUM CHLORIDE 9 MG/ML
250 INJECTION, SOLUTION INTRAVENOUS AS NEEDED
Status: DISCONTINUED | OUTPATIENT
Start: 2023-01-01 | End: 2023-01-01 | Stop reason: HOSPADM

## 2023-01-01 RX ORDER — LABETALOL HYDROCHLORIDE 5 MG/ML
20 INJECTION, SOLUTION INTRAVENOUS ONCE
Status: COMPLETED | OUTPATIENT
Start: 2023-01-01 | End: 2023-01-01

## 2023-01-01 RX ORDER — NOREPINEPHRINE BITARTRATE 0.06 MG/ML
.5-2 INJECTION, SOLUTION INTRAVENOUS
Status: DISCONTINUED | OUTPATIENT
Start: 2023-01-01 | End: 2023-01-01

## 2023-01-01 RX ORDER — DEXAMETHASONE SODIUM PHOSPHATE 10 MG/ML
10 INJECTION INTRAMUSCULAR; INTRAVENOUS EVERY 6 HOURS
Status: DISCONTINUED | OUTPATIENT
Start: 2023-01-01 | End: 2023-01-01

## 2023-01-01 RX ORDER — FENTANYL CITRATE 50 UG/ML
200 INJECTION, SOLUTION INTRAMUSCULAR; INTRAVENOUS ONCE
Status: COMPLETED | OUTPATIENT
Start: 2023-01-01 | End: 2023-01-01

## 2023-01-01 RX ORDER — IBUPROFEN 200 MG
4 TABLET ORAL AS NEEDED
Status: DISCONTINUED | OUTPATIENT
Start: 2023-01-01 | End: 2023-01-01 | Stop reason: HOSPADM

## 2023-01-01 RX ORDER — FENTANYL CITRATE 50 UG/ML
INJECTION, SOLUTION INTRAMUSCULAR; INTRAVENOUS
Status: DISCONTINUED
Start: 2023-01-01 | End: 2023-01-01

## 2023-01-01 RX ORDER — IPRATROPIUM BROMIDE AND ALBUTEROL SULFATE 2.5; .5 MG/3ML; MG/3ML
3 SOLUTION RESPIRATORY (INHALATION)
Status: DISCONTINUED | OUTPATIENT
Start: 2023-01-01 | End: 2023-01-01 | Stop reason: HOSPADM

## 2023-01-01 RX ORDER — MORPHINE SULFATE 2 MG/ML
2 INJECTION, SOLUTION INTRAMUSCULAR; INTRAVENOUS
Status: DISCONTINUED | OUTPATIENT
Start: 2023-01-01 | End: 2023-01-01 | Stop reason: HOSPADM

## 2023-01-01 RX ORDER — ACETAMINOPHEN 650 MG/1
650 SUPPOSITORY RECTAL
Status: DISCONTINUED | OUTPATIENT
Start: 2023-01-01 | End: 2023-01-01 | Stop reason: HOSPADM

## 2023-01-01 RX ORDER — LEVETIRACETAM 500 MG/5ML
500 INJECTION, SOLUTION, CONCENTRATE INTRAVENOUS EVERY 12 HOURS
Status: DISCONTINUED | OUTPATIENT
Start: 2023-01-01 | End: 2023-01-01

## 2023-01-01 RX ORDER — PROPOFOL 10 MG/ML
0-50 VIAL (ML) INTRAVENOUS
Status: DISCONTINUED | OUTPATIENT
Start: 2023-01-01 | End: 2023-01-01

## 2023-01-01 RX ORDER — ONDANSETRON 4 MG/1
4 TABLET, ORALLY DISINTEGRATING ORAL
Status: DISCONTINUED | OUTPATIENT
Start: 2023-01-01 | End: 2023-01-01 | Stop reason: HOSPADM

## 2023-01-01 RX ORDER — LABETALOL HYDROCHLORIDE 5 MG/ML
20 INJECTION, SOLUTION INTRAVENOUS
Status: DISCONTINUED | OUTPATIENT
Start: 2023-01-01 | End: 2023-01-01

## 2023-01-01 RX ORDER — SODIUM CHLORIDE 0.9 % (FLUSH) 0.9 %
5-40 SYRINGE (ML) INJECTION EVERY 8 HOURS
Status: DISCONTINUED | OUTPATIENT
Start: 2023-01-01 | End: 2023-01-01

## 2023-01-01 RX ORDER — ACETAMINOPHEN 325 MG/1
650 TABLET ORAL
Status: DISCONTINUED | OUTPATIENT
Start: 2023-01-01 | End: 2023-01-01 | Stop reason: HOSPADM

## 2023-01-01 RX ORDER — ROCURONIUM BROMIDE 10 MG/ML
INJECTION, SOLUTION INTRAVENOUS
Status: DISCONTINUED
Start: 2023-01-01 | End: 2023-01-01

## 2023-01-01 RX ORDER — LORAZEPAM 2 MG/ML
2 INJECTION INTRAMUSCULAR
Status: DISCONTINUED | OUTPATIENT
Start: 2023-01-01 | End: 2023-01-01

## 2023-01-01 RX ORDER — HYDRALAZINE HYDROCHLORIDE 20 MG/ML
20 INJECTION INTRAMUSCULAR; INTRAVENOUS
Status: DISCONTINUED | OUTPATIENT
Start: 2023-01-01 | End: 2023-01-01

## 2023-01-01 RX ORDER — DESMOPRESSIN ACETATE 4 UG/ML
2 INJECTION, SOLUTION INTRAVENOUS; SUBCUTANEOUS ONCE
Status: COMPLETED | OUTPATIENT
Start: 2023-01-01 | End: 2023-01-01

## 2023-01-01 RX ORDER — LEVETIRACETAM 500 MG/5ML
1000 INJECTION, SOLUTION, CONCENTRATE INTRAVENOUS ONCE
Status: COMPLETED | OUTPATIENT
Start: 2023-01-01 | End: 2023-01-01

## 2023-01-01 RX ADMIN — SODIUM CHLORIDE, PRESERVATIVE FREE 10 ML: 5 INJECTION INTRAVENOUS at 14:32

## 2023-01-01 RX ADMIN — SODIUM CHLORIDE, PRESERVATIVE FREE 10 ML: 5 INJECTION INTRAVENOUS at 13:41

## 2023-01-01 RX ADMIN — SODIUM CHLORIDE, POTASSIUM CHLORIDE, SODIUM LACTATE AND CALCIUM CHLORIDE 1000 ML: 600; 310; 30; 20 INJECTION, SOLUTION INTRAVENOUS at 15:54

## 2023-01-01 RX ADMIN — Medication 20 MCG/MIN: at 16:12

## 2023-01-01 RX ADMIN — SODIUM CHLORIDE, POTASSIUM CHLORIDE, SODIUM LACTATE AND CALCIUM CHLORIDE 75 ML/HR: 600; 310; 30; 20 INJECTION, SOLUTION INTRAVENOUS at 01:26

## 2023-01-01 RX ADMIN — SODIUM CHLORIDE 15 MG/HR: 9 INJECTION, SOLUTION INTRAVENOUS at 04:01

## 2023-01-01 RX ADMIN — SODIUM CHLORIDE, POTASSIUM CHLORIDE, SODIUM LACTATE AND CALCIUM CHLORIDE 1000 ML: 600; 310; 30; 20 INJECTION, SOLUTION INTRAVENOUS at 18:35

## 2023-01-01 RX ADMIN — PROPOFOL 20 MCG/KG/MIN: 10 INJECTION, EMULSION INTRAVENOUS at 00:34

## 2023-01-01 RX ADMIN — Medication 3 UNITS: at 05:30

## 2023-01-01 RX ADMIN — SODIUM CHLORIDE 10 MG/HR: 9 INJECTION, SOLUTION INTRAVENOUS at 08:32

## 2023-01-01 RX ADMIN — Medication 2 UNITS: at 14:30

## 2023-01-01 RX ADMIN — FAMOTIDINE 20 MG: 10 INJECTION INTRAVENOUS at 08:29

## 2023-01-01 RX ADMIN — POTASSIUM PHOSPHATE, MONOBASIC POTASSIUM PHOSPHATE, DIBASIC 20 MMOL: 224; 236 INJECTION, SOLUTION, CONCENTRATE INTRAVENOUS at 13:38

## 2023-01-01 RX ADMIN — SODIUM CHLORIDE 10 MG/HR: 9 INJECTION, SOLUTION INTRAVENOUS at 05:58

## 2023-01-01 RX ADMIN — PROPOFOL 200 MG: 10 INJECTION, EMULSION INTRAVENOUS at 00:25

## 2023-01-01 RX ADMIN — LEVETIRACETAM 500 MG: 100 INJECTION, SOLUTION, CONCENTRATE INTRAVENOUS at 01:29

## 2023-01-01 RX ADMIN — DESMOPRESSIN ACETATE 2 MCG: 4 INJECTION, SOLUTION INTRAVENOUS; SUBCUTANEOUS at 13:40

## 2023-01-01 RX ADMIN — Medication 2 UNITS: at 01:36

## 2023-01-01 RX ADMIN — SODIUM CHLORIDE 12.5 MG/HR: 900 INJECTION, SOLUTION INTRAVENOUS at 11:00

## 2023-01-01 RX ADMIN — CHLORHEXIDINE GLUCONATE 15 ML: 1.2 RINSE ORAL at 09:00

## 2023-01-01 RX ADMIN — PROPOFOL 100 MG: 10 INJECTION, EMULSION INTRAVENOUS at 00:27

## 2023-01-01 RX ADMIN — SODIUM CHLORIDE 5 MG/HR: 9 INJECTION, SOLUTION INTRAVENOUS at 22:25

## 2023-01-01 RX ADMIN — Medication 15 MCG/MIN: at 09:10

## 2023-01-01 RX ADMIN — PHENYLEPHRINE HYDROCHLORIDE 100 MCG/MIN: 10 INJECTION INTRAVENOUS at 23:20

## 2023-01-01 RX ADMIN — PHENYLEPHRINE HYDROCHLORIDE 100 MCG/MIN: 10 INJECTION INTRAVENOUS at 07:19

## 2023-01-01 RX ADMIN — LEVETIRACETAM 500 MG: 100 INJECTION, SOLUTION, CONCENTRATE INTRAVENOUS at 12:00

## 2023-01-01 RX ADMIN — FAMOTIDINE 20 MG: 10 INJECTION INTRAVENOUS at 18:30

## 2023-01-01 RX ADMIN — HYDRALAZINE HYDROCHLORIDE 20 MG: 20 INJECTION INTRAMUSCULAR; INTRAVENOUS at 12:27

## 2023-01-01 RX ADMIN — SODIUM CHLORIDE, POTASSIUM CHLORIDE, SODIUM LACTATE AND CALCIUM CHLORIDE 150 ML/HR: 600; 310; 30; 20 INJECTION, SOLUTION INTRAVENOUS at 23:20

## 2023-01-01 RX ADMIN — SODIUM CHLORIDE 15 MG/HR: 900 INJECTION, SOLUTION INTRAVENOUS at 12:47

## 2023-01-01 RX ADMIN — Medication 2 UNITS: at 13:47

## 2023-01-01 RX ADMIN — Medication 3 UNITS: at 01:29

## 2023-01-01 RX ADMIN — CHLORHEXIDINE GLUCONATE 15 ML: 1.2 RINSE ORAL at 23:09

## 2023-01-01 RX ADMIN — SODIUM CHLORIDE, POTASSIUM CHLORIDE, SODIUM LACTATE AND CALCIUM CHLORIDE 75 ML/HR: 600; 310; 30; 20 INJECTION, SOLUTION INTRAVENOUS at 14:33

## 2023-01-01 RX ADMIN — FENTANYL CITRATE 200 MCG: 50 INJECTION INTRAMUSCULAR; INTRAVENOUS at 00:24

## 2023-01-01 RX ADMIN — SODIUM CHLORIDE 2.5 MG/HR: 9 INJECTION, SOLUTION INTRAVENOUS at 00:57

## 2023-01-01 RX ADMIN — HYDRALAZINE HYDROCHLORIDE 20 MG: 20 INJECTION INTRAMUSCULAR; INTRAVENOUS at 03:19

## 2023-01-01 RX ADMIN — PHENYLEPHRINE HYDROCHLORIDE 30 MCG/MIN: 10 INJECTION INTRAVENOUS at 15:03

## 2023-01-01 RX ADMIN — LEVETIRACETAM 500 MG: 100 INJECTION, SOLUTION, CONCENTRATE INTRAVENOUS at 11:07

## 2023-01-01 RX ADMIN — Medication 2 UNITS: at 05:17

## 2023-01-01 RX ADMIN — FAMOTIDINE 20 MG: 10 INJECTION INTRAVENOUS at 08:57

## 2023-01-01 RX ADMIN — SODIUM CHLORIDE, POTASSIUM CHLORIDE, SODIUM LACTATE AND CALCIUM CHLORIDE 1000 ML: 600; 310; 30; 20 INJECTION, SOLUTION INTRAVENOUS at 16:00

## 2023-01-01 RX ADMIN — LEVETIRACETAM 500 MG: 100 INJECTION, SOLUTION, CONCENTRATE INTRAVENOUS at 00:00

## 2023-01-01 RX ADMIN — LEVETIRACETAM 1000 MG: 100 INJECTION, SOLUTION, CONCENTRATE INTRAVENOUS at 22:26

## 2023-01-01 RX ADMIN — CHLORHEXIDINE GLUCONATE 15 ML: 1.2 RINSE ORAL at 11:10

## 2023-01-01 RX ADMIN — IPRATROPIUM BROMIDE AND ALBUTEROL SULFATE 3 ML: 2.5; .5 SOLUTION RESPIRATORY (INHALATION) at 15:10

## 2023-01-01 RX ADMIN — LABETALOL HYDROCHLORIDE 20 MG: 5 INJECTION INTRAVENOUS at 23:32

## 2023-01-01 RX ADMIN — SODIUM CHLORIDE 5 MG/HR: 9 INJECTION, SOLUTION INTRAVENOUS at 00:55

## 2023-03-12 ENCOUNTER — APPOINTMENT (OUTPATIENT)
Dept: GENERAL RADIOLOGY | Age: 48
DRG: 045 | End: 2023-03-12
Attending: EMERGENCY MEDICINE
Payer: MEDICAID

## 2023-03-12 ENCOUNTER — HOSPITAL ENCOUNTER (INPATIENT)
Age: 48
LOS: 14 days | Discharge: NURSING, REHAB OR LONG-TERM CARE | DRG: 045 | End: 2023-03-27
Attending: EMERGENCY MEDICINE | Admitting: STUDENT IN AN ORGANIZED HEALTH CARE EDUCATION/TRAINING PROGRAM
Payer: MEDICAID

## 2023-03-12 ENCOUNTER — APPOINTMENT (OUTPATIENT)
Dept: CT IMAGING | Age: 48
DRG: 045 | End: 2023-03-12
Attending: EMERGENCY MEDICINE
Payer: MEDICAID

## 2023-03-12 DIAGNOSIS — I63.9 ACUTE STROKE DUE TO ISCHEMIA (HCC): Primary | ICD-10-CM

## 2023-03-12 DIAGNOSIS — M25.511 ACUTE PAIN OF RIGHT SHOULDER: ICD-10-CM

## 2023-03-12 DIAGNOSIS — G81.91 RIGHT HEMIPLEGIA (HCC): ICD-10-CM

## 2023-03-12 DIAGNOSIS — G93.40 ENCEPHALOPATHY: ICD-10-CM

## 2023-03-12 DIAGNOSIS — R56.9 CONVULSIONS, UNSPECIFIED CONVULSION TYPE (HCC): ICD-10-CM

## 2023-03-12 LAB
ALBUMIN SERPL-MCNC: 3.3 G/DL (ref 3.5–5)
ALBUMIN/GLOB SERPL: 0.8 (ref 1.1–2.2)
ALP SERPL-CCNC: 97 U/L (ref 45–117)
ALT SERPL-CCNC: 39 U/L (ref 12–78)
ANION GAP SERPL CALC-SCNC: 5 MMOL/L (ref 5–15)
AST SERPL-CCNC: 45 U/L (ref 15–37)
BASOPHILS # BLD: 0.1 K/UL (ref 0–0.1)
BASOPHILS NFR BLD: 1 % (ref 0–1)
BILIRUB SERPL-MCNC: 0.8 MG/DL (ref 0.2–1)
BUN SERPL-MCNC: 11 MG/DL (ref 6–20)
BUN/CREAT SERPL: 11 (ref 12–20)
CALCIUM SERPL-MCNC: 8.6 MG/DL (ref 8.5–10.1)
CHLORIDE SERPL-SCNC: 108 MMOL/L (ref 97–108)
CO2 SERPL-SCNC: 28 MMOL/L (ref 21–32)
CREAT SERPL-MCNC: 0.97 MG/DL (ref 0.7–1.3)
DIFFERENTIAL METHOD BLD: ABNORMAL
EOSINOPHIL # BLD: 0.3 K/UL (ref 0–0.4)
EOSINOPHIL NFR BLD: 4 % (ref 0–7)
ERYTHROCYTE [DISTWIDTH] IN BLOOD BY AUTOMATED COUNT: 20.5 % (ref 11.5–14.5)
GLOBULIN SER CALC-MCNC: 4.1 G/DL (ref 2–4)
GLUCOSE BLD STRIP.AUTO-MCNC: 116 MG/DL (ref 65–117)
GLUCOSE SERPL-MCNC: 140 MG/DL (ref 65–100)
HCT VFR BLD AUTO: 48.3 % (ref 36.6–50.3)
HGB BLD-MCNC: 16.2 G/DL (ref 12.1–17)
IMM GRANULOCYTES # BLD AUTO: 0 K/UL (ref 0–0.04)
IMM GRANULOCYTES NFR BLD AUTO: 0 % (ref 0–0.5)
INR PPP: 1 (ref 0.9–1.1)
LYMPHOCYTES # BLD: 3.2 K/UL (ref 0.8–3.5)
LYMPHOCYTES NFR BLD: 43 % (ref 12–49)
MCH RBC QN AUTO: 24 PG (ref 26–34)
MCHC RBC AUTO-ENTMCNC: 33.5 G/DL (ref 30–36.5)
MCV RBC AUTO: 71.6 FL (ref 80–99)
MONOCYTES # BLD: 0.7 K/UL (ref 0–1)
MONOCYTES NFR BLD: 10 % (ref 5–13)
NEUTS SEG # BLD: 3.2 K/UL (ref 1.8–8)
NEUTS SEG NFR BLD: 43 % (ref 32–75)
NRBC # BLD: 0 K/UL (ref 0–0.01)
NRBC BLD-RTO: 0 PER 100 WBC
PLATELET # BLD AUTO: 215 K/UL (ref 150–400)
PMV BLD AUTO: 9.6 FL (ref 8.9–12.9)
POTASSIUM SERPL-SCNC: 3.6 MMOL/L (ref 3.5–5.1)
PROT SERPL-MCNC: 7.4 G/DL (ref 6.4–8.2)
PROTHROMBIN TIME: 10.6 SEC (ref 9–11.1)
RBC # BLD AUTO: 6.75 M/UL (ref 4.1–5.7)
SERVICE CMNT-IMP: NORMAL
SODIUM SERPL-SCNC: 141 MMOL/L (ref 136–145)
TROPONIN I SERPL HS-MCNC: 24 NG/L (ref 0–76)
WBC # BLD AUTO: 7.4 K/UL (ref 4.1–11.1)

## 2023-03-12 PROCEDURE — 70450 CT HEAD/BRAIN W/O DYE: CPT

## 2023-03-12 PROCEDURE — 85610 PROTHROMBIN TIME: CPT

## 2023-03-12 PROCEDURE — 0042T CT CODE NEURO PERF W CBF: CPT

## 2023-03-12 PROCEDURE — 96374 THER/PROPH/DIAG INJ IV PUSH: CPT

## 2023-03-12 PROCEDURE — 82962 GLUCOSE BLOOD TEST: CPT

## 2023-03-12 PROCEDURE — 85025 COMPLETE CBC W/AUTO DIFF WBC: CPT

## 2023-03-12 PROCEDURE — 96375 TX/PRO/DX INJ NEW DRUG ADDON: CPT

## 2023-03-12 PROCEDURE — 84484 ASSAY OF TROPONIN QUANT: CPT

## 2023-03-12 PROCEDURE — 93005 ELECTROCARDIOGRAM TRACING: CPT

## 2023-03-12 PROCEDURE — 80053 COMPREHEN METABOLIC PANEL: CPT

## 2023-03-12 PROCEDURE — 74011250636 HC RX REV CODE- 250/636: Performed by: EMERGENCY MEDICINE

## 2023-03-12 PROCEDURE — 94762 N-INVAS EAR/PLS OXIMTRY CONT: CPT

## 2023-03-12 PROCEDURE — 95706 EEG WO VID 2-12HR INTMT MNTR: CPT | Performed by: EMERGENCY MEDICINE

## 2023-03-12 PROCEDURE — 96376 TX/PRO/DX INJ SAME DRUG ADON: CPT

## 2023-03-12 PROCEDURE — 99285 EMERGENCY DEPT VISIT HI MDM: CPT

## 2023-03-12 PROCEDURE — 70498 CT ANGIOGRAPHY NECK: CPT

## 2023-03-12 PROCEDURE — 71045 X-RAY EXAM CHEST 1 VIEW: CPT

## 2023-03-12 PROCEDURE — 36415 COLL VENOUS BLD VENIPUNCTURE: CPT

## 2023-03-12 PROCEDURE — 74011000636 HC RX REV CODE- 636: Performed by: EMERGENCY MEDICINE

## 2023-03-12 RX ORDER — LORAZEPAM 2 MG/ML
1 INJECTION INTRAMUSCULAR
Status: COMPLETED | OUTPATIENT
Start: 2023-03-12 | End: 2023-03-12

## 2023-03-12 RX ORDER — LORAZEPAM 2 MG/ML
2 INJECTION INTRAMUSCULAR
Status: COMPLETED | OUTPATIENT
Start: 2023-03-12 | End: 2023-03-12

## 2023-03-12 RX ORDER — SODIUM CHLORIDE 9 MG/ML
150 INJECTION, SOLUTION INTRAVENOUS ONCE
Status: COMPLETED | OUTPATIENT
Start: 2023-03-12 | End: 2023-03-13

## 2023-03-12 RX ORDER — LEVETIRACETAM 500 MG/5ML
2000 INJECTION, SOLUTION, CONCENTRATE INTRAVENOUS
Status: COMPLETED | OUTPATIENT
Start: 2023-03-12 | End: 2023-03-12

## 2023-03-12 RX ADMIN — LORAZEPAM 1 MG: 2 INJECTION INTRAMUSCULAR; INTRAVENOUS at 20:38

## 2023-03-12 RX ADMIN — IOPAMIDOL 100 ML: 755 INJECTION, SOLUTION INTRAVENOUS at 20:57

## 2023-03-12 RX ADMIN — LEVETIRACETAM 2000 MG: 100 INJECTION INTRAVENOUS at 20:34

## 2023-03-12 RX ADMIN — LORAZEPAM 2 MG: 2 INJECTION INTRAMUSCULAR; INTRAVENOUS at 23:35

## 2023-03-12 RX ADMIN — SODIUM CHLORIDE 150 ML/HR: 9 INJECTION, SOLUTION INTRAVENOUS at 20:39

## 2023-03-12 RX ADMIN — LEVETIRACETAM 2000 MG: 100 INJECTION INTRAVENOUS at 22:58

## 2023-03-13 ENCOUNTER — APPOINTMENT (OUTPATIENT)
Dept: GENERAL RADIOLOGY | Age: 48
DRG: 045 | End: 2023-03-13
Attending: HOSPITALIST
Payer: MEDICAID

## 2023-03-13 ENCOUNTER — APPOINTMENT (OUTPATIENT)
Dept: NON INVASIVE DIAGNOSTICS | Age: 48
DRG: 045 | End: 2023-03-13
Attending: NURSE PRACTITIONER
Payer: MEDICAID

## 2023-03-13 PROBLEM — G81.91 RIGHT HEMIPLEGIA (HCC): Status: ACTIVE | Noted: 2023-03-13

## 2023-03-13 PROBLEM — I63.9 ISCHEMIC STROKE (HCC): Status: ACTIVE | Noted: 2023-01-01

## 2023-03-13 LAB
APPEARANCE UR: CLEAR
ATRIAL RATE: 92 BPM
BACTERIA URNS QL MICRO: NEGATIVE /HPF
BILIRUB UR QL: NEGATIVE
BNP SERPL-MCNC: 986 PG/ML
CALCULATED P AXIS, ECG09: 60 DEGREES
CALCULATED R AXIS, ECG10: -20 DEGREES
CALCULATED T AXIS, ECG11: 32 DEGREES
CHOLEST SERPL-MCNC: 125 MG/DL
COLOR UR: ABNORMAL
DIAGNOSIS, 93000: NORMAL
ECHO AO ROOT DIAM: 3.1 CM
ECHO AO ROOT INDEX: 1.23 CM/M2
ECHO LA DIAMETER INDEX: 1.11 CM/M2
ECHO LA DIAMETER: 2.8 CM
ECHO LA TO AORTIC ROOT RATIO: 0.9
ECHO LV FRACTIONAL SHORTENING: 39 % (ref 28–44)
ECHO LV INTERNAL DIMENSION DIASTOLE INDEX: 2.13 CM/M2
ECHO LV INTERNAL DIMENSION DIASTOLIC: 5.4 CM (ref 4.2–5.9)
ECHO LV INTERNAL DIMENSION SYSTOLIC INDEX: 1.3 CM/M2
ECHO LV INTERNAL DIMENSION SYSTOLIC: 3.3 CM
ECHO LV IVSD: 1.3 CM (ref 0.6–1)
ECHO LV MASS 2D: 345.3 G (ref 88–224)
ECHO LV MASS INDEX 2D: 136.5 G/M2 (ref 49–115)
ECHO LV POSTERIOR WALL DIASTOLIC: 1.6 CM (ref 0.6–1)
ECHO LV RELATIVE WALL THICKNESS RATIO: 0.59
ECHO LVOT AREA: 4.2 CM2
ECHO LVOT DIAM: 2.3 CM
EPITH CASTS URNS QL MICRO: ABNORMAL /LPF
EST. AVERAGE GLUCOSE BLD GHB EST-MCNC: 108 MG/DL
GLUCOSE UR STRIP.AUTO-MCNC: NEGATIVE MG/DL
HBA1C MFR BLD: 5.4 % (ref 4–5.6)
HDLC SERPL-MCNC: 33 MG/DL
HDLC SERPL: 3.8 (ref 0–5)
HGB UR QL STRIP: ABNORMAL
HYALINE CASTS URNS QL MICRO: ABNORMAL /LPF (ref 0–2)
KETONES UR QL STRIP.AUTO: NEGATIVE MG/DL
LDLC SERPL CALC-MCNC: 70.2 MG/DL (ref 0–100)
LEUKOCYTE ESTERASE UR QL STRIP.AUTO: NEGATIVE
NITRITE UR QL STRIP.AUTO: NEGATIVE
P-R INTERVAL, ECG05: 180 MS
PH UR STRIP: 7 (ref 5–8)
PROT UR STRIP-MCNC: ABNORMAL MG/DL
Q-T INTERVAL, ECG07: 374 MS
QRS DURATION, ECG06: 110 MS
QTC CALCULATION (BEZET), ECG08: 462 MS
RBC #/AREA URNS HPF: ABNORMAL /HPF (ref 0–5)
SP GR UR REFRACTOMETRY: 1.01 (ref 1–1.03)
TRIGL SERPL-MCNC: 109 MG/DL (ref ?–150)
UA: UC IF INDICATED,UAUC: ABNORMAL
UROBILINOGEN UR QL STRIP.AUTO: 1 EU/DL (ref 0.2–1)
VENTRICULAR RATE, ECG03: 92 BPM
VLDLC SERPL CALC-MCNC: 21.8 MG/DL
WBC URNS QL MICRO: ABNORMAL /HPF (ref 0–4)

## 2023-03-13 PROCEDURE — 81001 URINALYSIS AUTO W/SCOPE: CPT

## 2023-03-13 PROCEDURE — 74011000250 HC RX REV CODE- 250: Performed by: HOSPITALIST

## 2023-03-13 PROCEDURE — 74011250636 HC RX REV CODE- 250/636: Performed by: NURSE PRACTITIONER

## 2023-03-13 PROCEDURE — 74011250636 HC RX REV CODE- 250/636: Performed by: STUDENT IN AN ORGANIZED HEALTH CARE EDUCATION/TRAINING PROGRAM

## 2023-03-13 PROCEDURE — 65610000006 HC RM INTENSIVE CARE

## 2023-03-13 PROCEDURE — 74011000250 HC RX REV CODE- 250: Performed by: NURSE PRACTITIONER

## 2023-03-13 PROCEDURE — 74011250636 HC RX REV CODE- 250/636: Performed by: INTERNAL MEDICINE

## 2023-03-13 PROCEDURE — 74011000250 HC RX REV CODE- 250: Performed by: INTERNAL MEDICINE

## 2023-03-13 PROCEDURE — 80061 LIPID PANEL: CPT

## 2023-03-13 PROCEDURE — 74011250637 HC RX REV CODE- 250/637: Performed by: INTERNAL MEDICINE

## 2023-03-13 PROCEDURE — 74011250636 HC RX REV CODE- 250/636: Performed by: HOSPITALIST

## 2023-03-13 PROCEDURE — 71045 X-RAY EXAM CHEST 1 VIEW: CPT

## 2023-03-13 PROCEDURE — 74011000258 HC RX REV CODE- 258: Performed by: HOSPITALIST

## 2023-03-13 PROCEDURE — 83880 ASSAY OF NATRIURETIC PEPTIDE: CPT

## 2023-03-13 PROCEDURE — 93306 TTE W/DOPPLER COMPLETE: CPT

## 2023-03-13 PROCEDURE — 94640 AIRWAY INHALATION TREATMENT: CPT

## 2023-03-13 PROCEDURE — 36415 COLL VENOUS BLD VENIPUNCTURE: CPT

## 2023-03-13 PROCEDURE — 95816 EEG AWAKE AND DROWSY: CPT | Performed by: PSYCHIATRY & NEUROLOGY

## 2023-03-13 PROCEDURE — 99291 CRITICAL CARE FIRST HOUR: CPT | Performed by: PSYCHIATRY & NEUROLOGY

## 2023-03-13 PROCEDURE — 83036 HEMOGLOBIN GLYCOSYLATED A1C: CPT

## 2023-03-13 RX ORDER — ACETAMINOPHEN 325 MG/1
650 TABLET ORAL
Status: DISCONTINUED | OUTPATIENT
Start: 2023-03-13 | End: 2023-03-27 | Stop reason: HOSPADM

## 2023-03-13 RX ORDER — ENOXAPARIN SODIUM 100 MG/ML
40 INJECTION SUBCUTANEOUS EVERY 12 HOURS
Status: DISCONTINUED | OUTPATIENT
Start: 2023-03-13 | End: 2023-03-27 | Stop reason: HOSPADM

## 2023-03-13 RX ORDER — DIPHENHYDRAMINE HYDROCHLORIDE 50 MG/ML
25 INJECTION, SOLUTION INTRAMUSCULAR; INTRAVENOUS ONCE
Status: COMPLETED | OUTPATIENT
Start: 2023-03-13 | End: 2023-03-13

## 2023-03-13 RX ORDER — FUROSEMIDE 10 MG/ML
80 INJECTION INTRAMUSCULAR; INTRAVENOUS ONCE
Status: COMPLETED | OUTPATIENT
Start: 2023-03-13 | End: 2023-03-13

## 2023-03-13 RX ORDER — LABETALOL HYDROCHLORIDE 5 MG/ML
10 INJECTION, SOLUTION INTRAVENOUS
Status: DISCONTINUED | OUTPATIENT
Start: 2023-03-13 | End: 2023-03-16

## 2023-03-13 RX ORDER — HALOPERIDOL 5 MG/ML
5 INJECTION INTRAMUSCULAR
Status: DISCONTINUED | OUTPATIENT
Start: 2023-03-13 | End: 2023-03-15

## 2023-03-13 RX ORDER — ACETAMINOPHEN 650 MG/1
650 SUPPOSITORY RECTAL
Status: DISCONTINUED | OUTPATIENT
Start: 2023-03-13 | End: 2023-03-27 | Stop reason: HOSPADM

## 2023-03-13 RX ORDER — IPRATROPIUM BROMIDE AND ALBUTEROL SULFATE 2.5; .5 MG/3ML; MG/3ML
3 SOLUTION RESPIRATORY (INHALATION)
Status: DISCONTINUED | OUTPATIENT
Start: 2023-03-13 | End: 2023-03-13

## 2023-03-13 RX ORDER — LORAZEPAM 2 MG/ML
1 INJECTION INTRAMUSCULAR ONCE
Status: DISPENSED | OUTPATIENT
Start: 2023-03-13 | End: 2023-03-13

## 2023-03-13 RX ORDER — LABETALOL HYDROCHLORIDE 5 MG/ML
20 INJECTION, SOLUTION INTRAVENOUS
Status: DISCONTINUED | OUTPATIENT
Start: 2023-03-13 | End: 2023-03-13

## 2023-03-13 RX ORDER — ZIPRASIDONE MESYLATE 20 MG/ML
10 INJECTION, POWDER, LYOPHILIZED, FOR SOLUTION INTRAMUSCULAR ONCE
Status: DISCONTINUED | OUTPATIENT
Start: 2023-03-13 | End: 2023-03-13 | Stop reason: SDUPTHER

## 2023-03-13 RX ORDER — GUAIFENESIN 100 MG/5ML
81 LIQUID (ML) ORAL DAILY
Status: DISCONTINUED | OUTPATIENT
Start: 2023-03-13 | End: 2023-03-15

## 2023-03-13 RX ORDER — CLOPIDOGREL BISULFATE 75 MG/1
75 TABLET ORAL DAILY
Status: DISCONTINUED | OUTPATIENT
Start: 2023-03-13 | End: 2023-03-16

## 2023-03-13 RX ORDER — LORAZEPAM 2 MG/ML
1 INJECTION INTRAMUSCULAR ONCE
Status: COMPLETED | OUTPATIENT
Start: 2023-03-13 | End: 2023-03-13

## 2023-03-13 RX ORDER — ACETAMINOPHEN 325 MG/1
650 TABLET ORAL
Status: DISCONTINUED | OUTPATIENT
Start: 2023-03-13 | End: 2023-03-13

## 2023-03-13 RX ORDER — ASPIRIN 300 MG/1
300 SUPPOSITORY RECTAL DAILY
Status: DISCONTINUED | OUTPATIENT
Start: 2023-03-13 | End: 2023-03-14

## 2023-03-13 RX ORDER — FACIAL-BODY WIPES
10 EACH TOPICAL DAILY PRN
Status: DISCONTINUED | OUTPATIENT
Start: 2023-03-13 | End: 2023-03-27 | Stop reason: HOSPADM

## 2023-03-13 RX ORDER — ONDANSETRON 2 MG/ML
4 INJECTION INTRAMUSCULAR; INTRAVENOUS
Status: DISCONTINUED | OUTPATIENT
Start: 2023-03-13 | End: 2023-03-27 | Stop reason: HOSPADM

## 2023-03-13 RX ORDER — HALOPERIDOL 5 MG/ML
5 INJECTION INTRAMUSCULAR ONCE
Status: COMPLETED | OUTPATIENT
Start: 2023-03-13 | End: 2023-03-13

## 2023-03-13 RX ORDER — GUAIFENESIN 100 MG/5ML
81 LIQUID (ML) ORAL DAILY
Status: DISCONTINUED | OUTPATIENT
Start: 2023-03-13 | End: 2023-03-13 | Stop reason: SDUPTHER

## 2023-03-13 RX ORDER — ENOXAPARIN SODIUM 100 MG/ML
40 INJECTION SUBCUTANEOUS EVERY 12 HOURS
Status: DISCONTINUED | OUTPATIENT
Start: 2023-03-13 | End: 2023-03-13

## 2023-03-13 RX ORDER — GUAIFENESIN 100 MG/5ML
325 LIQUID (ML) ORAL
Status: DISPENSED | OUTPATIENT
Start: 2023-03-13 | End: 2023-03-13

## 2023-03-13 RX ORDER — IPRATROPIUM BROMIDE AND ALBUTEROL SULFATE 2.5; .5 MG/3ML; MG/3ML
3 SOLUTION RESPIRATORY (INHALATION)
Status: DISCONTINUED | OUTPATIENT
Start: 2023-03-14 | End: 2023-03-14

## 2023-03-13 RX ORDER — CARVEDILOL 3.12 MG/1
3.12 TABLET ORAL 2 TIMES DAILY WITH MEALS
Status: DISCONTINUED | OUTPATIENT
Start: 2023-03-13 | End: 2023-03-15

## 2023-03-13 RX ORDER — LORAZEPAM 2 MG/ML
2 INJECTION INTRAMUSCULAR ONCE
Status: COMPLETED | OUTPATIENT
Start: 2023-03-13 | End: 2023-03-13

## 2023-03-13 RX ORDER — DEXMEDETOMIDINE HYDROCHLORIDE 4 UG/ML
0-1 INJECTION, SOLUTION INTRAVENOUS
Status: DISCONTINUED | OUTPATIENT
Start: 2023-03-13 | End: 2023-03-16

## 2023-03-13 RX ADMIN — HALOPERIDOL LACTATE 5 MG: 5 INJECTION, SOLUTION INTRAMUSCULAR at 17:06

## 2023-03-13 RX ADMIN — ENOXAPARIN SODIUM 40 MG: 100 INJECTION SUBCUTANEOUS at 14:04

## 2023-03-13 RX ADMIN — IPRATROPIUM BROMIDE AND ALBUTEROL SULFATE 3 ML: 2.5; .5 SOLUTION RESPIRATORY (INHALATION) at 15:39

## 2023-03-13 RX ADMIN — ENOXAPARIN SODIUM 40 MG: 100 INJECTION SUBCUTANEOUS at 01:54

## 2023-03-13 RX ADMIN — LORAZEPAM 1 MG: 2 INJECTION INTRAMUSCULAR; INTRAVENOUS at 01:55

## 2023-03-13 RX ADMIN — HALOPERIDOL LACTATE 5 MG: 5 INJECTION, SOLUTION INTRAMUSCULAR at 01:55

## 2023-03-13 RX ADMIN — DEXMEDETOMIDINE 0.7 MCG/KG/HR: 100 INJECTION, SOLUTION INTRAVENOUS at 22:56

## 2023-03-13 RX ADMIN — IPRATROPIUM BROMIDE AND ALBUTEROL SULFATE 3 ML: 2.5; .5 SOLUTION RESPIRATORY (INHALATION) at 20:21

## 2023-03-13 RX ADMIN — FUROSEMIDE 80 MG: 10 INJECTION, SOLUTION INTRAMUSCULAR; INTRAVENOUS at 14:05

## 2023-03-13 RX ADMIN — LABETALOL HYDROCHLORIDE 10 MG: 5 INJECTION INTRAVENOUS at 07:03

## 2023-03-13 RX ADMIN — IPRATROPIUM BROMIDE AND ALBUTEROL SULFATE 3 ML: 2.5; .5 SOLUTION RESPIRATORY (INHALATION) at 12:53

## 2023-03-13 RX ADMIN — DEXMEDETOMIDINE 0.4 MCG/KG/HR: 100 INJECTION, SOLUTION INTRAVENOUS at 13:19

## 2023-03-13 RX ADMIN — LORAZEPAM 2 MG: 2 INJECTION INTRAMUSCULAR; INTRAVENOUS at 04:14

## 2023-03-13 RX ADMIN — HALOPERIDOL LACTATE 5 MG: 5 INJECTION, SOLUTION INTRAMUSCULAR at 06:50

## 2023-03-13 RX ADMIN — DEXMEDETOMIDINE 0.6 MCG/KG/HR: 100 INJECTION, SOLUTION INTRAVENOUS at 19:18

## 2023-03-13 RX ADMIN — ASPIRIN 300 MG: 300 SUPPOSITORY RECTAL at 14:53

## 2023-03-13 RX ADMIN — DIPHENHYDRAMINE HYDROCHLORIDE 25 MG: 50 INJECTION, SOLUTION INTRAMUSCULAR; INTRAVENOUS at 01:55

## 2023-03-13 RX ADMIN — ZIPRASIDONE MESYLATE 10 MG: 20 INJECTION, POWDER, LYOPHILIZED, FOR SOLUTION INTRAMUSCULAR at 08:55

## 2023-03-13 NOTE — CONSULTS
Neurology Note    Patient ID:  Evaline Severe  [id number]  73 y.o.  1975      Date of Consultation:  March 13, 2023    Referring Physician: Dr. Lyn Dawkins    Reason for Consultation:  right sided weakness      Assessment and Plan:    Patient is a 50-year-old gentleman who presented to the hospital with progressive right-sided weakness. His current mental status shows quite significant agitation and confusion with right-sided hemiparesis. Right-sided hemiparesis:  Differential includes stroke versus other CNS pathology  Head CT with no acute abnormality  The patient does need a brain MRI. Order has been placed  Continue aspirin and Plavix. Patient's risk factors for stroke do include hypertension  Hypertension: Aggressive control with goal systolic blood pressure under 140  Lipid panel and hemoglobin A1c are pending. Please start statin therapy if LDL is greater than 70  The patient will need physical and Occupational Therapy    Acute encephalopathy:    This very well may be related to a stroke. Would also need to consider intermittent seizures or a postictal state. Due to his severe agitation this morning, I placed and order for a now dose for a 1 mg of Ativan  I will order a urgent EEG for possible seizure activity  will hold on starting an anticonvulsant until after the EEG is obtained. The patient did receive a dose of Keppra in the emergency department. The patient will need other metabolic labs including a TSH and vitamin B12 level obtained. Neurology will continue to follow closely in this complicated patient with ongoing neurological symptoms necessitating additional testing. I did discuss the patient with nursing at the bedside as well as with the primary hospital attending, Dr. Lyn Dawkins had discussed the orders that were placed. Care may need to be escalated to a higher level due to his significant agitation and combativeness.            Subjective:right side weakness     History of Present Illness:   Aldo Doan is a 51 y.o. 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 The symptoms began to progress over the past few days. He also noticed some intermittent muscle twitching in his right upper extremity. On teleneurology assessment, neurologist noted twitching movement in the right side and Keppra was started. The patient also received a dose of Haldol for increasing agitation. A rapid EEG was placed but the patient was uncooperative with testing. Overnight a rapid response was called due to progressive worsening agitation. The patient was given 2 mg of Ativan. The history was obtained from reviewing the medical records and speaking with nursing at the bedside. The patient was very agitated upon my arrival to see him earlier this morning. He would follow no commands. He had decreased attention and concentration. Laboratory results upon admission include a white blood cell count of 7.4, platelets of 554, hemoglobin of 16.2. Sodium of 141, potassium 3.6, creatinine 0.97, magnesium of 2.0, albumin 3.3. AST of 45 and ALT of 39. I did independently review the head CT from March 12, 2023. There is no acute abnormalities. CTA of the head and neck performed on March 12, 2023 revealed no large vessel flow-limiting stenosis.   There was hypodensity in the left centrum semiovale suggestive of chronic microvascular ischemic disease  Past Medical History:   Diagnosis Date    Bell's palsy 01/25/2019    Diverticulitis of colon with perforation 01/2018    sigmoid    Diverticulitis of colon with perforation 08/09/2020    Hypertension     Kidney infection     [place] Hunt syndrome (geniculate herpes zoster)         I am unable to obtain a surgical history from the patient due to his mental status    Family History   Problem Relation Age of Onset Hypertension Mother     Cancer Father         multiple myeloma        Social History     Tobacco Use    Smoking status: Every Day     Packs/day: 1.00     Years: 15.00     Pack years: 15.00     Types: Cigarettes    Smokeless tobacco: Never   Substance Use Topics    Alcohol use: Not Currently     Comment: rarely        Allergies   Allergen Reactions    Meclizine Rash        Prior to Admission medications    Medication Sig Start Date End Date Taking? Authorizing Provider   carvediloL (COREG) 3.125 mg tablet Take 1 Tab by mouth two (2) times daily (with meals). Patient not taking: Reported on 3/12/2023 8/18/20   Tenisha Hall MD   aspirin 81 mg chewable tablet Take 1 Tab by mouth daily. Patient not taking: Reported on 3/12/2023 8/19/20   Tenisha Hall MD   sacubitril-valsartan (ENTRESTO) 24 mg/26 mg tablet Take 1 Tab by mouth every twelve (12) hours.   Patient not taking: Reported on 3/12/2023 8/18/20   Charmayne Pope, NP     Current Facility-Administered Medications   Medication Dose Route Frequency    acetaminophen (TYLENOL) tablet 650 mg  650 mg Oral Q6H PRN    ondansetron (ZOFRAN) injection 4 mg  4 mg IntraVENous Q4H PRN    acetaminophen (TYLENOL) tablet 650 mg  650 mg Oral Q4H PRN    Or    acetaminophen (TYLENOL) solution 650 mg  650 mg Per NG tube Q4H PRN    Or    acetaminophen (TYLENOL) suppository 650 mg  650 mg Rectal Q4H PRN    [Held by provider] aspirin chewable tablet 81 mg  81 mg Oral DAILY    [Held by provider] clopidogreL (PLAVIX) tablet 75 mg  75 mg Oral DAILY    bisacodyL (DULCOLAX) suppository 10 mg  10 mg Rectal DAILY PRN    enoxaparin (LOVENOX) injection 40 mg  40 mg SubCUTAneous Q12H    [Held by provider] carvediloL (COREG) tablet 3.125 mg  3.125 mg Oral BID WITH MEALS    [Held by provider] sacubitriL-valsartan (ENTRESTO) 24-26 mg tablet 1 Tablet  1 Tablet Oral Q12H    labetaloL (NORMODYNE;TRANDATE) injection 10 mg  10 mg IntraVENous Q1H PRN    haloperidol lactate (HALDOL) injection 5 mg 5 mg IntraMUSCular Q6H PRN    LORazepam (ATIVAN) injection 1 mg  1 mg IntraVENous ONCE    niCARdipine (CARDENE) 25 mg in 0.9% sodium chloride 250 mL ([id number])  5-15 mg/hr IntraVENous TITRATE    albuterol-ipratropium (DUO-NEB) 2.5 MG-0.5 MG/3 ML  3 mL Nebulization Q4H RT    furosemide (LASIX) injection 80 mg  80 mg IntraVENous ONCE    dexmedeTOMidine in 0.9 % NaCl (PRECEDEX) 400 mcg/100 mL (4 mcg/mL) infusion soln  0.1-1.5 mcg/kg/hr IntraVENous TITRATE       Review of Systems:      [x]Unable to obtain  ROS due to  [x]mental status change  []sedated   []intubated    Objective:     Visit Vitals  BP (!) 107/90   Pulse (!) 114   Temp 98 °F (36.7 °C)   Resp 28   Ht 5' 4\" (1.626 m)   Wt (!) 367 lb 8.1 oz (166.7 kg)   SpO2 98%   BMI 63.08 kg/m²       Physical Exam:      General:  appears well nourished in no acute distress  Neck: no carotid bruits  Lungs: clear to auscultation  Heart:  no murmurs, regular rate  Lower extremity: peripheral pulses palpable and no edema  Skin: intact    Neurological exam:    The patient is awake. He is not alert. He is agitated and confused. He has decreased attention and concentration. He is unable to name objects or follow simple one-step commands. He is quite restless in the bed today and continues to move from side to side. Cranial nerves:   II-XII were tested    Perrrla  Fundoscopic examination attempted but difficult to complete due to his cooperation  Visual fields were full  Eomi, no evidence of nystagmus  Facial sensation:  normal and symmetric  Facial motor: Appears to have a right facial droop  Hearing intact  SCM strength intact  Tongue: midline without fasciculations    Motor: Tone -appears to be decreased in the right upper and lower extremity    No evidence of fasciculations    Strength testing: The patient is unable to participate in detailed manual motor testing. He does have decreased movement throughout his right upper and lower extremity.     Sensory:  Upper extremity: intact to pp,  Lower extremity: intact to pp,    Reflexes:  The patient is too agitated and restless for reflex testing    Cerebellar testing:  no tremor apparent,    Gait: This was not assessed due to his mental status    Labs:     Lab Results   Component Value Date/Time    Sodium 141 03/12/2023 08:29 PM    Potassium 3.6 03/12/2023 08:29 PM    Chloride 108 03/12/2023 08:29 PM    Glucose 140 (H) 03/12/2023 08:29 PM    BUN 11 03/12/2023 08:29 PM    Creatinine 0.97 03/12/2023 08:29 PM    Calcium 8.6 03/12/2023 08:29 PM    WBC 7.4 03/12/2023 08:29 PM    HCT 48.3 03/12/2023 08:29 PM    HGB 16.2 03/12/2023 08:29 PM    PLATELET 403 33/62/4682 08:29 PM       Imaging:    Results from Hospital Encounter encounter on 03/24/19    MRI BRAIN W WO CONT    Narrative  INDICATION: Mixed conductive and sensorineural hearing loss of left ear    EXAMINATION:  MR BRAIN WITH/WITHOUT CONTRAST, IAC PROTOCOL    COMPARISON: CT head February 1, 2019    TECHNIQUE:  Multiplanar multisequence acquisition without and with contrast of  the brain/internal auditory canals. FINDINGS:    Ventricles: Midline, no hydrocephalus. Intracranial Hemorrhage: None. Brain Parenchyma/Brainstem: There are multiple patchy areas of T2 hyperintense  signal in the supratentorial white matter, particularly in the left centrum  semiovale and corona radiata. There is also small focus of T2 hyperintense  signal in the left brachium pontis. No acute infarction. Basal Cisterns: Normal.  Flow Voids: Normal.  Internal Auditory Canals/Cranial Nerves: Evaluation of the cranial nerves is  unremarkable. There is normal T2 hyperintensity without abnormal enhancement in  the inner ear structures bilaterally  Post Contrast: No abnormal parenchymal or meningeal enhancement. Additional Comments: N/A. Impression  IMPRESSION:  1. No abnormality within the internal auditory canals.   2. Multiple areas of nonenhancing T2 hyperintense signal, including the left  brachium pontis and throughout the left corona radiata/centrum semiovale. These  may indicate areas of chronic microvascular ischemic disease/infarction, with  other processes such as inflammatory/demyelinating disease less likely. The left  brachium pontis lesion could represent a vascular malformation (gradient echo  not performed as part of IAC protocol). Correlate with clinical findings and  consider short-term follow-up. Results from Hospital Encounter encounter on 03/12/23    CT CODE NEURO PERF W CBF    Narrative  CLINICAL HISTORY: Code neuro    EXAMINATION:  CT ANGIOGRAPHY HEAD AND NECK, CT PERFUSION    COMPARISON: MRI brain March 2019    TECHNIQUE:  Following the uneventful administration of iodinated contrast  material, axial CT angiography of the head and neck was performed. Delayed axial  images through the head were also obtained. Coronal and sagittal reconstructions  were obtained. Manual postprocessing of images was performed. 3-D  Sagittal  maximal intensity projection images were obtained. 3-D Coronal maximal  intensity projections were obtained. CT brain perfusion was performed with  generation of hemodynamic maps of multiple parameters, including cerebral blood  flow, cerebral blood volume, mean transit time, and TMAX. CT dose reduction was  achieved through use of a standardized protocol tailored for this examination  and automatic exposure control for dose modulation. This study was analyzed by  the [address] ai algorithm. FINDINGS:    Delayed contrast-enhanced head CT:    Chronic left centrum semiovale hypodensity likely sequela of chronic  microvascular angiopathy. Ventricles and sulci are normal in size and  configuration. Basal cisterns are clear. No evidence of hemorrhage. Paranasal  sinuses are clear. Orbits and globes are within normal limits. CTA NECK:    Great vessels: Normal arch anatomy with the origins patent.     Right subclavian artery: Patent    Left subclavian artery: Patent    Right common carotid artery: Patent    Left common carotid artery: Patent    Cervical right internal carotid artery: Patent with no significant stenosis by  NASCET criteria. Cervical left internal carotid artery: Patent with no significant stenosis by  NASCET criteria. Right vertebral artery: Patent    Left vertebral artery: Patent    The lung apices are clear. The thyroid is homogeneous. No cervical  lymphadenopathy. CTA HEAD:    Right cavernous internal carotid artery: Patent    Left cavernous internal carotid artery: Patent    Anterior cerebral arteries: Patent    Anterior communicating artery: Patent    Right middle cerebral artery: Patent    Left middle cerebral artery: Patent    Posterior communicating arteries: Diminutive    Posterior cerebral arteries: Patent    Basilar artery: Patent    Distal vertebral arteries: Patent    No evidence for intracranial aneurysm or hemodynamically significant stenosis. CT Perfusion:  15 cc core infarct in the left frontal lobe with 81 cc ischemic penumbra in the  bilateral frontal lobes right greater than left. Impression  1. No large vessel occlusion or hemodynamically significant carotid stenosis. 2.  Chronic left centrum semiovale hypodensity likely sequela of chronic  microvascular angiopathy. 3.  Decrease cerebral perfusion in the bilateral periventricular white matter  with 81 cc area of ischemic tissue in the bilateral frontal lobes right greater  than left without underlying vascular lesion to explain this perfusion  abnormality. 50 minutes was spent providing medical care of this critically ill patient reviewing records, obtaining additional history from family, examining patient , discussing with collaborating physicians and nursing, and discussing treatment plans.           Active Problems:    Right hemiplegia ([facility] [address] (3/13/2023)      Ischemic stroke ([facility] [address] (3/13/2023)                   Signed By:  Armando Stuart DO FAAN    March 13, [phone]

## 2023-03-13 NOTE — PROGRESS NOTES
MRI PENDING    Completion of MRI Screening Sheet    Fax to [phone] when completed    Please call [phone]  When this is done    Thank You

## 2023-03-13 NOTE — H&P
Hospitalist Admission Note    NAME: Noé Terry   :  1975   MRN:  [id number]     Date/Time:  3/13/2023 1:06 AM    Patient PCP: Pj Mathias MD  ______________________________________________________________________  Given the patient's current clinical presentation, I have a high level of concern for decompensation if discharged from the emergency department. Complex decision making was performed, which includes reviewing the patient's available past medical records, laboratory results, and x-ray films. Discussed assessment of this patient's clinical condition and plan of care with Dr. Levy Mitchell which is as follows. Assessment / Plan:    Right facial droop  Right sided weakness  Ischemic stroke  Seizure-like activity  CT head WO contrast: No acute intracranial process seen  CTA head neck  1. No large vessel occlusion or hemodynamically significant carotid stenosis. 2.  Chronic left centrum semiovale hypodensity likely sequela of chronic  microvascular angiopathy. 3.  Decrease cerebral perfusion in the bilateral periventricular white matter  with 81 cc area of ischemic tissue in the bilateral frontal lobes right greater  than left without underlying vascular lesion to explain this perfusion  abnormality.  - ASA, Plavix  - check A1C, Lipid panel  - unable to complete EEG, given Keppra and Ativan in ED  - Neurology consult  - NPO  - speech, PT/OT consult for eval and treatment  - permissive hypertension till specified by Neuro    CHF  Dilated cardiomyopathy  Cardiomegaly  CXR: New, moderate cardiomegaly.  Grossly clear lungs  Echo: pending  Pro BNP: pending  - non compliance with medication  - continue Coreg, Entresto    Intermittent agitation  High risk for Injury  History of non-compliance with treatment  - Haldol, sitter PRN  - daily EKG while for QTc monitoring while on Haldol    Severe obesity (BMI:63.08)  - encourage weight loss with diet  and lifestyle modification    Code Status: Full  Surrogate Decision Maker: Mino Pickett, sister ([phone])    DVT Prophylaxis: Lovenox  GI Prophylaxis: not indicated    Baseline: lives with family      Subjective:   CHIEF COMPLAINT: right facial droop, right sided weakness    HISTORY OF PRESENT ILLNESS:     Pancho Menendez is a 51 y.o.  male who presents with progressive right facial droop, right sided numbness and weakness that started this past Monday. As per record, symptoms was initially intermittent but got pronounced on the day of admission. Patient also initially related it to his Bell's Palsy. Accompanying symptoms include muscle twitching of his right extremity. EMS brought him as code stroke. Tele-neuro assessment, neurologist noted the twitching movement of patient's right side and Keppra, and Ativan was administered. Patient also got Haldol for increasing agitation and physical aggression. EEG monitoring was started but was discontinued prior to completion as patient is uncooperative. Information obtained from EMS and ED record as patient  is unable to provided information secondary to lethargy s/p Ativan and Haldol administration. Past medical history is significant for CHF, ischemic cardiomyopathy, hypertension and severe obesity. We were asked to admit for work up and evaluation of the above problems. Past Medical History:   Diagnosis Date    Bell's palsy 01/25/2019    Diverticulitis of colon with perforation 01/2018    sigmoid    Diverticulitis of colon with perforation 08/09/2020    Hypertension     Kidney infection     Haresh Hunt syndrome (geniculate herpes zoster)         No past surgical history on file.     Social History     Tobacco Use    Smoking status: Every Day     Packs/day: 1.00     Years: 15.00     Pack years: 15.00     Types: Cigarettes    Smokeless tobacco: Never   Substance Use Topics    Alcohol use: Not Currently     Comment: rarely        Family History   Problem Relation Age of Onset    Hypertension Mother     Cancer Father         multiple myeloma     Allergies   Allergen Reactions    Meclizine Rash        Prior to Admission medications    Medication Sig Start Date End Date Taking? Authorizing Provider   carvediloL (COREG) 3.125 mg tablet Take 1 Tab by mouth two (2) times daily (with meals). Patient not taking: Reported on 3/12/2023 8/18/20   James Vásquez MD   aspirin 81 mg chewable tablet Take 1 Tab by mouth daily. Patient not taking: Reported on 3/12/2023 8/19/20   James Vásquez MD   sacubitril-valsartan (ENTRESTO) 24 mg/26 mg tablet Take 1 Tab by mouth every twelve (12) hours. Patient not taking: Reported on 3/12/2023 8/18/20   Sae Bull NP       REVIEW OF SYSTEMS:     I am not able to complete the review of systems because: The patient is intubated and sedated   y The patient has altered mental status due to his acute medical problems    The patient has baseline aphasia from prior stroke(s)    The patient has baseline dementia and is not reliable historian    The patient is in acute medical distress and unable to provide information           Objective:   VITALS:    Visit Vitals  BP (!) 163/90   Pulse 90   Temp 98 °F (36.7 °C)   Resp 23   Ht 5' 4\" (1.626 m)   Wt (!) 166.7 kg (367 lb 8.1 oz)   SpO2 94%   BMI 63.08 kg/m²       PHYSICAL EXAM:    General:    lethargic, thrashing, uncooperative, no distress, appears stated age. HEENT: Atraumatic, anicteric sclerae, pink conjunctivae     No oral ulcers, mucosa moist, throat clear, dentition fair  Neck:  Supple, symmetrical,  thyroid: non tender  Lungs:   Clear to auscultation bilaterally. No Wheezing or Rhonchi. No rales. Chest wall:  No tenderness  Intermittent accessory muscle use with agitation. Heart:   Regular  rhythm,  No  murmur   No edema  Abdomen:   Soft, non-tender. Not distended. Bowel sounds normal  Extremities: No cyanosis.   No clubbing,      Skin turgor normal, Capillary refill normal, Radial dial pulse 2+  Skin:     Not pale. Not Jaundiced  No rashes   Psych:  Intermittent period of extreme agitation  Neurologic:     right sided facial asymmetry. Right sided hemiparesis. Lethargic, unable to assess orientation  _______________________________________________________________________  Care Plan discussed with:    Comments   Patient     Family      RN y    Care Manager                    Consultant:      _______________________________________________________________________  Expected  Disposition:   Home with Family    HH/PT/OT/RN TBD   SNF/LTC    HOPE    ______________________________________________________      Comments    y Reviewed previous records   >50% of visit spent in counseling and coordination of care  Discussion with patient and/or family and questions answered       ________________________________________________________________________  Signed: Bartolo Moulton NP    Procedures: see electronic medical records for all procedures/Xrays and details which were not copied into this note but were reviewed prior to creation of Plan.     LAB DATA REVIEWED:    Recent Results (from the past 24 hour(s))   EKG, 12 LEAD, INITIAL    Collection Time: 03/12/23  7:56 PM   Result Value Ref Range    Ventricular Rate 92 BPM    Atrial Rate 92 BPM    P-R Interval 180 ms    QRS Duration 110 ms    Q-T Interval 374 ms    QTC Calculation (Bezet) 462 ms    Calculated P Axis 60 degrees    Calculated R Axis -20 degrees    Calculated T Axis 32 degrees    Diagnosis       Sinus rhythm with occasional premature ventricular complexes  Possible Left atrial enlargement  Septal infarct , age undetermined  When compared with ECG of 17-AUG-2020 09:25,  T wave inversion no longer evident in Inferior leads     GLUCOSE, POC    Collection Time: 03/12/23  8:15 PM   Result Value Ref Range    Glucose (POC) 116 65 - 117 mg/dL    Performed by Zeynep KAMINSKIN    CBC WITH AUTOMATED DIFF    Collection Time: 03/12/23  8:29 PM Result Value Ref Range    WBC 7.4 4.1 - 11.1 K/uL    RBC 6.75 (H) 4.10 - 5.70 M/uL    HGB 16.2 12.1 - 17.0 g/dL    HCT 48.3 36.6 - 50.3 %    MCV 71.6 (L) 80.0 - 99.0 FL    MCH 24.0 (L) 26.0 - 34.0 PG    MCHC 33.5 30.0 - 36.5 g/dL    RDW 20.5 (H) 11.5 - 14.5 %    PLATELET 124 155 - 773 K/uL    MPV 9.6 8.9 - 12.9 FL    NRBC 0.0 0  WBC    ABSOLUTE NRBC 0.00 0.00 - 0.01 K/uL    NEUTROPHILS 43 32 - 75 %    LYMPHOCYTES 43 12 - 49 %    MONOCYTES 10 5 - 13 %    EOSINOPHILS 4 0 - 7 %    BASOPHILS 1 0 - 1 %    IMMATURE GRANULOCYTES 0 0.0 - 0.5 %    ABS. NEUTROPHILS 3.2 1.8 - 8.0 K/UL    ABS. LYMPHOCYTES 3.2 0.8 - 3.5 K/UL    ABS. MONOCYTES 0.7 0.0 - 1.0 K/UL    ABS. EOSINOPHILS 0.3 0.0 - 0.4 K/UL    ABS. BASOPHILS 0.1 0.0 - 0.1 K/UL    ABS. IMM. GRANS. 0.0 0.00 - 0.04 K/UL    DF AUTOMATED     METABOLIC PANEL, COMPREHENSIVE    Collection Time: 03/12/23  8:29 PM   Result Value Ref Range    Sodium 141 136 - 145 mmol/L    Potassium 3.6 3.5 - 5.1 mmol/L    Chloride 108 97 - 108 mmol/L    CO2 28 21 - 32 mmol/L    Anion gap 5 5 - 15 mmol/L    Glucose 140 (H) 65 - 100 mg/dL    BUN 11 6 - 20 MG/DL    Creatinine 0.97 0.70 - 1.30 MG/DL    BUN/Creatinine ratio 11 (L) 12 - 20      eGFR >60 >60 ml/min/1.73m2    Calcium 8.6 8.5 - 10.1 MG/DL    Bilirubin, total 0.8 0.2 - 1.0 MG/DL    ALT (SGPT) 39 12 - 78 U/L    AST (SGOT) 45 (H) 15 - 37 U/L    Alk.  phosphatase 97 45 - 117 U/L    Protein, total 7.4 6.4 - 8.2 g/dL    Albumin 3.3 (L) 3.5 - 5.0 g/dL    Globulin 4.1 (H) 2.0 - 4.0 g/dL    A-G Ratio 0.8 (L) 1.1 - 2.2     PROTHROMBIN TIME + INR    Collection Time: 03/12/23  8:29 PM   Result Value Ref Range    INR 1.0 0.9 - 1.1      Prothrombin time 10.6 9.0 - 11.1 sec   TROPONIN-HIGH SENSITIVITY    Collection Time: 03/12/23  8:29 PM   Result Value Ref Range    Troponin-High Sensitivity 24 0 - 76 ng/L

## 2023-03-13 NOTE — PROGRESS NOTES
Responded to Rapid response,pt. SpO2 = 95-99 on RA. No respiratory interventions needed at this time.

## 2023-03-13 NOTE — ED PROVIDER NOTES
[facility] EMERGENCY DEPT  EMERGENCY DEPARTMENT ENCOUNTER       Pt Name: Nicole Guzman  MRN: [id number]  [place] 1975  Date of evaluation: 3/12/2023  Provider: Kailee Walker MD   PCP: Zachery Mir MD  Note Started: 8:33 PM 3/12/23     CHIEF COMPLAINT       Chief Complaint   Patient presents with    Facial Droop        HISTORY OF PRESENT ILLNESS: 1 or more elements      History From: Patient and EMS  HPI Limitations : None     Nicole Guzman is a 51 y.o. male who presents presenting with severe sudden onset of right-sided weakness. Patient reports he has had stuttering weakness and numbness on the right side of his body since Monday, approximately 6 days ago. However most of the symptoms have come and gone during that time. He also has noticed some twitching in the right upper extremity over the last couple weeks but he has not seen a doctor for this. He denies any headaches or fevers. He arrives by EMS with flaccid right-sided paralysis, he reports that is worsened suddenly today. However, he has had significant right-sided symptoms throughout the week. His blood sugar is 117. His initial blood pressure was 175/105. Nursing Notes were all reviewed and agreed with or any disagreements were addressed in the HPI. REVIEW OF SYSTEMS      Review of Systems     Positives and Pertinent negatives as per HPI. PAST HISTORY     Past Medical History:  Past Medical History:   Diagnosis Date    Bell's palsy 01/25/2019    Diverticulitis of colon with perforation 01/2018    sigmoid    Diverticulitis of colon with perforation 08/09/2020    Hypertension     Kidney infection     Haresh Hunt syndrome (geniculate herpes zoster)        Past Surgical History:  No past surgical history on file.     Family History:  Family History   Problem Relation Age of Onset    Hypertension Mother     Cancer Father         multiple myeloma       Social History:  Social History     Tobacco Use    Smoking status: Every Day Packs/day: 1.00     Years: 15.00     Pack years: 15.00     Types: Cigarettes    Smokeless tobacco: Never   Substance Use Topics    Alcohol use: Not Currently     Comment: rarely    Drug use: No       Allergies: Allergies   Allergen Reactions    Meclizine Rash       CURRENT MEDICATIONS      Previous Medications    ASPIRIN 81 MG CHEWABLE TABLET    Take 1 Tab by mouth daily. CARVEDILOL (COREG) 3.125 MG TABLET    Take 1 Tab by mouth two (2) times daily (with meals). SACUBITRIL-VALSARTAN (ENTRESTO) 24 MG/26 MG TABLET    Take 1 Tab by mouth every twelve (12) hours. PHYSICAL EXAM      ED Triage Vitals   ED Encounter Vitals Group      BP       Pulse       Resp       Temp       Temp src       SpO2       Weight       Height         Physical Exam  Constitutional:       General: He is in acute distress. Appearance: He is well-developed. He is obese. He is not ill-appearing. HENT:      Head: Normocephalic and atraumatic. Nose: Nose normal.      Mouth/Throat:      Pharynx: No oropharyngeal exudate. Eyes:      General: No visual field deficit. Conjunctiva/sclera: Conjunctivae normal.      Pupils: Pupils are equal, round, and reactive to light. Cardiovascular:      Rate and Rhythm: Normal rate and regular rhythm. Heart sounds: Normal heart sounds. Pulmonary:      Effort: Pulmonary effort is normal.      Breath sounds: Normal breath sounds. No wheezing or rales. Abdominal:      General: Bowel sounds are normal.      Palpations: Abdomen is soft. Tenderness: There is no abdominal tenderness. There is no guarding or rebound. Musculoskeletal:         General: No tenderness or deformity. Normal range of motion. Cervical back: Normal range of motion and neck supple. Skin:     General: Skin is warm and dry. Neurological:      Mental Status: He is alert and oriented to person, place, and time. GCS: GCS eye subscore is 4. GCS verbal subscore is 5. GCS motor subscore is 6. Cranial Nerves: Facial asymmetry present. Sensory: Sensory deficit present. Motor: Seizure activity and pronator drift present. Coordination: Coordination normal.      Comments: Severe flaccid right-sided weakness in the arm and leg. Severe weakness of the right side of the face with sparing of the upper branch of the facial muscles above the. Psychiatric:         Behavior: Behavior normal.          DIAGNOSTIC RESULTS   LABS:     Recent Results (from the past 12 hour(s))   EKG, 12 LEAD, INITIAL    Collection Time: 03/12/23  7:56 PM   Result Value Ref Range    Ventricular Rate 92 BPM    Atrial Rate 92 BPM    P-R Interval 180 ms    QRS Duration 110 ms    Q-T Interval 374 ms    QTC Calculation (Bezet) 462 ms    Calculated P Axis 60 degrees    Calculated R Axis -20 degrees    Calculated T Axis 32 degrees    Diagnosis       Sinus rhythm with occasional premature ventricular complexes  Possible Left atrial enlargement  Septal infarct , age undetermined  When compared with ECG of 17-AUG-2020 09:25,  T wave inversion no longer evident in Inferior leads     GLUCOSE, POC    Collection Time: 03/12/23  8:15 PM   Result Value Ref Range    Glucose (POC) 116 65 - 117 mg/dL    Performed by Camille KAMINSKIN    CBC WITH AUTOMATED DIFF    Collection Time: 03/12/23  8:29 PM   Result Value Ref Range    WBC 7.4 4.1 - 11.1 K/uL    RBC 6.75 (H) 4.10 - 5.70 M/uL    HGB 16.2 12.1 - 17.0 g/dL    HCT 48.3 36.6 - 50.3 %    MCV 71.6 (L) 80.0 - 99.0 FL    MCH 24.0 (L) 26.0 - 34.0 PG    MCHC 33.5 30.0 - 36.5 g/dL    RDW 20.5 (H) 11.5 - 14.5 %    PLATELET 608 028 - 128 K/uL    MPV 9.6 8.9 - 12.9 FL    NRBC 0.0 0  WBC    ABSOLUTE NRBC 0.00 0.00 - 0.01 K/uL    NEUTROPHILS 43 32 - 75 %    LYMPHOCYTES 43 12 - 49 %    MONOCYTES 10 5 - 13 %    EOSINOPHILS 4 0 - 7 %    BASOPHILS 1 0 - 1 %    IMMATURE GRANULOCYTES 0 0.0 - 0.5 %    ABS. NEUTROPHILS 3.2 1.8 - 8.0 K/UL    ABS. LYMPHOCYTES 3.2 0.8 - 3.5 K/UL    ABS.  MONOCYTES 0.7 0.0 - 1.0 K/UL    ABS. EOSINOPHILS 0.3 0.0 - 0.4 K/UL    ABS. BASOPHILS 0.1 0.0 - 0.1 K/UL    ABS. IMM. GRANS. 0.0 0.00 - 0.04 K/UL    DF AUTOMATED     METABOLIC PANEL, COMPREHENSIVE    Collection Time: 03/12/23  8:29 PM   Result Value Ref Range    Sodium 141 136 - 145 mmol/L    Potassium 3.6 3.5 - 5.1 mmol/L    Chloride 108 97 - 108 mmol/L    CO2 28 21 - 32 mmol/L    Anion gap 5 5 - 15 mmol/L    Glucose 140 (H) 65 - 100 mg/dL    BUN 11 6 - 20 MG/DL    Creatinine 0.97 0.70 - 1.30 MG/DL    BUN/Creatinine ratio 11 (L) 12 - 20      eGFR >60 >60 ml/min/1.73m2    Calcium 8.6 8.5 - 10.1 MG/DL    Bilirubin, total 0.8 0.2 - 1.0 MG/DL    ALT (SGPT) 39 12 - 78 U/L    AST (SGOT) 45 (H) 15 - 37 U/L    Alk. phosphatase 97 45 - 117 U/L    Protein, total 7.4 6.4 - 8.2 g/dL    Albumin 3.3 (L) 3.5 - 5.0 g/dL    Globulin 4.1 (H) 2.0 - 4.0 g/dL    A-G Ratio 0.8 (L) 1.1 - 2.2     PROTHROMBIN TIME + INR    Collection Time: 03/12/23  8:29 PM   Result Value Ref Range    INR 1.0 0.9 - 1.1      Prothrombin time 10.6 9.0 - 11.1 sec   TROPONIN-HIGH SENSITIVITY    Collection Time: 03/12/23  8:29 PM   Result Value Ref Range    Troponin-High Sensitivity 24 0 - 76 ng/L        EKG: Interpreted by me, PVCs, no ST elevations or depressions. RADIOLOGY:  Non-plain film images such as CT, Ultrasound and MRI are read by the radiologist. Plain radiographic images are visualized and preliminarily interpreted by the ED Provider with the below findings:     Chest x-ray: Interpreted by me, no pulmonary edema, cardiomegaly     Interpretation per the Radiologist below, if available at the time of this note:     XR CHEST PORT    Result Date: 3/12/2023  INDICATION: Infarct. Portable AP view of the chest. Direct comparison made to prior chest x-ray dated June 2009. There is new, moderate enlargement of the cardiac silhouette. Lungs are grossly clear. No pleural fluid is visualized. There is no pneumothorax. New, moderate cardiomegaly. Grossly clear lungs. CTA CODE NEURO HEAD AND NECK W CONT    Result Date: 3/12/2023  CLINICAL HISTORY: Code neuro EXAMINATION:  CT ANGIOGRAPHY HEAD AND NECK, CT PERFUSION COMPARISON: MRI brain March 2019 TECHNIQUE:  Following the uneventful administration of iodinated contrast material, axial CT angiography of the head and neck was performed. Delayed axial images through the head were also obtained. Coronal and sagittal reconstructions were obtained. Manual postprocessing of images was performed. 3-D  Sagittal maximal intensity projection images were obtained. 3-D Coronal maximal intensity projections were obtained. CT brain perfusion was performed with generation of hemodynamic maps of multiple parameters, including cerebral blood flow, cerebral blood volume, mean transit time, and TMAX. CT dose reduction was achieved through use of a standardized protocol tailored for this examination and automatic exposure control for dose modulation. This study was analyzed by the [address] ai algorithm. FINDINGS: Delayed contrast-enhanced head CT: Chronic left centrum semiovale hypodensity likely sequela of chronic microvascular angiopathy. Ventricles and sulci are normal in size and configuration. Basal cisterns are clear. No evidence of hemorrhage. Paranasal sinuses are clear. Orbits and globes are within normal limits. CTA NECK: Great vessels: Normal arch anatomy with the origins patent. Right subclavian artery: Patent Left subclavian artery: Patent Right common carotid artery: Patent Left common carotid artery: Patent Cervical right internal carotid artery: Patent with no significant stenosis by NASCET criteria. Cervical left internal carotid artery: Patent with no significant stenosis by NASCET criteria. Right vertebral artery: Patent Left vertebral artery: Patent The lung apices are clear. The thyroid is homogeneous. No cervical lymphadenopathy.  CTA HEAD: Right cavernous internal carotid artery: Patent Left cavernous internal carotid artery: Patent Anterior cerebral arteries: Patent Anterior communicating artery: Patent Right middle cerebral artery: Patent Left middle cerebral artery: Patent Posterior communicating arteries: Diminutive Posterior cerebral arteries: Patent Basilar artery: Patent Distal vertebral arteries: Patent No evidence for intracranial aneurysm or hemodynamically significant stenosis. CT Perfusion: 15 cc core infarct in the left frontal lobe with 81 cc ischemic penumbra in the bilateral frontal lobes right greater than left. 1.  No large vessel occlusion or hemodynamically significant carotid stenosis. 2.  Chronic left centrum semiovale hypodensity likely sequela of chronic microvascular angiopathy. 3.  Decrease cerebral perfusion in the bilateral periventricular white matter with 81 cc area of ischemic tissue in the bilateral frontal lobes right greater than left without underlying vascular lesion to explain this perfusion abnormality. CT CODE NEURO HEAD WO CONTRAST    Result Date: 3/12/2023  EXAM: CT CODE NEURO HEAD WO CONTRAST INDICATION: Code Stroke COMPARISON: 2/1/2019. CONTRAST: None. TECHNIQUE: Unenhanced CT of the head was performed using 5 mm images. Brain and bone windows were generated. Coronal and sagittal reformats. CT dose reduction was achieved through use of a standardized protocol tailored for this examination and automatic exposure control for dose modulation. FINDINGS: The ventricles and sulci are normal in size, shape and configuration. There is asymmetric white matter disease in the left periventricular white matter, extending into the left external capsule, similar to that previously seen. . There is no intracranial hemorrhage, extra-axial collection, or mass effect. The basilar cisterns are open. No CT evidence of acute infarct. The bone windows demonstrate no abnormalities.  The visualized portions of the paranasal sinuses and mastoid air cells are remarkable for mucus retention cyst in the right maxillary sinus. No acute intracranial process seen    CT CODE NEURO PERF W CBF    Result Date: 3/12/2023  CLINICAL HISTORY: Code neuro EXAMINATION:  CT ANGIOGRAPHY HEAD AND NECK, CT PERFUSION COMPARISON: MRI brain March 2019 TECHNIQUE:  Following the uneventful administration of iodinated contrast material, axial CT angiography of the head and neck was performed. Delayed axial images through the head were also obtained. Coronal and sagittal reconstructions were obtained. Manual postprocessing of images was performed. 3-D  Sagittal maximal intensity projection images were obtained. 3-D Coronal maximal intensity projections were obtained. CT brain perfusion was performed with generation of hemodynamic maps of multiple parameters, including cerebral blood flow, cerebral blood volume, mean transit time, and TMAX. CT dose reduction was achieved through use of a standardized protocol tailored for this examination and automatic exposure control for dose modulation. This study was analyzed by the [address] ai algorithm. FINDINGS: Delayed contrast-enhanced head CT: Chronic left centrum semiovale hypodensity likely sequela of chronic microvascular angiopathy. Ventricles and sulci are normal in size and configuration. Basal cisterns are clear. No evidence of hemorrhage. Paranasal sinuses are clear. Orbits and globes are within normal limits. CTA NECK: Great vessels: Normal arch anatomy with the origins patent. Right subclavian artery: Patent Left subclavian artery: Patent Right common carotid artery: Patent Left common carotid artery: Patent Cervical right internal carotid artery: Patent with no significant stenosis by NASCET criteria. Cervical left internal carotid artery: Patent with no significant stenosis by NASCET criteria. Right vertebral artery: Patent Left vertebral artery: Patent The lung apices are clear. The thyroid is homogeneous. No cervical lymphadenopathy.  CTA HEAD: Right cavernous internal carotid artery: Patent Left cavernous internal carotid artery: Patent Anterior cerebral arteries: Patent Anterior communicating artery: Patent Right middle cerebral artery: Patent Left middle cerebral artery: Patent Posterior communicating arteries: Diminutive Posterior cerebral arteries: Patent Basilar artery: Patent Distal vertebral arteries: Patent No evidence for intracranial aneurysm or hemodynamically significant stenosis. CT Perfusion: 15 cc core infarct in the left frontal lobe with 81 cc ischemic penumbra in the bilateral frontal lobes right greater than left. 1.  No large vessel occlusion or hemodynamically significant carotid stenosis. 2.  Chronic left centrum semiovale hypodensity likely sequela of chronic microvascular angiopathy. 3.  Decrease cerebral perfusion in the bilateral periventricular white matter with 81 cc area of ischemic tissue in the bilateral frontal lobes right greater than left without underlying vascular lesion to explain this perfusion abnormality.        PROCEDURES   Unless otherwise noted below, none  Critical Care  Performed by: Maxwell Shah MD  Authorized by: Maxwell Shah MD     Critical care provider statement:     Critical care time (minutes):  90    Critical care time was exclusive of:  Separately billable procedures and treating other patients and teaching time    Critical care was necessary to treat or prevent imminent or life-threatening deterioration of the following conditions:  CNS failure or compromise    Critical care was time spent personally by me on the following activities:  Development of treatment plan with patient or surrogate, review of old charts, pulse oximetry, re-evaluation of patient's condition, ordering and performing treatments and interventions, ordering and review of laboratory studies, ordering and review of radiographic studies, evaluation of patient's response to treatment and examination of patient    Care discussed with: admitting provider       CRITICAL CARE TIME   90    EMERGENCY DEPARTMENT COURSE and DIFFERENTIAL DIAGNOSIS/MDM   Vitals:    Vitals:    03/12/23 2232 03/12/23 2249 03/12/23 2300 03/12/23 2329   BP: (!) 203/104 (!) 183/125 (!) 171/102 (!) 163/90   Pulse: 99 (!) 102 94 90   Resp: 26 16 24 23   Temp:   98 °F (36.7 °C)    SpO2: (!) 86% 94% 94%    Weight:       Height:            Patient was given the following medications:  Medications   aspirin chewable tablet 324 mg (has no administration in time range)   acetaminophen (TYLENOL) tablet 650 mg (has no administration in time range)   ondansetron (ZOFRAN) injection 4 mg (has no administration in time range)   iopamidoL (ISOVUE-370) 370 mg iodine /mL (76 %) injection 100 mL (100 mL IntraVENous Given 3/12/23 2057)   levETIRAcetam (KEPPRA) injection 2,000 mg (2,000 mg IntraVENous Given 3/12/23 2034)   0.9% sodium chloride infusion (150 mL/hr IntraVENous New Bag 3/12/23 2039)   LORazepam (ATIVAN) injection 1 mg (1 mg IntraVENous Given 3/12/23 2038)   levETIRAcetam (KEPPRA) injection 2,000 mg (2,000 mg IntraVENous Given 3/12/23 2258)   LORazepam (ATIVAN) injection 2 mg (2 mg IntraVENous Given 3/12/23 2335)       CONSULTS: (Who and What was discussed)  IP CONSULT TO HOSPITALIST    Chronic Conditions: Cardiomyopathy with ejection fraction 25%, history of alcoholism    Social Determinants affecting Dx or Tx: None    Records Reviewed (source and summary of external notes): Prior medical records and Nursing notes    CC/HPI Summary, DDx, ED Course, and Reassessment: Patient is a 79-year-old male with known history of cardiomyopathy, not on anticoagulation antiplatelet drugs, presenting with acute onset of right-sided deficits. Significant weakness, sensory deficits, NIH stroke scale of 11. A level 1 code stroke was initially activated given concern for last known well of less than 4.5 hours.   Patient was emergently taken to CAT scan to have a CT scan performed which ruled out intracranial hemorrhage. Patient was emergently evaluated by teleneurologist and was found the patient last known well was actually several days ago as he has had stuttering symptoms as well as convulsions in the right upper extremity during this time. Therefore patient was not a TNKase candidate given his delayed presentation. Throughout his stay he continued to have weakness, intermittent twitching on the right upper extremity. There was some concern for seizures and a rapid EEG was performed which showed no seizure activity over the for 2 hours. However patient was still put on prophylactically on IV Keppra, and was monitored closely by her bedside nurse. Plan was to obtain an MRI to rule out acute stroke, patient was not found to have a large vessel occlusion on CT angiogram.  There was deficits on the perfusion study. ED Course as of 03/13/23 0051   Kori Reyes Mar 12, 2023   2038 Considered TNKase, however, given patient starting symptoms last 6 days, findings on CAT scan Per teleneurology, patient risk of TNKase is very high for intracranial bleed and very low for any potential benefit. Patient is therefore not a candidate for TNKase. [CC]   2038 Patient having intermittent twitching of the right upper extremity, concern for seizure activity of the focal nature. Ordering 2 g of Keppra, 1 mg of Ativan, will continue to monitor and reassess. [CC]   2040 Patient's NIH stroke score of 11. [CC]      ED Course User Index  [CC] Grzegorz Monterroso MD       Disposition Considerations (Tests not done, Shared Decision Making, Pt Expectation of Test or Tx.):      FINAL IMPRESSION     1. Acute stroke due to ischemia ([facility] [address]    2. Convulsions, unspecified convulsion type [facility])          DISPOSITION/PLAN   Admitted    Admit Note: Pt is being admitted by hospitalist. The results of their tests and reason(s) for their admission have been discussed with pt and/or available family.  They convey agreement and understanding for the need to be admitted and for the admission diagnosis. I am the Primary Clinician of Record. Maverick Carrillo MD (electronically signed)    (Please note that parts of this dictation were completed with voice recognition software. Quite often unanticipated grammatical, syntax, homophones, and other interpretive errors are inadvertently transcribed by the computer software. Please disregards these errors.  Please excuse any errors that have escaped final proofreading.)

## 2023-03-13 NOTE — ED NOTES
eTRANSFER SBAR NOTE    IP UNIT CALLED NOTE IS READY: Yes Spoke to Susan Read RN     IF there are questions Call transferring nurse (your name) Jasper Estes at phone # [phone]    SITUATION/BACKGROUND:    Patient is being transferred to [facility] Neurology Tele, Room# 143    Patient's Chief Complaint on arrival to ED was Facial Droop and is admitted for Acute Stroke due to Ischemia. CODE STATUS: Full Code    VITAL SIGNS (MUST BE WITHIN 1 HOUR OF TRANSFER TO IP UNIT:    TEMP: 99  PULSE: 104  RESP: 20  BP: 161/113  PAIN SCORE: 0  MEWS: 2     ISOLATION/PRECAUTIONS: No   ISOLATION TYPE: None    Called outstanding consults: No      Are there sign and held orders that need to be released? Yes  Are all STAT orders completed: Yes    STAT labs collected:  None  REPEAT LACTIC ACID DUE? No  TIME DUE: None    Are there any titrating drips? No   If so what? None    The following personal items will be sent with the patient during transfer to the floor: All valuables:   ITEM:    ITEM: Visual Aid: None  ITEM:           ASSESSMENT:    CIWA Assessment: No  Last Score: None    NEURO:   NIH SCORE:   Total: 12 (03/12/23 2040)    [facility] SCREENING:   Swallow Screening  Is the Patient Unable to Remain Alert for Testing?: No (03/12/23 2127)  Is the Patient on a Modified Diet (Thickened Liquids) Due to Pre-existing Dysphagia?: No (03/12/23 2127)  Is There Presence of Existing Enteral Tube Feeding via the Stomach or Nose?: No (03/12/23 2127)  Is There Presence of Head-of-Bed Restrictions (Less than 30 Degrees)?: No (03/12/23 2127)  Is There Presence of Tracheotomy Tube?: No (03/12/23 2127)  Is the Patient Ordered Nothing-by-Mouth Status?: (!) Yes (03/12/23 2127)  3 oz Water Swallow Screen: (!) Fail (03/12/23 2100)  Reason for Fail: Other (comment) (Facial Droop.  Slurred Speech) (03/12/23 2100)      NEURO ASSESSMENT:   Neuro  Neurologic State: Agitated, Alert (03/13/23 0000)  Orientation Level: Oriented X4 (03/13/23 0000)  Cognition: Appropriate decision making, Appropriate for age attention/concentration, Poor safety awareness (03/13/23 0000)  Speech: Slurred (03/13/23 0000)  Assessment L Pupil: Brisk, Round (03/13/23 0000)  Size L Pupil (mm): 2 (03/13/23 0000)  Assessment R Pupil: Brisk, Round (03/13/23 0000)  Size R Pupil (mm): 2 (03/13/23 0000)  LUE Motor Response: Purposeful, Spontaneous  (03/13/23 0000)  LLE Motor Response: Purposeful, Spontaneous  (03/13/23 0000)  RUE Motor Response: No movement to any stimulus (03/13/23 0000)  RLE Motor Response: No movement to any stimulus (03/13/23 0000)    Is patient impulsive? Yes  Is patient oriented? Yes   Do they follow commands? Yes  Is the patient ambulatory? No  Device need None    FALL RISK? Yes   Is the Hallie 1 Assessment completed? Yes  INTERVENTIONS: Gripper Socks, Bed in Lowest Position    INTEGUMENTARY:   IS THE PATIENT UNDRESSED? Yes  WOUNDS PRESENT? No  On admit to ED  None  ARE THE WOUNDS DOCUMENTED? None    RESPIRATORY:   Is patient on Oxygen? No    OXYGEN: Oxygen Therapy  O2 Device: None (Room air) (03/13/23 0154)    CARDIAC:   Is cardiac monitoring ordered? Yes Last Rhythm: Sinus Tachy  Patient to transfer with tele box on? Yes  Is patient using a LIFE VEST? No     LINE ACCESS:   Peripheral IV 03/12/23 Left Antecubital (Active)       Peripheral IV Right Antecubital (Active)   Site Assessment Clean, dry, & intact 03/12/23 2129   Phlebitis Assessment 0 03/12/23 2129   Infiltration Assessment 0 03/12/23 2129   Dressing Status Clean, dry, & intact 03/12/23 2129   Hub Color/Line Status Pink 03/12/23 2129        /GI:   CONTINENT BOWEL/BLADDER? No   URINARY OUTPUT: ngo   Written Order for Ngo Cath? Yes  CHRONIC OR ACUTE? Acute   If CHRONIC, is it 1days old, was it changed prior to specimen collection? None  WAS UA WITH REFLEX SENT TO LAB? No  IF NO,  COLLECT AND SEND PRIOR TO TRANSPORT TO INPATIENT AREA    RESTRAINTS IN USE: No      IS DOCUMENTATION COMPLETE:  None  Is there a current Order?  No  When does it ?  None    Additional details as Needed:

## 2023-03-13 NOTE — ED NOTES
Headband: applied  Date/Time: 03/12/2023 at 2343  Recorder: recording started  Skin: Intact  Highest Seizure [place] Percentage past hour: None (Applied. General info regarding Seizure [place] %:  Minimum duration of study is 2 hours. If Seizure [place] has remained 0% throughout the entire 2-hour duration, communicate with provider to stop the recording. Seizure [place] 0-10% - Continue to monitor and complete 2-hour study. Seizure [place] 11-89% - Epileptiform activity present. Notify provider for next steps. Seizure [place] >/= 90% - Epileptiform activity consistent with Status Epilepticus. Immediately notify provider. *Patients with Seizure [place] above 10% that persists may require a study longer than 2 hours. Maximum recording duration is 24 hours. Please update provider with a persistent increase in Seizure [place] above 10%.

## 2023-03-13 NOTE — PROCEDURES
EEG REPORT    Patient Name: Lian Cortes  : 1975  Age: 51 y.o. Ordering physician: Dr. Doug Sams  Date of EEG: 3/13/2023   14:22 - 14:42  Diagnosis: encephalopathy, convulsion  Interpreting physician: MULUGETA HaysN    Procedure: EEG    CLINICAL INDICATION: The patient is a 51 y.o. male who is being evaluated for baseline electro cerebral activities and to rule out seizure focus.       Current Facility-Administered Medications   Medication Dose Route Frequency    acetaminophen (TYLENOL) tablet 650 mg  650 mg Oral Q6H PRN    ondansetron (ZOFRAN) injection 4 mg  4 mg IntraVENous Q4H PRN    acetaminophen (TYLENOL) tablet 650 mg  650 mg Oral Q4H PRN    Or    acetaminophen (TYLENOL) solution 650 mg  650 mg Per NG tube Q4H PRN    Or    acetaminophen (TYLENOL) suppository 650 mg  650 mg Rectal Q4H PRN    [Held by provider] aspirin chewable tablet 81 mg  81 mg Oral DAILY    [Held by provider] clopidogreL (PLAVIX) tablet 75 mg  75 mg Oral DAILY    bisacodyL (DULCOLAX) suppository 10 mg  10 mg Rectal DAILY PRN    enoxaparin (LOVENOX) injection 40 mg  40 mg SubCUTAneous Q12H    [Held by provider] carvediloL (COREG) tablet 3.125 mg  3.125 mg Oral BID WITH MEALS    [Held by provider] sacubitriL-valsartan (ENTRESTO) 24-26 mg tablet 1 Tablet  1 Tablet Oral Q12H    labetaloL (NORMODYNE;TRANDATE) injection 10 mg  10 mg IntraVENous Q1H PRN    haloperidol lactate (HALDOL) injection 5 mg  5 mg IntraMUSCular Q6H PRN    LORazepam (ATIVAN) injection 1 mg  1 mg IntraVENous ONCE    niCARdipine (CARDENE) 25 mg in 0.9% sodium chloride 250 mL (Nuli2Aiu)  5-15 mg/hr IntraVENous TITRATE    albuterol-ipratropium (DUO-NEB) 2.5 MG-0.5 MG/3 ML  3 mL Nebulization Q4H RT    dexmedeTOMidine in 0.9 % NaCl (PRECEDEX) 400 mcg/100 mL (4 mcg/mL) infusion soln  0.1-1.5 mcg/kg/hr IntraVENous TITRATE    aspirin (ASA) suppository 300 mg  300 mg Rectal DAILY           DESCRIPTION OF PROCEDURE:     This is a digitally recorded electroencephalogram  Electrodes were applied in accordance with the international 10-20 system of electrode placement. 18 channels of scalp EEG are recorded  A channel was used for EoG  Another channel was used for ECG   The data is stored digitally and reviewed in reformatted montages for optimal display  EEG  was reviewed in both bipolar and referential montages    Description of Activity: The background of this recording contains a posteriorly-located occipital alpha rhythm of 9-10 hz that attenuates with eye opening. This was seen maximally over the posterior head region and was symmetric. There was a small amount of beta activity seen. Throughout the recording, there were no clear areas of focal slowing nor spike or spike-and-wave discharges seen. Hyperventilation was not performed. Photic stimulation produced no response in the posterior head regions. During the recording, the patient did not achieve stage II sleep      Clinical Interpretation: This EEG, performed during wakefulness, is normal.  There was no clear focal abnormalities or epileptiform activity. A normal EEG doesn't not rule out seizures. Clinical correlation recommended. MULUGETA Brooks

## 2023-03-13 NOTE — PROGRESS NOTES
P&T-Approved DVT Prophylaxis Dosing    Per P&T Committee-approved protocol lovenox 160 mg daily has been adjusted to lovenox 40 mg bid based on weight and renal function as shown in the table below.          ALFREDO HaskinsD

## 2023-03-13 NOTE — ED NOTES
Headband: removed  Date/Time: 03/13/2023 at [address]: recording stopped  Skin: Intact  Highest Seizure [place] Percentage past hour: Unknown. Patient has REMOVED device and refuses to allow the nurse to replace. Tj Owens MD notified. General info regarding Seizure [place] %:  Minimum duration of study is 2 hours. If Seizure [place] has remained 0% throughout the entire 2-hour duration, communicate with provider to stop the recording. Seizure [place] 0-10% - Continue to monitor and complete 2-hour study. Seizure [place] 11-89% - Epileptiform activity present. Notify provider for next steps. Seizure [place] >/= 90% - Epileptiform activity consistent with Status Epilepticus. Immediately notify provider. *Patients with Seizure [place] above 10% that persists may require a study longer than 2 hours. Maximum recording duration is 24 hours. Please update provider with a persistent increase in Seizure [place] above 10%.

## 2023-03-13 NOTE — PROGRESS NOTES
Physical Therapy Note    Patient is unable to follow commands and demonstrates restlessness and agitation. Spoke with RN who agrees to hold acute PT evaluation at this time.

## 2023-03-13 NOTE — PROGRESS NOTES
I saw and evaluated the patient, performing the key elements of the service the morning of 3/13. I discussed the overnight findings, assessment and plan with the NP in the morning of 3/13 and agree with the NP's findings and plan as documented in the NP's note. -Patient continued to be restless and intermittently agitated on the floor, I transferred patient to ICU for further care.   -He is confused and disoriented, does not follow commands. -CT head showed changes suggestive of previus strokes. -Will obtain MRI. -Neurology consulted. -Serial neuro exams.  -EEG. -Avoid sedating meds. -Continue precedex. Eleazar Mcgregor MD  Critical care.

## 2023-03-13 NOTE — ED TRIAGE NOTES
2007: Patient arrives to ED Room 25    2008: MD paged to bedside. Patient has obvious right side facial droop. 2010Samantha Irvin MD arrives to bedside. EMS providing report to MD. Evie Houser RN paged to bedside. 2011: Code S, Level 1 paged overhead. 2013: Nurse arrives to CT with patient. MD [employer] RN present. 2015: Blood glucose obtained 116    2021: Olamide Segovia MD (Tele Neuro) on screen. Marlen Chow MD present in CT. *verbal orders given to obtain medication for seizure activity. 2040: AMR arrives to CT at this time. (Possible transfer to [facility])    2045: Patient returns to CT scanner at this time. Patient is anxious and requiring coaching at this time. Delay in CT.     2053: CT scan is finish. 2055: Patient arrives in ED Room 25. EEG machine is in use in another room at this time. Marlen Chow MD notified. 2056: ECG obtained. 2145: Call completed to Marlen Chow MD to inquire about patient's blood pressure. MD advised to reports a blood pressure OVER 220/120.

## 2023-03-13 NOTE — PROGRESS NOTES
RAPID RESPONSE TEAM    Overhead rapid response paged to room # 143/01  at  0811    Reason for rapid response:  Agitation    Initial assessment:   Upon my arrival, patient is laying face down on stomach attempting to roll over. Patient appears agitated with increased work of breathing. HR 140s, /130. After assisting patient with turn, patient appeared more comfortable with decreased respiratory effort, HR and BP.     NP, Von  at bedside, orders received for the following Interventions:     - PRN Haldol   - ICU Consult    NP Renay (Intensivist) at bedside, per NP no need to transfer to ICU at this time but did recommend transferring to SDU for closer monitoring. Outcome:   pt to transfer to PCU once room is available.      Please call with any questions or concerns    Latonia Coughlin RN  Rapid Response Team  Ext [phone]     Recent Results (from the past 8 hour(s))   URINALYSIS W/ REFLEX CULTURE    Collection Time: 03/13/23  2:15 AM    Specimen: Urine   Result Value Ref Range    Color YELLOW/STRAW      Appearance CLEAR CLEAR      Specific gravity 1.010 1.003 - 1.030      pH (UA) 7.0 5.0 - 8.0      Protein TRACE (A) NEG mg/dL    Glucose Negative NEG mg/dL    Ketone Negative NEG mg/dL    Bilirubin Negative NEG      Blood MODERATE (A) NEG      Urobilinogen 1.0 0.2 - 1.0 EU/dL    Nitrites Negative NEG      Leukocyte Esterase Negative NEG      UA:UC IF INDICATED CULTURE NOT INDICATED BY UA RESULT CNI      WBC 0-4 0 - 4 /hpf    RBC  0 - 5 /hpf    Epithelial cells FEW FEW /lpf    Bacteria Negative NEG /hpf    Hyaline cast 0-2 0 - 2 /lpf   NT-PRO BNP    Collection Time: 03/13/23  3:06 AM   Result Value Ref Range    NT pro- (H) <125 PG/ML

## 2023-03-13 NOTE — PROGRESS NOTES
Patient is unable to follow commands and demonstrates restlessness and agitation.  Spoke with RN who agrees to hold acute OT evaluation at this time

## 2023-03-13 NOTE — ED NOTES
2011: Teleneurology consult placed. 2015: [facility] contacted for LVO transfer standby. ETA 2040.

## 2023-03-13 NOTE — PROGRESS NOTES
1900: Bedside shift change report given to MARCI Bonilla (oncoming nurse) by Diandra Vitale RN (offgoing nurse). Report included the following information SBAR.     2000: Assessment complete. Pt disoriented x 4. Pt does not follow commands. Pt only moans. Pt moves LUE and LLE spontaneously. Pt has no movement for RUE and RLE. Pt is in sinus rhythm. Radial and pedal pulses are palpable bilaterally. Pt is on 4L NC with O2 sats in low- to mid-90s. Pt's lung sounds are coarse and diminished in the bases bilaterally. Pt has expiratory wheezing noted on L lung. Pt has active bowel sounds with obese semi-soft abdomen. 0000: Reassessment complete. No changes noted. 0300: Pt maintaning O2 sats in low- to mid-90s, but RR increased to 30s-40s. Pt continues to have expiratory wheezing. Juancarlos RT notified and will assess pt/ administer PRN neb. Pt swinging at staff, removing NC, gown, and tele leads. Order received for L soft wrist restraint per Brooke Segovia NP.     0400: Reassessment complete. Pt intermittently following commands. When asked to smile, pt smiled and demonstrated a noticeable right facial droop. Pt asked for water, this writer provided pt with mouth swabs d/t NPO status. 0600: Pt appearing to follow commands more consistently. Pt appears less agitated/ restless. 0700: Bedside shift change report given to Diandra Vitale RN (oncoming nurse) by Lyndsey Flor RN (offgoing nurse). Report included the following information SBAR.

## 2023-03-13 NOTE — ED NOTES
Cardiac monitor alarming, nurse to bedside. Patient has removed his Cerebell at this time and refuses replace it back on. Patient has removed his pulse oxygen and ECG leads. Patient is sliding to the edge to the bed. Patient advised IF he gets on the floor there is NO one to pick him up. Patient reports he left [facility] AMA and he will leave here. Patient lowered himself to the floor. MD notified. Charge RN notified. 0100: Charge RN notified Nursing Supervisor who responded to the ED with additional staff. ED staff on hand, Rad Tech at bedside along with Security. Patient placed on jimenez stretcher & returned to bed.

## 2023-03-13 NOTE — ROUTINE PROCESS
End of Shift Note    Bedside shift change report given to Nely Moody RN (oncoming nurse) by Kiana Baez RN (offgoing nurse). Report included the following information SBAR, Kardex, Intake/Output, and MAR    Shift worked:  Nights   Shift summary and any significant changes:     New admit from ED. Pt drowsy but severely agitated and restless. Sitter and nurse at bedside attempting to redirect, but pt is still very restless. Rapid called due to safety issues and agitation medications not responding. Orders to be transferred to PCU. Pt is drowsy and sitter is still at bedside. Concerns for physician to address:  Agitation and safety   Zone phone for oncoming shift:        Patient Information  Oneil Clark  51 y.o.  3/12/2023  8:04 PM by Roc Houser was admitted from Home    Problem List  Patient Active Problem List    Diagnosis Date Noted    Right hemiplegia ([address] 03/13/2023    Ischemic stroke ([address] 03/13/2023    Dilated cardiomyopathy ([address] 08/17/2020    Hypertension 08/14/2020    Class 3 severe obesity in adult [facility]) 08/12/2020    Diverticulitis of colon with perforation 01/23/2018    Diverticulitis 01/23/2018     Past Medical History:   Diagnosis Date    Bell's palsy 01/25/2019    Diverticulitis of colon with perforation 01/2018    sigmoid    Diverticulitis of colon with perforation 08/09/2020    Hypertension     Kidney infection     [place] syndrome (geniculate herpes zoster)        Core Measures:  CVA: Yes Yes  CHF:No No  PNA:No No    Activity:     Number times ambulated in hallways past shift: 0  Number of times OOB to chair past shift: 0    Cardiac:   Cardiac Monitoring: Yes      Cardiac Rhythm: Sinus Tachy    Access:   Current line(s): PIV   Central Line? No Placement date    Reason Medically Necessary     PICC LINE? No Placement date   Reason Medically Necessary       Genitourinary:   Urinary status: incontinent  Urinary Catheter?  Yes Placement Date    Reason Medically Necessary     Respiratory:   O2 Device: None (Room air)  Chronic home O2 use?: NO  Incentive spirometer at bedside: N/A       GI:     Current diet:  DIET NPO  Passing flatus: YES  Tolerating current diet: YES       Pain Management:   Patient states pain is manageable on current regimen: N/A    Skin:  Ricardo Score: 17  Interventions: float heels and PT/OT consult    Patient Safety:  Fall Score:    Interventions: bed/chair alarm, gripper socks, and sitter at bedside      @Rollbelt  @dexterity to release roll belt  Yes/No ( must document dexterity  here by stating Yes or No here, otherwise this is a restraint and must follow restraint documentation policy.)    DVT prophylaxis:  DVT prophylaxis Med- Not applicable  DVT prophylaxis SCD or TD- Refused     Wounds: (If Applicable)  Wounds- No  Location       Active Consults:  IP CONSULT TO HOSPITALIST  IP CONSULT TO NEUROLOGY    Length of Stay:  Expected LOS: - - -  Actual LOS: 0  Discharge Plan: Yes         Lefty Bush RN

## 2023-03-13 NOTE — ED NOTES
Patient has become agitated and appears angry at this time. Staff has repositioned patient into a 90 degree sitting position at his request. Patient is requesting to be placed right side lying. Patient educated on the risk on right side lying. Patient reports he will continue to scream and disturb all the other patient's if he can not have his way. Patient advised that his behavior is unacceptable and could result in harm to himself. *Patient has placed his left leg on the side rail and is attempting to turn himself. Patient advised this action is unsafe and could result in the stretcher turning and him falling.

## 2023-03-13 NOTE — CONSULTS
Name: Asmita Pabon   : 1975   MRN: [id number]   Date: 3/13/2023        Chief Complaint   Patient presents with    Facial Droop         HPI:     50 y/o M pt hypertension presents to ED with cc of right sided weakness for 6 days. Code stroke called. CTH done, negative for bleed and LVO but did show \"Decrease cerebral perfusion in the bilateral periventricular white matter with 81 cc area of ischemic tissue in the bilateral frontal lobes right greater  than left without underlying vascular lesion to explain this perfusion abnormality. \"  Tele neuro consulted but not a candidate for TNK due to [address]. While in ED had episode of twitching for which he got Keppra and Ativan. Admitted to the neuro tele unit under the hospitalist service. Rapid response called around 4:15 am for agitation. Given 2 mg ativan and ICU consulted for input. Upon my arrival pt was sleeping/somnolent but arrousable to light noxious stimuli with some spontaneous movement. Once awake alert and oriented to name and place. As per RN, prior to my arrival pt was very agitated bc he got stuck on his stomach and was unable to roll himself back over due to his right sided weakness. Now pt is calm. He is still protecting his airway. BP stbale, HR stable, SPO2 96%. Active Problems Being Managed: Altered mental status   Stroke   Agitation    Assessment/Plan:     Altered mental status due to Stroke   Agitation  -neurology consult  -daily aspirin  -not a candidate for TPA as per neuro telel due to time of onset  -permissive hypertension, goal BP <210/110, PRN labetalol for BP higher then that  -if Qtc remains <500, given PRN Haldol for severe agitation  -please re-consult ICU for pt's medical condition deteriorates    Rest of medical plan as per primary team          Disposition:  Pt is still confused but calm, HDS, and protecting his airway. He does not appear to have any critical care needs at this time.   Would recommend transfer to step down unit for closer monitoring due to frequent episodes of agitation. I personally spent 45 minutes of critical care time. This is time spent at this critically ill patient's bedside actively involved in patient care as well as the coordination of care and discussions with the patient's family. This does not include any procedural time which has been billed separately. Review of systems:     Review of Systems   Unable to perform ROS: Mental acuity        Objective:     Vital Signs:  Visit Vitals  BP (!) (P) 163/111   Pulse (!) (P) 107   Temp (P) 98.4 °F (36.9 °C)   Resp (P) 20   Ht 5' 4\" (1.626 m)   Wt (!) 166.7 kg (367 lb 8.1 oz)   SpO2 (P) 97%   BMI 63.08 kg/m²      O2 Device: None (Room air) Temp (24hrs), Av.4 °F (36.9 °C), Min:98 °F (36.7 °C), Max:99 °F (37.2 °C)           Intake/Output:   No intake or output data in the 24 hours ending 23 0503    Physical Exam:  Physical Exam  Constitutional:       General: He is not in acute distress. HENT:      Nose: Nose normal.      Mouth/Throat:      Mouth: Mucous membranes are dry. Eyes:      Conjunctiva/sclera: Conjunctivae normal.   Cardiovascular:      Rate and Rhythm: Normal rate and regular rhythm. Pulses: Normal pulses. Heart sounds: Normal heart sounds. Pulmonary:      Effort: Pulmonary effort is normal.      Breath sounds: Normal breath sounds. Abdominal:      General: There is no distension. Palpations: Abdomen is soft. Tenderness: There is no abdominal tenderness. Musculoskeletal:         General: Normal range of motion. Cervical back: Normal range of motion. Skin:     General: Skin is warm. Capillary Refill: Capillary refill takes less than 2 seconds.    Neurological:      Comments: A&O x 2 to name and place, moving left side spontaneously       Past Medical History:        has a past medical history of Bell's palsy (2019), Diverticulitis of colon with perforation (2018), Diverticulitis of colon with perforation (08/09/2020), Hypertension, Kidney infection, and [place] syndrome (geniculate herpes zoster). Past Surgical History:      has no past surgical history on file. Home Medications:     Prior to Admission medications    Medication Sig Start Date End Date Taking? Authorizing Provider   carvediloL (COREG) 3.125 mg tablet Take 1 Tab by mouth two (2) times daily (with meals). Patient not taking: Reported on 3/12/2023 8/18/20   Camille Motta MD   aspirin 81 mg chewable tablet Take 1 Tab by mouth daily. Patient not taking: Reported on 3/12/2023 8/19/20   Camille Motta MD   sacubitril-valsartan (ENTRESTO) 24 mg/26 mg tablet Take 1 Tab by mouth every twelve (12) hours. Patient not taking: Reported on 3/12/2023 8/18/20   Humberto Lehman NP         Allergies/Social/Family History: Allergies   Allergen Reactions    Meclizine Rash      Social History     Tobacco Use    Smoking status: Every Day     Packs/day: 1.00     Years: 15.00     Pack years: 15.00     Types: Cigarettes    Smokeless tobacco: Never   Substance Use Topics    Alcohol use: Not Currently     Comment: rarely      Family History   Problem Relation Age of Onset    Hypertension Mother     Cancer Father         multiple myeloma         LABS AND  DATA:   Reviewed      Peak airway pressure:      Minute ventilation:        CRITICAL CARE CONSULTANT NOTE  I had a face to face encounter with the patient, reviewed and interpreted patient data including clinical events, labs, images, vital signs, I/O's, and examined patient. I have discussed the case and the plan and management of the patient's care with the consulting services, the bedside nurses and the respiratory therapist.      NOTE OF PERSONAL INVOLVEMENT IN CARE   This patient has a high probability of imminent, clinically significant deterioration, which requires the highest level of preparedness to intervene urgently.  I participated in the decision-making and personally managed or directed the management of the following life and organ supporting interventions that required my frequent assessment to treat or prevent imminent deterioration.         Cher Lennon NP   [facility]  [phone]  3/13/2023

## 2023-03-13 NOTE — PROCEDURES
EEG REPORT    Patient Name: Gilda Pascual  : 1975  Age: 51 y.o. Ordering physician: Dr. Afua Aponte  Date of EEG: 3/12/2023   23:42 - 3/13/2023   00:59  Diagnosis: encephalopathy  Interpreting physician: Sheryl Rodriguez D.O. FAAN    Procedure: EEG    CLINICAL INDICATION: The patient is a 51 y.o. male who is being evaluated for baseline electro cerebral activities and to rule out seizure focus.       Current Facility-Administered Medications   Medication Dose Route Frequency    acetaminophen (TYLENOL) tablet 650 mg  650 mg Oral Q6H PRN    ondansetron (ZOFRAN) injection 4 mg  4 mg IntraVENous Q4H PRN    acetaminophen (TYLENOL) tablet 650 mg  650 mg Oral Q4H PRN    Or    acetaminophen (TYLENOL) solution 650 mg  650 mg Per NG tube Q4H PRN    Or    acetaminophen (TYLENOL) suppository 650 mg  650 mg Rectal Q4H PRN    [Held by provider] aspirin chewable tablet 81 mg  81 mg Oral DAILY    [Held by provider] clopidogreL (PLAVIX) tablet 75 mg  75 mg Oral DAILY    bisacodyL (DULCOLAX) suppository 10 mg  10 mg Rectal DAILY PRN    enoxaparin (LOVENOX) injection 40 mg  40 mg SubCUTAneous Q12H    [Held by provider] carvediloL (COREG) tablet 3.125 mg  3.125 mg Oral BID WITH MEALS    [Held by provider] sacubitriL-valsartan (ENTRESTO) 24-26 mg tablet 1 Tablet  1 Tablet Oral Q12H    labetaloL (NORMODYNE;TRANDATE) injection 10 mg  10 mg IntraVENous Q1H PRN    haloperidol lactate (HALDOL) injection 5 mg  5 mg IntraMUSCular Q6H PRN    LORazepam (ATIVAN) injection 1 mg  1 mg IntraVENous ONCE    niCARdipine (CARDENE) 25 mg in 0.9% sodium chloride 250 mL ([id number])  5-15 mg/hr IntraVENous TITRATE    albuterol-ipratropium (DUO-NEB) 2.5 MG-0.5 MG/3 ML  3 mL Nebulization Q4H RT    dexmedeTOMidine in 0.9 % NaCl (PRECEDEX) 400 mcg/100 mL (4 mcg/mL) infusion soln  0.1-1.5 mcg/kg/hr IntraVENous TITRATE    aspirin (ASA) suppository 300 mg  300 mg Rectal DAILY           DESCRIPTION OF PROCEDURE:     This is a digitally recorded electroencephalogram    Description of procedure: This EEG was obtained using a 10 lead, 8 channel system positioned circumferentially without any parasagittal coverage (rapid EEG). Computer selected EEG is reviewed as well as background features and all clinically significant events. Clarity algorithm utilized and implemented to provide analysis of underlying activity and seizure detection used to facilitate reading. Description of recording: The background activity of this EEG consists of a mixed frequency in the alpha, theta, delta, beta range. Amplitudes appear symmetric. There is background symmetry. The background is reactive, continuous, variable. Within the limitations of the study, there are no clear or well-formed electrographic seizures. Throughout the recording, there were no clear areas of focal slowing nor spike or spike-and-wave discharges seen. Automated seizure detection burden was reviewed - 0%. Hyperventilation and photic stimulation were not performed. During the recording, the patient did not achieve stage II sleep      Clinical Interpretation: This rapid EEG does not show any clear or well-formed seizures within the confines and limitations of the study. Note the absence of electrographic seizures does not rule out the diagnosis of a seizure disorder. Seizure burden 0%  Clinical correlation is recommended      Comment:   If there is still persistent suspicion for continued seizure-like activity, I would recommend obtaining an electroencephalogram with the 10-20 international system for improved spatial resolution and parasagittal coverage. Mohan Rodriguez D.O.   Thania Srivastava

## 2023-03-13 NOTE — PROGRESS NOTES
Speech Pathology:  Consult received and chart reviewed. Discussed with RN who reports patient is not appropriate for evaluation at this time due to absent command following and restlessness/agitation. SLP to continue to follow as medically appropriate. Nursing may complete dysphagia screen as appropriate. Thank you.     Ina Mercado, SLP

## 2023-03-13 NOTE — PROGRESS NOTES
TRANSFER - IN REPORT:    Verbal report received from Clinton Calvin RN(name) on Kaleb Siemens  being received from Neuro (unit) for routine progression of care      Report consisted of patients Situation, Background, Assessment and   Recommendations(SBAR). Information from the following report(s) SBAR, Kardex, MAR, and Recent Results was reviewed with the receiving nurse. Opportunity for questions and clarification was provided. Assessment completed upon patients arrival to unit and care assumed. Patient doesn't follow commands, restless. Right side is flaccid.     0700: Bedside and Verbal shift change report given to David Miranda RN (oncoming nurse) by Dang Mendoza RN (offgoing nurse). Report included the following information SBAR, Kardex, and MAR.

## 2023-03-13 NOTE — PROGRESS NOTES
Pt seen ad examined today, see H&P for full details  Pt remain agitated, non coperative, despite multiple Meds. His AMS likely from his CVA and possible seizure, need further work up and treatment but wont allow it.  Discussed with ICU intensivist ,will transfer to ICU

## 2023-03-14 ENCOUNTER — APPOINTMENT (OUTPATIENT)
Dept: GENERAL RADIOLOGY | Age: 48
DRG: 045 | End: 2023-03-14
Attending: NURSE PRACTITIONER
Payer: MEDICAID

## 2023-03-14 ENCOUNTER — APPOINTMENT (OUTPATIENT)
Dept: NON INVASIVE DIAGNOSTICS | Age: 48
DRG: 045 | End: 2023-03-14
Attending: HOSPITALIST
Payer: MEDICAID

## 2023-03-14 ENCOUNTER — APPOINTMENT (OUTPATIENT)
Dept: MRI IMAGING | Age: 48
DRG: 045 | End: 2023-03-14
Attending: INTERNAL MEDICINE
Payer: MEDICAID

## 2023-03-14 ENCOUNTER — APPOINTMENT (OUTPATIENT)
Dept: GENERAL RADIOLOGY | Age: 48
DRG: 045 | End: 2023-03-14
Attending: INTERNAL MEDICINE
Payer: MEDICAID

## 2023-03-14 LAB
AMMONIA PLAS-SCNC: 36 UMOL/L
ANION GAP SERPL CALC-SCNC: 6 MMOL/L (ref 5–15)
BUN SERPL-MCNC: 14 MG/DL (ref 6–20)
BUN/CREAT SERPL: 14 (ref 12–20)
CALCIUM SERPL-MCNC: 8.9 MG/DL (ref 8.5–10.1)
CHLORIDE SERPL-SCNC: 107 MMOL/L (ref 97–108)
CO2 SERPL-SCNC: 25 MMOL/L (ref 21–32)
CREAT SERPL-MCNC: 0.99 MG/DL (ref 0.7–1.3)
ERYTHROCYTE [DISTWIDTH] IN BLOOD BY AUTOMATED COUNT: 20.8 % (ref 11.5–14.5)
GLUCOSE SERPL-MCNC: 147 MG/DL (ref 65–100)
HCT VFR BLD AUTO: 48.2 % (ref 36.6–50.3)
HGB BLD-MCNC: 16.4 G/DL (ref 12.1–17)
MAGNESIUM SERPL-MCNC: 1.9 MG/DL (ref 1.6–2.4)
MCH RBC QN AUTO: 24.3 PG (ref 26–34)
MCHC RBC AUTO-ENTMCNC: 34 G/DL (ref 30–36.5)
MCV RBC AUTO: 71.4 FL (ref 80–99)
NRBC # BLD: 0 K/UL (ref 0–0.01)
NRBC BLD-RTO: 0 PER 100 WBC
PHOSPHATE SERPL-MCNC: 3.8 MG/DL (ref 2.6–4.7)
PLATELET # BLD AUTO: 200 K/UL (ref 150–400)
PMV BLD AUTO: 9.6 FL (ref 8.9–12.9)
POTASSIUM SERPL-SCNC: 3.9 MMOL/L (ref 3.5–5.1)
RBC # BLD AUTO: 6.75 M/UL (ref 4.1–5.7)
SODIUM SERPL-SCNC: 138 MMOL/L (ref 136–145)
TSH SERPL DL<=0.05 MIU/L-ACNC: 5.28 UIU/ML (ref 0.36–3.74)
VIT B12 SERPL-MCNC: 339 PG/ML (ref 193–986)
WBC # BLD AUTO: 9.2 K/UL (ref 4.1–11.1)

## 2023-03-14 PROCEDURE — 74011250636 HC RX REV CODE- 250/636: Performed by: NURSE PRACTITIONER

## 2023-03-14 PROCEDURE — 94640 AIRWAY INHALATION TREATMENT: CPT | Performed by: STUDENT IN AN ORGANIZED HEALTH CARE EDUCATION/TRAINING PROGRAM

## 2023-03-14 PROCEDURE — 82140 ASSAY OF AMMONIA: CPT

## 2023-03-14 PROCEDURE — 99233 SBSQ HOSP IP/OBS HIGH 50: CPT | Performed by: PSYCHIATRY & NEUROLOGY

## 2023-03-14 PROCEDURE — 36415 COLL VENOUS BLD VENIPUNCTURE: CPT

## 2023-03-14 PROCEDURE — 74011250637 HC RX REV CODE- 250/637: Performed by: INTERNAL MEDICINE

## 2023-03-14 PROCEDURE — 82607 VITAMIN B-12: CPT

## 2023-03-14 PROCEDURE — 74011250637 HC RX REV CODE- 250/637: Performed by: NURSE PRACTITIONER

## 2023-03-14 PROCEDURE — 4A03X5D MEASUREMENT OF ARTERIAL FLOW, INTRACRANIAL, EXTERNAL APPROACH: ICD-10-PCS | Performed by: GENERAL ACUTE CARE HOSPITAL

## 2023-03-14 PROCEDURE — 80048 BASIC METABOLIC PNL TOTAL CA: CPT

## 2023-03-14 PROCEDURE — 83735 ASSAY OF MAGNESIUM: CPT

## 2023-03-14 PROCEDURE — 77010033678 HC OXYGEN DAILY

## 2023-03-14 PROCEDURE — 74018 RADEX ABDOMEN 1 VIEW: CPT

## 2023-03-14 PROCEDURE — 74011000250 HC RX REV CODE- 250: Performed by: INTERNAL MEDICINE

## 2023-03-14 PROCEDURE — 85027 COMPLETE CBC AUTOMATED: CPT

## 2023-03-14 PROCEDURE — 94640 AIRWAY INHALATION TREATMENT: CPT

## 2023-03-14 PROCEDURE — 93005 ELECTROCARDIOGRAM TRACING: CPT

## 2023-03-14 PROCEDURE — 74011000250 HC RX REV CODE- 250: Performed by: NURSE PRACTITIONER

## 2023-03-14 PROCEDURE — 74011250636 HC RX REV CODE- 250/636: Performed by: INTERNAL MEDICINE

## 2023-03-14 PROCEDURE — 70551 MRI BRAIN STEM W/O DYE: CPT

## 2023-03-14 PROCEDURE — 74011000250 HC RX REV CODE- 250: Performed by: HOSPITALIST

## 2023-03-14 PROCEDURE — 84443 ASSAY THYROID STIM HORMONE: CPT

## 2023-03-14 PROCEDURE — 71045 X-RAY EXAM CHEST 1 VIEW: CPT

## 2023-03-14 PROCEDURE — 65610000006 HC RM INTENSIVE CARE

## 2023-03-14 PROCEDURE — 74011000258 HC RX REV CODE- 258: Performed by: HOSPITALIST

## 2023-03-14 PROCEDURE — 84100 ASSAY OF PHOSPHORUS: CPT

## 2023-03-14 RX ORDER — DEXTROSE, SODIUM CHLORIDE, SODIUM LACTATE, POTASSIUM CHLORIDE, AND CALCIUM CHLORIDE 5; .6; .31; .03; .02 G/100ML; G/100ML; G/100ML; G/100ML; G/100ML
50 INJECTION, SOLUTION INTRAVENOUS CONTINUOUS
Status: DISCONTINUED | OUTPATIENT
Start: 2023-03-14 | End: 2023-03-16

## 2023-03-14 RX ORDER — HALOPERIDOL 5 MG/ML
4 INJECTION INTRAMUSCULAR
Status: DISCONTINUED | OUTPATIENT
Start: 2023-03-14 | End: 2023-03-16

## 2023-03-14 RX ORDER — ZIPRASIDONE HYDROCHLORIDE 20 MG/1
40 CAPSULE ORAL 2 TIMES DAILY WITH MEALS
Status: DISCONTINUED | OUTPATIENT
Start: 2023-03-14 | End: 2023-03-16

## 2023-03-14 RX ORDER — IPRATROPIUM BROMIDE AND ALBUTEROL SULFATE 2.5; .5 MG/3ML; MG/3ML
3 SOLUTION RESPIRATORY (INHALATION)
Status: DISCONTINUED | OUTPATIENT
Start: 2023-03-14 | End: 2023-03-27 | Stop reason: HOSPADM

## 2023-03-14 RX ORDER — IPRATROPIUM BROMIDE AND ALBUTEROL SULFATE 2.5; .5 MG/3ML; MG/3ML
3 SOLUTION RESPIRATORY (INHALATION)
Status: DISCONTINUED | OUTPATIENT
Start: 2023-03-14 | End: 2023-03-14

## 2023-03-14 RX ADMIN — IPRATROPIUM BROMIDE AND ALBUTEROL SULFATE 3 ML: 2.5; .5 SOLUTION RESPIRATORY (INHALATION) at 03:24

## 2023-03-14 RX ADMIN — DEXMEDETOMIDINE 0.9 MCG/KG/HR: 100 INJECTION, SOLUTION INTRAVENOUS at 06:59

## 2023-03-14 RX ADMIN — ASPIRIN 300 MG: 300 SUPPOSITORY RECTAL at 08:45

## 2023-03-14 RX ADMIN — DEXMEDETOMIDINE 0.9 MCG/KG/HR: 100 INJECTION, SOLUTION INTRAVENOUS at 04:34

## 2023-03-14 RX ADMIN — ENOXAPARIN SODIUM 40 MG: 100 INJECTION SUBCUTANEOUS at 14:53

## 2023-03-14 RX ADMIN — DEXMEDETOMIDINE 1.1 MCG/KG/HR: 100 INJECTION, SOLUTION INTRAVENOUS at 19:52

## 2023-03-14 RX ADMIN — HALOPERIDOL LACTATE 4 MG: 5 INJECTION, SOLUTION INTRAMUSCULAR at 20:35

## 2023-03-14 RX ADMIN — SODIUM CHLORIDE, SODIUM LACTATE, POTASSIUM CHLORIDE, CALCIUM CHLORIDE AND DEXTROSE MONOHYDRATE 50 ML/HR: 5; 600; 310; 30; 20 INJECTION, SOLUTION INTRAVENOUS at 14:53

## 2023-03-14 RX ADMIN — IPRATROPIUM BROMIDE AND ALBUTEROL SULFATE 3 ML: 2.5; .5 SOLUTION RESPIRATORY (INHALATION) at 23:46

## 2023-03-14 RX ADMIN — DEXMEDETOMIDINE 0.8 MCG/KG/HR: 100 INJECTION, SOLUTION INTRAVENOUS at 02:05

## 2023-03-14 RX ADMIN — ENOXAPARIN SODIUM 40 MG: 100 INJECTION SUBCUTANEOUS at 01:55

## 2023-03-14 RX ADMIN — ZIPRASIDONE MESYLATE 10 MG: 20 INJECTION, POWDER, LYOPHILIZED, FOR SOLUTION INTRAMUSCULAR at 21:13

## 2023-03-14 RX ADMIN — DEXMEDETOMIDINE 0.8 MCG/KG/HR: 100 INJECTION, SOLUTION INTRAVENOUS at 17:24

## 2023-03-14 RX ADMIN — DEXMEDETOMIDINE 0.8 MCG/KG/HR: 100 INJECTION, SOLUTION INTRAVENOUS at 10:43

## 2023-03-14 RX ADMIN — DEXMEDETOMIDINE 1.5 MCG/KG/HR: 100 INJECTION, SOLUTION INTRAVENOUS at 23:29

## 2023-03-14 RX ADMIN — DEXMEDETOMIDINE 0.6 MCG/KG/HR: 100 INJECTION, SOLUTION INTRAVENOUS at 15:13

## 2023-03-14 RX ADMIN — IPRATROPIUM BROMIDE AND ALBUTEROL SULFATE 3 ML: 2.5; .5 SOLUTION RESPIRATORY (INHALATION) at 08:10

## 2023-03-14 RX ADMIN — SODIUM CHLORIDE 5 MG/HR: 9 INJECTION, SOLUTION INTRAVENOUS at 23:28

## 2023-03-14 RX ADMIN — DEXMEDETOMIDINE 1.5 MCG/KG/HR: 100 INJECTION, SOLUTION INTRAVENOUS at 21:47

## 2023-03-14 RX ADMIN — HALOPERIDOL LACTATE 4 MG: 5 INJECTION, SOLUTION INTRAMUSCULAR at 16:32

## 2023-03-14 RX ADMIN — SODIUM CHLORIDE 5 MG/HR: 9 INJECTION, SOLUTION INTRAVENOUS at 17:16

## 2023-03-14 NOTE — ACP (ADVANCE CARE PLANNING)
Advance Care Planning   Advance Care Planning Inpatient Note  [facility]  Spiritual Care Department    Today's Date: 3/14/2023  Unit: [facility] 2 CRITICAL CARE 3    Received request from  in basket for advance medical directive on CCU . Upon review of chart and communication with care team, Spiritual Care will defer Advance Care planning with patient at this time is sedated. Patient and sister Jaleesa Pollard  were present in the room during visit. Goals of ACP Conversation:      Health Care Decision Makers:    No healthcare decision makers have been documented. Click here to complete [place] including selection of the Healthcare Decision Maker Relationship (ie \"Primary\")    Summary:          Advance Care Planning Documents (Patient Wishes) on file:  None     Assessment:       Received request from in basket for advance medical directive on CCU. Upon review of chart and communication with care team, Spiritual Care will defer Advance Care planning with patient at this time is sedated. Patient and sister Jaleesa Pollard were present in the room during visit. Interventions:  Deferred conversation as patient is unable to participate in AMD consult at this time.      Care Preferences Communicated:  No    Outcomes/Plan:  ACP Discussion Postponed    Ruthy Mcmillan [facility]  on 3/14/2023 at 11:48 AM Procedure is scheduled in [facility].

## 2023-03-14 NOTE — PROGRESS NOTES
Spiritual Care Assessment/Progress Note  [facility]      NAME: Ariel Lucas      MRN: [id number]  AGE: 51 y.o. SEX: male  Jain Affiliation: Non Hinduism   Language: English     3/14/2023     Total Time (in minutes): 19     Spiritual Assessment begun in [facility] 2 CRITICAL CARE 3 through conversation with:         []Patient        [x] Family    [] Friend(s)        Reason for Consult: Advance medical directive consult     Spiritual beliefs: (Please include comment if needed)     [x] Identifies with a tiffanie tradition:         [] Supported by a tiffanie community:            [] Claims no spiritual orientation:           [] Seeking spiritual identity:                [] Adheres to an individual form of spirituality:           [] Not able to assess:                           Identified resources for coping:      [x] Prayer                               [] Music                  [] Guided Imagery     [x] Family/friends                 [] Pet visits     [] Devotional reading                         [] Unknown     [] Other:                                                Interventions offered during this visit: (See comments for more details)    Patient Interventions: Initial visit     Family/Friend(s):  Affirmation of emotions/emotional suffering, Affirmation of tiffanie, Catharsis/review of pertinent events in supportive environment, Iconic (affirming the presence of God/Higher Power), Normalization of emotional/spiritual concerns, Prayer (assurance of), Jain beliefs/image of God discussed     Plan of Care:     [x] Support spiritual and/or cultural needs    [] Support AMD and/or advance care planning process      [] Support grieving process   [] Coordinate Rites and/or Rituals    [] Coordination with community clergy   [] No spiritual needs identified at this time   [] Detailed Plan of Care below (See Comments)  [] Make referral to Music Therapy  [] Make referral to Pet Therapy     [] Make referral to Addiction services  [] Make referral to [facility]  [] Make referral to Spiritual Care Partner  [] No future visits requested        [x] Contact Spiritual Care for further referrals     Comments:   ACP ASSESSMENT Received request from in basket for advance medical directive on CCU. Upon review of chart and communication with care team, Spiritual Care will defer Advance Care planning with patient at this time as he is sedated. Patient and sister Honorio Varela were present in the room during visit. Spoke with his sister Honorio Varela who expressed that prayer is a strong resource for family's coping. Patient's mother is in the hospital, but not currently in the room. Patient's other sister is coming from [address] [facility]. Offered listening presence and assurance of prayer.    available upon referral by staff or by patient/family request.       VICK Sadler Aas, [facility], Staff   MAREN [facility] Paging Service  [phone])

## 2023-03-14 NOTE — PROGRESS NOTES
ADULT PROTOCOL: JET AEROSOL ASSESSMENT    Patient  Liliam Moody     51 y.o.   male     3/14/2023  9:42 AM    Breath Sounds Pre Procedure: Right Breath Sounds: Diminished                               Left Breath Sounds: Diminished    Breath Sounds Post Procedure: Right Breath Sounds: Expiratory wheezing, Diminished                                 Left Breath Sounds: Expiratory wheezing, Diminished    Breathing pattern: Pre procedure Breathing Pattern: Regular          Post procedure Breathing Pattern: Regular    Heart Rate: Pre procedure Pulse: 76           Post procedure Pulse: 80    Resp Rate: Pre procedure Respirations: 24           Post procedure Respirations: 21    Cough: Pre procedure                 Post procedure      Oxygen: O2 Device: Nasal cannula   O2 Flow Rate (L/min): 4 l/min         SpO2: Pre procedure SpO2: 99 %   with oxygen              Post procedure SpO2: 98 %  with oxygen    Nebulizer Therapy: Current medications Aerosolized Medications: DuoNeb      Changed: YES    Patient doesn't take scheduled breathing treatments at home. BBS equal and clear at this time. Changed DuoNeb PRN.     Smoking History:   Social History     Tobacco Use   Smoking Status Every Day    Packs/day: 1.00    Years: 15.00    Pack years: 15.00    Types: Cigarettes   Smokeless Tobacco Never       Problem List:   Patient Active Problem List   Diagnosis Code    Diverticulitis of colon with perforation K57.20    Diverticulitis K57.92    Class 3 severe obesity in adult ([address] E66.01    Hypertension I10    Dilated cardiomyopathy ([address] I42.0    Right hemiplegia ([address] G81.91    Ischemic stroke [facility]) I63.9       Respiratory Therapist: Dalton Duong RT

## 2023-03-14 NOTE — PROGRESS NOTES
OT note:  OT orders received and chart was reviewed. Pt remains on precedex at this time and is resting.   Pt has been agitated and currently is resting and asked to hold this am.

## 2023-03-14 NOTE — PROGRESS NOTES
Transition of Care Plan:    RUR:13%    Disposition: Home with family support    If SNF or IPR: Date FOC offered:N/A    Date [facility] received:N/A    Date authorization started with reference number:N/A    Date authorization received and expires:N/A    Accepting facility:N/A    Follow up appointments: To be done prior to discharge    DME needed:None    Transportation at [address] or means to access home: Patient has keys         IM Medicare Letter:N/A    Is patient a [address] and connected with the [place]? No             If yes, was [employer] transfer form completed and VA notified? N/A    Caregiver Contact: Mother      Discharge Caregiver contacted prior to discharge? Caregiver to be contacted prior to discharge. Care Conference needed?: No      Reason for Admission:  Patient came in for sudden onset right sided weakness, stuttering and numbness on right side of the body                      RUR Score:  13%                   Plan for utilizing home health: He has not used home health services in the past.          PCP: First and Last name:  Kem Lynn MD     Name of Practice: [facility]     Are you a current patient: Yes/No: Patient has not been seen since 8/27/2020     Approximate date of last visit: 8/27/2020     Can you participate in a virtual visit with your PCP: Yes                    Current Advanced Directive/Advance Care Plan: Full Code  [address] (ACP) Conversation      Date of Conversation: 3/14/23  Conducted with: Patient with Decision Making Capacity    Healthcare Decision Maker:   No healthcare decision makers have been documented.    Click here to complete [address] including selection of the Healthcare Decision Maker Relationship (ie \"Primary\")      Content/Action Overview:   DECLINED ACP conversation - will revisit periodically   Reviewed DNR/DNI and patient elects Full Code (Attempt Resuscitation)         Length of Voluntary ACP Conversation in minutes:  <16 minutes (Non-Billable)    Bert Stein RN                 Healthcare Decision Maker:   Click here to complete [address] including selection of the Healthcare Decision Maker Relationship (ie \"Primary\")                             Confirmed demographics and pcp information with patient's mother. Patient lives alone in two story home. He was independent prior to coming to the hospital with adl/iadl's. He works and drives. He does not use home oxygen or cpap. His mother states he does have health insurance and his sister is in the process of looking for the card. She will bring it to the hospital when she finds it. Gloria Orellana RN BSN CRM        [phone]

## 2023-03-14 NOTE — PROGRESS NOTES
[facility]    ICU TEAM Progress Note    Name: Alina Fletcher   : 1975   MRN: [id number]   Date: 3/14/2023           ICU Assessment     Acute stroke : causing left side weakness and change in mental status      Imaging:  3/14/2023   MRI of brain :Acute infarct in the left posterior corona radiata/basal ganglia. ICU Comprehensive Plan of Care:     Neurological System  Acute stroke : place NGT for ASA and plavix  Taper off precedex as tolerated    Cardiovascular System  Place NGT and start ASA and plavix  SBP Goal of: < 180 mmHg  MAP Goal of: < 90 mmHg  None - For above SBP/MAP goals  Transfusion Trigger (Hgb): <7 g/dL  Keep K > 4; Mg > 2     Respiratory System  Aspiration precautions   SpO2 Goal: > 92%  DVT Prophylaxis: Lovenox     Renal/GI/Endocrine System  IVFs: LR at 50 ml/hr  Ulcer Prophylaxis: Not at this time   Bowel Regimen: None needed at this time  Feeding:  Pending await speech therapy evaluation  Blood Sugar Goal 120-180 - Glycemic Control: Insulin    Tubes: None  Lines: Peripheral IV  Drains: None    PT/OT: PT consulted and on board and OT consulted and on board     Goals of Care Discussion with family Yes     Plan of Care/Code Status: Full Code    Discussed Care Plan with Bedside RN    Documentation of Current Medications    Subjective:   Progress Note: 3/14/2023      Reason for ICU Admission:  agitation    HPI:   52 y/o M pt hypertension presents to ED with cc of right sided weakness for 6 days. Code stroke called. CTH done, negative for bleed and LVO but did show \"Decrease cerebral perfusion in the bilateral periventricular white matter with 81 cc area of ischemic tissue in the bilateral frontal lobes right greater  than left without underlying vascular lesion to explain this perfusion abnormality. \"  Tele neuro consulted but not a candidate for TNK due to [address]. While in ED had episode of twitching for which he got Keppra and Ativan.   Admitted to the neuro tele unit under the hospitalist service. Rapid response called around 4:15 am for agitation. Given 2 mg ativan and ICU consulted and patient was transferred to ICU       Overnight Events:   3/14/2023  Remains on precedex for agitation    POD:  * No surgery found *    S/P:       Active Problem List:     Problem List  Date Reviewed: 8/27/2020            Codes Class    Right hemiplegia (HCC) ICD-10-CM: G81.91  ICD-9-CM: 342.90         Ischemic stroke ([address] ICD-10-CM: I63.9  ICD-9-CM: 434.91         Dilated cardiomyopathy (HCC) ICD-10-CM: I42.0  ICD-9-CM: 425.4         Hypertension ICD-10-CM: I10  ICD-9-CM: 401.9         Class 3 severe obesity in adult [facility]) ICD-10-CM: E66.01  ICD-9-CM: 278.01         Diverticulitis of colon with perforation ICD-10-CM: K57.20  ICD-9-CM: 562.11         Diverticulitis ICD-10-CM: K57.92  ICD-9-CM: 562.11            Past Medical History:      has a past medical history of Bell's palsy (01/25/2019), Diverticulitis of colon with perforation (01/2018), Diverticulitis of colon with perforation (08/09/2020), Hypertension, Kidney infection, and Haresh Hunt syndrome (geniculate herpes zoster). Past Surgical History:      has no past surgical history on file. Home Medications:     Prior to Admission medications    Medication Sig Start Date End Date Taking? Authorizing Provider   carvediloL (COREG) 3.125 mg tablet Take 1 Tab by mouth two (2) times daily (with meals). Patient not taking: Reported on 3/12/2023 8/18/20   Matehw Robison MD   aspirin 81 mg chewable tablet Take 1 Tab by mouth daily. Patient not taking: Reported on 3/12/2023 8/19/20   Mathew Robison MD   sacubitril-valsartan (ENTRESTO) 24 mg/26 mg tablet Take 1 Tab by mouth every twelve (12) hours. Patient not taking: Reported on 3/12/2023 8/18/20   Delfin uBrr NP       Allergies/Social/Family History:      Allergies   Allergen Reactions    Meclizine Rash      Social History     Tobacco Use    Smoking status: Every Day Packs/day: 1.00     Years: 15.00     Pack years: 15.00     Types: Cigarettes    Smokeless tobacco: Never   Substance Use Topics    Alcohol use: Not Currently     Comment: rarely      Family History   Problem Relation Age of Onset    Hypertension Mother     Cancer Father         multiple myeloma       Review of Systems:     Review of systems not obtained due to patient factors. Objective:   Vital Signs:  Visit Vitals  BP (!) 145/98   Pulse 84   Temp 99 °F (37.2 °C)   Resp 19   Ht 5' 4\" (1.626 m)   Wt 151.8 kg (334 lb 10.5 oz)   SpO2 94%   BMI 57.44 kg/m²    O2 Flow Rate (L/min): 4 l/min O2 Device: Nasal cannula Temp (24hrs), Av.9 °F (37.2 °C), Min:98.2 °F (36.8 °C), Max:99.4 °F (37.4 °C)           Intake/Output:     Intake/Output Summary (Last 24 hours) at 3/14/2023 1156  Last data filed at 3/14/2023 1100  Gross per 24 hour   Intake 641.03 ml   Output 2005 ml   Net -1363.97 ml       Physical Exam:    Visit Vitals  BP (!) 171/101 (BP 1 Location: Right lower arm, BP Patient Position: At rest)   Pulse 75   Temp 97.6 °F (36.4 °C)   Resp 22   Ht 5' 4\" (1.626 m)   Wt 151.8 kg (334 lb 10.5 oz)   SpO2 94%   BMI 57.44 kg/m²     General:   Obese   Head:  Normocephalic, without obvious abnormality, atraumatic. Eyes:  Conjunctivae/corneas clear. PERRL, EOMs intact. Fundi benign   Ears:  Normal TMs and external ear canals both ears. Nose: Nares normal. Septum midline. Mucosa normal. No drainage or sinus tenderness. Throat: Lips, mucosa, and tongue normal. Teeth and gums normal.   Neck: Supple, symmetrical, trachea midline, no adenopathy, thyroid: no enlargement/tenderness/nodules, no carotid bruit and no JVD. Back:   Symmetric, no curvature. ROM normal. No CVA tenderness. Lungs:   Clear to auscultation bilaterally. Chest wall:  No tenderness or deformity. Heart:  Regular rate and rhythm, S1, S2 normal, no murmur, click, rub or gallop. Abdomen:   Soft, non-tender.  Bowel sounds normal. No masses,  No organomegaly. Genitalia:  Normal male without lesion, discharge or tenderness. Rectal:  Normal tone, normal prostate, no masses or tenderness  Guaiac negative stool. Extremities: Extremities normal, atraumatic, no cyanosis or edema. Pulses: 2+ and symmetric all extremities. Skin: Skin color, texture, turgor normal. No rashes or lesions   Lymph nodes: Cervical, supraclavicular, and axillary nodes normal.   Neurologic: Right sided weakness,sedated on precedex     LABS AND  DATA: Personally reviewed  Recent Labs     03/14/23 0340 03/12/23 2029   WBC 9.2 7.4   HGB 16.4 16.2   HCT 48.2 48.3    215     Recent Labs     03/14/23 0340 03/12/23 2029    141   K 3.9 3.6    108   CO2 25 28   BUN 14 11   CREA 0.99 0.97   * 140*   CA 8.9 8.6   MG 1.9  --    PHOS 3.8  --      Recent Labs     03/12/23 2029   AP 97   TP 7.4   ALB 3.3*   GLOB 4.1*     Recent Labs     03/12/23 2029   INR 1.0   PTP 10.6      No results for input(s): PHI, PCO2I, PO2I, FIO2I in the last 72 hours. No results for input(s): CPK, CKMB, TROIQ, BNPP in the last 72 hours. MEDS: Reviewed    Chest X-Ray:  CXR Results  (Last 48 hours)                 03/13/23 1333  XR CHEST PORT Final result    Impression:      New mild interstitial edema           Narrative:  EXAM:  XR CHEST PORT       INDICATION: Acute respiratory failure       COMPARISON: 3/12/2023       TECHNIQUE: portable chest AP view at 1323 hours       FINDINGS: The cardiac silhouette is remarkable for cardiomegaly with a globular   configuration to the heart. There is new mild interstitial edema without pleural   effusions. 03/12/23 2103  XR CHEST PORT Final result    Impression:  New, moderate cardiomegaly. Grossly clear lungs. Narrative:  INDICATION: Infarct. Portable AP view of the chest.       Direct comparison made to prior chest x-ray dated June 2009. There is new, moderate enlargement of the cardiac silhouette.  Lungs are grossly   clear. No pleural fluid is visualized. There is no pneumothorax. ECHO:    Left Ventricle: Reduced left ventricular systolic function with a visually estimated EF of 35 - 40%. Left ventricle size is normal. Increased wall thickness. Findings consistent with moderate concentric hypertrophy. Technical qualifiers: Echo study was limited due to patient tolerance. Multidisciplinary Rounds Completed: Yes    ABCDEF Bundle/Checklist Completed:  Yes    SPECIAL EQUIPMENT  None    DISPOSITION  Stay in ICU    CRITICAL CARE CONSULTANT NOTE  I had a face to face encounter with the patient, reviewed and interpreted patient data including clinical events, labs, images, vital signs, I/O's, and examined patient. I have discussed the case and the plan and management of the patient's care with the consulting services, the bedside nurses and the respiratory therapist.      NOTE OF PERSONAL INVOLVEMENT IN CARE   This patient has a high probability of imminent, clinically significant deterioration, which requires the highest level of preparedness to intervene urgently. I participated in the decision-making and personally managed or directed the management of the following life and organ supporting interventions that required my frequent assessment to treat or prevent imminent deterioration. I personally spent 40 minutes of critical care time. This is time spent at this critically ill patient's bedside actively involved in patient care as well as the coordination of care. This does not include any procedural time which has been billed separately.     [address] [facility]  3/14/2023

## 2023-03-14 NOTE — PROGRESS NOTES
Physician Progress Note      PATIENTClaudean Livings  [facility] #:                  [id number]  :                       1975  ADMIT DATE:       3/12/2023 8:04 PM  [address] [place] DATE:  RESPONDING  PROVIDER #:        Eden Padilla MD          QUERY TEXT:    Pt admitted with right side weakness and has encephalopathy documented. If possible, please document in progress notes and discharge summary further specificity regarding the type of encephalopathy:    The medical record reflects the following:  Risk Factors: r/o stroke  Clinical Indicators: bp 169/146, 3/13 neuro-Acute encephalopathy:This very well may be related to a stroke. Would also need to consider intermittent seizures or a postictal state. severe agitation this morning  Treatment:neuro cx, mri brain pending, IV Ativan, tx to icu  Thanks,  Merna Hurley Rn/CDI   Options provided:  -- Hypertensive encephalopathy  -- Metabolic encephalopathy  -- Toxic encephalopathy  -- Drug-induced encephalopathy due to, Please specify substance. -- Encephalopathy due to *** (other, such as CVA or brain tumor)  -- Toxic metabolic encephalopathy  -- Other - I will add my own diagnosis  -- Disagree - Not applicable / Not valid  -- Disagree - Clinically unable to determine / Unknown  -- Refer to Clinical Documentation Reviewer    PROVIDER RESPONSE TEXT:    This patient has encephalopathy due to *** (other, such as CVA or brain tumor).     Query created by: Helen Palafox on 3/14/2023 9:09 AM      Electronically signed by:  Eden Padilla MD 3/14/2023 9:19 AM

## 2023-03-14 NOTE — PROGRESS NOTES
0  Report received from [facility] PCU nurse    1200  Patient arrived via bed with nurse and sitter. No IV access  ngo in place  patient moaning and restless    1230  Assessment complete  Patient responds to name no command following moans out and is restless and agitated at times. IV inserted in right hand #20 flushed and capped. Ngo in place with blake urine. BP elevated NSR  Wheezing noted in all fields  Right side flaccid sensation positive  Sitter at bedside    1300  Dr Sandhya Zapien and Dr Laura Rollins both saw patient both putting orders in      Precedex started Cardene on hold to see if precedex will lower BP and calm patient down. Lasix ordered and nebs ordered. PCXR ordered    1500  BP better see flow sheet     1600  Assessment complete   sat's  low O2 started 2 lpm then moved to 4 lpm sat's better in mid 90's    1706  Patient agitated no able to be redirected grabbing at sitter throwing legs up  Haldol given see MAR  Precedex increased see MAR    1800  MRI screening completed via phone with mother and faxed to MRI    1900  Report to Irene gonzalez RN

## 2023-03-14 NOTE — PROGRESS NOTES
Speech path   Patient is on Precedex and sedated due to agitation. We will hold for now. If he is calmer when he awakens, we are available for consult. Perfect serve us.    Shmuel Hall, SLP

## 2023-03-14 NOTE — PROGRESS NOTES
Interdisciplinary team rounds were held 3/14/2023 with the following team members:Care Management, Nursing, Pharmacy, Physician, and Clinical Coordinator and the patient. Plan of care discussed. See clinical pathway and/or care plan for interventions and desired outcomes.     Goals of the Day: Continue current plan of care, MRI today

## 2023-03-14 NOTE — PROGRESS NOTES
Neurology Note    Patient ID:  Richard Gruber  [id number]  37 y.o.  1975      Date of Consultation:  March 14, 2023    Assessment and Plan:    Patient is a 50-year-old gentleman who presented to the hospital with progressive right-sided weakness. His current mental status shows quite significant agitation and confusion with right-sided hemiparesis. Imaging revealed an acute stroke in the left basal ganglia      Acute stroke:   Continue aspirin and Plavix. Patient's risk factors for stroke do include hypertension  Hypertension: Aggressive control with goal systolic blood pressure under 140. Blood pressure is much better  LDL is 70 at goal.  Hemoglobin A1c is at goal.  Limited brain MRI does reveal an acute stroke on diffusion-weighted images. Ultimately, the patient will need a repeat MRI for a complete study to look at disease burden. The patient will need physical and Occupational Therapy  EEG with no seizure focus  Echo completed - EF 35-40%. I provided stroke education today in regards to risk factors for stroke and lifestyle modifications to help minimize the risk of future stroke. This will need to be repeated and discussed with other family members due to patient's mental status today. This included medication compliance, regular follow up with primary care physician,  and healthy lifestyle habits (nutrition/exercise)    Continued agitation:   Most likely related to stroke, possible other metabolic abnormalities  On precedex drip. Attempt to wean as able. Eeg with no seizure focus. Neurology will continue to follow closely in this complicated patient with ongoing neurological symptoms necessitating additional testing. I discussed the plan with nursing and with the primary intensivist,Dr. Ivette Lara today.             Subjective:right side weakness     History of Present Illness:   Richard Gruber is a 51 y.o. 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 The patient was ultimately transferred to the ICU due to persistent agitation. The patient was maintained on a Precedex drip which helped to control agitation. Brain MRI was able to be obtained today. I did independently review the brain MRI from March 14, 2023. There is an acute stroke in the left posterior corona radiata/basal ganglia. Study was limited due to patient's agitation    Pertinent updated labs include an LDL of 70, vitamin B12 of 339, hemoglobin A1c of 5.4. Ammonia was 36. TSH 5.28. CTA of the head and neck performed on March 12, 2023 revealed no large vessel flow-limiting stenosis.     Past Medical History:   Diagnosis Date    Bell's palsy 01/25/2019    Diverticulitis of colon with perforation 01/2018    sigmoid    Diverticulitis of colon with perforation 08/09/2020    Hypertension     Kidney infection     [place] Hunt syndrome (geniculate herpes zoster)         I am unable to obtain a surgical history from the patient due to his mental status    Family History   Problem Relation Age of Onset    Hypertension Mother     Cancer Father         multiple myeloma        Social History     Tobacco Use    Smoking status: Every Day     Packs/day: 1.00     Years: 15.00     Pack years: 15.00     Types: Cigarettes    Smokeless tobacco: Never   Substance Use Topics    Alcohol use: Not Currently     Comment: rarely        Allergies   Allergen Reactions    Meclizine Rash          Current Facility-Administered Medications   Medication Dose Route Frequency    albuterol-ipratropium (DUO-NEB) 2.5 MG-0.5 MG/3 ML  3 mL Nebulization Q4H PRN    dextrose 5% lactated ringers infusion  50 mL/hr IntraVENous CONTINUOUS    ondansetron (ZOFRAN) injection 4 mg  4 mg IntraVENous Q4H PRN    acetaminophen (TYLENOL) tablet 650 mg  650 mg Oral Q4H PRN    Or    acetaminophen (TYLENOL) solution 650 mg 650 mg Per NG tube Q4H PRN    Or    acetaminophen (TYLENOL) suppository 650 mg  650 mg Rectal Q4H PRN    aspirin chewable tablet 81 mg  81 mg Oral DAILY    clopidogreL (PLAVIX) tablet 75 mg  75 mg Oral DAILY    bisacodyL (DULCOLAX) suppository 10 mg  10 mg Rectal DAILY PRN    enoxaparin (LOVENOX) injection 40 mg  40 mg SubCUTAneous Q12H    carvediloL (COREG) tablet 3.125 mg  3.125 mg Oral BID WITH MEALS    sacubitriL-valsartan (ENTRESTO) 24-26 mg tablet 1 Tablet  1 Tablet Oral Q12H    labetaloL (NORMODYNE;TRANDATE) injection 10 mg  10 mg IntraVENous Q1H PRN    haloperidol lactate (HALDOL) injection 5 mg  5 mg IntraMUSCular Q6H PRN    dexmedeTOMidine in 0.9 % NaCl (PRECEDEX) 400 mcg/100 mL (4 mcg/mL) infusion soln  0.1-1.5 mcg/kg/hr IntraVENous TITRATE       Review of Systems:      [x]Unable to obtain  ROS due to  [x]mental status change  []sedated   []intubated    Objective:     Visit Vitals  BP (!) 171/101 (BP 1 Location: Right lower arm, BP Patient Position: At rest)   Pulse 75   Temp 97.6 °F (36.4 °C)   Resp 22   Ht 5' 4\" (1.626 m)   Wt 334 lb 10.5 oz (151.8 kg)   SpO2 94%   BMI 57.44 kg/m²       Physical Exam:      General:  appears well nourished in no acute distress  Neck: no carotid bruits  Lungs: clear to auscultation  Heart:  no murmurs, regular rate  Lower extremity: peripheral pulses palpable and no edema  Skin: intact    Neurological exam:    The patient is arousable. He did follow simple commands for me today such as show me his left thumb and wiggle his toes on the left side. He was still intermittently agitated and his left arm remained in restraints. He also did stick out his tongue and open his eyes to command.     Cranial nerves:   II-XII were tested    Perrrla  Visual fields were full  Eomi, no evidence of nystagmus  Facial sensation:  normal and symmetric  Facial motor: Appears to have a right facial droop  Hearing intact  SCM strength intact  Tongue: midline without fasciculations    Motor: Tone -appears to be decreased in the right upper and lower extremity    No evidence of fasciculations    Strength testing:  He does have good strength in his left upper and lower extremity. There is no spontaneous movement seen in his right upper or lower extremity. There is mild grimace and slight withdrawal to painful stimulation. Sensory:  Upper extremity: intact to pp,  Lower extremity: intact to pp,    Reflexes:  The patient is too agitated and restless for reflex testing    Cerebellar testing:  no tremor apparent,      Labs:     Lab Results   Component Value Date/Time    Hemoglobin A1c 5.4 03/13/2023 03:06 AM    Sodium 138 03/14/2023 03:40 AM    Potassium 3.9 03/14/2023 03:40 AM    Chloride 107 03/14/2023 03:40 AM    Glucose 147 (H) 03/14/2023 03:40 AM    BUN 14 03/14/2023 03:40 AM    Creatinine 0.99 03/14/2023 03:40 AM    Calcium 8.9 03/14/2023 03:40 AM    WBC 9.2 03/14/2023 03:40 AM    HCT 48.2 03/14/2023 03:40 AM    HGB 16.4 03/14/2023 03:40 AM    PLATELET 496 84/25/4325 03:40 AM       Imaging:    Results from Hospital Encounter encounter on 03/12/23    MRI BRAIN WO CONT    Narrative  EXAM: MRI BRAIN WO CONT    INDICATION: CHANGE IN MENTAL STATUS    COMPARISON: CTA head neck 3/12/2023. CONTRAST: None. TECHNIQUE:  Multiplanar multisequence acquisition without contrast of the brain. FINDINGS:  Only axial DWI was performed, as patient unable to tolerate remainder of exam  and started kicking and yelling. There is an acute infarct in the left posterior corona radiata/basal ganglia. Impression  1. Acute infarct in the left posterior corona radiata/basal ganglia.       Results from Hospital Encounter encounter on 03/12/23    CT CODE NEURO PERF W CBF    Narrative  CLINICAL HISTORY: Code neuro    EXAMINATION:  CT ANGIOGRAPHY HEAD AND NECK, CT PERFUSION    COMPARISON: MRI brain March 2019    TECHNIQUE:  Following the uneventful administration of iodinated contrast  material, axial CT angiography of the head and neck was performed. Delayed axial  images through the head were also obtained. Coronal and sagittal reconstructions  were obtained. Manual postprocessing of images was performed. 3-D  Sagittal  maximal intensity projection images were obtained. 3-D Coronal maximal  intensity projections were obtained. CT brain perfusion was performed with  generation of hemodynamic maps of multiple parameters, including cerebral blood  flow, cerebral blood volume, mean transit time, and TMAX. CT dose reduction was  achieved through use of a standardized protocol tailored for this examination  and automatic exposure control for dose modulation. This study was analyzed by  the [address] ai algorithm. FINDINGS:    Delayed contrast-enhanced head CT:    Chronic left centrum semiovale hypodensity likely sequela of chronic  microvascular angiopathy. Ventricles and sulci are normal in size and  configuration. Basal cisterns are clear. No evidence of hemorrhage. Paranasal  sinuses are clear. Orbits and globes are within normal limits. CTA NECK:    Great vessels: Normal arch anatomy with the origins patent. Right subclavian artery: Patent    Left subclavian artery: Patent    Right common carotid artery: Patent    Left common carotid artery: Patent    Cervical right internal carotid artery: Patent with no significant stenosis by  NASCET criteria. Cervical left internal carotid artery: Patent with no significant stenosis by  NASCET criteria. Right vertebral artery: Patent    Left vertebral artery: Patent    The lung apices are clear. The thyroid is homogeneous. No cervical  lymphadenopathy.     CTA HEAD:    Right cavernous internal carotid artery: Patent    Left cavernous internal carotid artery: Patent    Anterior cerebral arteries: Patent    Anterior communicating artery: Patent    Right middle cerebral artery: Patent    Left middle cerebral artery: Patent    Posterior communicating arteries: Diminutive    Posterior cerebral arteries: Patent    Basilar artery: Patent    Distal vertebral arteries: Patent    No evidence for intracranial aneurysm or hemodynamically significant stenosis. CT Perfusion:  15 cc core infarct in the left frontal lobe with 81 cc ischemic penumbra in the  bilateral frontal lobes right greater than left. Impression  1. No large vessel occlusion or hemodynamically significant carotid stenosis. 2.  Chronic left centrum semiovale hypodensity likely sequela of chronic  microvascular angiopathy. 3.  Decrease cerebral perfusion in the bilateral periventricular white matter  with 81 cc area of ischemic tissue in the bilateral frontal lobes right greater  than left without underlying vascular lesion to explain this perfusion  abnormality. Complex decision making was necessary due to the patient's current medical condition and other medical co-morbidities.          Active Problems:    Right hemiplegia ([address] (3/13/2023)      Ischemic stroke ([address] (3/13/2023)                 Signed By:  Radha Hurd DO FAAN    March 14, 2023

## 2023-03-15 LAB
ANION GAP SERPL CALC-SCNC: 5 MMOL/L (ref 5–15)
BUN SERPL-MCNC: 18 MG/DL (ref 6–20)
BUN/CREAT SERPL: 19 (ref 12–20)
CALCIUM SERPL-MCNC: 8.7 MG/DL (ref 8.5–10.1)
CHLORIDE SERPL-SCNC: 106 MMOL/L (ref 97–108)
CO2 SERPL-SCNC: 25 MMOL/L (ref 21–32)
CREAT SERPL-MCNC: 0.96 MG/DL (ref 0.7–1.3)
ERYTHROCYTE [DISTWIDTH] IN BLOOD BY AUTOMATED COUNT: 19.8 % (ref 11.5–14.5)
GLUCOSE BLD STRIP.AUTO-MCNC: 119 MG/DL (ref 65–117)
GLUCOSE BLD STRIP.AUTO-MCNC: 127 MG/DL (ref 65–117)
GLUCOSE BLD STRIP.AUTO-MCNC: 145 MG/DL (ref 65–117)
GLUCOSE BLD STRIP.AUTO-MCNC: 156 MG/DL (ref 65–117)
GLUCOSE SERPL-MCNC: 155 MG/DL (ref 65–100)
HCT VFR BLD AUTO: 47.9 % (ref 36.6–50.3)
HGB BLD-MCNC: 15.9 G/DL (ref 12.1–17)
MAGNESIUM SERPL-MCNC: 2 MG/DL (ref 1.6–2.4)
MCH RBC QN AUTO: 23.9 PG (ref 26–34)
MCHC RBC AUTO-ENTMCNC: 33.2 G/DL (ref 30–36.5)
MCV RBC AUTO: 72 FL (ref 80–99)
NRBC # BLD: 0 K/UL (ref 0–0.01)
NRBC BLD-RTO: 0 PER 100 WBC
PHOSPHATE SERPL-MCNC: 4.1 MG/DL (ref 2.6–4.7)
PLATELET # BLD AUTO: 200 K/UL (ref 150–400)
PMV BLD AUTO: 9.7 FL (ref 8.9–12.9)
POTASSIUM SERPL-SCNC: 3.9 MMOL/L (ref 3.5–5.1)
RBC # BLD AUTO: 6.65 M/UL (ref 4.1–5.7)
SERVICE CMNT-IMP: ABNORMAL
SODIUM SERPL-SCNC: 136 MMOL/L (ref 136–145)
WBC # BLD AUTO: 8.6 K/UL (ref 4.1–11.1)

## 2023-03-15 PROCEDURE — 74011000250 HC RX REV CODE- 250: Performed by: HOSPITALIST

## 2023-03-15 PROCEDURE — 92610 EVALUATE SWALLOWING FUNCTION: CPT

## 2023-03-15 PROCEDURE — 74011250637 HC RX REV CODE- 250/637: Performed by: INTERNAL MEDICINE

## 2023-03-15 PROCEDURE — 74011000258 HC RX REV CODE- 258: Performed by: HOSPITALIST

## 2023-03-15 PROCEDURE — 74011000258 HC RX REV CODE- 258: Performed by: INTERNAL MEDICINE

## 2023-03-15 PROCEDURE — 83735 ASSAY OF MAGNESIUM: CPT

## 2023-03-15 PROCEDURE — 92522 EVALUATE SPEECH PRODUCTION: CPT

## 2023-03-15 PROCEDURE — 84100 ASSAY OF PHOSPHORUS: CPT

## 2023-03-15 PROCEDURE — 82962 GLUCOSE BLOOD TEST: CPT

## 2023-03-15 PROCEDURE — 85027 COMPLETE CBC AUTOMATED: CPT

## 2023-03-15 PROCEDURE — 74011250636 HC RX REV CODE- 250/636: Performed by: NURSE PRACTITIONER

## 2023-03-15 PROCEDURE — 97162 PT EVAL MOD COMPLEX 30 MIN: CPT

## 2023-03-15 PROCEDURE — 80048 BASIC METABOLIC PNL TOTAL CA: CPT

## 2023-03-15 PROCEDURE — 97530 THERAPEUTIC ACTIVITIES: CPT

## 2023-03-15 PROCEDURE — 77010033678 HC OXYGEN DAILY

## 2023-03-15 PROCEDURE — 74011250637 HC RX REV CODE- 250/637: Performed by: NURSE PRACTITIONER

## 2023-03-15 PROCEDURE — 74011000250 HC RX REV CODE- 250: Performed by: INTERNAL MEDICINE

## 2023-03-15 PROCEDURE — 97112 NEUROMUSCULAR REEDUCATION: CPT | Performed by: OCCUPATIONAL THERAPIST

## 2023-03-15 PROCEDURE — 97530 THERAPEUTIC ACTIVITIES: CPT | Performed by: OCCUPATIONAL THERAPIST

## 2023-03-15 PROCEDURE — 97167 OT EVAL HIGH COMPLEX 60 MIN: CPT | Performed by: OCCUPATIONAL THERAPIST

## 2023-03-15 PROCEDURE — 65610000006 HC RM INTENSIVE CARE

## 2023-03-15 PROCEDURE — 74011250636 HC RX REV CODE- 250/636: Performed by: INTERNAL MEDICINE

## 2023-03-15 PROCEDURE — 36415 COLL VENOUS BLD VENIPUNCTURE: CPT

## 2023-03-15 PROCEDURE — 99232 SBSQ HOSP IP/OBS MODERATE 35: CPT | Performed by: PSYCHIATRY & NEUROLOGY

## 2023-03-15 RX ORDER — CARVEDILOL 3.12 MG/1
3.12 TABLET ORAL 2 TIMES DAILY WITH MEALS
Status: DISCONTINUED | OUTPATIENT
Start: 2023-03-15 | End: 2023-03-16

## 2023-03-15 RX ORDER — GUAIFENESIN 100 MG/5ML
81 LIQUID (ML) ORAL DAILY
Status: DISCONTINUED | OUTPATIENT
Start: 2023-03-15 | End: 2023-03-16

## 2023-03-15 RX ADMIN — SODIUM CHLORIDE 5 MG/HR: 9 INJECTION, SOLUTION INTRAVENOUS at 06:46

## 2023-03-15 RX ADMIN — SODIUM CHLORIDE 2.5 MG/HR: 9 INJECTION, SOLUTION INTRAVENOUS at 13:44

## 2023-03-15 RX ADMIN — Medication 1 AMPULE: at 12:50

## 2023-03-15 RX ADMIN — ENOXAPARIN SODIUM 40 MG: 100 INJECTION SUBCUTANEOUS at 17:53

## 2023-03-15 RX ADMIN — DEXMEDETOMIDINE 1.2 MCG/KG/HR: 100 INJECTION, SOLUTION INTRAVENOUS at 03:21

## 2023-03-15 RX ADMIN — DEXMEDETOMIDINE 0.6 MCG/KG/HR: 100 INJECTION, SOLUTION INTRAVENOUS at 23:54

## 2023-03-15 RX ADMIN — ASPIRIN 81 MG: 81 TABLET, CHEWABLE ORAL at 08:58

## 2023-03-15 RX ADMIN — SACUBITRIL AND VALSARTAN 1 TABLET: 24; 26 TABLET, FILM COATED ORAL at 08:59

## 2023-03-15 RX ADMIN — SACUBITRIL AND VALSARTAN 1 TABLET: 24; 26 TABLET, FILM COATED ORAL at 02:29

## 2023-03-15 RX ADMIN — DEXMEDETOMIDINE 1.5 MCG/KG/HR: 100 INJECTION, SOLUTION INTRAVENOUS at 01:33

## 2023-03-15 RX ADMIN — CARVEDILOL 3.12 MG: 3.12 TABLET, FILM COATED ORAL at 08:58

## 2023-03-15 RX ADMIN — CLOPIDOGREL BISULFATE 75 MG: 75 TABLET ORAL at 08:58

## 2023-03-15 RX ADMIN — SODIUM CHLORIDE, SODIUM LACTATE, POTASSIUM CHLORIDE, CALCIUM CHLORIDE AND DEXTROSE MONOHYDRATE 50 ML/HR: 5; 600; 310; 30; 20 INJECTION, SOLUTION INTRAVENOUS at 06:44

## 2023-03-15 RX ADMIN — SACUBITRIL AND VALSARTAN 1 TABLET: 24; 26 TABLET, FILM COATED ORAL at 23:06

## 2023-03-15 RX ADMIN — DEXMEDETOMIDINE 0.9 MCG/KG/HR: 100 INJECTION, SOLUTION INTRAVENOUS at 05:25

## 2023-03-15 RX ADMIN — ENOXAPARIN SODIUM 40 MG: 100 INJECTION SUBCUTANEOUS at 01:23

## 2023-03-15 RX ADMIN — DEXMEDETOMIDINE 0.5 MCG/KG/HR: 100 INJECTION, SOLUTION INTRAVENOUS at 09:07

## 2023-03-15 RX ADMIN — Medication 1 AMPULE: at 23:54

## 2023-03-15 RX ADMIN — CARVEDILOL 3.12 MG: 3.12 TABLET, FILM COATED ORAL at 17:53

## 2023-03-15 RX ADMIN — DEXMEDETOMIDINE 0.6 MCG/KG/HR: 100 INJECTION, SOLUTION INTRAVENOUS at 18:02

## 2023-03-15 NOTE — PROGRESS NOTES
0700  Report received from Irene COVARRUBIAS    0800  Assessment complete  Patient on Precedex . 9 mcg not following commands  able to move left side pupils pinpoint does moan out on O2 4 lpm [place] in place. #20 right hand infusing  right side remains flaccid right facial droop still noted. Precedex weaned down see MAR  patient was moved to speciality bed  Left restraint in place  Sitter at bedside    1000  Restraint removed as patient is slightly more awake and agrees to cooperate able to follow commands now    1145  To MRI    1250  Back from MRI    1300  Assessment complete patient more awake still follows command  spoke with Dr Ivette Lara she would like to wean Precedex off and have speech see him for swallow test  see MAR attempting to wean    1445  Per MD need NG tube for PO meds  NG placed 75 cm KUB ordered for confirmation    1600  Assessment complete  Patient awake but is extremely agitated moving all over bed will not sit still spoke with Dr Ivette Lara orders received for Haldol IV and Geodon PO    1632  Haldol given see MAR    6367  Precedex weaning up see MAR    1716  BP up Cardene gtt started see [facility]    1830  Confirmation of KUB went to reposition patient again as he has slid down to give his PO Coreg and Geodon via NGT and patient pulled ngt out. 1850  NGT replaced right nare 78 cm Kub reordered for confirmation. Meds left for next shift to give    1900  Report to [address]  Patient following commands able to be redirected but unable to get comfortable in bed is moving all over bed insisting on being turned every few minutes attempted to please patient as best as possible  continues to throw leg over side of bed sister Bronson Holstein in room at one pointed attempted to calm patient down also.    Patient was turned so many times and repositioned unable to chart in flow sheet each turn  Taps pad replaced several  times also   Sitter remained at bedside entire shift         Wallet and 2 cell phones plus  given to Diane Cardona on 3/13/2023 to take home clothes were left at bedside

## 2023-03-15 NOTE — PROGRESS NOTES
1900: Bedside shift change report given to Irene (oncoming nurse) by Iliana Castillo RN (offgoing nurse). Report included the following information SBAR. CXR tech in room for verification of NGT.    2000: Assessment complete. Pt Alert, agitated, and restless. Pt A&O x 2. Pt oriented to person and time. Pt disoriented to place and situation. When asked where he is, pt replied \"my house\". Pt has garbled speech. Pt's pupils are equal, round, and small. Pt moves LUE and LLE purposefully. Pt has no movement for RUE. Pt moves RLE spontaneously and weakly. Pt in NSR. Radial and pedal pulses are palpable bilaterally. Pt on 2L NC with O2 sats in mid-90s. Pts lung sounds are coarse with expiratory wheezing and diminished in the bases bilaterally. Pt has generalized edema. Pt voiding clear, blake urine via ngo. Pt has an obese abdomen with hypoactive bowel sounds. 2030: Pt pulling at lines and tubes. Order received for L soft wrist restraint per Varghese, NP.     2100: Pt pulled out NGT during KUB. Order received for IM Geodon per Varghese, NP.     2130: EKG complete. 2330: NGT inserted. Somewhat reddish, brown stomach output noted. 2345: Ray, RT in room for PRN neb.     0000: X-Ray Tech in room for KUB. Reassessment complete. No changes noted. 0145: This writer called Radiology to check status of KUB. Radiology said they would check and call back. 0215: Radiology advised this writer to call Radiologist to check status [phone]. Radiologist confirmed NGT is placed in the stomach. 0400: Reassessment completed. No changes noted. 0600: Pt received CHG bath, gown change, and linen change. 0700: Bedside shift change report given to Jacobo Mccain RN (oncoming nurse) by Ann Marie Apple RN (offgoing nurse). Report included the following information SBAR.

## 2023-03-15 NOTE — PROGRESS NOTES
Problem: Mobility Impaired (Adult and Pediatric)  Goal: *Acute Goals and Plan of Care (Insert Text)  Description:   FUNCTIONAL STATUS PRIOR TO ADMISSION: Patient was independent and active without use of DME. Works full-time as a . Enjoys gardening and working on home projects. HOME SUPPORT PRIOR TO ADMISSION: The patient lived alone, sister and mother both live nearby and are available to assist.    Physical Therapy Goals  Initiated 3/15/2023  1. Patient will move from supine to sit and sit to supine , scoot up and down, and roll side to side in bed with minimal assistance/contact guard assist within 7 day(s). 2.  Patient will transfer from bed to chair and chair to bed with moderate assistance  using the least restrictive device within 7 day(s). 3.  Patient will perform sit to stand with moderate assistance  within 7 day(s). 4.  Patient will ambulate with moderate assistance  for 10 feet with the least restrictive device within 7 day(s). 5.  Patient will improve Owens Balance score by 7 points within 7 days. Outcome: Not Met   PHYSICAL THERAPY EVALUATION- NEURO POPULATION  Patient: Francesco Kumar (62 y.o. male)  Date: 3/15/2023  Primary Diagnosis: Right hemiplegia ([address] [G81.91]  Ischemic stroke ([address] [I63.9]       Precautions:  Fall (dense right norm)      ASSESSMENT  Based on the objective data described below, the patient presents with drowsiness, dense R hemiparesis, impaired bed mobility, impaired sitting balance, impaired speech with poor intelligibility, increased fall risk, and decreased overall independence following admission for acute stroke with infarct in L basal ganglia. Patient able to follow commands at times requiring repeating and is able to reply with 1 word responses, difficult to understand though appropriate. Patient is drowsy during session, on precedex as he was agitated early though therapist did not observe this behavior during session.  Patient has no volitional movement through RUE, at times R foot/ankle will move without purpose. Sensation to light touch remains intact through RUE and RLE. On 4L NC throughout session with SpO2 remaining >92% during mobility. Patient required Max Ax2 for supine<>sit with max cuing for sequencing and protection of RUE. Upon sitting edge of bed patient initially requiring total A to maintain sitting balance. With time in sitting, patient is occasional able to sit with CGA for a few seconds at a time however majority of time demonstrates posterior R tendency requiring Max A to maintain balance. Patient tolerated sitting edge of bed for about 15 minutes before returning to supine with Max Ax2. Patient is well below his prior independent level and will require skilled PT services during acute hospital stay. Patient would benefit from rehab at discharge and would tolerate 3 hr therapy 5 days/week, patient is very motivated to return to his PLOF and has good support from his family. Current Level of Function Impacting Discharge (mobility/balance): Max Ax2 supine<>sit; Max A sitting balance x15 min    Functional Outcome Measure: The patient scored 0/56 on the [facility] [address] which is indicative of high fall risk. Other factors to consider for discharge: Indep PLOF     Patient will benefit from skilled therapy intervention to address the above noted impairments. PLAN :  Recommendations and Planned Interventions: bed mobility training, transfer training, gait training, therapeutic exercises, neuromuscular re-education, patient and family training/education, and therapeutic activities      Frequency/Duration: Patient will be followed by physical therapy:  5 times a week to address goals.     Recommendation for discharge: (in order for the patient to meet his/her long term goals)  Therapy 3 hours per day 5-7 days per week    This discharge recommendation:  A follow-up discussion with the attending provider and/or case management is planned    IF patient discharges home will need the following DME: to be determined (TBD)     SUBJECTIVE:   Patient stated Guille (when asked his name)    OBJECTIVE DATA SUMMARY:   HISTORY:    Past Medical History:   Diagnosis Date    Bell's palsy 01/25/2019    Diverticulitis of colon with perforation 01/2018    sigmoid    Diverticulitis of colon with perforation 08/09/2020    Hypertension     Kidney infection     Haresh Hunt syndrome (geniculate herpes zoster)    No past surgical history on file. Personal factors and/or comorbidities impacting plan of care: HTN    Home Situation  Home Environment: Private residence  # Steps to Enter: 3  Rails to Enter: Yes  One/Two Story Residence: Two story, live on 1st floor ([facility])  Living Alone: Yes  Support Systems: Other Family Member(s) (Sister and Mother live nearby)  Patient Expects to be Discharged to[de-identified] Home  Current DME Used/Available at Home: None  Tub or Shower Type: Tub/Shower combination    EXAMINATION/PRESENTATION/DECISION MAKING:   Critical Behavior:  Neurologic State: Agitated, Irritable, Restless  Orientation Level: Oriented to person, Oriented to time, Disoriented to place  Cognition: Follows commands  Safety/Judgement: Fall prevention  Hearing: Auditory  Auditory Impairment: None    Range Of Motion:  AROM: Grossly decreased, non-functional (RUE/LE, LUE/LE WFL)  PROM: Within functional limits  Strength:    Strength: Grossly decreased, non-functional (RUE/LE, LUE/LE WFL)  Tone & Sensation:   Tone: Abnormal (RUE/LE, flaccid)  Sensation: Intact  Coordination:  Coordination: Grossly decreased, non-functional (RUE/LE no functional, LUE/LE WFL)  Vision:   Diplopia: No  Acuity: Within Defined Limits  Corrective Lenses:  (sister reports that she thinks pt needed glasses prior to CVA)  Functional Mobility:  Bed Mobility:  Rolling: Maximum assistance; Additional time;Assist x2  Supine to Sit: Maximum assistance; Additional time;Assist x2  Sit to Supine: Maximum assistance; Additional time;Assist x2  Scooting: Moderate assistance; Additional time;Assist x2  Transfers:  Sit to Stand:  (unsafe to attempt this session)  Bed to Chair:  (unable at this time, recommend joel)  Balance:   Sitting: Impaired  Sitting - Static: Poor (constant support) (fleeting moments of fair static balance)  Sitting - Dynamic: Prop sitting  Standing:  (unable to safely attempt)  Ambulation/Gait Training:  Deferred due to dense R nrom with drowsiness on precedex    Functional Measure  Owens Balance Test:    Sitting to Standin  Standing Unsupported: 0  Sitting with Back Unsupported: 0  Standing to Sittin  Transfers: 0  Standing Unsupported with Eyes Closed: 0  Standing Unsupported with Feet Together: 0  Reach Forward with Outstretched Arm: 0   Object: 0  Turn to Look Over Shoulders: 0  Turn 360 Degrees: 0  Alternate Foot on Step/Stool: 0  Standing Unsupported One Foot in Front: 0  Stand on One Le  Total: 0/56       56=Maximum possible score;   0-20=High fall risk  21-40=Moderate fall risk   41-56=Low fall risk      Physical Therapy Evaluation Charge Determination   History Examination Presentation Decision-Making   MEDIUM  Complexity : 1-2 comorbidities / personal factors will impact the outcome/ POC  MEDIUM Complexity : 3 Standardized tests and measures addressing body structure, function, activity limitation and / or participation in recreation  MEDIUM Complexity : Evolving with changing characteristics  Other outcome measures Owens Balance 0/56  HIGH       Based on the above components, the patient evaluation is determined to be of the following complexity level: MEDIUM    Pain Rating:  Denies pain    Activity Tolerance:   Fair, requires frequent rest breaks, and observed SOB with activity      After treatment patient left in no apparent distress:   Patient positioned in L sidelying for pressure relief, Call bell within reach, Caregiver / family present, and Side rails x 3    COMMUNICATION/EDUCATION:   The patients plan of care was discussed with: Occupational therapist and Registered nurse. Patient was educated regarding his deficit(s) of R hemiparesis as this relates to his diagnosis of acute stroke. He demonstrated Fair understanding as evidenced by nodding in understanding. Fall prevention education was provided and the patient/caregiver indicated understanding., Patient/family have participated as able in goal setting and plan of care. , and Patient/family agree to work toward stated goals and plan of care.     Thank you for this referral.  Heidi Manley, PT   Time Calculation: 29 mins

## 2023-03-15 NOTE — PROGRESS NOTES
Neurology Note    Patient ID:  Gina Camarillo  [id number]  45 y.o.  1975      Date of Consultation:  March 15, 2023    Assessment and Plan:    Patient is a 56-year-old gentleman who presented to the hospital with progressive right-sided weakness. His current mental status shows quite significant agitation and confusion with right-sided severe hemiparesis. Imaging revealed an acute stroke in the left basal ganglia      Acute stroke:   Continue aspirin and Plavix. Patient's risk factors for stroke include hypertension - very high upon admission  Hypertension: goal systolic blood pressure under 140. LDL is 70 at goal.  Hemoglobin A1c is at goal.  Limited brain MRI does reveal an acute stroke on diffusion-weighted images. Ultimately, the patient will need a repeat MRI for a complete study to look at cerebral vascular disease burden. Non urgent  physical and Occupational Therapy following  EEG with no seizure focus  Echo completed - EF 35-40%. If examination worsens, patient with need urgent neuroimaging    I provided stroke education today to the patient and his sister in regards to risk factors for stroke and lifestyle modifications to help minimize the risk of future stroke. This included medication compliance, regular follow up with primary care physician,  and healthy lifestyle habits (nutrition/exercise)    There is no other additional neurology recommendations at this time. If questions arise, please do not hesitate to contact me and I will return to see the patient. The patient should follow-up in clinic in approximately 4 weeks time after discharge. Subjective:I am still weak     History of Present Illness:   Gina Camarillo is a 51 y.o. 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   The patient was ultimately transferred to the ICU due to persistent agitation. The patient was maintained on a Precedex drip which helped to control agitation. Brain MRI did reveal evidence of an acute stroke  He was less agitated overnight this morning. Patient was initially seen while working with therapy and then seen later in the afternoon as well. He is frustrated due to the severity of the weakness on his right upper and lower extremity      brain MRI from March 14, 2023 - acute stroke in the left posterior corona radiata/basal ganglia. Study was limited due to patient's agitation      CTA of the head and neck performed on March 12, 2023 revealed no large vessel flow-limiting stenosis.     Past Medical History:   Diagnosis Date    Bell's palsy 01/25/2019    Diverticulitis of colon with perforation 01/2018    sigmoid    Diverticulitis of colon with perforation 08/09/2020    Hypertension     Kidney infection     Haresh Hunt syndrome (geniculate herpes zoster)         No pertinent surgical history    Family History   Problem Relation Age of Onset    Hypertension Mother     Cancer Father         multiple myeloma        Social History     Tobacco Use    Smoking status: Every Day     Packs/day: 1.00     Years: 15.00     Pack years: 15.00     Types: Cigarettes    Smokeless tobacco: Never   Substance Use Topics    Alcohol use: Not Currently     Comment: rarely        Allergies   Allergen Reactions    Meclizine Rash          Current Facility-Administered Medications   Medication Dose Route Frequency    aspirin chewable tablet 81 mg  81 mg Per NG tube DAILY    carvediloL (COREG) tablet 3.125 mg  3.125 mg Per NG tube BID WITH MEALS    sacubitriL-valsartan (ENTRESTO) 24-26 mg tablet 1 Tablet  1 Tablet Per NG tube Q12H    alcohol 62% (NOZIN) nasal  1 Ampule  1 Ampule Topical Q12H    albuterol-ipratropium (DUO-NEB) 2.5 MG-0.5 MG/3 ML  3 mL Nebulization Q4H PRN    dextrose 5% lactated ringers infusion  50 mL/hr IntraVENous CONTINUOUS    [Held by provider] haloperidol lactate (HALDOL) injection 4 mg  4 mg IntraVENous Q4H PRN    [Held by provider] ziprasidone (GEODON) capsule 40 mg  40 mg Oral BID WITH MEALS    niCARdipine (CARDENE) 25 mg in 0.9% sodium chloride 250 mL ([id number])  0-15 mg/hr IntraVENous TITRATE    ondansetron (ZOFRAN) injection 4 mg  4 mg IntraVENous Q4H PRN    acetaminophen (TYLENOL) tablet 650 mg  650 mg Oral Q4H PRN    Or    acetaminophen (TYLENOL) solution 650 mg  650 mg Per NG tube Q4H PRN    Or    acetaminophen (TYLENOL) suppository 650 mg  650 mg Rectal Q4H PRN    clopidogreL (PLAVIX) tablet 75 mg  75 mg Oral DAILY    bisacodyL (DULCOLAX) suppository 10 mg  10 mg Rectal DAILY PRN    enoxaparin (LOVENOX) injection 40 mg  40 mg SubCUTAneous Q12H    labetaloL (NORMODYNE;TRANDATE) injection 10 mg  10 mg IntraVENous Q1H PRN    dexmedeTOMidine in 0.9 % NaCl (PRECEDEX) 400 mcg/100 mL (4 mcg/mL) infusion soln  0-1 mcg/kg/hr IntraVENous TITRATE       Review of Systems:      [x]Unable to obtain  ROS due to  [x]mental status change  []sedated   []intubated    Objective:     Visit Vitals  BP (!) 132/92   Pulse 65   Temp 97.6 °F (36.4 °C)   Resp 16   Ht 5' 7\" (1.702 m)   Wt 334 lb 10.5 oz (151.8 kg)   SpO2 95%   BMI 52.41 kg/m²       Physical Exam:      General:  appears well nourished in no acute distress  Neck: no carotid bruits  Lungs: clear to auscultation  Heart:  no murmurs, regular rate  Lower extremity: peripheral pulses palpable and no edema  Skin: intact    Neurological exam:    The patient was more awake and alert today. He did follow simple commands for me today such as show me his left thumb and wiggle his toes on the left side. He was less agitated. He also did stick out his tongue and open/close his eyes to command.     Cranial nerves:   II-XII were tested    Perrrla  Visual fields were full  Eomi, no evidence of nystagmus  Facial sensation:  normal and symmetric  Facial motor: right facial droop  Hearing intact  SCM strength intact  Tongue: midline without fasciculations    Motor: Tone  decreased in the right upper and lower extremity    No evidence of fasciculations    Strength testing:  He does have good strength in his left upper and lower extremity. There is no spontaneous movement seen in his right upper or lower extremity. There is mild grimace and slight withdrawal to painful stimulation. Unchanged. Sensory:  Upper extremity: intact to pp,  Lower extremity: intact to pp, this appears symmetric    Reflexes:  Diminished throughout    Cerebellar testing:  no tremor apparent,      Labs:     Lab Results   Component Value Date/Time    Hemoglobin A1c 5.4 03/13/2023 03:06 AM    Sodium 136 03/15/2023 04:27 AM    Potassium 3.9 03/15/2023 04:27 AM    Chloride 106 03/15/2023 04:27 AM    Glucose 155 (H) 03/15/2023 04:27 AM    BUN 18 03/15/2023 04:27 AM    Creatinine 0.96 03/15/2023 04:27 AM    Calcium 8.7 03/15/2023 04:27 AM    WBC 8.6 03/15/2023 04:27 AM    HCT 47.9 03/15/2023 04:27 AM    HGB 15.9 03/15/2023 04:27 AM    PLATELET 189 90/93/8898 04:27 AM       Imaging:    Results from Hospital Encounter encounter on 03/12/23    MRI BRAIN WO CONT    Narrative  EXAM: MRI BRAIN WO CONT    INDICATION: CHANGE IN MENTAL STATUS    COMPARISON: CTA head neck 3/12/2023. CONTRAST: None. TECHNIQUE:  Multiplanar multisequence acquisition without contrast of the brain. FINDINGS:  Only axial DWI was performed, as patient unable to tolerate remainder of exam  and started kicking and yelling. There is an acute infarct in the left posterior corona radiata/basal ganglia. Impression  1. Acute infarct in the left posterior corona radiata/basal ganglia.       Results from Hospital Encounter encounter on 03/12/23    CT CODE NEURO PERF W CBF    Narrative  CLINICAL HISTORY: Code neuro    EXAMINATION:  CT ANGIOGRAPHY HEAD AND NECK, CT PERFUSION    COMPARISON: MRI brain March 2019    TECHNIQUE:  Following the uneventful administration of iodinated contrast  material, axial CT angiography of the head and neck was performed. Delayed axial  images through the head were also obtained. Coronal and sagittal reconstructions  were obtained. Manual postprocessing of images was performed. 3-D  Sagittal  maximal intensity projection images were obtained. 3-D Coronal maximal  intensity projections were obtained. CT brain perfusion was performed with  generation of hemodynamic maps of multiple parameters, including cerebral blood  flow, cerebral blood volume, mean transit time, and TMAX. CT dose reduction was  achieved through use of a standardized protocol tailored for this examination  and automatic exposure control for dose modulation. This study was analyzed by  the [address] ai algorithm. FINDINGS:    Delayed contrast-enhanced head CT:    Chronic left centrum semiovale hypodensity likely sequela of chronic  microvascular angiopathy. Ventricles and sulci are normal in size and  configuration. Basal cisterns are clear. No evidence of hemorrhage. Paranasal  sinuses are clear. Orbits and globes are within normal limits. CTA NECK:    Great vessels: Normal arch anatomy with the origins patent. Right subclavian artery: Patent    Left subclavian artery: Patent    Right common carotid artery: Patent    Left common carotid artery: Patent    Cervical right internal carotid artery: Patent with no significant stenosis by  NASCET criteria. Cervical left internal carotid artery: Patent with no significant stenosis by  NASCET criteria. Right vertebral artery: Patent    Left vertebral artery: Patent    The lung apices are clear. The thyroid is homogeneous. No cervical  lymphadenopathy.     CTA HEAD:    Right cavernous internal carotid artery: Patent    Left cavernous internal carotid artery: Patent    Anterior cerebral arteries: Patent    Anterior communicating artery: Patent    Right middle cerebral artery: Patent    Left middle cerebral artery: Patent    Posterior communicating arteries: Diminutive    Posterior cerebral arteries: Patent    Basilar artery: Patent    Distal vertebral arteries: Patent    No evidence for intracranial aneurysm or hemodynamically significant stenosis. CT Perfusion:  15 cc core infarct in the left frontal lobe with 81 cc ischemic penumbra in the  bilateral frontal lobes right greater than left. Impression  1. No large vessel occlusion or hemodynamically significant carotid stenosis. 2.  Chronic left centrum semiovale hypodensity likely sequela of chronic  microvascular angiopathy. 3.  Decrease cerebral perfusion in the bilateral periventricular white matter  with 81 cc area of ischemic tissue in the bilateral frontal lobes right greater  than left without underlying vascular lesion to explain this perfusion  abnormality. I spent  40   minutes providing care to this  acutely ill inpatient with > 50% of the time counseling and assisting in the coordination of care of the patient on the patient's hospital floor/unit.     The Patient plan was discussed with Amy Montes, Dr. Xu Wellington and nursing today        Active Problems:    Right hemiplegia ([address] (3/13/2023)      Ischemic stroke ([address] (3/13/2023)                 Signed By:  Keshia Rucker DO FAAN    March 15, 2023

## 2023-03-15 NOTE — PROGRESS NOTES
[facility]    ICU TEAM Progress Note    Name: Daryle Primus   : 1975   MRN: [id number]   Date: 3/15/2023           ICU Assessment   Acute stroke with L hemiplegia  Acute agitated delirium  Dilated CM. LVEF 35-40% by Echocardiogram 23 (improved from 2020)  Hypertension  Obesity    Imaging:  3/15/2023   MRI of brain: Acute infarct in the left posterior corona radiata/basal ganglia. ICU Comprehensive Plan of Care:     Neurological System  Neurology following  Stroke order set implemented  Continue DAPT  Wean off dex gtt as toelrated    Cardiovascular System  Cont DAPT  SBP Goal of: < 160 mmHg  MAP Goal of: < 90 mmHg  None - For above SBP/MAP goals  Transfusion Trigger (Hgb): <7 g/dL  Keep K > 4; Mg > 2     Respiratory System  Aspiration precautions   SpO2 Goal: > 92%  DVT Prophylaxis: Lovenox     Renal/GI/Endocrine System  IVFs: D5LR at 50 ml/hr  Ulcer Prophylaxis: Not at this time   Bowel Regimen: None needed at this time  Feeding:  Pending SLP eval pending  Blood Sugar Goal 100-180 - Glycemic Control: Insulin    Tubes: None  Lines: Peripheral IV  Drains: None    PT/OT: PT consulted and on board and OT consulted and on board     Goals of Care Discussion with family Yes     Plan of Care/Code Status: Full Code    Discussed Care Plan with Bedside RN    Documentation of Current Medications    Subjective:   Progress Note: 3/15/2023      Reason for ICU Admission:  agitation    HPI:   52 y/o M pt hypertension presents to ED with cc of right sided weakness for 6 days. Code stroke called. CTH done, negative for bleed and LVO but did show \"Decrease cerebral perfusion in the bilateral periventricular white matter with 81 cc area of ischemic tissue in the bilateral frontal lobes right greater than left without underlying vascular lesion to explain this perfusion abnormality. \"  Tele neuro consulted but not a candidate for TNK due to Professor [address]hena [address].   While in ED had episode of twitching for which he got Keppra and Ativan. Admitted to the neuro tele unit under the hospitalist service. Rapid response called around 4:15 am for agitation. Given 2 mg ativan and ICU consulted and patient was transferred to ICU       Overnight Events:   3/15/2023  No major change. Remains poorly oriented and pulling out tubes, lines      Active Problem List:     Problem List  Date Reviewed: 2020            Codes Class    Right hemiplegia (HCC) ICD-10-CM: G81.91  ICD-9-CM: 342.90         Ischemic stroke (HCC) ICD-10-CM: I63.9  ICD-9-CM: 434.91         Dilated cardiomyopathy (HCC) ICD-10-CM: I42.0  ICD-9-CM: 425.4         Hypertension ICD-10-CM: I10  ICD-9-CM: 401.9         Class 3 severe obesity in adult [facility]) ICD-10-CM: E66.01  ICD-9-CM: 278.01         Diverticulitis of colon with perforation ICD-10-CM: K57.20  ICD-9-CM: 562.11         Diverticulitis ICD-10-CM: K57.92  ICD-9-CM: 562.11            Objective:   Vital Signs:  Visit Vitals  BP (!) 120/92   Pulse 67   Temp 98.6 °F (37 °C)   Resp 16   Ht 5' 7\" (1.702 m)   Wt 151.8 kg (334 lb 10.5 oz)   SpO2 96%   BMI 52.41 kg/m²    O2 Flow Rate (L/min): 4 l/min O2 Device: Nasal cannula Temp (24hrs), Av.3 °F (36.8 °C), Min:97.3 °F (36.3 °C), Max:100.2 °F (37.9 °C)           Intake/Output:     Intake/Output Summary (Last 24 hours) at 3/15/2023 1419  Last data filed at 3/15/2023 1300  Gross per 24 hour   Intake 2949.93 ml   Output 2748 ml   Net 201.93 ml         Physical Exam:    Visit Vitals  BP (!) 120/92   Pulse 67   Temp 98.6 °F (37 °C)   Resp 16   Ht 5' 7\" (1.702 m)   Wt 151.8 kg (334 lb 10.5 oz)   SpO2 96%   BMI 52.41 kg/m²     Obese  RASS -2, + F/C, NAD  HEENT WNL  No JVD noted  Chest clear anteriorly  Sinus, reg, no M  Abd soft, NT, +BS  Ext warm, no edema  R hemiplegia. Tongue deviation to R    LABS AND  DATA:   Lab results reviewed. For significant abnormal values and values requiring intervention, see assessment and plan.      MEDS: Reviewed    Chest X-Ray: No new film  CXR Results  (Last 48 hours)                 03/14/23 1954  XR CHEST PORT Final result    Impression:  Stable interstitial edema pattern with cardiomegaly. Gastric tube visualized but   tip not included. Narrative:  INDICATION:  NGT pulled  New one placed need confirmation        EXAM: Chest single view. COMPARISON: 3/13/2023. FINDINGS: A single frontal view of the chest at 1941 hours shows radiopaque   catheter projecting over the thorax, coursing toward the upper abdomen, but its   tip is not seen. No new focal infiltrate. Stable interstitial edema pattern with   cardiomegaly. .  The heart, mediastinum and pulmonary vasculature are stable . The bony thorax is unremarkable for age. .                    Multidisciplinary Rounds Completed: Yes    ABCDEF Bundle/Checklist Completed:  Yes    SPECIAL EQUIPMENT  None    DISPOSITION  Stay in ICU    CRITICAL CARE CONSULTANT NOTE  I had a face to face encounter with the patient, reviewed and interpreted patient data including clinical events, labs, images, vital signs, I/O's, and examined patient. I have discussed the case and the plan and management of the patient's care with the consulting services, the bedside nurses and the respiratory therapist.      NOTE OF PERSONAL INVOLVEMENT IN CARE   This patient has a high probability of imminent, clinically significant deterioration, which requires the highest level of preparedness to intervene urgently. I participated in the decision-making and personally managed or directed the management of the following life and organ supporting interventions that required my frequent assessment to treat or prevent imminent deterioration. I personally spent 35 minutes of critical care time. This is time spent at this critically ill patient's bedside actively involved in patient care as well as the coordination of care. This does not include any procedural time which has been billed separately.       Pasqual Font Sb Jackson, [address]  [phone]  3/15/2023

## 2023-03-15 NOTE — PROGRESS NOTES
Problem: Dysphagia (Adult)  Goal: *Acute Goals and Plan of Care (Insert Text)  Description: 3/15/2023  Speech path goals  1. Patient will participate with reeval of swallowing   Outcome: Not Met  SPEECH LANGUAGE PATHOLOGY BEDSIDE SWALLOW EVALUATION/motor speech evaluation   Patient: Cherylene Chafe (40 y.o. male)  Date: 3/15/2023  Primary Diagnosis: Right hemiplegia ([address] [G81.91]  Ischemic stroke ([address] [I63.9]       Precautions:   Fall (dense right norm)    ASSESSMENT :  Based on the objective data described below, the patient presents with severe R facial and lingual weakness with lingual deviation to the R. Has complete dentition. His speech is severely dysarthric with poor intelligibility and imprecise artic and blended word boundaries. When he tried to produce one word at a time, he was not able to improve intelligibility. He used the straw fairly well to extract liquid. Mildly slow oral phase noted. Difficult to palpate his laryngeal excursion due to his positioning. No coughing after ice chips or sips of thins. With applesauce, there was slow manipulation and propulsion. Pocketing on the R noted with purees and he needed to be suctioned. His dysphagia is secondary to his CVA and lethargy is affecting his ability to take po as well. We will reeval tomorrow. Nsg to offer ice chips intermittently. Patient will benefit from skilled intervention to address the above impairments. Patients rehabilitation potential is considered to be Good     PLAN :  Recommendations and Planned Interventions:  Recommend NPO   Small amounts of ice with nsg only   Frequency/Duration: Patient will be followed by speech-language pathology 3 times a week to address goals. Discharge Recommendations: Inpatient Rehab     SUBJECTIVE:   Patient was drowsy and very dysarthric.  \"Wiggins\" but not to hospital.     OBJECTIVE:     Past Medical History:   Diagnosis Date    Bell's palsy 01/25/2019    Diverticulitis of colon with perforation 01/2018    sigmoid    Diverticulitis of colon with perforation 08/09/2020    Hypertension     Kidney infection     [place] Hunt syndrome (geniculate herpes zoster)    No past surgical history on file. Prior Level of Function/Home Situation: lived alone, worked   Home Situation  Home Environment: Private residence  # Steps to Enter: 3  Rails to [employer]: Yes  One/Two Story Residence: Two story, live on 1st floor ([facility])  Living Alone: Yes  Support Systems:  (Mother and sister live close by and are supportive)  Patient Expects to be Discharged to[de-identified] Home  Current DME Used/Available at Home: None  Tub or Shower Type: Tub/Shower combination  Diet prior to admission: reg/thins   Current Diet:  NPO   Cognitive and Communication Status:  Neurologic State: Lethargic  Orientation Level: Oriented to person, Oriented to time, Disoriented to place  Cognition: Follows commands  Perception: Cues to maintain midline in sitting, Tactile, Verbal, Visual (manual, multimodal cues needed)  Perseveration: No perseveration noted  Safety/Judgement: Fall prevention  Oral Assessment:  Oral Assessment  Labial: Right droop  Dentition: Natural  Lingual: Right deviation  Mandible: No impairment  P.O. Trials:  Patient Position: upright in bed  Vocal quality prior to P.O.: No impairment  Consistency Presented: Thin liquid;Puree  How Presented: Self-fed/presented;Straw     Bolus Acceptance: Impaired  Bolus Formation/Control: Impaired  Type of Impairment: Delayed  Propulsion: Delayed (# of seconds)  Oral Residue: None  Initiation of Swallow: Delayed (# of seconds)  Laryngeal Elevation: Decreased  Aspiration Signs/Symptoms: None                Oral Phase Severity: Moderate  Pharyngeal Phase Severity : Moderate    NOMS:   The NOMS functional outcome measure was used to quantify this patient's level of swallowing impairment.   Based on the NOMS, the patient was determined to be at level 1 for swallow function       NOMS Swallowing Levels:  Level 1 (CN): NPO  Level 2 (CM): NPO but takes consistency in therapy  Level 3 (CL): Takes less than 50% of nutrition p.o. and continues with nonoral feedings; and/or safe with mod cues; and/or max diet restriction  Level 4 (CK): Safe swallow but needs mod cues; and/or mod diet restriction; and/or still requires some nonoral feeding/supplements  Level 5 (CJ): Safe swallow with min diet restriction; and/or needs min cues  Level 6 (CI): Independent with p.o.; rare cues; usually self cues; may need to avoid some foods or needs extra time  Level 7 ([address] [place] [address]): Independent for all p.o.  JAZMIN. (2003). National Outcomes Measurement System (NOMS): Adult Speech-Language Pathology User's Guide. Motor speech 2    Pain:  Pain Scale 1: Adult Nonverbal Pain Scale  Pain Intensity 1: 0       After treatment:   Patient left in no apparent distress in bed    COMMUNICATION/EDUCATION:   Patient was educated regarding his deficit(s) of dysphagia and dysarthria as this relates to his diagnosis of CVA. He demonstrated fair understanding as evidenced by lethargy. The patient's plan of care including recommendations, planned interventions, and recommended diet changes were discussed with: Registered nurse. Patient is unable to participate in goal setting and plan of care.     Thank you for this referral.  RHONDA Weaver  Time Calculation: 1330 mins

## 2023-03-15 NOTE — PROGRESS NOTES
Problem: Self Care Deficits Care Plan (Adult)  Goal: *Acute Goals and Plan of Care (Insert Text)  Description: FUNCTIONAL STATUS PRIOR TO ADMISSION: Patient was independent and active without use of DME. Works at a for . Recently bought his own home and was managing a large garden. HOME SUPPORT PRIOR TO ADMISSION: The patient lived alone with sister and mother to provide assistance. Occupational Therapy Goals:  Initiated 3/15/2023  1. Patient will perform grooming with supervision/set-up within 7 days. 2. Patient will perform upper body dressing with minimal assistance within 7 days. 3. Patient will perform upper body bathing with minimal assistance within 7 days. 4. Patient will improve dynamic sitting balance to CGA for improved independence with ADLS within 7 days. 5. Patient will improve fugl haynes score by 5 points within 7 days. 6. Patient will transfer from drop arm recliner or drop arm bedside commode with best technique and max assist within 7 days. Outcome: Not Met  OCCUPATIONAL THERAPY EVALUATION  Patient: Anne Palma (62 y.o. male)  Date: 3/15/2023  Primary Diagnosis: Right hemiplegia ([facility] [address] [G81.91]  Ischemic stroke ([facility] [address] [I63.9]       Precautions:   Fall (dense right norm)    ASSESSMENT  Based on the objective data described below, the patient presents with dysarthria, dense right flaccid UE/LE hemiparesis and decreased static seated balance. With attempt at pt moving his right side he over compensates in his trunk and left side to attempt movement. No active muscle contraction noted on right side at this time. Max assist x2 needed for supine to sit and CGA to max assist needed for static seated balance. It was deemed unsafe to attempt sit to stand this session due to pts seated balance deficits. Pt remains on cardene drip as well as precedex drip. BP was elevated this session and nursing was notified and aware.   Pt was able to remain seated edge of bed with focus on balance for ~15 minutes. Assisted pt back to supine with RUE supported with pillows and heel relief boot obtained and placed on pts right LE. Recommend intensive rehab at discharge. Current Level of Function Impacting Discharge (ADLs/self-care): max assist x2 bed mobility overall, unable to attempt sit to stand today    Feeding:  (Currently NPO) appears max assist    Oral Facial Hygiene/Grooming: Maximum assistance    Bathing: Maximum assistance; Total assistance    Upper Body Dressing: Total assistance    Lower Body Dressing: Total assistance    Toileting: Total assistance       Functional Outcome Measure: The patient scored Total A-D  Total A-D (Motor Function): 0/66 on the Fugl-Engel Assessment which is indicative of severe impairment in upper extremity functional status. Other factors to consider for discharge: was independent and working prior to onset of CVA     Patient will benefit from skilled therapy intervention to address the above noted impairments. PLAN :  Recommendations and Planned Interventions: self care training, functional mobility training, therapeutic exercise, balance training, visual/perceptual training, therapeutic activities, cognitive retraining, endurance activities, neuromuscular re-education, patient education, home safety training, and family training/education    Frequency/Duration: Patient will be followed by occupational therapy 5 times a week to address goals. Recommendation for discharge: (in order for the patient to meet his/her long term goals)  Therapy 3 hours per day 5-7 days per week    This discharge recommendation:  Has not yet been discussed the attending provider and/or case management    IF patient discharges home will need the following DME: joel lift, hospital bed, bariatric wheelchair, bariatric drop arm bedside commode       SUBJECTIVE:   Patient stated Margo Cook had a stroke.     OBJECTIVE DATA SUMMARY:   HISTORY:   Past Medical History: Diagnosis Date    Bell's palsy 01/25/2019    Diverticulitis of colon with perforation 01/2018    sigmoid    Diverticulitis of colon with perforation 08/09/2020    Hypertension     Kidney infection     [place] Hunt syndrome (geniculate herpes zoster)    No past surgical history on file. Expanded or extensive additional review of patient history:     Home Situation  Home Environment: Private residence  # Steps to Enter: 3  Rails to Enter: Yes  One/Two Story Residence: Two story, live on 1st floor ([facility])  Living Alone: Yes  Support Systems: Other Family Member(s) (Sister and Mother live nearby)  Patient Expects to be Discharged to[de-identified] Home  Current DME Used/Available at Home: None  Tub or Shower Type: Tub/Shower combination    Hand dominance: Right    EXAMINATION OF PERFORMANCE DEFICITS:  Cognitive/Behavioral Status:  Neurologic State: Drowsy; Eyes open spontaneously; Eyes open to voice  Orientation Level: Oriented X4  Cognition: Follows commands; Impulsive;Poor safety awareness  Perception: Cues to maintain midline in sitting; Tactile;Verbal;Visual (manual, multimodal cues needed)  Perseveration: No perseveration noted  Safety/Judgement: Fall prevention    Hearing: Auditory  Auditory Impairment: None    Vision/Perceptual:                      Diplopia: No    Acuity: Within Defined Limits    Corrective Lenses:  (sister reports that she thinks pt needed glasses prior to CVA)    Range of Motion:    AROM: Grossly decreased, non-functional (RUE/LE, LUE/LE WFL)  PROM: Within functional limits                      Strength:    Strength: Grossly decreased, non-functional (RUE/LE, LUE/LE WFL)                Coordination:  Coordination: Grossly decreased, non-functional (RUE/LE no functional, LUE/LE WFL)  Fine Motor Skills-Upper: Right Impaired;Left Intact    Gross Motor Skills-Upper: Left Intact; Right Impaired    Tone & Sensation:    Tone: Abnormal (RUE/LE, flaccid)  Sensation: Intact Balance:  Sitting: Impaired  Sitting - Static: Poor (constant support) (fleeting moments of fair static balance)  Sitting - Dynamic: Prop sitting  Standing:  (unable to safely attempt)    Functional Mobility and Transfers for ADLs:  Bed Mobility:  Rolling: Maximum assistance; Additional time;Assist x2  Supine to Sit: Maximum assistance; Additional time;Assist x2  Sit to Supine: Maximum assistance; Additional time;Assist x2  Scooting: Moderate assistance; Additional time;Assist x2    Transfers:  Sit to Stand:  (unsafe to attempt this session)  Bed to Chair:  (unable at this time, recommend joel)  Bathroom Mobility:  (unable)  Toilet Transfer :  (unable)    ADL Assessment:  Feeding:  (Currently NPO)    Oral Facial Hygiene/Grooming: Maximum assistance    Bathing: Maximum assistance; Total assistance    Upper Body Dressing: Total assistance    Lower Body Dressing: Total assistance    Toileting: Total assistance                ADL Intervention and task modifications: Focus on righting reactions and static seated balance edge of bed this session. Pt needed CGA to max assist for balance.      Cognitive Retraining  Safety/Judgement: Fall prevention    Therapeutic Exercise:  PROM RUE all functional planes     Functional Measure:  Fugl-Engel Assessment of Motor Recovery after Stroke:   Upper Extremity Assessment: Yes    Reflex Activity  Flexors/Biceps/Fingers: None  Extensors/Triceps: None  Reflex Subtotal: 0    Volitional Movement Within Synergies  Shoulder Retraction: None  Shoulder Elevation: None  Shoulder Abduction (90 degrees): None  Shoulder External Rotation: None  Elbow Flexion: None  Forearm Supination: None  Shoulder Adduction/Internal Rotation: None  Elbow Extension: None  Forearm Pronation: None  Subtotal: 0    Volitional Movement Mixing Synergies  Hand to Lumbar Spine: None  Shoulder Flexion (0-90 degrees): None  Pronation-Supination: None  Subtotal: 0    Volitional Movement With Little or No Synergy  Shoulder Abduction (0-90 degrees): None  Shoulder Flexion ( degrees): None  Pronation/Supination: None  Subtotal : 0    Normal Reflex Activity  Biceps, Triceps, Finger Flexors: None  Subtotal : 0    Upper Extremity Total   Upper Extremity Total: 0    Wrist  Stability at 15 Degree Dorsiflexion: None  Repeated Dorsiflexion/ Volar Flexion: None  Stability at 15 Degree Dorsiflexion: None  Repeated Dorsiflexion/ Volar Flexion: None  Circumduction: None  Wrist Total: 0    Hand  Mass Flexion: None  Mass Extension: None  Grasp A: None  Grasp B: None  Grasp C: None  Grasp D: None  Grasp E: None  Hand Total: 0    Coordination/Speed  Tremor: Marked  Dysmetria: Marked  Time: >5s  Coordination/Speed Total : 0    Total A-D  Total A-D (Motor Function): 0/66     This is a reliable/valid measure of arm function after a neurological event. It has established value to characterize functional status and for measuring spontaneous and therapy-induced recovery; tests proximal and distal motor functions. Fugl-Engel Assessment - UE scores recorded between five and 30 days post neurologic event can be used to predict UE recovery at six months post neurologic event. Severe = 0-21 points   Moderately Severe = 22-33 points   Moderate = 34-47 points   Mild = 48-66 points  JAZZY Beyer, PATTI Jung, & ZEUS Adamson (1992). Measurement of motor recovery after stroke: Outcome assessment and sample size requirements.  Stroke, 23, pp. 3597-5057.   ------------------------------------------------------------------------------------------------------------------------------------------------------------------  MCID:  Stroke:   Gui Weathers, 2001; n = 171; mean age 79 (6) years; assessed within 16 (12) days of stroke, Acute Stroke)  FMA Motor Scores from Admission to Discharge   10 point increase in FMA Upper Extremity = 1.5 change in discharge FIM   10 point increase in FMA Lower Extremity = 1.9 change in discharge FIM  MDC: Stroke:   Trung Soriano et al, 2008, n = 14, mean age = 59.9 (14.6) years, assessed on average 14 (6.5) months post stroke, Chronic Stroke)   FMA = 5.2 points for the Upper Extremity portion of the assessment     Occupational Therapy Evaluation Charge Determination   History Examination Decision-Making   HIGH Complexity : Extensive review of history including physical, cognitive and psychosocial history  HIGH Complexity : 5 or more performance deficits relating to physical, cognitive , or psychosocial skils that result in activity limitations and / or participation restrictions HIGH Complexity : Patient presents with comorbidities that affect occupational performance. Signifigant modification of tasks or assistance (eg, physical or verbal) with assessment (s) is necessary to enable patient to complete evaluation       Based on the above components, the patient evaluation is determined to be of the following complexity level: HIGH   Pain Ratin/10    Activity Tolerance:   Fair and requires rest breaks    After treatment patient left in no apparent distress:    Supine in bed, Heels elevated for pressure relief, Patient positioned in left sidelying for pressure relief, Call bell within reach, Bed / chair alarm activated, Caregiver / family present, and Side rails x 3    COMMUNICATION/EDUCATION:   The patients plan of care was discussed with: Physical therapist, Registered nurse, and patient and his sister . Patient was educated regarding his deficit(s) of right UE/LE hemiparesis, decreased balance and language deficits as this relates to his diagnosis of CVA. He demonstrated Good understanding as evidenced by verbalization. Patient and/or family was verbally educated on the BE FAST acronym for signs/symptoms of CVA and TIA. BE FAST was written on patient's communication board  for visual education and reinforcement. All questions answered with patient indicating good understanding.      Patient/family have participated as able in goal setting and plan of care. and Patient/family agree to work toward stated goals and plan of care. This patients plan of care is appropriate for delegation to [facility].     Thank you for this referral.  Shahida Conway, OTR/L  Time Calculation: 30 mins

## 2023-03-15 NOTE — PROGRESS NOTES
0730 Bedside and Verbal shift change report given to [facility] (oncoming nurse) by Adam Menezes RN (offgoing nurse). Report included the following information SBAR, Kardex, ED Summary, Intake/Output, MAR, Recent Results, and Cardiac Rhythm NSR .      1995 Patient agitated and is thrashing in the bed with his left arm and left leg - precedex gtt titrated up to 0.7 mcg/kg/hr    0748 Patient drowsy, eyes open spontaneously, and following commands - precedex gtt titrated down to 0.6 mcg/kg/hr    0800 Shift assessment completed - see flowsheet     0801 Cardene gtt titrated down to 2.5 mg/hr    0907 Patient drowsy, eyes open spontaneously, and following commands - precedex gtt titrated down to 0.5 mcg/kg/hr    1045 Patient alert, eyes open spontaneously, following commands, and is calm - precedex gtt titrated down to 0.4 mcg/kg/hr     1046 Interdisciplinary team rounds were held 3/15/2023 with the following team members:Care Management, Nursing, Nutrition, Occupational Therapy, Pharmacy, Physical Therapy, and Physician and the patient and sibling(s). Plan of care discussed. See clinical pathway and/or care plan for interventions and desired outcomes. Goals of the Day: continue to wean precedex gtt, work with PT/OT, and speech evaluation    1104 Patient alert, eyes open spontaneously, following commands, and is calm - precedex gtt titrated down to 0.3 mcg/kg/hr     1106 PT/OT at bedside with patient - per team, patient tolerated sitting on the edge of the bed on 4 L NC. Per team, will reassess patient again tomorrow    1200 Reassessment completed. Patient alert and calm, precedex gtt titrated down to 0.1 mcg/kg/min - see flowsheet and MAR    1215 Precedex gtt stopped and the site of the cardene gtt switched from right side PIV to left side PIV per protocol     [address], with Speech at bedside with patient.  Per speech, patient allowed to have ice chips when alert and sitting up straight and will come back again tomorrow to reevaluate     1240 Patient restless, agitated, and attempting to pull at NGT. Precedex gtt restarted at 0.1 mcg/kg/min     1257 Patient still agitated and attempting to pull at NGT. Precedex gtt titrated up to 0.2 mcg/kg/hr    1330 Vota, Do with neurology at bedside with patient and family     1451 Cardene gtt stopped - see flowsheet and MAR    1600 Reassessment completed - see flowsheet     1700 Patient becoming extremely agitated, irritable, and restless - precedex gtt titrated to 0.3 mcg/kg/hr    1716 Patient still very agitated, irritable, and is now thrashing left leg and arm in bed - precedex gtt titrated to 0.4 mcg/kg/min     1730 Patient inconsolable, agitated, and is continues to thrash the left side around in bed  - precedex gtt titrated to 0.5 mcg/kg/min    1758 Patient still inconsolable, agitated and thrashing the left arm and leg around in bed - precedex gtt 0.6 mcg/kg/min    1802 Patient calming down and agitation beginning to subside - precedex gtt titrated down to 0.5 mcg/kg/hr     End of Shift Note    Bedside shift change report given to Irene COVARRUBIAS (oncoming nurse) by José Miguel Mendosa RN (offgoing nurse). Report included the following information SBAR, Kardex, Intake/Output, MAR, and Recent Results    Shift worked:  [id number]     Shift summary and any significant changes:     Read note above      Concerns for physician to address:  Was joseph to titrate precedex gtt off - however patient towards the end of the shift patient became very agitated restless and irritable.  Unable to get comfortable      Zone phone for oncoming shift:          Activity:  Activity Level: Bed Rest  Number times ambulated in hallways past shift: 0  Number of times OOB to chair past shift: 0    Cardiac:   Cardiac Monitoring: Yes      Cardiac Rhythm: Sinus Rhythm    Access:  Current line(s): PIV     Genitourinary:   Urinary status: ngo    Respiratory:   O2 Device: Nasal cannula  Chronic home O2 use?: NO         GI:  Last Bowel Movement Date: 03/13/23  Current diet:  DIET NPO  Passing flatus: YES  Tolerating current diet: NO       Pain Management:   Patient states pain is manageable on current regimen: YES    Skin:  Ricardo Score: 13  Interventions: float heels, increase time out of bed, PT/OT consult, limit briefs, and internal/external urinary devices    Patient Safety:  Fall Score:    Interventions: bed/chair alarm, assistive device (walker, cane, etc), gripper socks, pt to call before getting OOB, and sitter at bedside        Length of Stay:  Expected LOS: 2d 21h  Actual LOS: 2      Meghan Garcia RN

## 2023-03-16 LAB
ALBUMIN SERPL-MCNC: 3.1 G/DL (ref 3.5–5)
ALBUMIN/GLOB SERPL: 0.7 (ref 1.1–2.2)
ALP SERPL-CCNC: 67 U/L (ref 45–117)
ALT SERPL-CCNC: 49 U/L (ref 12–78)
ANION GAP SERPL CALC-SCNC: 6 MMOL/L (ref 5–15)
AST SERPL-CCNC: 96 U/L (ref 15–37)
ATRIAL RATE: 83 BPM
BASOPHILS # BLD: 0.1 K/UL (ref 0–0.1)
BASOPHILS NFR BLD: 1 % (ref 0–1)
BILIRUB SERPL-MCNC: 2.5 MG/DL (ref 0.2–1)
BNP SERPL-MCNC: 634 PG/ML
BUN SERPL-MCNC: 19 MG/DL (ref 6–20)
BUN/CREAT SERPL: 19 (ref 12–20)
CALCIUM SERPL-MCNC: 9.1 MG/DL (ref 8.5–10.1)
CALCULATED P AXIS, ECG09: 51 DEGREES
CALCULATED R AXIS, ECG10: -15 DEGREES
CALCULATED T AXIS, ECG11: 26 DEGREES
CHLORIDE SERPL-SCNC: 108 MMOL/L (ref 97–108)
CO2 SERPL-SCNC: 28 MMOL/L (ref 21–32)
CREAT SERPL-MCNC: 0.99 MG/DL (ref 0.7–1.3)
DIAGNOSIS, 93000: NORMAL
DIFFERENTIAL METHOD BLD: ABNORMAL
EOSINOPHIL # BLD: 0.1 K/UL (ref 0–0.4)
EOSINOPHIL NFR BLD: 1 % (ref 0–7)
ERYTHROCYTE [DISTWIDTH] IN BLOOD BY AUTOMATED COUNT: 20.7 % (ref 11.5–14.5)
GLOBULIN SER CALC-MCNC: 4.5 G/DL (ref 2–4)
GLUCOSE BLD STRIP.AUTO-MCNC: 115 MG/DL (ref 65–117)
GLUCOSE BLD STRIP.AUTO-MCNC: 121 MG/DL (ref 65–117)
GLUCOSE BLD STRIP.AUTO-MCNC: 137 MG/DL (ref 65–117)
GLUCOSE BLD STRIP.AUTO-MCNC: 147 MG/DL (ref 65–117)
GLUCOSE SERPL-MCNC: 123 MG/DL (ref 65–100)
HCT VFR BLD AUTO: 51.6 % (ref 36.6–50.3)
HGB BLD-MCNC: 17.4 G/DL (ref 12.1–17)
IMM GRANULOCYTES # BLD AUTO: 0 K/UL (ref 0–0.04)
IMM GRANULOCYTES NFR BLD AUTO: 0 % (ref 0–0.5)
LYMPHOCYTES # BLD: 2 K/UL (ref 0.8–3.5)
LYMPHOCYTES NFR BLD: 24 % (ref 12–49)
MAGNESIUM SERPL-MCNC: 1.9 MG/DL (ref 1.6–2.4)
MCH RBC QN AUTO: 24.4 PG (ref 26–34)
MCHC RBC AUTO-ENTMCNC: 33.7 G/DL (ref 30–36.5)
MCV RBC AUTO: 72.3 FL (ref 80–99)
MONOCYTES # BLD: 0.8 K/UL (ref 0–1)
MONOCYTES NFR BLD: 10 % (ref 5–13)
NEUTS SEG # BLD: 5.4 K/UL (ref 1.8–8)
NEUTS SEG NFR BLD: 64 % (ref 32–75)
NRBC # BLD: 0 K/UL (ref 0–0.01)
NRBC BLD-RTO: 0 PER 100 WBC
P-R INTERVAL, ECG05: 170 MS
PHOSPHATE SERPL-MCNC: 4.1 MG/DL (ref 2.6–4.7)
PLATELET # BLD AUTO: 214 K/UL (ref 150–400)
PLATELET COMMENTS,PCOM: ABNORMAL
PMV BLD AUTO: 10.1 FL (ref 8.9–12.9)
POTASSIUM SERPL-SCNC: 3.7 MMOL/L (ref 3.5–5.1)
PROT SERPL-MCNC: 7.6 G/DL (ref 6.4–8.2)
Q-T INTERVAL, ECG07: 388 MS
QRS DURATION, ECG06: 110 MS
QTC CALCULATION (BEZET), ECG08: 455 MS
RBC # BLD AUTO: 7.14 M/UL (ref 4.1–5.7)
RBC MORPH BLD: ABNORMAL
SERVICE CMNT-IMP: ABNORMAL
SERVICE CMNT-IMP: NORMAL
SODIUM SERPL-SCNC: 142 MMOL/L (ref 136–145)
VENTRICULAR RATE, ECG03: 83 BPM
WBC # BLD AUTO: 8.4 K/UL (ref 4.1–11.1)

## 2023-03-16 PROCEDURE — 74011000250 HC RX REV CODE- 250: Performed by: INTERNAL MEDICINE

## 2023-03-16 PROCEDURE — 82962 GLUCOSE BLOOD TEST: CPT

## 2023-03-16 PROCEDURE — 77010033678 HC OXYGEN DAILY

## 2023-03-16 PROCEDURE — 74011250637 HC RX REV CODE- 250/637: Performed by: INTERNAL MEDICINE

## 2023-03-16 PROCEDURE — 74011250637 HC RX REV CODE- 250/637: Performed by: STUDENT IN AN ORGANIZED HEALTH CARE EDUCATION/TRAINING PROGRAM

## 2023-03-16 PROCEDURE — 85025 COMPLETE CBC W/AUTO DIFF WBC: CPT

## 2023-03-16 PROCEDURE — 36415 COLL VENOUS BLD VENIPUNCTURE: CPT

## 2023-03-16 PROCEDURE — 83880 ASSAY OF NATRIURETIC PEPTIDE: CPT

## 2023-03-16 PROCEDURE — 74011250636 HC RX REV CODE- 250/636: Performed by: INTERNAL MEDICINE

## 2023-03-16 PROCEDURE — 84100 ASSAY OF PHOSPHORUS: CPT

## 2023-03-16 PROCEDURE — 83735 ASSAY OF MAGNESIUM: CPT

## 2023-03-16 PROCEDURE — 80053 COMPREHEN METABOLIC PANEL: CPT

## 2023-03-16 PROCEDURE — 97530 THERAPEUTIC ACTIVITIES: CPT

## 2023-03-16 PROCEDURE — 74011250636 HC RX REV CODE- 250/636: Performed by: NURSE PRACTITIONER

## 2023-03-16 PROCEDURE — 65270000046 HC RM TELEMETRY

## 2023-03-16 PROCEDURE — 97535 SELF CARE MNGMENT TRAINING: CPT | Performed by: OCCUPATIONAL THERAPIST

## 2023-03-16 PROCEDURE — 97112 NEUROMUSCULAR REEDUCATION: CPT | Performed by: OCCUPATIONAL THERAPIST

## 2023-03-16 PROCEDURE — 97110 THERAPEUTIC EXERCISES: CPT

## 2023-03-16 PROCEDURE — 74011000258 HC RX REV CODE- 258: Performed by: INTERNAL MEDICINE

## 2023-03-16 PROCEDURE — 92526 ORAL FUNCTION THERAPY: CPT | Performed by: SPEECH-LANGUAGE PATHOLOGIST

## 2023-03-16 PROCEDURE — 74011250637 HC RX REV CODE- 250/637: Performed by: NURSE PRACTITIONER

## 2023-03-16 RX ORDER — INSULIN LISPRO 100 [IU]/ML
INJECTION, SOLUTION INTRAVENOUS; SUBCUTANEOUS
Status: DISCONTINUED | OUTPATIENT
Start: 2023-03-16 | End: 2023-03-27 | Stop reason: HOSPADM

## 2023-03-16 RX ORDER — AMLODIPINE BESYLATE 5 MG/1
5 TABLET ORAL DAILY
Status: DISCONTINUED | OUTPATIENT
Start: 2023-03-17 | End: 2023-03-16

## 2023-03-16 RX ORDER — AMLODIPINE BESYLATE 5 MG/1
5 TABLET ORAL DAILY
Status: DISCONTINUED | OUTPATIENT
Start: 2023-03-17 | End: 2023-03-17

## 2023-03-16 RX ORDER — CLONIDINE HYDROCHLORIDE 0.1 MG/1
0.2 TABLET ORAL 2 TIMES DAILY
Status: DISCONTINUED | OUTPATIENT
Start: 2023-03-16 | End: 2023-03-16

## 2023-03-16 RX ORDER — AMLODIPINE BESYLATE 5 MG/1
5 TABLET ORAL DAILY
Status: DISCONTINUED | OUTPATIENT
Start: 2023-03-16 | End: 2023-03-16

## 2023-03-16 RX ORDER — HYDRALAZINE HYDROCHLORIDE 20 MG/ML
10 INJECTION INTRAMUSCULAR; INTRAVENOUS
Status: DISCONTINUED | OUTPATIENT
Start: 2023-03-16 | End: 2023-03-17

## 2023-03-16 RX ORDER — CARVEDILOL 3.12 MG/1
3.12 TABLET ORAL 2 TIMES DAILY WITH MEALS
Status: DISCONTINUED | OUTPATIENT
Start: 2023-03-16 | End: 2023-03-17

## 2023-03-16 RX ORDER — GUAIFENESIN 100 MG/5ML
81 LIQUID (ML) ORAL DAILY
Status: DISCONTINUED | OUTPATIENT
Start: 2023-03-17 | End: 2023-03-27 | Stop reason: HOSPADM

## 2023-03-16 RX ORDER — CLOPIDOGREL BISULFATE 75 MG/1
75 TABLET ORAL DAILY
Status: DISCONTINUED | OUTPATIENT
Start: 2023-03-17 | End: 2023-03-27 | Stop reason: HOSPADM

## 2023-03-16 RX ORDER — CLONIDINE HYDROCHLORIDE 0.1 MG/1
0.2 TABLET ORAL 2 TIMES DAILY
Status: DISCONTINUED | OUTPATIENT
Start: 2023-03-16 | End: 2023-03-27 | Stop reason: HOSPADM

## 2023-03-16 RX ORDER — IBUPROFEN 200 MG
4 TABLET ORAL AS NEEDED
Status: DISCONTINUED | OUTPATIENT
Start: 2023-03-16 | End: 2023-03-27 | Stop reason: HOSPADM

## 2023-03-16 RX ORDER — ATORVASTATIN CALCIUM 40 MG/1
80 TABLET, FILM COATED ORAL
Status: DISCONTINUED | OUTPATIENT
Start: 2023-03-16 | End: 2023-03-27 | Stop reason: HOSPADM

## 2023-03-16 RX ORDER — CLOPIDOGREL BISULFATE 75 MG/1
75 TABLET ORAL DAILY
Status: DISCONTINUED | OUTPATIENT
Start: 2023-03-16 | End: 2023-03-16

## 2023-03-16 RX ADMIN — CARVEDILOL 3.12 MG: 3.12 TABLET, FILM COATED ORAL at 08:19

## 2023-03-16 RX ADMIN — AMLODIPINE BESYLATE 5 MG: 5 TABLET ORAL at 09:29

## 2023-03-16 RX ADMIN — CARVEDILOL 3.12 MG: 3.12 TABLET, FILM COATED ORAL at 17:22

## 2023-03-16 RX ADMIN — ASPIRIN 81 MG: 81 TABLET, CHEWABLE ORAL at 08:19

## 2023-03-16 RX ADMIN — ENOXAPARIN SODIUM 40 MG: 100 INJECTION SUBCUTANEOUS at 17:22

## 2023-03-16 RX ADMIN — CLONIDINE HYDROCHLORIDE 0.2 MG: 0.1 TABLET ORAL at 09:29

## 2023-03-16 RX ADMIN — Medication 1 AMPULE: at 12:31

## 2023-03-16 RX ADMIN — SODIUM CHLORIDE, SODIUM LACTATE, POTASSIUM CHLORIDE, CALCIUM CHLORIDE AND DEXTROSE MONOHYDRATE 50 ML/HR: 5; 600; 310; 30; 20 INJECTION, SOLUTION INTRAVENOUS at 02:21

## 2023-03-16 RX ADMIN — DEXMEDETOMIDINE 0.6 MCG/KG/HR: 100 INJECTION, SOLUTION INTRAVENOUS at 03:33

## 2023-03-16 RX ADMIN — CLOPIDOGREL BISULFATE 75 MG: 75 TABLET ORAL at 08:19

## 2023-03-16 RX ADMIN — AMLODIPINE BESYLATE 5 MG: 5 TABLET ORAL at 23:30

## 2023-03-16 RX ADMIN — ATORVASTATIN CALCIUM 80 MG: 40 TABLET, FILM COATED ORAL at 21:05

## 2023-03-16 RX ADMIN — HYDRALAZINE HYDROCHLORIDE 10 MG: 20 INJECTION INTRAMUSCULAR; INTRAVENOUS at 17:29

## 2023-03-16 RX ADMIN — Medication 1 AMPULE: at 21:11

## 2023-03-16 RX ADMIN — ENOXAPARIN SODIUM 40 MG: 100 INJECTION SUBCUTANEOUS at 05:52

## 2023-03-16 RX ADMIN — SACUBITRIL AND VALSARTAN 1 TABLET: 24; 26 TABLET, FILM COATED ORAL at 21:05

## 2023-03-16 RX ADMIN — SACUBITRIL AND VALSARTAN 1 TABLET: 24; 26 TABLET, FILM COATED ORAL at 12:31

## 2023-03-16 RX ADMIN — CLONIDINE HYDROCHLORIDE 0.2 MG: 0.1 TABLET ORAL at 17:22

## 2023-03-16 RX ADMIN — SODIUM CHLORIDE 5 MG/HR: 9 INJECTION, SOLUTION INTRAVENOUS at 04:16

## 2023-03-16 RX ADMIN — HYDRALAZINE HYDROCHLORIDE 10 MG: 20 INJECTION INTRAMUSCULAR; INTRAVENOUS at 22:28

## 2023-03-16 NOTE — PROGRESS NOTES
Problem: Self Care Deficits Care Plan (Adult)  Goal: *Acute Goals and Plan of Care (Insert Text)  Description: FUNCTIONAL STATUS PRIOR TO ADMISSION: Patient was independent and active without use of DME. Works at a for . Recently bought his own home and was managing a large garden. HOME SUPPORT PRIOR TO ADMISSION: The patient lived alone with sister and mother to provide assistance. Occupational Therapy Goals:  Initiated 3/15/2023  1. Patient will perform grooming with supervision/set-up within 7 days. 2. Patient will perform upper body dressing with minimal assistance within 7 days. 3. Patient will perform upper body bathing with minimal assistance within 7 days. 4. Patient will improve dynamic sitting balance to CGA for improved independence with ADLS within 7 days. 5. Patient will improve fugl haynes score by 5 points within 7 days. 6. Patient will transfer from drop arm recliner or drop arm bedside commode with best technique and max assist within 7 days. Outcome: Progressing Towards Goal   OCCUPATIONAL THERAPY TREATMENT  Patient: Bunny Barahona (76 y.o. male)  Date: 3/16/2023  Diagnosis: Right hemiplegia ([facility] [address] [G81.91]  Ischemic stroke ([facility] [address] [I63.9] <principal problem not specified>      Precautions: Fall (dense right norm)  Chart, occupational therapy assessment, plan of care, and goals were reviewed. ASSESSMENT  Patient continues with skilled OT services and is progressing towards goals. Pt is now off precedex drip and he had improved speech this session. His mother was present and supportive. Both parties had questions on what a stroke truly is and normal recovery. All parties were educated on this with good understanding. Moderate to max assist x2 needed for supine to sit this session. He continues to over compensate at times with mobility causing loss of balance.   Less balance corrections needed this session and pt was able to perform seated UB ADL and exercise with CGA to moderate assist for balance. Pt now has slight movement actively with scapular elevation, humeral extension (gravity eliminated) and now has slight tone in bicep with quick stretch. Current Level of Function Impacting Discharge (ADLs): moderate to max assist x2 bed mobility, max assist x2 to scoot edge of bed, Min assist applying lotion to right UE seated edge of bed, total assist socks    Other factors to consider for discharge: making gains with therapy, was fully independent prior to CVA         PLAN :  Patient continues to benefit from skilled intervention to address the above impairments. Continue treatment per established plan of care to address goals. Recommend with staff: chair position or joel to bedside recliner chair    Recommend next OT session: neuromuscular re-education, balance, ADLS    Recommendation for discharge: (in order for the patient to meet his/her long term goals)  Therapy 3 hours per day 5-7 days per week    This discharge recommendation:  Has been made in collaboration with the attending provider and/or case management    IF patient discharges home will need the following DME: joel lift, hospital bed, bariatric wheelchair       SUBJECTIVE:   Patient stated I need to sit up and get out of this bed.     OBJECTIVE DATA SUMMARY:   Cognitive/Behavioral Status:  Neurologic State: Alert  Orientation Level: Oriented X4  Cognition: Follows commands  Perception: Cues to maintain midline in sitting; Tactile;Verbal;Visual  Perseveration: No perseveration noted  Safety/Judgement: Fall prevention    Functional Mobility and Transfers for ADLs:  Bed Mobility:  Rolling: Moderate assistance;Maximum assistance; Additional time;Assist x2  Supine to Sit: Moderate assistance;Maximum assistance; Additional time;Assist x2  Sit to Supine: Maximum assistance; Additional time;Assist x2  Scooting: Maximum assistance; Additional time;Assist x2 (seated edge of bed sliding laterally)    Transfers:             Balance:  Sitting - Static: Fair (occasional)  Sitting - Dynamic: Fair (occasional); Poor (constant support) (occasional over correction of balance needing assist)  Standing:  (unable to attempt)    ADL Intervention:       Grooming  Grooming Assistance: Minimum assistance (applying lotion using norm technique to open flip top lotion with left hand and applying lotion to right UE seated edge of bed with fair to occasional poor balance, RUE propped on bolster)      Lower Body Dressing Assistance  Socks: Total assistance (dependent)         Cognitive Retraining  Safety/Judgement: Fall prevention    Therapeutic Exercises/Neuromuscular Re-education:   Seated edge of bed:  -scapular elevation with bilateral integration (able to actively perform two on right side partial range)  -scapular retraction right 1 trace motion  -gravity eliminated shoulder retraction 2 reps trace movement    Seated edge of bed RUE supported on wedges for WB, manual facilitation given through distal third of tricep and shoulder girdle    PROM RUE all functional planes. Pain:  0/10    Activity Tolerance:   Good    After treatment patient left in no apparent distress:   Call bell within reach, Caregiver / family present, Side rails x 3, and chair position in bed with support of bilateral feet on wedges and RUE supported with pillows for joint protection    COMMUNICATION/COLLABORATION:   The patients plan of care was discussed with: Physical therapist, Registered nurse, and patient and his mother .      ANNE Siddiqui/L  Time Calculation: 41 mins

## 2023-03-16 NOTE — PROGRESS NOTES
[facility]    ICU TEAM Progress Note    Name: Asmita Pabon   : 1975   MRN: [id number]   Date: 3/16/2023           ICU Assessment   Acute stroke with L hemiplegia  Acute agitated delirium - much improved to resolved. Off dexmedetomidine  Dilated CM. LVEF 35-40% by Echocardiogram 23 (improved from 2020)  Hypertension, now controlled off nicardipine gtt  Obesity    Imaging:  3/16/2023   MRI of brain: Acute infarct in the left posterior corona radiata/basal ganglia. ICU Comprehensive Plan of Care:     Neurological System  Neurology following  Stroke order set implemented  Continue DAPT  Statin therapy initiated   SLP swallow re-eval  - if passes, DC NGT and advance diet  DC sitter    Cardiovascular System  Added clonidine and amlodipine       Respiratory System  Aspiration precautions   SpO2 Goal: > 92%  DVT Prophylaxis: Lovenox     Renal/GI/Endocrine System  IVFs: D5LR at 50 ml/hr - this can be stopped if passes SP eval  Bowel Regimen: None needed at this time  Feeding:  Pending SLP eval pending  Blood Sugar Goal 100-180 - Glycemic Control: not required    Tubes: Nasogastric Tube  Lines: Peripheral IV  Drains: None    PT/OT: PT consulted and on board and OT consulted and on board     Plan of Care/Code Status: Full Code    Discussed Care Plan with Bedside RN. Mother updated at bedside. Discussed on MDRs    Documentation of Current Medications    Subjective:   Progress Note: 3/16/2023         Overnight Events:   3/16/2023  No major events    Comfortable on RA. Cognition much improved. Oriented to person and place.  Calm and cooperative    Active Problem List:     Problem List  Date Reviewed: 2020            Codes Class    Right hemiplegia (HCC) ICD-10-CM: G81.91  ICD-9-CM: 342.90         Ischemic stroke ([facility] [address] ICD-10-CM: I63.9  ICD-9-CM: 434.91         Dilated cardiomyopathy ([facility] [address] ICD-10-CM: I42.0  ICD-9-CM: 425.4         Hypertension ICD-10-CM: I10  ICD-9-CM: 401.9         Class 3 severe obesity in adult [facility]) ICD-10-CM: E66.01  ICD-9-CM: 278.01         Diverticulitis of colon with perforation ICD-10-CM: K57.20  ICD-9-CM: 562.11         Diverticulitis ICD-10-CM: K57.92  ICD-9-CM: 562.11          Objective:   Vital Signs:  Visit Vitals  /86   Pulse 70   Temp 98.3 °F (36.8 °C)   Resp 17   Ht 5' 7\" (1.702 m)   Wt 147 kg (324 lb 1.2 oz)   SpO2 98%   BMI 50.76 kg/m²    O2 Flow Rate (L/min): 4 l/min O2 Device: Nasal cannula Temp (24hrs), Av.9 °F (37.2 °C), Min:98.3 °F (36.8 °C), Max:99.7 °F (37.6 °C)           Intake/Output:     Intake/Output Summary (Last 24 hours) at 3/16/2023 1151  Last data filed at 3/16/2023 1135  Gross per 24 hour   Intake 1591.63 ml   Output 2980 ml   Net -1388.37 ml         Physical Exam:    Visit Vitals  /86   Pulse 70   Temp 98.3 °F (36.8 °C)   Resp 17   Ht 5' 7\" (1.702 m)   Wt 147 kg (324 lb 1.2 oz)   SpO2 98%   BMI 50.76 kg/m²     Obese  NAD  HEENT R facial droop  No JVD noted  Chest clear anteriorly  Sinus, reg, no M  Abd soft, NT, +BS  Ext warm, no edema  R hemiparesis    LABS AND  DATA:   Lab results reviewed. For significant abnormal values and values requiring intervention, see assessment and plan. MEDS: Reviewed    Chest X-Ray: No new film  CXR Results  (Last 48 hours)                 23  XR CHEST PORT Final result    Impression:  Stable interstitial edema pattern with cardiomegaly. Gastric tube visualized but   tip not included. Narrative:  INDICATION:  NGT pulled  New one placed need confirmation        EXAM: Chest single view. COMPARISON: 3/13/2023. FINDINGS: A single frontal view of the chest at 1941 hours shows radiopaque   catheter projecting over the thorax, coursing toward the upper abdomen, but its   tip is not seen. No new focal infiltrate. Stable interstitial edema pattern with   cardiomegaly. .  The heart, mediastinum and pulmonary vasculature are stable .     The bony thorax is unremarkable for age. .                    Multidisciplinary Rounds Completed: Yes    ABCDEF Bundle/Checklist Completed:  Yes    SPECIAL EQUIPMENT  None    DISPOSITION  Transfer to non-ICU bed - communicated with Dr Nai Hewitt. Transfer to Hospitalist Service    CRITICAL CARE CONSULTANT NOTE  I had a face to face encounter with the patient, reviewed and interpreted patient data including clinical events, labs, images, vital signs, I/O's, and examined patient. I have discussed the case and the plan and management of the patient's care with the consulting services, the bedside nurses and the respiratory therapist.      NOTE OF PERSONAL INVOLVEMENT IN CARE   This patient has a high probability of imminent, clinically significant deterioration, which requires the highest level of preparedness to intervene urgently. I participated in the decision-making and personally managed or directed the management of the following life and organ supporting interventions that required my frequent assessment to treat or prevent imminent deterioration. I personally spent 35 minutes of critical care time. This is time spent at this critically ill patient's bedside actively involved in patient care as well as the coordination of care. This does not include any procedural time which has been billed separately.       Amanda Darden, [facility],[address]  [phone]  3/16/2023

## 2023-03-16 NOTE — PROGRESS NOTES
SPEECH LANGUAGE PATHOLOGY DYSPHAGIA TREATMENT  Patient: Nicole Guzman (7 y.o. male)  Date: 3/16/2023  Diagnosis: Right hemiplegia ([facility] [address] [G81.91]  Ischemic stroke ([facility] [address] [I63.9] <principal problem not specified>      Precautions:   Fall (dense right norm)    ASSESSMENT:  Patient awake and alert today, speech dysarthric but 100% intelligible. He was able to self feed sips of water via straw without anterior loss, without s/s of aspiration. No pocketing noted with purees or solids this date. Patient able to use tongue for lingual sweep. At this juncture, it is reasonable to initiate a PO diet. Patient reports dry mouth and sore throat, not interested in eating much right now. Discussed diet options with him, risk for pocketing with R droop. He was interested in initiating clears for now, to advance as tolerated. PLAN:  Recommendations and Planned Interventions:  Clear liquid diet  Will follow to advance as appropriate. Patient continues to benefit from skilled intervention to address the above impairments. Continue treatment per established plan of care. Discharge Recommendations: At this juncture, patient will benefit from continued intensive SLP treatment for speech and swallowing. SUBJECTIVE:   Patient stated I gotta look good, I'm a good lookin lina. OBJECTIVE:   Cognitive and Communication Status:  Neurologic State: Alert  Orientation Level: Oriented X4  Cognition: Follows commands  Perception: Cues to maintain midline in sitting, Tactile, Verbal, Visual  Perseveration: No perseveration noted  Safety/Judgement: Fall prevention  Dysphagia Treatment:  Oral Assessment:  Oral Assessment  Labial: Right droop  Dentition: Natural;Intact  Oral Hygiene: clean and moist  Lingual: Right deviation  Mandible: No impairment  P.O. Trials:  Patient Position: bed in chair position  Vocal quality prior to P.O.: No impairment  Consistency Presented:  Thin liquid  How Presented: Self-fed/presented;Straw;Spoon Bolus Acceptance: No impairment  Bolus Formation/Control: No impairment        Oral Residue: None        Aspiration Signs/Symptoms: None                                                                                                                                          Pain:  Pain Scale 1: Numeric (0 - 10)  Pain Intensity 1: 0       After treatment:   Patient left in no apparent distress in bed, Call bell within reach, Nursing notified, and Caregiver / family present    COMMUNICATION/EDUCATION:   Patient was educated regarding purpose of SLP visit, diet options, plan, importance of checking for pocketing as diet is advanced. The patient's plan of care including recommendations, planned interventions, and recommended diet changes were discussed with: Registered nurse.      RHONDA Torre  Time Calculation: 15 mins

## 2023-03-16 NOTE — PROGRESS NOTES
12[address] [facility] IN REPORT:    Verbal report received from MARCI Goodman (name) on Chelsey Martinez  being received from CCU(unit) for routine progression of care      Report consisted of patients Situation, Background, Assessment and   Recommendations(SBAR). Information from the following report(s) SBAR, Kardex, MAR, and Cardiac Rhythm NSR  was reviewed with the receiving nurse. Opportunity for questions and clarification was provided. Assessment completed upon patients arrival to unit and care assumed. 1900 End of Shift Note    Bedside shift change report given to Pat Vu RN (oncoming nurse) by Afshan Contreras RN (offgoing nurse). Report included the following information SBAR, Kardex, MAR, and Cardiac Rhythm NSR    Shift worked:   7  am- 7 pm     Shift summary and any significant changes:     Patient transferred from CCU this shift. Patient noted with right sided weakness. NGT removed this shift. Patient on clear liquids.       Concerns for physician to address:       Zone phone for oncoming shift:          Activity:  Activity Level: Bed Rest  Number times ambulated in hallways past shift: 0  Number of times OOB to chair past shift: 0    Cardiac:   Cardiac Monitoring: Yes      Cardiac Rhythm: Sinus Rhythm    Access:  Current line(s): PIV     Genitourinary:   Urinary status: voiding    Respiratory:   O2 Device: None (Room air)  Chronic home O2 use?: NO  Incentive spirometer at bedside: YES       GI:  Last Bowel Movement Date: 03/13/23  Current diet:  ADULT DIET Clear Liquid  Passing flatus: YES  Tolerating current diet: YES       Pain Management:   Patient states pain is manageable on current regimen: N/A    Skin:  Ricardo Score: 13  Interventions: PT/OT consult and nutritional support     Patient Safety:  Fall Score:    Interventions: bed/chair alarm and gripper socks       Length of Stay:  Expected LOS: 2d 21h  Actual LOS: 3      Afshan Contreras RN

## 2023-03-16 NOTE — INTERDISCIPLINARY ROUNDS
Interdisciplinary team rounds were held 3/16/2023 with the following team members:Care Management, Diabetes Treatment Specialist, Nursing, Nutrition, Pharmacy, Physician, and Clinical Coordinator and the .    Plan of care discussed. See clinical pathway and/or care plan for interventions and desired outcomes. Goals of the Day: Medications as ordered. Transfer orders noted.

## 2023-03-16 NOTE — PROGRESS NOTES
1900: Bedside shift change report given to MARCI Bonilla (oncoming nurse) by Nlela Mcclellan RN (offgoing nurse). Report included the following information SBAR. Pt complaining of discomfort. Pt restless and trying to get comfortable. Pt transferred to standard ICU bed. Nursing Supervisor, Emmy Rodriguez, notified that pt is no longer on Bariatric Bed. Pt reports feeling more comfortable. 2000: Assessment complete. Pt A&O x 4. Pt's speech is garbled. Pt has right-sided facial droop. Pt opens eyes spontaneously. Pt's pupils are round and equal. Pt moves LUE and LLE spontaneously, purposefully, and to command. Pt has no movement in RUE. Pt has very slight movement on RLE. Pt in NSR with palpable radial and pedal pulses bilaterally. Pt on 4L NC with coarse lung sounds, diminished in the bases bilaterally. Pt has generalized edema. Pt has active bowel sounds and an obese, semi-soft abdomen. Pt voiding clear, blake urine via ngo. Pt has NGT in R nare that is clamped. 0000: Reassessment complete. 0200: Pt's BP reading as hypotensive. BP cuff moved from R Upper arm to R lower arm.      0345: Pt very impulsive; pt found with L leg on floor, attempting to exit bed to stand. This writer and Ree Weston, PCT redirected pt to remain in bed.    0400: Pt's BP continues to trend up SBPs in 150s/160s and DBP in low 100s. BP cuff reading on R Upper Arm and verified by reading R Leg. Cardene gtt restarted. Reassessment complete. No changes noted. 0530: Pt's K resulted as 3.7. JERRY Kwong aware and will provide orders. Pt has been more oriented throughout the night. Orders received to d/c restraint. 0700: Bedside shift change report given to Kirk Naik RN (oncoming nurse) by Kym Keenan RN (offgoing nurse). Report included the following information SBAR.

## 2023-03-16 NOTE — PROGRESS NOTES
Problem: Mobility Impaired (Adult and Pediatric)  Goal: *Acute Goals and Plan of Care (Insert Text)  Description:   FUNCTIONAL STATUS PRIOR TO ADMISSION: Patient was independent and active without use of DME. Works full-time as a . Enjoys gardening and working on home projects. HOME SUPPORT PRIOR TO ADMISSION: The patient lived alone, sister and mother both live nearby and are available to assist.    Physical Therapy Goals  Initiated 3/15/2023  1. Patient will move from supine to sit and sit to supine , scoot up and down, and roll side to side in bed with minimal assistance/contact guard assist within 7 day(s). 2.  Patient will transfer from bed to chair and chair to bed with moderate assistance  using the least restrictive device within 7 day(s). 3.  Patient will perform sit to stand with moderate assistance  within 7 day(s). 4.  Patient will ambulate with moderate assistance  for 10 feet with the least restrictive device within 7 day(s). 5.  Patient will improve Owens Balance score by 7 points within 7 days. Outcome: Progressing Towards Goal   PHYSICAL THERAPY TREATMENT  Patient: Shad Brandt (67 y.o. male)  Date: 3/16/2023  Diagnosis: Right hemiplegia ([facility] [address] [G81.91]  Ischemic stroke ([facility] [address] [I63.9] <principal problem not specified>      Precautions: Fall (dense right norm)  Chart, physical therapy assessment, plan of care and goals were reviewed. ASSESSMENT  Patient continues with skilled PT services and is progressing towards goals. Patient in bed upon arrival, agreeable and eager to participate in therapy session. Patient with significantly improved speech this session though remains dysarthric. Completes supine>sit with Mod-Max Ax2 and max cuing for sequencing. Patient demonstrates improved sitting balance this session, intermittently requiring up to Mod A to maintain balance and correct to midline though majority of time with static sitting patient able to maintain with CGA.  On 2 occasions patient over corrects and causes significant LOB requiring assist to maintain safely. Demonstrates intact hip add/abd in sitting and trace DF sporadically in supine. Attempted knee flexion in sitting, patient demonstrating intact knee extension instead. Need to assess LE tone next session. Returned to supine with Max Ax2. Patient reports fatigue after session. Patient educated on stroke recovery and expectation, patient and mother verbalizing understanding. Patient remains a great rehab candidate, he was indep prior and is extremely motivated to return to independence. Current Level of Function Impacting Discharge (mobility/balance): Mod-Max Ax2 bed mobility; CGA-Mod A sitting balance; unable to attempt transfers         PLAN :  Patient continues to benefit from skilled intervention to address the above impairments. Continue treatment per established plan of care. to address goals. Recommendation for discharge: (in order for the patient to meet his/her long term goals)  Therapy 3 hours per day 5-7 days per week    This discharge recommendation:  Has been made in collaboration with the attending provider and/or case management    IF patient discharges home will need the following DME: to be determined (TBD)       SUBJECTIVE:   Patient stated I want to get out of this bed.     OBJECTIVE DATA SUMMARY:   Critical Behavior:  Neurologic State: Alert  Orientation Level: Oriented X4  Cognition: Follows commands  Safety/Judgement: Fall prevention  Functional Mobility Training:  Bed Mobility:  Rolling: Moderate assistance;Maximum assistance; Additional time;Assist x2  Supine to Sit: Moderate assistance;Maximum assistance; Additional time;Assist x2  Sit to Supine: Maximum assistance; Additional time;Assist x2  Scooting: Maximum assistance; Additional time;Assist x2 (seated edge of bed sliding laterally)  Transfers:  Deferred this session  Balance:  Sitting - Static: Fair (occasional)  Sitting - Dynamic: Fair (occasional); Poor (constant support) (occasional over correction of balance needing assist)  Standing:  (unable to attempt)    Therapeutic Exercises:       EXERCISE   Sets   Reps   Active Active Assist   Passive Self ROM   Comments   Hip adduction 1 5 [] [x] [] []    Hip abduction 1 5 [] [x] [] []       Pain Rating:  Denies pain    Activity Tolerance:   Fair, requires frequent rest breaks, and observed SOB with activity    After treatment patient left in no apparent distress:   Sitting in chair position in bed, Call bell within reach, Caregiver / family present, and Side rails x 3    COMMUNICATION/COLLABORATION:   The patients plan of care was discussed with: Occupational therapist and Registered nurse.      Heidi Manley, PT   Time Calculation: 38 mins

## 2023-03-16 NOTE — PROGRESS NOTES
Hospitalist Progress Note    Subjective:   Daily Progress Note: 3/16/2023 3:46 PM    Hospital Course:  Pt admitted for progressive right facial droop, right side numbness and weakness. MRI was done , was a limited study showing an acute stroke in the left posterior corona radiata/basal ganglia on 3/14/23. He was on the medical floor but became increasingly agitated and was transferred to the ICU for Precedex drip and was weaned off ,transferred back to the medical floor. CTA of the head and neck does not reveal any large vessel stenosis. Aggressive control of lifestyle modifications, BP and weight loss are recommended. Subjective: Pt seen in room, no complaints of pain, still having right side weakness.      Current Facility-Administered Medications   Medication Dose Route Frequency    clopidogreL (PLAVIX) tablet 75 mg  75 mg Per NG tube DAILY    cloNIDine HCL (CATAPRES) tablet 0.2 mg  0.2 mg Per NG tube BID    amLODIPine (NORVASC) tablet 5 mg  5 mg Per NG tube DAILY    atorvastatin (LIPITOR) tablet 80 mg  80 mg Oral QHS    aspirin chewable tablet 81 mg  81 mg Per NG tube DAILY    carvediloL (COREG) tablet 3.125 mg  3.125 mg Per NG tube BID WITH MEALS    sacubitriL-valsartan (ENTRESTO) 24-26 mg tablet 1 Tablet  1 Tablet Per NG tube Q12H    alcohol 62% (NOZIN) nasal  1 Ampule  1 Ampule Topical Q12H    albuterol-ipratropium (DUO-NEB) 2.5 MG-0.5 MG/3 ML  3 mL Nebulization Q4H PRN    dextrose 5% lactated ringers infusion  50 mL/hr IntraVENous CONTINUOUS    [Held by provider] haloperidol lactate (HALDOL) injection 4 mg  4 mg IntraVENous Q4H PRN    [Held by provider] ziprasidone (GEODON) capsule 40 mg  40 mg Oral BID WITH MEALS    ondansetron (ZOFRAN) injection 4 mg  4 mg IntraVENous Q4H PRN    acetaminophen (TYLENOL) tablet 650 mg  650 mg Oral Q4H PRN    Or    acetaminophen (TYLENOL) solution 650 mg  650 mg Per NG tube Q4H PRN    Or    acetaminophen (TYLENOL) suppository 650 mg  650 mg Rectal Q4H PRN bisacodyL (DULCOLAX) suppository 10 mg  10 mg Rectal DAILY PRN    enoxaparin (LOVENOX) injection 40 mg  40 mg SubCUTAneous Q12H    labetaloL (NORMODYNE;TRANDATE) injection 10 mg  10 mg IntraVENous Q1H PRN        Review of Systems:    Review of Systems   Constitutional:  Positive for malaise/fatigue. Negative for fever. Respiratory:  Negative for cough and shortness of breath. Cardiovascular:  Negative for chest pain and orthopnea. Gastrointestinal:  Negative for abdominal pain, heartburn, nausea and vomiting. Genitourinary:  Negative for dysuria and urgency. Neurological:  Positive for focal weakness. Negative for dizziness and headaches. Objective:     Visit Vitals  /82   Pulse 73   Temp 98 °F (36.7 °C)   Resp 19   Ht 5' 7\" (1.702 m)   Wt 147 kg (324 lb 1.2 oz)   SpO2 100%   BMI 50.76 kg/m²    O2 Flow Rate (L/min): 4 l/min O2 Device: None (Room air)    Temp (24hrs), Av.8 °F (37.1 °C), Min:98 °F (36.7 °C), Max:99.7 °F (37.6 °C)       0701 -  1900  In: 100   Out: 975 [Urine:975]  1901 -  0700  In: 3835.9 [I.V.:3545.9]  Out: 8932 [RHWKO:9661]    PHYSICAL EXAM:    Physical Exam  Constitutional:       General: He is not in acute distress. Appearance: He is obese. Cardiovascular:      Rate and Rhythm: Normal rate and regular rhythm. Pulses: Normal pulses. Heart sounds: Normal heart sounds. Pulmonary:      Effort: Pulmonary effort is normal.      Breath sounds: Normal breath sounds. Abdominal:      General: Bowel sounds are normal.      Palpations: Abdomen is soft. Neurological:      Mental Status: He is alert and oriented to person, place, and time. Comments: Right side weakness, right side facial droop, speech is garbled, but understandable.            Data Review    Recent Results (from the past 24 hour(s))   GLUCOSE, POC    Collection Time: 03/15/23  5:49 PM   Result Value Ref Range    Glucose (POC) 119 (H) 65 - 117 mg/dL    Performed by Hans Krishnan Lashon PCT    GLUCOSE, POC    Collection Time: 03/16/23 12:25 AM   Result Value Ref Range    Glucose (POC) 137 (H) 65 - 117 mg/dL    Performed by Alex Bolden RN    CBC WITH AUTOMATED DIFF    Collection Time: 03/16/23  4:53 AM   Result Value Ref Range    WBC 8.4 4.1 - 11.1 K/uL    RBC 7.14 (H) 4.10 - 5.70 M/uL    HGB 17.4 (H) 12.1 - 17.0 g/dL    HCT 51.6 (H) 36.6 - 50.3 %    MCV 72.3 (L) 80.0 - 99.0 FL    MCH 24.4 (L) 26.0 - 34.0 PG    MCHC 33.7 30.0 - 36.5 g/dL    RDW 20.7 (H) 11.5 - 14.5 %    PLATELET 851 673 - 284 K/uL    MPV 10.1 8.9 - 12.9 FL    NRBC 0.0 0  WBC    ABSOLUTE NRBC 0.00 0.00 - 0.01 K/uL    NEUTROPHILS 64 32 - 75 %    LYMPHOCYTES 24 12 - 49 %    MONOCYTES 10 5 - 13 %    EOSINOPHILS 1 0 - 7 %    BASOPHILS 1 0 - 1 %    IMMATURE GRANULOCYTES 0 0.0 - 0.5 %    ABS. NEUTROPHILS 5.4 1.8 - 8.0 K/UL    ABS. LYMPHOCYTES 2.0 0.8 - 3.5 K/UL    ABS. MONOCYTES 0.8 0.0 - 1.0 K/UL    ABS. EOSINOPHILS 0.1 0.0 - 0.4 K/UL    ABS. BASOPHILS 0.1 0.0 - 0.1 K/UL    ABS. IMM. GRANS. 0.0 0.00 - 0.04 K/UL    DF SMEAR SCANNED      PLATELET COMMENTS Large Platelets      RBC COMMENTS ANISOCYTOSIS  2+       METABOLIC PANEL, COMPREHENSIVE    Collection Time: 03/16/23  4:53 AM   Result Value Ref Range    Sodium 142 136 - 145 mmol/L    Potassium 3.7 3.5 - 5.1 mmol/L    Chloride 108 97 - 108 mmol/L    CO2 28 21 - 32 mmol/L    Anion gap 6 5 - 15 mmol/L    Glucose 123 (H) 65 - 100 mg/dL    BUN 19 6 - 20 MG/DL    Creatinine 0.99 0.70 - 1.30 MG/DL    BUN/Creatinine ratio 19 12 - 20      eGFR >60 >60 ml/min/1.73m2    Calcium 9.1 8.5 - 10.1 MG/DL    Bilirubin, total 2.5 (H) 0.2 - 1.0 MG/DL    ALT (SGPT) 49 12 - 78 U/L    AST (SGOT) 96 (H) 15 - 37 U/L    Alk.  phosphatase 67 45 - 117 U/L    Protein, total 7.6 6.4 - 8.2 g/dL    Albumin 3.1 (L) 3.5 - 5.0 g/dL    Globulin 4.5 (H) 2.0 - 4.0 g/dL    A-G Ratio 0.7 (L) 1.1 - 2.2     NT-PRO BNP    Collection Time: 03/16/23  4:53 AM   Result Value Ref Range    NT pro- (H) <125 PG/ML MAGNESIUM    Collection Time: 03/16/23  4:53 AM   Result Value Ref Range    Magnesium 1.9 1.6 - 2.4 mg/dL   PHOSPHORUS    Collection Time: 03/16/23  4:53 AM   Result Value Ref Range    Phosphorus 4.1 2.6 - 4.7 MG/DL   GLUCOSE, POC    Collection Time: 03/16/23  5:51 AM   Result Value Ref Range    Glucose (POC) 147 (H) 65 - 117 mg/dL    Performed by Allison Moncada RN        XR ABD (KUB)   Final Result   Addendum (preliminary) 1 of 1   Addendum: Correction: Enteric tube traverses expected course to below the   diaphragm into the left upper quadrant. Final   Enteric tube tip lies just above the gastroesophageal junction. XR CHEST PORT   Final Result   Stable interstitial edema pattern with cardiomegaly. Gastric tube visualized but   tip not included. XR ABD PORT  1 V   Final Result   NG tube in place. MRI BRAIN WO CONT   Final Result      1. Acute infarct in the left posterior corona radiata/basal ganglia. XR CHEST PORT   Final Result      New mild interstitial edema         XR CHEST PORT   Final Result   New, moderate cardiomegaly. Grossly clear lungs. CTA CODE NEURO HEAD AND NECK W CONT   Final Result   1. No large vessel occlusion or hemodynamically significant carotid stenosis. 2.  Chronic left centrum semiovale hypodensity likely sequela of chronic   microvascular angiopathy. 3.  Decrease cerebral perfusion in the bilateral periventricular white matter   with 81 cc area of ischemic tissue in the bilateral frontal lobes right greater   than left without underlying vascular lesion to explain this perfusion   abnormality. CT CODE NEURO PERF W CBF   Final Result   1. No large vessel occlusion or hemodynamically significant carotid stenosis. 2.  Chronic left centrum semiovale hypodensity likely sequela of chronic   microvascular angiopathy.    3.  Decrease cerebral perfusion in the bilateral periventricular white matter   with 81 cc area of ischemic tissue in the bilateral frontal lobes right greater   than left without underlying vascular lesion to explain this perfusion   abnormality. CT CODE NEURO HEAD WO CONTRAST   Final Result   No acute intracranial process seen          Active Problems:    Right hemiplegia ([facility] [address] (3/13/2023)      Ischemic stroke ([facility] [address] (3/13/2023)        Assessment/Plan:   Acute ischemic stroke- in the  left basal ganglia, with right side hemiparesis, on plavix, asa. OT/PT/speech, neurology consulted. OOB to chair. 2. Hypertension- keep <140/90, on amlodipine, clonidine    3. Systolic heart failure- EF 35-40%, on Entresto, Coreg,    4. CAD- statin. 5. Discharge- likely need SNF. CM to follow.          DVT Prophylaxis: lovenox  Code Status:  Full Code  POA: [address]      NORMA HYDE discussed with:   _____patient, staff nurse, __________________________________________________________    Josr Menard NP

## 2023-03-16 NOTE — PROGRESS NOTES
0700- Bedside and Verbal shift change report given to Maxine COVARRUBIAS (oncoming nurse) by Edgar Lamar (offgoing nurse). Report included the following information SBAR, Kardex, ED Summary, Intake/Output, MAR, and Recent Results. 0800- Shift assessment complete. Patient is resting in bed calmly; A&O x 4; follow commands appropriately; right side facial droop and right side UE and LE weakness;  NSR; ngo intact; sitter at the bedside; Cardene gtt infusing at 5mg; precedex gtt at 0.4mcg/kg/hr    0805- precedex stopped. 8340- Administer amlodipine and clonidine as ordered. Cardene gtt stopped. Will continue to maintain SBP >140 as per Dr. Dimitrios Renteria. Discontinued ngo catheter per Dr. Dimitrios Renteria orders. 1100- Patient tolerated PT & OT well; patient showed no signs of agitation and follwed commands appropriately. Patient is currently sitting in chair position. 1545- Patient tolerated clear liquids well by speech. Will remove NGT as per verbal order from Dr. Dimitrios Renteria. 1350- TRANSFER - OUT REPORT:    Verbal report given to [facility] RN(name) on Daryle Primus  being transferred to PCU(unit) for routine progression of care       Report consisted of patients Situation, Background, Assessment and   Recommendations(SBAR). Information from the following report(s) SBAR, Kardex, Procedure Summary, Intake/Output, MAR, and Recent Results was reviewed with the receiving nurse. Lines:   Peripheral IV 03/13/23 Posterior;Right Hand (Active)   Site Assessment Clean, dry, & intact 03/16/23 1200   Phlebitis Assessment 0 03/16/23 1200   Infiltration Assessment 0 03/16/23 1200   Dressing Status Clean, dry, & intact 03/16/23 1200   Dressing Type Tape;Transparent 03/16/23 1200   Hub Color/Line Status Pink; Infusing 03/16/23 1200   Action Taken Open ports on tubing capped 03/16/23 1200   Alcohol Cap Used Yes 03/16/23 1200       Peripheral IV 03/14/23 Anterior; Left Forearm (Active)   Site Assessment Clean, dry, & intact 03/16/23 1200   Phlebitis Assessment 0 03/16/23 1200   Infiltration Assessment 0 03/16/23 1200   Dressing Status Clean, dry, & intact 03/16/23 1200   Dressing Type Transparent 03/16/23 1200   Hub Color/Line Status Pink;Capped 03/16/23 1200   Action Taken Open ports on tubing capped 03/16/23 1200   Alcohol Cap Used Yes 03/16/23 1200        Opportunity for questions and clarification was provided.       Patient transported with:   Monitor  Registered Nurse

## 2023-03-17 LAB
GLUCOSE BLD STRIP.AUTO-MCNC: 120 MG/DL (ref 65–117)
GLUCOSE BLD STRIP.AUTO-MCNC: 126 MG/DL (ref 65–117)
GLUCOSE BLD STRIP.AUTO-MCNC: 126 MG/DL (ref 65–117)
GLUCOSE BLD STRIP.AUTO-MCNC: 136 MG/DL (ref 65–117)
SERVICE CMNT-IMP: ABNORMAL

## 2023-03-17 PROCEDURE — 74011250637 HC RX REV CODE- 250/637: Performed by: NURSE PRACTITIONER

## 2023-03-17 PROCEDURE — 74011250636 HC RX REV CODE- 250/636: Performed by: NURSE PRACTITIONER

## 2023-03-17 PROCEDURE — 74011000250 HC RX REV CODE- 250: Performed by: STUDENT IN AN ORGANIZED HEALTH CARE EDUCATION/TRAINING PROGRAM

## 2023-03-17 PROCEDURE — 74011250637 HC RX REV CODE- 250/637: Performed by: INTERNAL MEDICINE

## 2023-03-17 PROCEDURE — 97530 THERAPEUTIC ACTIVITIES: CPT

## 2023-03-17 PROCEDURE — 74011250636 HC RX REV CODE- 250/636: Performed by: STUDENT IN AN ORGANIZED HEALTH CARE EDUCATION/TRAINING PROGRAM

## 2023-03-17 PROCEDURE — 93005 ELECTROCARDIOGRAM TRACING: CPT

## 2023-03-17 PROCEDURE — 74011000250 HC RX REV CODE- 250: Performed by: NURSE PRACTITIONER

## 2023-03-17 PROCEDURE — 82962 GLUCOSE BLOOD TEST: CPT

## 2023-03-17 PROCEDURE — 74011250637 HC RX REV CODE- 250/637: Performed by: STUDENT IN AN ORGANIZED HEALTH CARE EDUCATION/TRAINING PROGRAM

## 2023-03-17 PROCEDURE — 65270000046 HC RM TELEMETRY

## 2023-03-17 RX ORDER — ALPRAZOLAM 0.5 MG/1
0.5 TABLET ORAL ONCE
Status: COMPLETED | OUTPATIENT
Start: 2023-03-17 | End: 2023-03-17

## 2023-03-17 RX ORDER — CARVEDILOL 12.5 MG/1
12.5 TABLET ORAL 2 TIMES DAILY WITH MEALS
Status: DISCONTINUED | OUTPATIENT
Start: 2023-03-17 | End: 2023-03-27 | Stop reason: HOSPADM

## 2023-03-17 RX ORDER — LANOLIN ALCOHOL/MO/W.PET/CERES
5 CREAM (GRAM) TOPICAL
Status: DISCONTINUED | OUTPATIENT
Start: 2023-03-17 | End: 2023-03-27 | Stop reason: HOSPADM

## 2023-03-17 RX ORDER — LABETALOL HYDROCHLORIDE 5 MG/ML
20 INJECTION, SOLUTION INTRAVENOUS
Status: DISCONTINUED | OUTPATIENT
Start: 2023-03-17 | End: 2023-03-27 | Stop reason: HOSPADM

## 2023-03-17 RX ORDER — HYDRALAZINE HYDROCHLORIDE 20 MG/ML
20 INJECTION INTRAMUSCULAR; INTRAVENOUS
Status: DISCONTINUED | OUTPATIENT
Start: 2023-03-17 | End: 2023-03-27 | Stop reason: HOSPADM

## 2023-03-17 RX ORDER — TRAZODONE HYDROCHLORIDE 50 MG/1
50 TABLET ORAL
Status: DISCONTINUED | OUTPATIENT
Start: 2023-03-17 | End: 2023-03-27 | Stop reason: HOSPADM

## 2023-03-17 RX ORDER — HYDROXYZINE HYDROCHLORIDE 10 MG/1
10 TABLET, FILM COATED ORAL
Status: DISCONTINUED | OUTPATIENT
Start: 2023-03-17 | End: 2023-03-27 | Stop reason: HOSPADM

## 2023-03-17 RX ORDER — CARVEDILOL 12.5 MG/1
12.5 TABLET ORAL 2 TIMES DAILY WITH MEALS
Status: DISCONTINUED | OUTPATIENT
Start: 2023-03-17 | End: 2023-03-17

## 2023-03-17 RX ORDER — AMLODIPINE BESYLATE 5 MG/1
10 TABLET ORAL DAILY
Status: DISCONTINUED | OUTPATIENT
Start: 2023-03-17 | End: 2023-03-27 | Stop reason: HOSPADM

## 2023-03-17 RX ORDER — MAG HYDROX/ALUMINUM HYD/SIMETH 200-200-20
30 SUSPENSION, ORAL (FINAL DOSE FORM) ORAL
Status: DISCONTINUED | OUTPATIENT
Start: 2023-03-17 | End: 2023-03-27 | Stop reason: HOSPADM

## 2023-03-17 RX ORDER — LABETALOL HYDROCHLORIDE 5 MG/ML
10 INJECTION, SOLUTION INTRAVENOUS ONCE
Status: COMPLETED | OUTPATIENT
Start: 2023-03-17 | End: 2023-03-17

## 2023-03-17 RX ADMIN — CLONIDINE HYDROCHLORIDE 0.2 MG: 0.1 TABLET ORAL at 17:37

## 2023-03-17 RX ADMIN — ALUMINUM HYDROXIDE, MAGNESIUM HYDROXIDE, AND SIMETHICONE 30 ML: 200; 200; 20 SUSPENSION ORAL at 20:25

## 2023-03-17 RX ADMIN — SODIUM CHLORIDE 5 MG/HR: 9 INJECTION, SOLUTION INTRAVENOUS at 02:50

## 2023-03-17 RX ADMIN — ENOXAPARIN SODIUM 40 MG: 100 INJECTION SUBCUTANEOUS at 06:03

## 2023-03-17 RX ADMIN — SODIUM CHLORIDE 7.5 MG/HR: 9 INJECTION, SOLUTION INTRAVENOUS at 13:55

## 2023-03-17 RX ADMIN — Medication 1 AMPULE: at 21:11

## 2023-03-17 RX ADMIN — AMLODIPINE BESYLATE 10 MG: 5 TABLET ORAL at 09:48

## 2023-03-17 RX ADMIN — ZIPRASIDONE MESYLATE 10 MG: 20 INJECTION, POWDER, LYOPHILIZED, FOR SOLUTION INTRAMUSCULAR at 22:45

## 2023-03-17 RX ADMIN — CARVEDILOL 3.12 MG: 3.12 TABLET, FILM COATED ORAL at 04:49

## 2023-03-17 RX ADMIN — HYDROXYZINE HYDROCHLORIDE 10 MG: 10 TABLET, FILM COATED ORAL at 20:25

## 2023-03-17 RX ADMIN — SACUBITRIL AND VALSARTAN 1 TABLET: 24; 26 TABLET, FILM COATED ORAL at 08:47

## 2023-03-17 RX ADMIN — SODIUM CHLORIDE 12.5 MG/HR: 9 INJECTION, SOLUTION INTRAVENOUS at 07:36

## 2023-03-17 RX ADMIN — HYDROXYZINE HYDROCHLORIDE 10 MG: 10 TABLET, FILM COATED ORAL at 00:43

## 2023-03-17 RX ADMIN — SODIUM CHLORIDE 12.5 MG/HR: 9 INJECTION, SOLUTION INTRAVENOUS at 05:34

## 2023-03-17 RX ADMIN — SODIUM CHLORIDE 15 MG/HR: 9 INJECTION, SOLUTION INTRAVENOUS at 04:59

## 2023-03-17 RX ADMIN — CLONIDINE HYDROCHLORIDE 0.2 MG: 0.1 TABLET ORAL at 04:49

## 2023-03-17 RX ADMIN — CARVEDILOL 12.5 MG: 12.5 TABLET, FILM COATED ORAL at 17:37

## 2023-03-17 RX ADMIN — TRAZODONE HYDROCHLORIDE 50 MG: 50 TABLET ORAL at 23:51

## 2023-03-17 RX ADMIN — ALPRAZOLAM 0.5 MG: 0.5 TABLET ORAL at 15:08

## 2023-03-17 RX ADMIN — SODIUM CHLORIDE 7.5 MG/HR: 9 INJECTION, SOLUTION INTRAVENOUS at 10:32

## 2023-03-17 RX ADMIN — MELATONIN 4.5 MG: at 23:51

## 2023-03-17 RX ADMIN — SACUBITRIL AND VALSARTAN 1 TABLET: 24; 26 TABLET, FILM COATED ORAL at 20:25

## 2023-03-17 RX ADMIN — CLOPIDOGREL BISULFATE 75 MG: 75 TABLET ORAL at 08:48

## 2023-03-17 RX ADMIN — ASPIRIN 81 MG: 81 TABLET, CHEWABLE ORAL at 08:48

## 2023-03-17 RX ADMIN — Medication 1 AMPULE: at 08:47

## 2023-03-17 RX ADMIN — HYDRALAZINE HYDROCHLORIDE 20 MG: 20 INJECTION INTRAMUSCULAR; INTRAVENOUS at 20:25

## 2023-03-17 RX ADMIN — LABETALOL HYDROCHLORIDE 10 MG: 5 INJECTION INTRAVENOUS at 00:40

## 2023-03-17 RX ADMIN — ENOXAPARIN SODIUM 40 MG: 100 INJECTION SUBCUTANEOUS at 17:37

## 2023-03-17 RX ADMIN — CLONIDINE HYDROCHLORIDE 0.2 MG: 0.1 TABLET ORAL at 09:44

## 2023-03-17 RX ADMIN — HYDRALAZINE HYDROCHLORIDE 20 MG: 20 INJECTION INTRAMUSCULAR; INTRAVENOUS at 13:37

## 2023-03-17 RX ADMIN — ATORVASTATIN CALCIUM 80 MG: 40 TABLET, FILM COATED ORAL at 21:11

## 2023-03-17 RX ADMIN — ALUMINUM HYDROXIDE, MAGNESIUM HYDROXIDE, AND SIMETHICONE 30 ML: 200; 200; 20 SUSPENSION ORAL at 15:45

## 2023-03-17 NOTE — PROGRESS NOTES
Problem: Self Care Deficits Care Plan (Adult)  Goal: *Acute Goals and Plan of Care (Insert Text)  Description: FUNCTIONAL STATUS PRIOR TO ADMISSION: Patient was independent and active without use of DME. Works at a for . Recently bought his own home and was managing a large garden. HOME SUPPORT PRIOR TO ADMISSION: The patient lived alone with sister and mother to provide assistance. Occupational Therapy Goals:  Initiated 3/15/2023  1. Patient will perform grooming with supervision/set-up within 7 days. 2. Patient will perform upper body dressing with minimal assistance within 7 days. 3. Patient will perform upper body bathing with minimal assistance within 7 days. 4. Patient will improve dynamic sitting balance to CGA for improved independence with ADLS within 7 days. 5. Patient will improve fugl haynes score by 5 points within 7 days. 6. Patient will transfer from drop arm recliner or drop arm bedside commode with best technique and max assist within 7 days. Outcome: Progressing Towards Goal    OCCUPATIONAL THERAPY TREATMENT  Patient: Leroy Gar (88 y.o. male)  Date: 3/17/2023  Diagnosis: Right hemiplegia ([facility] [address] [G81.91]  Ischemic stroke ([facility] [address] [I63.9] <principal problem not specified>      Precautions: Fall (dense right norm)  Chart, occupational therapy assessment, plan of care, and goals were reviewed. ASSESSMENT  Patient continues with skilled OT services and is progressing towards goals. Pt highly pleasant and participatory with today's challenges, with progressive EOB seated balance noted, evidenced by pt maintaining balance with intermittent Mod A-SBA. Pt with good engagement in R lateral leaning at EOB to encourage RUE weight bearing. Pt educated on importance of RUE self-ROM, demonstrating good understanding through active engagement.   As pt is highly motivated and agreeable to therapeutic challenges, recommend IPR at discharge, with acute OT to continue to follow during acute hospitalization. Pt with elevated BP throughout session; RN aware and following. Current Level of Function Impacting Discharge (ADLs): Up to Total A    Other factors to consider for discharge: R hemiparesis, fall risk, far below baseline, elevated BP         PLAN :  Patient continues to benefit from skilled intervention to address the above impairments. Continue treatment per established plan of care to address goals. Recommend with staff: Frequent positional changes, Active bed level ADL engagement    Recommend next OT session: POC progression    Recommendation for discharge: (in order for the patient to meet his/her long term goals)  Therapy 3 hours per day 5-7 days per week    This discharge recommendation:  Has been made in collaboration with the attending provider and/or case management    IF patient discharges home will need the following DME: TBD- pt not safe for discharge home at this time       SUBJECTIVE:   Patient stated I want to get up and get myself going, I hate being stuck in this bed.     OBJECTIVE DATA SUMMARY:   Cognitive/Behavioral Status:  Neurologic State: Alert  Orientation Level: Oriented X4  Cognition: Follows commands  Perception: Cues to maintain midline in sitting;Cues to attend to right side of body  Perseveration: No perseveration noted  Safety/Judgement: Fall prevention    Functional Mobility and Transfers for ADLs:  Bed Mobility:  Rolling: Moderate assistance;Assist x2  Supine to Sit: Moderate assistance;Maximum assistance;Assist x2  Sit to Supine: Maximum assistance;Assist x2  Scooting: Total assistance (scooting while seated EOB)    Balance:  Sitting: Impaired  Sitting - Static: Fair (occasional); Good (unsupported)  Sitting - Dynamic: Poor (constant support); Fair (occasional) (initially with ballistic movement needing VC for safety)    ADL Intervention:  Grooming  Grooming Assistance: Minimum assistance  Washing Face: Minimum assistance (long-sitting)    Cognitive Retraining  Safety/Judgement: Fall prevention    Pain:  No c/o pain    Activity Tolerance:   Fair and requires frequent rest breaks    After treatment patient left in no apparent distress:   Call bell within reach, Bed / chair alarm activated, Side rails x 3, and PCT present    COMMUNICATION/COLLABORATION:   The patients plan of care was discussed with: Physical therapy assistant, Registered nurse, Case management, and Certified nursing assistant/patient care technician.      Chepe Johnson   Time Calculation: 31 mins

## 2023-03-17 NOTE — PROGRESS NOTES
Speech path  Attempted twice today to evaluate this patient. Earlier today he did not want to eat because he wanted \"to lose weight\". Assured patient this is not the time to that concern. But he refused. Changed his diet to soft/thins to encourage him to take po. Second visit the patient did not attempt the lunch tray that was soft. He complained of reflux and was concerned about vomiting.    Diet changed back to clears at his request.   Derick Hernandez, SLP

## 2023-03-17 NOTE — PROGRESS NOTES
Problem: Aspiration - Risk of  Goal: *Absence of aspiration  Outcome: Progressing Towards Goal     Problem: Patient Education: Go to Patient Education Activity  Goal: Patient/Family Education  Outcome: Progressing Towards Goal       Problem: Pressure Injury - Risk of  Goal: *Prevention of pressure injury  Description: Document Ricardo Scale and appropriate interventions in the flowsheet. Outcome: Progressing Towards Goal  Note: Pressure Injury Interventions:  Sensory Interventions: Assess changes in LOC, Assess need for specialty bed, Discuss PT/OT consult with provider    Moisture Interventions: Absorbent underpads, Limit adult briefs, Assess need for specialty bed, Minimize layers    Activity Interventions: Assess need for specialty bed, Pressure redistribution bed/mattress(bed type), PT/OT evaluation    Mobility Interventions: PT/OT evaluation, HOB 30 degrees or less, Assess need for specialty bed, Turn and reposition approx. every two hours(pillow and wedges)    Nutrition Interventions: Document food/fluid/supplement intake    Friction and Shear Interventions: Apply protective barrier, creams and emollients, HOB 30 degrees or less, Minimize layers       Problem: Patient Education: Go to Patient Education Activity  Goal: Patient/Family Education  Outcome: Progressing Towards Goal         Problem: Falls - Risk of  Goal: *Absence of Falls  Description: Document Dilcia Fall Risk and appropriate interventions in the flowsheet.   Outcome: Progressing Towards Goal  Note: Fall Risk Interventions:

## 2023-03-17 NOTE — PROGRESS NOTES
RN notified about elevated blood pressure despite po medications and PRN BP medications. Addendum 4:45AM: patient still with high BP despite being on full dose of nicardipine. Consulted ICU - recommended to give carvedilol and clonidine now while utilizing PRN IV medications. Patient is more relaxed after receiving atarax. BP same on both sides. Patient Vitals for the past 12 hrs:   Temp Pulse Resp BP SpO2   03/17/23 0130 -- 92 23 (!) 183/105 --   03/17/23 0105 -- 95 20 (!) 177/105 --   03/17/23 0040 -- (!) 101 -- (!) 202/106 --   03/17/23 0000 -- 97 26 (!) 197/96 (!) 64 %   03/16/23 2330 98.6 °F (37 °C) 98 23 (!) 193/92 98 %   03/16/23 2228 -- 90 -- (!) 191/112 --   03/16/23 2105 -- 92 -- (!) 182/112 --   03/16/23 2000 -- 85 23 (!) 180/100 98 %   03/16/23 1939 98.4 °F (36.9 °C) 85 15 (!) 171/102 97 %   03/16/23 1722 -- -- -- (!) 180/103 --   03/16/23 1525 98.7 °F (37.1 °C) 77 19 (!) 151/92 100 %       Hypertensive urgency  Continue on nicardipine drip  Continue on clonidine, amlodipine, and carvedilol while on drip  Labetalol and hydralazine PRN  Cardiology consulted    Discussed with ICU about treatment of hypertensive urgency.

## 2023-03-17 NOTE — PROGRESS NOTES
Hospitalist Progress Note    Subjective:   Daily Progress Note: 3/17/2023 3:46 PM    Hospital Course:  Pt admitted for progressive right facial droop, right side numbness and weakness. MRI was done , was a limited study showing an acute stroke in the left posterior corona radiata/basal ganglia on 3/14/23. He was on the medical floor but became increasingly agitated and was transferred to the ICU for Precedex drip and was weaned off ,transferred back to the medical floor. CTA of the head and neck does not reveal any large vessel stenosis. Aggressive control of lifestyle modifications, BP and weight loss are recommended. Subjective:   He continues to report right arm and right leg weakness. Still remains on Cardene drip at 7.5.   Does not report any chest pain or shortness of breath  No fever  Overnight events noted      Current Facility-Administered Medications   Medication Dose Route Frequency    hydrOXYzine HCL (ATARAX) tablet 10 mg  10 mg Oral TID PRN    niCARdipine (CARDENE) 25 mg in 0.9% sodium chloride 250 mL ([id number])  5-15 mg/hr IntraVENous TITRATE    amLODIPine (NORVASC) tablet 10 mg  10 mg Oral DAILY    hydrALAZINE (APRESOLINE) 20 mg/mL injection 20 mg  20 mg IntraVENous Q6H PRN    labetaloL (NORMODYNE;TRANDATE) injection 20 mg  20 mg IntraVENous Q6H PRN    carvediloL (COREG) tablet 12.5 mg  12.5 mg Oral BID WITH MEALS    atorvastatin (LIPITOR) tablet 80 mg  80 mg Oral QHS    aspirin chewable tablet 81 mg  81 mg Oral DAILY    cloNIDine HCL (CATAPRES) tablet 0.2 mg  0.2 mg Oral BID    clopidogreL (PLAVIX) tablet 75 mg  75 mg Oral DAILY    sacubitriL-valsartan (ENTRESTO) 24-26 mg tablet 1 Tablet  1 Tablet Oral Q12H    ziprasidone (GEODON) 10 mg in sterile water (preservative free) 0.5 mL injection  10 mg IntraMUSCular Q12H PRN    insulin lispro (HUMALOG) injection   SubCUTAneous AC&HS    glucose chewable tablet 16 g  4 Tablet Oral PRN    glucagon (GLUCAGEN) injection 1 mg  1 mg IntraMUSCular PRN alcohol 62% (NOZIN) nasal  1 Ampule  1 Ampule Topical Q12H    albuterol-ipratropium (DUO-NEB) 2.5 MG-0.5 MG/3 ML  3 mL Nebulization Q4H PRN    ondansetron (ZOFRAN) injection 4 mg  4 mg IntraVENous Q4H PRN    acetaminophen (TYLENOL) tablet 650 mg  650 mg Oral Q4H PRN    Or    acetaminophen (TYLENOL) solution 650 mg  650 mg Per NG tube Q4H PRN    Or    acetaminophen (TYLENOL) suppository 650 mg  650 mg Rectal Q4H PRN    bisacodyL (DULCOLAX) suppository 10 mg  10 mg Rectal DAILY PRN    enoxaparin (LOVENOX) injection 40 mg  40 mg SubCUTAneous Q12H            Objective:     Visit Vitals  BP (!) 183/94   Pulse 84   Temp 97.6 °F (36.4 °C)   Resp 22   Ht 5' 7\" (1.702 m)   Wt 147 kg (324 lb 1.2 oz)   SpO2 96%   BMI 50.76 kg/m²    O2 Flow Rate (L/min): 4 l/min O2 Device: None (Room air)    Temp (24hrs), Av.1 °F (36.7 °C), Min:97.3 °F (36.3 °C), Max:98.7 °F (37.1 °C)      No intake/output data recorded.   03/15 1901 -  0700  In: 1095.9 [I.V.:895.9]  Out: 2676 [Urine:2675]    PHYSICAL EXAM:    General appearance: Comfortable not in distress  Heart: Regular rate and rhythm, no murmurs  HEENT: No thyroid enlargement  Lungs: Bilateral air entry present, no crackles or wheezing  Abdomen: Soft, nontender, no guarding  Extremities no edema  Neurological exam alert awake oriented x3, right-sided hemiplegia      Data Review    Recent Results (from the past 24 hour(s))   GLUCOSE, POC    Collection Time: 23  4:36 PM   Result Value Ref Range    Glucose (POC) 115 65 - 117 mg/dL    Performed by Vinay RODRIGUEZ    GLUCOSE, POC    Collection Time: 23  9:25 PM   Result Value Ref Range    Glucose (POC) 121 (H) 65 - 117 mg/dL    Performed by Terri Denver RN    GLUCOSE, POC    Collection Time: 23  7:21 AM   Result Value Ref Range    Glucose (POC) 126 (H) 65 - 117 mg/dL    Performed by Vinay Angulo PCT    GLUCOSE, POC    Collection Time: 23 11:23 AM   Result Value Ref Range    Glucose (POC) 136 (H) 65 - 117 mg/dL    Performed by Michelle Christianson PCT        XR ABD (KUB)   Final Result   Addendum (preliminary) 1 of 1   Addendum: Correction: Enteric tube traverses expected course to below the   diaphragm into the left upper quadrant. Final   Enteric tube tip lies just above the gastroesophageal junction. XR CHEST PORT   Final Result   Stable interstitial edema pattern with cardiomegaly. Gastric tube visualized but   tip not included. XR ABD PORT  1 V   Final Result   NG tube in place. MRI BRAIN WO CONT   Final Result      1. Acute infarct in the left posterior corona radiata/basal ganglia. XR CHEST PORT   Final Result      New mild interstitial edema         XR CHEST PORT   Final Result   New, moderate cardiomegaly. Grossly clear lungs. CTA CODE NEURO HEAD AND NECK W CONT   Final Result   1. No large vessel occlusion or hemodynamically significant carotid stenosis. 2.  Chronic left centrum semiovale hypodensity likely sequela of chronic   microvascular angiopathy. 3.  Decrease cerebral perfusion in the bilateral periventricular white matter   with 81 cc area of ischemic tissue in the bilateral frontal lobes right greater   than left without underlying vascular lesion to explain this perfusion   abnormality. CT CODE NEURO PERF W CBF   Final Result   1. No large vessel occlusion or hemodynamically significant carotid stenosis. 2.  Chronic left centrum semiovale hypodensity likely sequela of chronic   microvascular angiopathy. 3.  Decrease cerebral perfusion in the bilateral periventricular white matter   with 81 cc area of ischemic tissue in the bilateral frontal lobes right greater   than left without underlying vascular lesion to explain this perfusion   abnormality.       CT CODE NEURO HEAD WO CONTRAST   Final Result   No acute intracranial process seen          Active Problems:    Right hemiplegia ([address] (3/13/2023)      Ischemic stroke (HCC) (3/13/2023)      Assessment/Plan:   Acute ischemic stroke- in the  left basal ganglia, with right side hemiparesis: Continue aspirin, Plavix, statin. Hemoglobin A1c is 5.4 and LDL is 70 point two 2D echocardiogram showed ejection fraction of around 35 to 40% bubble study could not be performed due to patient's noncompliance. PT OT recommended inpatient rehab. Case management consulted. 2. Hypertension urgency: Overnight patient noted hypertensive urgency and had to be started on Cardene drip. Wean Cardene as tolerated. Increase Coreg to 12.5 twice daily. Continue clonidine, Entresto and also added Norvasc. On as needed hydralazine and labetalol. Discussed with nursing. 3. Systolic heart failure- EF 35-40%, on Entresto, Coreg. He does not appear to be volume overloaded    4.  CAD-continue aspirin, Plavix and statin    WILLARD: 3/18  Barriers: Weaning Cardene drip, inpatient rehab placement        DVT Prophylaxis: lovenox  Code Status:  Full Code  POA: 13[address] [facility]  [facility] discussed with:   _____patient, staff nurse, __________________________________________________________    Mimi Crenshaw MD

## 2023-03-17 NOTE — PROGRESS NOTES
0700. Bedside shift change report given to [address] [place] (oncoming nurse) by Darrick Bernardo RN (offgoing nurse). Report included the following information SBAR, Kardex, ED Summary, Intake/Output, MAR, Recent Results, and Cardiac Rhythm NSR .     1337. Due to high BP administered hydrolizine, line infiltrated and immediately after the flush patient was in pain. Readministered in other line and removed infiltrated one. Rapid response nurse notified as patient was complaining about SOB and chest pain. EKG done and symptoms resolved with po xanax. Patient requesting \"anxiety pill\" be given to him regularly at bedtime.     1900. Bedside shift change report given to Odell Damon (oncoming nurse) by Narinder hCow (offgoing nurse). Report included the following information SBAR, Kardex, ED Summary, Intake/Output, MAR, Recent Results, and Cardiac Rhythm NSR-sinus tach .

## 2023-03-17 NOTE — PROGRESS NOTES
[facility]  [address], [address] [facility]  [phone]  [email or web address]     Messaged from Dr Carol Hernandez, cardiac consult no longer needed.      Fabien Glynn DNP, ANP-BC

## 2023-03-17 NOTE — PROGRESS NOTES
1900: Pt calm, BP high. BP was rechecked every hour    2309: RADHAMES Majano notified that BP remain high despite prn BP medication. 2315: Amlodipine ordered and given at 2330    0013: BP remained high and Pt anxious, Labetalol and atarax ordered and given at [facility] 57: Pt restated on Cardene drip, BP remained high. Clonidine and carvedilol given early recommended by RADHAMES Majano and prn hydralazine and labetalol orders put in. End of Shift Note    Bedside shift change report given to Alison Patino (oncoming nurse) by Vida Nichols RN (offgoing nurse). Report included the following information SBAR, Kardex, Intake/Output, MAR, Recent Results, and Cardiac Rhythm NSR    Shift worked:  0717-4241     Shift summary and any significant changes:     No pain issues, CHG bath completed and fresh gown provided. Pt refused help to turn, he prefer to turn self.  See note above , flowsheet, and MAR for more information     Concerns for physician to address:       Zone phone for oncoming shift:          Activity:  Activity Level: Bed Rest  Number times ambulated in hallways past shift: 0  Number of times OOB to chair past shift: 0    Cardiac:   Cardiac Monitoring: Yes      Cardiac Rhythm: Sinus Rhythm, Pair PVCs    Access:  Current line(s): PIV     Genitourinary:   Urinary status: voiding    Respiratory:   O2 Device: None (Room air)  Chronic home O2 use?: NO  Incentive spirometer at bedside: YES       GI:  Last Bowel Movement Date: 03/13/23  Current diet:  ADULT DIET Clear Liquid  Passing flatus: YES  Tolerating current diet: YES       Pain Management:   Patient states pain is manageable on current regimen: YES    Skin:  Ricardo Score: 13  Interventions: increase time out of bed and PT/OT consult    Patient Safety:  Fall Score:    Interventions: bed/chair alarm, assistive device (walker, cane, etc), gripper socks, and pt to call before getting OOB       Length of Stay:  Expected LOS: 2d 21h  Actual LOS: 4      Cristina Kaitlyn Pu

## 2023-03-17 NOTE — PROGRESS NOTES
0430: Spoke with Loreto RICCI on behalf of primary RN Antelmo Pham regarding the patient's elevated blood pressure. Verbal orders given to give carvedilol and clonidine early. Orders placed for PRN hydralazine 20 mg IV and labetalol 20 mg. PO medication given per verbal orders and primary RN made aware of new orders.

## 2023-03-17 NOTE — PROGRESS NOTES
Problem: Mobility Impaired (Adult and Pediatric)  Goal: *Acute Goals and Plan of Care (Insert Text)  Description:   FUNCTIONAL STATUS PRIOR TO ADMISSION: Patient was independent and active without use of DME. Works full-time as a . Enjoys gardening and working on home projects. HOME SUPPORT PRIOR TO ADMISSION: The patient lived alone, sister and mother both live nearby and are available to assist.    Physical Therapy Goals  Initiated 3/15/2023  1. Patient will move from supine to sit and sit to supine , scoot up and down, and roll side to side in bed with minimal assistance/contact guard assist within 7 day(s). 2.  Patient will transfer from bed to chair and chair to bed with moderate assistance  using the least restrictive device within 7 day(s). 3.  Patient will perform sit to stand with moderate assistance  within 7 day(s). 4.  Patient will ambulate with moderate assistance  for 10 feet with the least restrictive device within 7 day(s). 5.  Patient will improve Owens Balance score by 7 points within 7 days. Outcome: Progressing Towards Goal   PHYSICAL THERAPY TREATMENT  Patient: Anne Palma (50 y.o. male)  Date: 3/17/2023  Diagnosis: Right hemiplegia ([facility] [G81.91]  Ischemic stroke ([facility] [I63.9] <principal problem not specified>      Precautions: Fall (dense right norm)  Chart, physical therapy assessment, plan of care and goals were reviewed. ASSESSMENT  Patient continues with skilled PT services and is progressing towards goals. Pt was received in supine upon arrival and continues to be eager to participate with therapy services. Pt continues to present with right sided weakness, right neglect, and slurred speech however making steady gains in the acute setting with therapy services. Required modA x2 to roll towards his affected side, very engaged and utilizing bed railings as support with cues for sequencing.  Once seated upright pt has mild right lean initially however able to maintain midline sitting balance overall with CGA and additional time. Close supervision needing while seated EOB as pt continues to have ballistic movement thrusting himself to shift his hips forward to scoot to the EOB. Session focused with pt maintaining his sitting balance, orienting to midline as pt has some right neglect often looking towards the left, transitioning to right propped sidelying and pushing back into sitting, and with some improved right LE quad activation while seated EOB. Pt was returned back to supine with maxA x2 and left in chair position at end of session. Pt continues to be well below his independent PLOF, very motivated/engaged, and would be able to tolerate from intensive therapy services remaining appropriate for IPR upon discharge. Current Level of Function Impacting Discharge (mobility/balance): mod-maxA x2 with bed mobility    Other factors to consider for discharge: lives alone, increased risk of falls, well below his independent baseline         PLAN :  Patient continues to benefit from skilled intervention to address the above impairments. Continue treatment per established plan of care. to address goals. Recommendation for discharge: (in order for the patient to meet his/her long term goals)  Therapy 3 hours per day 5-7 days per week    This discharge recommendation:  Has been made in collaboration with the attending provider and/or case management    IF patient discharges home will need the following DME: to be determined (TBD)     SUBJECTIVE:   Patient stated Bety Torres call me herbie Luke.     OBJECTIVE DATA SUMMARY:   Critical Behavior:  Neurologic State: Alert  Orientation Level: Oriented X4  Cognition: Follows commands  Safety/Judgement: Fall prevention  Functional Mobility Training:  Bed Mobility:  Rolling: Moderate assistance;Assist x2  Supine to Sit: Moderate assistance;Maximum assistance;Assist x2  Sit to Supine: Maximum assistance;Assist x2  Scooting:  Total assistance (scooting while seated EOB)     Transfers:  Unable to test this date    Balance:  Sitting: Impaired  Sitting - Static: Fair (occasional); Good (unsupported)  Sitting - Dynamic: Poor (constant support); Fair (occasional) (initially with ballistic movement needing VC for safety)    Therapeutic Exercises:   Seated EOB LAQ 1x5     Pain Rating:  No pain rating received from pt    Activity Tolerance:   Fair    After treatment patient left in no apparent distress:   Chair position in bed, Heels elevated for pressure relief, Call bell within reach, Bed / chair alarm activated, and Side rails x 3    COMMUNICATION/COLLABORATION:   The patients plan of care was discussed with: Occupational therapist and Registered nurse.      Kenyon Robison PTA   Time Calculation: 26 mins

## 2023-03-18 LAB
ANION GAP SERPL CALC-SCNC: 10 MMOL/L (ref 5–15)
BUN SERPL-MCNC: 16 MG/DL (ref 6–20)
BUN/CREAT SERPL: 24 (ref 12–20)
CALCIUM SERPL-MCNC: 8.7 MG/DL (ref 8.5–10.1)
CHLORIDE SERPL-SCNC: 108 MMOL/L (ref 97–108)
CO2 SERPL-SCNC: 17 MMOL/L (ref 21–32)
CREAT SERPL-MCNC: 0.68 MG/DL (ref 0.7–1.3)
ERYTHROCYTE [DISTWIDTH] IN BLOOD BY AUTOMATED COUNT: 20.7 % (ref 11.5–14.5)
GLUCOSE BLD STRIP.AUTO-MCNC: 108 MG/DL (ref 65–117)
GLUCOSE BLD STRIP.AUTO-MCNC: 114 MG/DL (ref 65–117)
GLUCOSE BLD STRIP.AUTO-MCNC: 117 MG/DL (ref 65–117)
GLUCOSE BLD STRIP.AUTO-MCNC: 135 MG/DL (ref 65–117)
GLUCOSE SERPL-MCNC: 108 MG/DL (ref 65–100)
HCT VFR BLD AUTO: 48.8 % (ref 36.6–50.3)
HGB BLD-MCNC: 16.7 G/DL (ref 12.1–17)
MCH RBC QN AUTO: 24.3 PG (ref 26–34)
MCHC RBC AUTO-ENTMCNC: 34.2 G/DL (ref 30–36.5)
MCV RBC AUTO: 71 FL (ref 80–99)
NRBC # BLD: 0 K/UL (ref 0–0.01)
NRBC BLD-RTO: 0 PER 100 WBC
PLATELET # BLD AUTO: 260 K/UL (ref 150–400)
PMV BLD AUTO: 9.4 FL (ref 8.9–12.9)
POTASSIUM SERPL-SCNC: 3.5 MMOL/L (ref 3.5–5.1)
RBC # BLD AUTO: 6.87 M/UL (ref 4.1–5.7)
SERVICE CMNT-IMP: ABNORMAL
SERVICE CMNT-IMP: NORMAL
SODIUM SERPL-SCNC: 135 MMOL/L (ref 136–145)
WBC # BLD AUTO: 7.2 K/UL (ref 4.1–11.1)

## 2023-03-18 PROCEDURE — 92526 ORAL FUNCTION THERAPY: CPT

## 2023-03-18 PROCEDURE — 74011250637 HC RX REV CODE- 250/637: Performed by: NURSE PRACTITIONER

## 2023-03-18 PROCEDURE — 80048 BASIC METABOLIC PNL TOTAL CA: CPT

## 2023-03-18 PROCEDURE — 74011250637 HC RX REV CODE- 250/637: Performed by: STUDENT IN AN ORGANIZED HEALTH CARE EDUCATION/TRAINING PROGRAM

## 2023-03-18 PROCEDURE — 65270000046 HC RM TELEMETRY

## 2023-03-18 PROCEDURE — 36415 COLL VENOUS BLD VENIPUNCTURE: CPT

## 2023-03-18 PROCEDURE — 74011250636 HC RX REV CODE- 250/636: Performed by: STUDENT IN AN ORGANIZED HEALTH CARE EDUCATION/TRAINING PROGRAM

## 2023-03-18 PROCEDURE — 74011250637 HC RX REV CODE- 250/637: Performed by: INTERNAL MEDICINE

## 2023-03-18 PROCEDURE — 85027 COMPLETE CBC AUTOMATED: CPT

## 2023-03-18 PROCEDURE — 82962 GLUCOSE BLOOD TEST: CPT

## 2023-03-18 PROCEDURE — 74011250636 HC RX REV CODE- 250/636: Performed by: NURSE PRACTITIONER

## 2023-03-18 RX ORDER — LORATADINE 10 MG/1
10 TABLET ORAL
Status: DISCONTINUED | OUTPATIENT
Start: 2023-03-18 | End: 2023-03-27 | Stop reason: HOSPADM

## 2023-03-18 RX ORDER — CETIRIZINE HYDROCHLORIDE 10 MG/1
10 TABLET ORAL EVERY EVENING
Status: DISCONTINUED | OUTPATIENT
Start: 2023-03-18 | End: 2023-03-18

## 2023-03-18 RX ADMIN — CARVEDILOL 12.5 MG: 12.5 TABLET, FILM COATED ORAL at 17:44

## 2023-03-18 RX ADMIN — CLONIDINE HYDROCHLORIDE 0.2 MG: 0.1 TABLET ORAL at 17:44

## 2023-03-18 RX ADMIN — ASPIRIN 81 MG: 81 TABLET, CHEWABLE ORAL at 08:38

## 2023-03-18 RX ADMIN — Medication 1 AMPULE: at 08:38

## 2023-03-18 RX ADMIN — LORATADINE 10 MG: 10 TABLET ORAL at 21:05

## 2023-03-18 RX ADMIN — ENOXAPARIN SODIUM 40 MG: 100 INJECTION SUBCUTANEOUS at 05:47

## 2023-03-18 RX ADMIN — ENOXAPARIN SODIUM 40 MG: 100 INJECTION SUBCUTANEOUS at 17:49

## 2023-03-18 RX ADMIN — SACUBITRIL AND VALSARTAN 1 TABLET: 24; 26 TABLET, FILM COATED ORAL at 08:38

## 2023-03-18 RX ADMIN — ATORVASTATIN CALCIUM 80 MG: 40 TABLET, FILM COATED ORAL at 21:05

## 2023-03-18 RX ADMIN — AMLODIPINE BESYLATE 10 MG: 5 TABLET ORAL at 10:10

## 2023-03-18 RX ADMIN — ALUMINUM HYDROXIDE, MAGNESIUM HYDROXIDE, AND SIMETHICONE 30 ML: 200; 200; 20 SUSPENSION ORAL at 15:17

## 2023-03-18 RX ADMIN — CLONIDINE HYDROCHLORIDE 0.2 MG: 0.1 TABLET ORAL at 08:38

## 2023-03-18 RX ADMIN — HYDRALAZINE HYDROCHLORIDE 20 MG: 20 INJECTION INTRAMUSCULAR; INTRAVENOUS at 14:17

## 2023-03-18 RX ADMIN — SACUBITRIL AND VALSARTAN 1 TABLET: 24; 26 TABLET, FILM COATED ORAL at 21:05

## 2023-03-18 RX ADMIN — CLOPIDOGREL BISULFATE 75 MG: 75 TABLET ORAL at 08:38

## 2023-03-18 RX ADMIN — ALUMINUM HYDROXIDE, MAGNESIUM HYDROXIDE, AND SIMETHICONE 30 ML: 200; 200; 20 SUSPENSION ORAL at 07:44

## 2023-03-18 RX ADMIN — HYDROXYZINE HYDROCHLORIDE 10 MG: 10 TABLET, FILM COATED ORAL at 15:17

## 2023-03-18 RX ADMIN — CARVEDILOL 12.5 MG: 12.5 TABLET, FILM COATED ORAL at 07:43

## 2023-03-18 NOTE — PROGRESS NOTES
End of Shift Note    Bedside shift change report given to Marla (oncoming nurse) by Rahul Celeste RN (offgoing nurse). Report included the following information SBAR    Shift worked:  7-3   Shift summary and any significant changes:     Transferred from PCU at 1408. Med x one with hydralazine  at 2799TRO systolic in 716'Z. BP at 2343 was 757 systolic but patient was c/o feeling anxious. Med x one for anxiety and x1 for heartburn       Concerns for physician to address:  none   Zone phone for oncoming shift:   none     Patient Information  Kota Pena  51 y.o.  3/12/2023  8:04 PM by Jolene Padilla Ankita was admitted from Home    Problem List  Patient Active Problem List    Diagnosis Date Noted    Right hemiplegia ([facility] 03/13/2023    Ischemic stroke ([facility] 03/13/2023    Dilated cardiomyopathy ([facility] 08/17/2020    Hypertension 08/14/2020    Class 3 severe obesity in adult [facility]) 08/12/2020    Diverticulitis of colon with perforation 01/23/2018    Diverticulitis 01/23/2018     Past Medical History:   Diagnosis Date    Bell's palsy 01/25/2019    Diverticulitis of colon with perforation 01/2018    sigmoid    Diverticulitis of colon with perforation 08/09/2020    Hypertension     Kidney infection     Haresh Hunt syndrome (geniculate herpes zoster)        Core Measures:  CVA: Yes Yes  CHF:No No  PNA:No No    Activity:  Activity Level: Dangle Side of Bed  Number times ambulated in hallways past shift: 0  Number of times OOB to chair past shift: 0    Cardiac:   Cardiac Monitoring: No          Access:   Current line(s): PIV       Genitourinary:   Urinary status: voiding    Respiratory:   O2 Device: None (Room air)  Chronic home O2 use?: NO  Incentive spirometer at bedside: YES       GI:  Last Bowel Movement Date: 03/17/23  Current diet:  ADULT DIET Dysphagia - Soft & Bite Sized  Passing flatus: YES  Tolerating current diet: YES       Pain Management:   Patient states pain is manageable on current regimen: YES    Skin:  Ricardo Score: 18  Interventions: speciality bed refused to float heels    Patient Safety:  Fall Score:    Interventions: bed/chair alarm     @Rollbelt  @dexterity to release roll belt  Yes/No ( must document dexterity  here by stating Yes or No here, otherwise this is a restraint and must follow restraint documentation policy.)    DVT prophylaxis:  DVT prophylaxis Med- Yes  DVT prophylaxis SCD or TD- No     Wounds: (If Applicable)  Wounds- No  Location     Active Consults:  IP CONSULT TO NEUROLOGY  IP CONSULT TO NEUROLOGY    Length of Stay:  Expected LOS: 2d 21h  Actual LOS: 5  Discharge Plan: Yes Inpatient Renzo Chau, RN

## 2023-03-18 NOTE — PROGRESS NOTES
Problem: Aspiration - Risk of  Goal: *Absence of aspiration  Outcome: Progressing Towards Goal     Problem: Patient Education: Go to Patient Education Activity  Goal: Patient/Family Education  Outcome: Progressing Towards Goal     Problem: Pressure Injury - Risk of  Goal: *Prevention of pressure injury  Description: Document Ricardo Scale and appropriate interventions in the flowsheet.   Outcome: Progressing Towards Goal  Note: Pressure Injury Interventions:  Sensory Interventions: Assess changes in LOC    Moisture Interventions: Absorbent underpads    Activity Interventions: Assess need for specialty bed    Mobility Interventions: Assess need for specialty bed    Nutrition Interventions: Document food/fluid/supplement intake    Friction and Shear Interventions: Apply protective barrier, creams and emollients

## 2023-03-18 NOTE — PROGRESS NOTES
End of Shift Note    Bedside shift change report given to [address] (oncoming nurse) by Rosalina Macias RN (offgoing nurse). Report included the following information SBAR    Shift worked:  7p-7a     Shift summary and any significant changes:    Patient was very rude to staff and nurse. Pt pooped and rubbed it all over bed and railings on purpose and was calling staff curse words. Concerns for physician to address: Restlessness and the fact that patient is not sleeping through the night after giving prn's.      Zone phone for oncoming shift:  [phone]       Activity:  Activity Level: Bed Rest  Number times ambulated in hallways past shift: 0  Number of times OOB to chair past shift: 0    Cardiac:   Cardiac Monitoring: Yes      Cardiac Rhythm: Sinus Rhythm    Access:  Current line(s): PIV     Genitourinary:   Urinary status: voiding and incontinent    Respiratory:   O2 Device: None (Room air)  Chronic home O2 use?: NO  Incentive spirometer at bedside: NO       GI:  Last Bowel Movement Date: 03/17/23  Current diet:  ADULT DIET Clear Liquid  Passing flatus: YES  Tolerating current diet: NO       Pain Management:   Patient states pain is manageable on current regimen: NO    Skin:  Ricardo Score: 14  Interventions: speciality bed    Patient Safety:  Fall Score:    Interventions: bed/chair alarm       Length of Stay:  Expected LOS: 2d 21h  Actual LOS: 5      Rosalina Macias RN

## 2023-03-18 NOTE — PROGRESS NOTES
Hospitalist Progress Note    Subjective:   Daily Progress Note: 3/18/2023 3:46 PM    Hospital Course:  Pt admitted for progressive right facial droop, right side numbness and weakness. MRI was done , was a limited study showing an acute stroke in the left posterior corona radiata/basal ganglia on 3/14/23. He was on the medical floor but became increasingly agitated and was transferred to the ICU for Precedex drip and was weaned off ,transferred back to the medical floor. CTA of the head and neck does not reveal any large vessel stenosis. Aggressive control of lifestyle modifications, BP and weight loss are recommended. Subjective:   Overnight patient needed prns for anxiety. Would avoid geodone in the future. Reviewed anxiety exercises. Denies CP and SOB.      Current Facility-Administered Medications   Medication Dose Route Frequency    loratadine (CLARITIN) tablet 10 mg  10 mg Oral QHS    hydrOXYzine HCL (ATARAX) tablet 10 mg  10 mg Oral TID PRN    amLODIPine (NORVASC) tablet 10 mg  10 mg Oral DAILY    hydrALAZINE (APRESOLINE) 20 mg/mL injection 20 mg  20 mg IntraVENous Q6H PRN    labetaloL (NORMODYNE;TRANDATE) injection 20 mg  20 mg IntraVENous Q6H PRN    carvediloL (COREG) tablet 12.5 mg  12.5 mg Oral BID WITH MEALS    alum-mag hydroxide-simeth (MYLANTA) oral suspension 30 mL  30 mL Oral Q4H PRN    traZODone (DESYREL) tablet 50 mg  50 mg Oral QHS PRN    melatonin tablet 4.5 mg  4.5 mg Oral QHS PRN    atorvastatin (LIPITOR) tablet 80 mg  80 mg Oral QHS    aspirin chewable tablet 81 mg  81 mg Oral DAILY    cloNIDine HCL (CATAPRES) tablet 0.2 mg  0.2 mg Oral BID    clopidogreL (PLAVIX) tablet 75 mg  75 mg Oral DAILY    sacubitriL-valsartan (ENTRESTO) 24-26 mg tablet 1 Tablet  1 Tablet Oral Q12H    ziprasidone (GEODON) 10 mg in sterile water (preservative free) 0.5 mL injection  10 mg IntraMUSCular Q12H PRN    insulin lispro (HUMALOG) injection   SubCUTAneous AC&HS    glucose chewable tablet 16 g  4 Tablet Oral PRN    glucagon (GLUCAGEN) injection 1 mg  1 mg IntraMUSCular PRN    alcohol 62% (NOZIN) nasal  1 Ampule  1 Ampule Topical Q12H    albuterol-ipratropium (DUO-NEB) 2.5 MG-0.5 MG/3 ML  3 mL Nebulization Q4H PRN    ondansetron (ZOFRAN) injection 4 mg  4 mg IntraVENous Q4H PRN    acetaminophen (TYLENOL) tablet 650 mg  650 mg Oral Q4H PRN    Or    acetaminophen (TYLENOL) solution 650 mg  650 mg Per NG tube Q4H PRN    Or    acetaminophen (TYLENOL) suppository 650 mg  650 mg Rectal Q4H PRN    bisacodyL (DULCOLAX) suppository 10 mg  10 mg Rectal DAILY PRN    enoxaparin (LOVENOX) injection 40 mg  40 mg SubCUTAneous Q12H            Objective:     Visit Vitals  BP (!) 142/74   Pulse 91   Temp 98.8 °F (37.1 °C)   Resp 18   Ht 5' 7\" (1.702 m)   Wt 147 kg (324 lb 1.2 oz)   SpO2 100%   BMI 50.76 kg/m²    O2 Flow Rate (L/min): 4 l/min O2 Device: None (Room air)    Temp (24hrs), Av.4 °F (36.9 °C), Min:98 °F (36.7 °C), Max:98.8 °F (37.1 °C)      No intake/output data recorded.    1901 -  0700  In: -   Out:  [Urine:]    PHYSICAL EXAM:    General appearance: Comfortable not in distress  Heart: Regular rate and rhythm, no murmurs  HEENT: No thyroid enlargement  Lungs: Bilateral air entry present, no crackles or wheezing  Abdomen: Soft, nontender, no guarding  Extremities no edema  Neurological exam alert awake oriented x3, right-sided hemiplegia      Data Review    Recent Results (from the past 24 hour(s))   GLUCOSE, POC    Collection Time: 23  4:46 PM   Result Value Ref Range    Glucose (POC) 120 (H) 65 - 117 mg/dL    Performed by Anastasia RODRIGUEZ    GLUCOSE, POC    Collection Time: 23  9:00 PM   Result Value Ref Range    Glucose (POC) 126 (H) 65 - 117 mg/dL    Performed by Yudith Davenport RN    CBC W/O DIFF    Collection Time: 23  5:29 AM   Result Value Ref Range    WBC 7.2 4.1 - 11.1 K/uL    RBC 6.87 (H) 4.10 - 5.70 M/uL    HGB 16.7 12.1 - 17.0 g/dL    HCT 48.8 36.6 - 50.3 %    MCV 71.0 (L) 80.0 - 99.0 FL    MCH 24.3 (L) 26.0 - 34.0 PG    MCHC 34.2 30.0 - 36.5 g/dL    RDW 20.7 (H) 11.5 - 14.5 %    PLATELET 371 665 - 341 K/uL    MPV 9.4 8.9 - 12.9 FL    NRBC 0.0 0  WBC    ABSOLUTE NRBC 0.00 0.00 - 2.90 K/uL   METABOLIC PANEL, BASIC    Collection Time: 03/18/23  5:29 AM   Result Value Ref Range    Sodium 135 (L) 136 - 145 mmol/L    Potassium 3.5 3.5 - 5.1 mmol/L    Chloride 108 97 - 108 mmol/L    CO2 17 (L) 21 - 32 mmol/L    Anion gap 10 5 - 15 mmol/L    Glucose 108 (H) 65 - 100 mg/dL    BUN 16 6 - 20 MG/DL    Creatinine 0.68 (L) 0.70 - 1.30 MG/DL    BUN/Creatinine ratio 24 (H) 12 - 20      eGFR >60 >60 ml/min/1.73m2    Calcium 8.7 8.5 - 10.1 MG/DL   GLUCOSE, POC    Collection Time: 03/18/23  7:22 AM   Result Value Ref Range    Glucose (POC) 117 65 - 117 mg/dL    Performed by Stacey Kendell PCT    GLUCOSE, POC    Collection Time: 03/18/23 11:19 AM   Result Value Ref Range    Glucose (POC) 108 65 - 117 mg/dL    Performed by Stacey Kendell PCT        XR ABD (KUB)   Final Result   Addendum (preliminary) 1 of 1   Addendum: Correction: Enteric tube traverses expected course to below the   diaphragm into the left upper quadrant. Final   Enteric tube tip lies just above the gastroesophageal junction. XR CHEST PORT   Final Result   Stable interstitial edema pattern with cardiomegaly. Gastric tube visualized but   tip not included. XR ABD PORT  1 V   Final Result   NG tube in place. MRI BRAIN WO CONT   Final Result      1. Acute infarct in the left posterior corona radiata/basal ganglia. XR CHEST PORT   Final Result      New mild interstitial edema         XR CHEST PORT   Final Result   New, moderate cardiomegaly. Grossly clear lungs. CTA CODE NEURO HEAD AND NECK W CONT   Final Result   1. No large vessel occlusion or hemodynamically significant carotid stenosis.     2.  Chronic left centrum semiovale hypodensity likely sequela of chronic   microvascular angiopathy. 3.  Decrease cerebral perfusion in the bilateral periventricular white matter   with 81 cc area of ischemic tissue in the bilateral frontal lobes right greater   than left without underlying vascular lesion to explain this perfusion   abnormality. CT CODE NEURO PERF W CBF   Final Result   1. No large vessel occlusion or hemodynamically significant carotid stenosis. 2.  Chronic left centrum semiovale hypodensity likely sequela of chronic   microvascular angiopathy. 3.  Decrease cerebral perfusion in the bilateral periventricular white matter   with 81 cc area of ischemic tissue in the bilateral frontal lobes right greater   than left without underlying vascular lesion to explain this perfusion   abnormality. CT CODE NEURO HEAD WO CONTRAST   Final Result   No acute intracranial process seen          Active Problems:    Right hemiplegia ([address] (3/13/2023)      Ischemic stroke ([address] (3/13/2023)      Assessment/Plan:   Acute ischemic stroke- in the  left basal ganglia, with right side hemiparesis: Continue aspirin, Plavix, statin. Hemoglobin A1c is 5.4 and LDL is 70 point two 2D echocardiogram showed ejection fraction of around 35 to 40% bubble study could not be performed due to patient's noncompliance. PT OT recommended inpatient rehab. Case management consulted-no payor source. 2. Hypertension urgency: Overnight patient noted hypertensive urgency and had to be started on Cardene drip. Now weaned from [address]. Coreg to 12.5 twice daily. Continue clonidine, Entresto and also added Norvasc. On as needed hydralazine and labetalol. DC tele monitoring    3. Systolic heart failure- EF 35-40%, on Entresto, Coreg. Stable    4.  CAD-continue aspirin, Plavix and statin    WILLARD: 3/20  Barriers: [facility] bed placement in inpatient rehab placement        DVT Prophylaxis: lovenox  Code Status:  Full Code  POA: [address]      DMKATI PINA discussed with:   _____patient, staff nurse, __________________________________________________________    Nicolas Ames MD

## 2023-03-18 NOTE — PROGRESS NOTES
SPEECH LANGUAGE PATHOLOGY DYSPHAGIA TREATMENT  Patient: Francesco Kumar (85 y.o. male)  Date: 3/18/2023  Diagnosis: Right hemiplegia ([facility] [address] [G81.91]  Ischemic stroke ([facility] [address] [I63.9] <principal problem not specified>      Precautions:  Fall (dense right norm)    ASSESSMENT:  Patient is s/p L CVA with dysarthria and dysphagia. He is tolerating soft solids and thins well. Mastication was mildly slow but complete. Min lingual residue noted. No pocketing. Not overt s/s of aspiration. His dysarthria persists. Apparently was highly anxious last night. Seemed calm today. Wants a new bed. PLAN:  Recommendations and Planned Interventions:  Recommend diet be advanced to soft/thins. Sit as fully upright as possible. Small bites and sips   Patient continues to benefit from skilled intervention to address the above impairments. Continue treatment per established plan of care. Discharge Recommendations:  Inpatient Rehab     SUBJECTIVE:   Patient stated he would try to eat solids today. OBJECTIVE:   Cognitive and Communication Status:  Neurologic State: Alert  Orientation Level: Oriented X4  Cognition: Appropriate decision making, Appropriate for age attention/concentration, Appropriate safety awareness  Perception: Cues to maintain midline in sitting, Cues to attend to right side of body  Perseveration: No perseveration noted  Safety/Judgement: Fall prevention  Dysphagia Treatment:  Oral Assessment:     P.O. Trials:  Patient Position: upright in bed but not fully upright due to limitations of the specialty bed  Vocal quality prior to P.O.: No impairment  Consistency Presented:  Thin liquid;Mechanical soft  How Presented: Self-fed/presented;Straw     Bolus Acceptance: No impairment  Bolus Formation/Control: No impairment  Type of Impairment: Delayed;Mastication  Propulsion: Delayed (# of seconds)  Oral Residue: None  Initiation of Swallow: Delayed (# of seconds)  Laryngeal Elevation: Functional  Aspiration Signs/Symptoms: None  Pharyngeal Phase Characteristics: No impairment, issues, or problems      Cues for Modifications: None       Oral Phase Severity: Mild  Pharyngeal Phase Severity : Mild              Pain:  Pain Scale 1: Numeric (0 - 10)  Pain Intensity 1: 0       After treatment:   Patient left in no apparent distress in bed    COMMUNICATION/EDUCATION:   Patient was educated regarding his deficit(s) of dysphagia  as this relates to his diagnosis of CVA. He demonstrated Good understanding as evidenced by repeating back. .    The patient's plan of care including recommendations, planned interventions, and recommended diet changes were discussed with: Registered nurse.      Kadi Contreras, SLP  Time Calculation: 10 mins

## 2023-03-18 NOTE — PROGRESS NOTES
Bedside shift report given to oncoming nurse [facility] RN by off going nurse Leah Ramos RN patient alert and stable during shift change no distress noted

## 2023-03-18 NOTE — PROGRESS NOTES
0700. Bedside shift change report given to [address] [place] (oncoming nurse) by Cira Frye (offgoing nurse). Report included the following information SBAR, Kardex, ED Summary, Intake/Output, MAR, Recent Results, and Cardiac Rhythm NSR .     1300. TRANSFER - OUT REPORT:    Verbal report given to [facility] - [facility]TON[facility] Rn(name) on Asmita Pabon  being transferred to Neuro tele (unit) for routine progression of care       Report consisted of patients Situation, Background, Assessment and   Recommendations(SBAR). Information from the following report(s) SBAR, Kardex, ED Summary, Intake/Output, MAR, Recent Results, and Cardiac Rhythm NSR  was reviewed with the receiving nurse. Lines:   Peripheral IV 03/13/23 Posterior;Right Hand (Active)   Site Assessment Clean, dry, & intact 03/18/23 1200   Phlebitis Assessment 0 03/18/23 1200   Infiltration Assessment 0 03/18/23 1200   Dressing Status Clean, dry, & intact 03/18/23 1200   Dressing Type Transparent 03/18/23 1200   Hub Color/Line Status Pink;Capped 03/18/23 1200   Action Taken Open ports on tubing capped 03/18/23 1200   Alcohol Cap Used Yes 03/18/23 1200        Opportunity for questions and clarification was provided.       Patient transported with:   Registered Nurse

## 2023-03-19 LAB
ATRIAL RATE: 113 BPM
CALCULATED P AXIS, ECG09: 63 DEGREES
CALCULATED R AXIS, ECG10: 2 DEGREES
CALCULATED T AXIS, ECG11: 33 DEGREES
DIAGNOSIS, 93000: NORMAL
GLUCOSE BLD STRIP.AUTO-MCNC: 104 MG/DL (ref 65–117)
GLUCOSE BLD STRIP.AUTO-MCNC: 116 MG/DL (ref 65–117)
GLUCOSE BLD STRIP.AUTO-MCNC: 138 MG/DL (ref 65–117)
GLUCOSE BLD STRIP.AUTO-MCNC: 99 MG/DL (ref 65–117)
P-R INTERVAL, ECG05: 156 MS
Q-T INTERVAL, ECG07: 358 MS
QRS DURATION, ECG06: 104 MS
QTC CALCULATION (BEZET), ECG08: 491 MS
SERVICE CMNT-IMP: ABNORMAL
SERVICE CMNT-IMP: NORMAL
VENTRICULAR RATE, ECG03: 113 BPM

## 2023-03-19 PROCEDURE — 74011250637 HC RX REV CODE- 250/637: Performed by: INTERNAL MEDICINE

## 2023-03-19 PROCEDURE — 65270000046 HC RM TELEMETRY

## 2023-03-19 PROCEDURE — 74011250637 HC RX REV CODE- 250/637: Performed by: STUDENT IN AN ORGANIZED HEALTH CARE EDUCATION/TRAINING PROGRAM

## 2023-03-19 PROCEDURE — 74011250636 HC RX REV CODE- 250/636: Performed by: NURSE PRACTITIONER

## 2023-03-19 PROCEDURE — 82962 GLUCOSE BLOOD TEST: CPT

## 2023-03-19 PROCEDURE — 74011250637 HC RX REV CODE- 250/637: Performed by: NURSE PRACTITIONER

## 2023-03-19 RX ADMIN — CARVEDILOL 12.5 MG: 12.5 TABLET, FILM COATED ORAL at 09:40

## 2023-03-19 RX ADMIN — CARVEDILOL 12.5 MG: 12.5 TABLET, FILM COATED ORAL at 18:01

## 2023-03-19 RX ADMIN — CLOPIDOGREL BISULFATE 75 MG: 75 TABLET ORAL at 09:39

## 2023-03-19 RX ADMIN — SACUBITRIL AND VALSARTAN 1 TABLET: 24; 26 TABLET, FILM COATED ORAL at 21:59

## 2023-03-19 RX ADMIN — SACUBITRIL AND VALSARTAN 1 TABLET: 24; 26 TABLET, FILM COATED ORAL at 09:39

## 2023-03-19 RX ADMIN — ENOXAPARIN SODIUM 40 MG: 100 INJECTION SUBCUTANEOUS at 06:11

## 2023-03-19 RX ADMIN — ACETAMINOPHEN 325MG 650 MG: 325 TABLET ORAL at 21:59

## 2023-03-19 RX ADMIN — ATORVASTATIN CALCIUM 80 MG: 40 TABLET, FILM COATED ORAL at 21:59

## 2023-03-19 RX ADMIN — AMLODIPINE BESYLATE 10 MG: 5 TABLET ORAL at 10:17

## 2023-03-19 RX ADMIN — MELATONIN 4.5 MG: at 21:59

## 2023-03-19 RX ADMIN — ENOXAPARIN SODIUM 40 MG: 100 INJECTION SUBCUTANEOUS at 18:01

## 2023-03-19 RX ADMIN — CLONIDINE HYDROCHLORIDE 0.2 MG: 0.1 TABLET ORAL at 09:39

## 2023-03-19 RX ADMIN — ASPIRIN 81 MG: 81 TABLET, CHEWABLE ORAL at 09:39

## 2023-03-19 RX ADMIN — CLONIDINE HYDROCHLORIDE 0.2 MG: 0.1 TABLET ORAL at 18:01

## 2023-03-19 RX ADMIN — LORATADINE 10 MG: 10 TABLET ORAL at 21:59

## 2023-03-19 NOTE — PROGRESS NOTES
Hospitalist Progress Note    Subjective:   Daily Progress Note: 3/19/2023 3:46 PM    Hospital Course:  Pt admitted for progressive right facial droop, right side numbness and weakness. MRI was done , was a limited study showing an acute stroke in the left posterior corona radiata/basal ganglia on 3/14/23. He was on the medical floor but became increasingly agitated and was transferred to the ICU for Precedex drip and was weaned off ,transferred back to the medical floor. CTA of the head and neck does not reveal any large vessel stenosis. Aggressive control of lifestyle modifications, BP and weight loss are recommended. Subjective:   Overnight patient needed prns for anxiety. Would avoid geodone in the future. Reviewed anxiety exercises. Denies CP and SOB.      Current Facility-Administered Medications   Medication Dose Route Frequency    loratadine (CLARITIN) tablet 10 mg  10 mg Oral QHS    hydrOXYzine HCL (ATARAX) tablet 10 mg  10 mg Oral TID PRN    amLODIPine (NORVASC) tablet 10 mg  10 mg Oral DAILY    hydrALAZINE (APRESOLINE) 20 mg/mL injection 20 mg  20 mg IntraVENous Q6H PRN    labetaloL (NORMODYNE;TRANDATE) injection 20 mg  20 mg IntraVENous Q6H PRN    carvediloL (COREG) tablet 12.5 mg  12.5 mg Oral BID WITH MEALS    alum-mag hydroxide-simeth (MYLANTA) oral suspension 30 mL  30 mL Oral Q4H PRN    traZODone (DESYREL) tablet 50 mg  50 mg Oral QHS PRN    melatonin tablet 4.5 mg  4.5 mg Oral QHS PRN    atorvastatin (LIPITOR) tablet 80 mg  80 mg Oral QHS    aspirin chewable tablet 81 mg  81 mg Oral DAILY    cloNIDine HCL (CATAPRES) tablet 0.2 mg  0.2 mg Oral BID    clopidogreL (PLAVIX) tablet 75 mg  75 mg Oral DAILY    sacubitriL-valsartan (ENTRESTO) 24-26 mg tablet 1 Tablet  1 Tablet Oral Q12H    ziprasidone (GEODON) 10 mg in sterile water (preservative free) 0.5 mL injection  10 mg IntraMUSCular Q12H PRN    insulin lispro (HUMALOG) injection   SubCUTAneous AC&HS    glucose chewable tablet 16 g  4 Tablet Oral PRN    glucagon (GLUCAGEN) injection 1 mg  1 mg IntraMUSCular PRN    alcohol 62% (NOZIN) nasal  1 Ampule  1 Ampule Topical Q12H    albuterol-ipratropium (DUO-NEB) 2.5 MG-0.5 MG/3 ML  3 mL Nebulization Q4H PRN    ondansetron (ZOFRAN) injection 4 mg  4 mg IntraVENous Q4H PRN    acetaminophen (TYLENOL) tablet 650 mg  650 mg Oral Q4H PRN    Or    acetaminophen (TYLENOL) solution 650 mg  650 mg Per NG tube Q4H PRN    Or    acetaminophen (TYLENOL) suppository 650 mg  650 mg Rectal Q4H PRN    bisacodyL (DULCOLAX) suppository 10 mg  10 mg Rectal DAILY PRN    enoxaparin (LOVENOX) injection 40 mg  40 mg SubCUTAneous Q12H            Objective:     Visit Vitals  BP (!) 162/80   Pulse 92   Temp 97.5 °F (36.4 °C)   Resp 18   Ht 5' 7\" (1.702 m)   Wt 147 kg (324 lb 1.2 oz)   SpO2 97%   BMI 50.76 kg/m²    O2 Flow Rate (L/min): 4 l/min O2 Device: None (Room air)    Temp (24hrs), Av.3 °F (36.8 °C), Min:97.5 °F (36.4 °C), Max:98.8 °F (37.1 °C)       07 -  190  In: 840 [P.O.:840]  Out: 250 [Urine:250]   190 -  0700  In: -   Out: 250 [Urine:250]    PHYSICAL EXAM:    General appearance: Comfortable not in distress  Heart: Regular rate and rhythm, no murmurs  HEENT: No thyroid enlargement  Lungs: Bilateral air entry present, no crackles or wheezing  Abdomen: Soft, nontender, no guarding  Extremities no edema  Neurological exam alert awake oriented x3, right-sided hemiplegia      Data Review    Recent Results (from the past 24 hour(s))   GLUCOSE, POC    Collection Time: 23  4:46 PM   Result Value Ref Range    Glucose (POC) 114 65 - 117 mg/dL    Performed by Jamie Holder (JOSE MANUEL RN)    GLUCOSE, POC    Collection Time: 23  8:10 PM   Result Value Ref Range    Glucose (POC) 135 (H) 65 - 117 mg/dL    Performed by Tash Casper (TRV RN)    GLUCOSE, POC    Collection Time: 23  6:40 AM   Result Value Ref Range    Glucose (POC) 116 65 - 117 mg/dL    Performed by Tash Casper (JOSE MANUEL COVARRUBIAS)    GLUCOSE, POC    Collection Time: 03/19/23 11:16 AM   Result Value Ref Range    Glucose (POC) 138 (H) 65 - 117 mg/dL    Performed by Palma Player PCT        XR ABD (KUB)   Final Result   Addendum (preliminary) 1 of 1   Addendum: Correction: Enteric tube traverses expected course to below the   diaphragm into the left upper quadrant. Final   Enteric tube tip lies just above the gastroesophageal junction. XR CHEST PORT   Final Result   Stable interstitial edema pattern with cardiomegaly. Gastric tube visualized but   tip not included. XR ABD PORT  1 V   Final Result   NG tube in place. MRI BRAIN WO CONT   Final Result      1. Acute infarct in the left posterior corona radiata/basal ganglia. XR CHEST PORT   Final Result      New mild interstitial edema         XR CHEST PORT   Final Result   New, moderate cardiomegaly. Grossly clear lungs. CTA CODE NEURO HEAD AND NECK W CONT   Final Result   1. No large vessel occlusion or hemodynamically significant carotid stenosis. 2.  Chronic left centrum semiovale hypodensity likely sequela of chronic   microvascular angiopathy. 3.  Decrease cerebral perfusion in the bilateral periventricular white matter   with 81 cc area of ischemic tissue in the bilateral frontal lobes right greater   than left without underlying vascular lesion to explain this perfusion   abnormality. CT CODE NEURO PERF W CBF   Final Result   1. No large vessel occlusion or hemodynamically significant carotid stenosis. 2.  Chronic left centrum semiovale hypodensity likely sequela of chronic   microvascular angiopathy. 3.  Decrease cerebral perfusion in the bilateral periventricular white matter   with 81 cc area of ischemic tissue in the bilateral frontal lobes right greater   than left without underlying vascular lesion to explain this perfusion   abnormality.       CT CODE NEURO HEAD WO CONTRAST   Final Result   No acute intracranial process seen Active Problems:    Right hemiplegia ([facility] [address] (3/13/2023)      Ischemic stroke ([facility] [address] (3/13/2023)      Assessment/Plan:   Acute ischemic stroke- in the  left basal ganglia, with right side hemiparesis: Continue aspirin, Plavix, statin. Hemoglobin A1c is 5.4 and LDL is 70 point two 2D echocardiogram showed ejection fraction of around 35 to 40% bubble study could not be performed due to patient's noncompliance. PT OT recommended inpatient rehab. Case management consulted-no payor source. 2. Hypertension urgency: Overnight patient noted hypertensive urgency and had to be started on Cardene drip. Now weaned from [address]. Coreg to 12.5 twice daily. Continue clonidine, Entresto and also added Norvasc. On as needed hydralazine and labetalol. DC tele monitoring    3. Systolic heart failure- EF 35-40%, on Entresto, Coreg. Stable    4.  CAD-continue aspirin, Plavix and statin    WILLARD: 3/20  Barriers: [facility] bed placement in inpatient rehab placement        DVT Prophylaxis: lovenox  Code Status:  Full Code  POA: [facility]      [facility] ANTOINETTE discussed with:   _____patient, staff nurse, __________________________________________________________    Benjamin Bryan MD

## 2023-03-19 NOTE — PROGRESS NOTES
Problem: Aspiration - Risk of  Goal: *Absence of aspiration  Outcome: Progressing Towards Goal     Problem: Patient Education: Go to Patient Education Activity  Goal: Patient/Family Education  Outcome: Progressing Towards Goal     Problem: Pressure Injury - Risk of  Goal: *Prevention of pressure injury  Description: Document Ricardo Scale and appropriate interventions in the flowsheet.   Outcome: Progressing Towards Goal  Note: Pressure Injury Interventions:  Sensory Interventions: Assess changes in LOC    Moisture Interventions: Absorbent underpads    Activity Interventions: Assess need for specialty bed    Mobility Interventions: Pressure redistribution bed/mattress (bed type)    Nutrition Interventions: Offer support with meals,snacks and hydration    Friction and Shear Interventions: Apply protective barrier, creams and emollients                Problem: Patient Education: Go to Patient Education Activity  Goal: Patient/Family Education  Outcome: Progressing Towards Goal     Problem: Patient Education: Go to Patient Education Activity  Goal: Patient/Family Education  Outcome: Progressing Towards Goal     Problem: TIA/CVA Stroke: 0-24 hours  Goal: Off Pathway (Use only if patient is Off Pathway)  Outcome: Progressing Towards Goal  Goal: Activity/Safety  Outcome: Progressing Towards Goal  Goal: Consults, if ordered  Outcome: Progressing Towards Goal  Goal: Diagnostic Test/Procedures  Outcome: Progressing Towards Goal  Goal: Nutrition/Diet  Outcome: Progressing Towards Goal  Goal: Discharge Planning  Outcome: Progressing Towards Goal  Goal: Medications  Outcome: Progressing Towards Goal  Goal: Respiratory  Outcome: Progressing Towards Goal  Goal: Treatments/Interventions/Procedures  Outcome: Progressing Towards Goal  Goal: Minimize risk of bleeding post-thrombolytic infusion  Outcome: Progressing Towards Goal  Goal: Monitor for complications post-thrombolytic infusion  Outcome: Progressing Towards Goal  Goal: Psychosocial  Outcome: Progressing Towards Goal  Goal: *Hemodynamically stable  Outcome: Progressing Towards Goal  Goal: *Neurologically stable  Description: Absence of additional neurological deficits    Outcome: Progressing Towards Goal  Goal: *Verbalizes anxiety and depression are reduced or absent  Outcome: Progressing Towards Goal  Goal: *Absence of Signs of Aspiration on Current Diet  Outcome: Progressing Towards Goal  Goal: *Absence of deep venous thrombosis signs and symptoms(Stroke Metric)  Outcome: Progressing Towards Goal  Goal: *Ability to perform ADLs and demonstrates progressive mobility and function  Outcome: Progressing Towards Goal  Goal: *Stroke education started(Stroke Metric)  Outcome: Progressing Towards Goal  Goal: *Dysphagia screen performed(Stroke Metric)  Outcome: Progressing Towards Goal  Goal: *Rehab consulted(Stroke Metric)  Outcome: Progressing Towards Goal     Problem: TIA/CVA Stroke: Day 2 Until Discharge  Goal: Off Pathway (Use only if patient is Off Pathway)  Outcome: Progressing Towards Goal  Goal: Activity/Safety  Outcome: Progressing Towards Goal  Goal: Diagnostic Test/Procedures  Outcome: Progressing Towards Goal  Goal: Nutrition/Diet  Outcome: Progressing Towards Goal  Goal: Discharge Planning  Outcome: Progressing Towards Goal  Goal: Medications  Outcome: Progressing Towards Goal  Goal: Respiratory  Outcome: Progressing Towards Goal  Goal: Treatments/Interventions/Procedures  Outcome: Progressing Towards Goal  Goal: Psychosocial  Outcome: Progressing Towards Goal  Goal: *Verbalizes anxiety and depression are reduced or absent  Outcome: Progressing Towards Goal  Goal: *Absence of aspiration  Outcome: Progressing Towards Goal  Goal: *Absence of deep venous thrombosis signs and symptoms(Stroke Metric)  Outcome: Progressing Towards Goal  Goal: *Optimal pain control at patient's stated goal  Outcome: Progressing Towards Goal  Goal: *Tolerating diet  Outcome: Progressing Towards Goal  Goal: *Ability to perform ADLs and demonstrates progressive mobility and function  Outcome: Progressing Towards Goal  Goal: *Stroke education continued(Stroke Metric)  Outcome: Progressing Towards Goal     Problem: Ischemic Stroke: Discharge Outcomes  Goal: *Verbalizes anxiety and depression are reduced or absent  Outcome: Progressing Towards Goal  Goal: *Verbalize understanding of risk factor modification(Stroke Metric)  Outcome: Progressing Towards Goal  Goal: *Hemodynamically stable  Outcome: Progressing Towards Goal  Goal: *Absence of aspiration pneumonia  Outcome: Progressing Towards Goal  Goal: *Aware of needed dietary changes  Outcome: Progressing Towards Goal  Goal: *Verbalize understanding of prescribed medications including anti-coagulants, anti-lipid, and/or anti-platelets(Stroke Metric)  Outcome: Progressing Towards Goal  Goal: *Tolerating diet  Outcome: Progressing Towards Goal  Goal: *Aware of follow-up diagnostics related to anticoagulants  Outcome: Progressing Towards Goal  Goal: *Ability to perform ADLs and demonstrates progressive mobility and function  Outcome: Progressing Towards Goal  Goal: *Absence of DVT(Stroke Metric)  Outcome: Progressing Towards Goal  Goal: *Absence of aspiration  Outcome: Progressing Towards Goal  Goal: *Optimal pain control at patient's stated goal  Outcome: Progressing Towards Goal  Goal: *Home safety concerns addressed  Outcome: Progressing Towards Goal  Goal: *Describes available resources and support systems  Outcome: Progressing Towards Goal  Goal: *Verbalizes understanding of activation of EMS(911) for stroke symptoms(Stroke Metric)  Outcome: Progressing Towards Goal  Goal: *Understands and describes signs and symptoms to report to providers(Stroke Metric)  Outcome: Progressing Towards Goal  Goal: *Neurolgocially stable (absence of additional neurological deficits)  Outcome: Progressing Towards Goal  Goal: *Verbalizes importance of follow-up with primary care physician(Stroke Metric)  Outcome: Progressing Towards Goal  Goal: *Smoking cessation discussed,if applicable(Stroke Metric)  Outcome: Progressing Towards Goal  Goal: *Depression screening completed(Stroke Metric)  Outcome: Progressing Towards Goal     Problem: Falls - Risk of  Goal: *Absence of Falls  Description: Document Dilcia Fall Risk and appropriate interventions in the flowsheet. Outcome: Progressing Towards Goal     Problem: Patient Education: Go to Patient Education Activity  Goal: Patient/Family Education  Outcome: Progressing Towards Goal     Problem: Falls - Risk of  Goal: *Absence of Falls  Description: Document Viki Morgan Fall Risk and appropriate interventions in the flowsheet.   Outcome: Progressing Towards Goal     Problem: Delirium Treatment  Goal: *Level of consciousness restored to baseline  Outcome: Progressing Towards Goal  Goal: *Level of environmental perceptions restored to baseline  Outcome: Progressing Towards Goal  Goal: *Sensory perception restored to baseline  Outcome: Progressing Towards Goal  Goal: *Emotional stability restored to baseline  Outcome: Progressing Towards Goal  Goal: *Functional assessment restored to baseline  Outcome: Progressing Towards Goal  Goal: *Absence of falls  Outcome: Progressing Towards Goal  Goal: *Will remain free of delirium, CAM Score negative  Outcome: Progressing Towards Goal  Goal: *Cognitive status will be restored to baseline  Outcome: Progressing Towards Goal  Goal: Interventions  Outcome: Progressing Towards Goal     Problem: Patient Education: Go to Patient Education Activity  Goal: Patient/Family Education  Outcome: Progressing Towards Goal     Problem: Patient Education: Go to Patient Education Activity  Goal: Patient/Family Education  Outcome: Progressing Towards Goal     Problem: Patient Education: Go to Patient Education Activity  Goal: Patient/Family Education  Outcome: Progressing Towards Goal     Problem: Patient Education: Go to Patient Education Activity  Goal: Patient/Family Education  Outcome: Progressing Towards Goal

## 2023-03-19 NOTE — PROGRESS NOTES
Problem: Aspiration - Risk of  Goal: *Absence of aspiration  Outcome: Progressing Towards Goal     Problem: Pressure Injury - Risk of  Goal: *Prevention of pressure injury  Description: Document Ricardo Scale and appropriate interventions in the flowsheet. Outcome: Progressing Towards Goal  Note: Pressure Injury Interventions:  Sensory Interventions: Assess changes in LOC, Keep linens dry and wrinkle-free    Moisture Interventions: Absorbent underpads    Activity Interventions: Pressure redistribution bed/mattress(bed type), PT/OT evaluation    Mobility Interventions: HOB 30 degrees or less, Pressure redistribution bed/mattress (bed type), PT/OT evaluation    Nutrition Interventions: Document food/fluid/supplement intake    Friction and Shear Interventions: Apply protective barrier, creams and emollients, Feet elevated on foot rest, HOB 30 degrees or less       Problem: TIA/CVA Stroke: Day 2 Until Discharge  Goal: Activity/Safety  Outcome: Progressing Towards Goal  Goal: Nutrition/Diet  Outcome: Progressing Towards Goal     Problem: Ischemic Stroke: Discharge Outcomes  Goal: *Hemodynamically stable  Outcome: Progressing Towards Goal     Problem: Falls - Risk of  Goal: *Absence of Falls  Description: Document Dilcia Fall Risk and appropriate interventions in the flowsheet. Outcome: Progressing Towards Goal  Note: Fall Risk Interventions:     Problem: Falls - Risk of  Goal: *Absence of Falls  Description: Document Dilcia Fall Risk and appropriate interventions in the flowsheet.   Outcome: Progressing Towards Goal  Note: Fall Risk Interventions:

## 2023-03-19 NOTE — PROGRESS NOTES
End of Shift Note    Bedside shift change report given to Oj Sung RN (oncoming nurse) by Durel Paget, RN (offgoing nurse). Report included the following information SBAR    Shift worked: 9214-6976   Shift summary and any significant changes: AxO4 bed rest. R sided facial droop. Dysarthric. Pt is able to lift R leg a few inches off the bed, however R arm is flaccid.        Concerns for physician to address:  none   Zone phone for oncoming shift:   none     Patient Information  Anne Palma  51 y.o.  3/12/2023  8:04 PM by Chau Tran Hema Killian was admitted from Home    Problem List  Patient Active Problem List    Diagnosis Date Noted    Right hemiplegia ([facility] [address] 03/13/2023    Ischemic stroke ([facility] [address] 03/13/2023    Dilated cardiomyopathy ([facility] [address] 08/17/2020    Hypertension 08/14/2020    Class 3 severe obesity in adult [facility]) 08/12/2020    Diverticulitis of colon with perforation 01/23/2018    Diverticulitis 01/23/2018     Past Medical History:   Diagnosis Date    Bell's palsy 01/25/2019    Diverticulitis of colon with perforation 01/2018    sigmoid    Diverticulitis of colon with perforation 08/09/2020    Hypertension     Kidney infection     [place] Hunt syndrome (geniculate herpes zoster)        Core Measures:  CVA: Yes Yes  CHF:No No  PNA:No No    Activity:  Activity Level: Dangle Side of Bed  Number times ambulated in hallways past shift: 0  Number of times OOB to chair past shift: 0    Cardiac:   Cardiac Monitoring: No          Access:   Current line(s): PIV       Genitourinary:   Urinary status: voiding    Respiratory:   O2 Device: None (Room air)  Chronic home O2 use?: NO  Incentive spirometer at bedside: YES       GI:  Last Bowel Movement Date: 03/17/23  Current diet:  ADULT DIET Dysphagia - Soft & Bite Sized  Passing flatus: YES  Tolerating current diet: YES       Pain Management:   Patient states pain is manageable on current regimen: YES    Skin:  Ricardo Score: 18  Interventions: speciality bed refused to float heels    Patient Safety:  Fall Score:    Interventions: bed/chair alarm     @Rollbelt  @dexterity to release roll belt  Yes/No ( must document dexterity  here by stating Yes or No here, otherwise this is a restraint and must follow restraint documentation policy.)    DVT prophylaxis:  DVT prophylaxis Med- Yes  DVT prophylaxis SCD or TD- No     Wounds: (If Applicable)  Wounds- No  Location     Active Consults:  IP CONSULT TO NEUROLOGY  IP CONSULT TO NEUROLOGY    Length of Stay:  Expected LOS: 2d 21h  Actual LOS: 5  Discharge Plan: Yes Inpatient Davide Edwin Bianchi, RN

## 2023-03-19 NOTE — PROGRESS NOTES
End of Shift Note    Bedside shift change report given to Alejandro Cooley RN (oncoming nurse) by Fidelia Douglas RN (offgoing nurse). Report included the following information SBAR    Shift worked: 2300 -0700   Shift summary and any significant changes: AxO4 bed rest. R sided facial droop. Dysarthric. Pt is able to lift R leg a few inches off the bed, however R arm is flaccid. Call bell and phone within reach of patient, able to make needs known, repositioned several times and passing urine without a problem.         Concerns for physician to address:  none   Zone phone for oncoming shift:   none     Patient Information  Jamel Knows  51 y.o.  3/12/2023  8:04 PM by Maggy Spencer was admitted from Home    Problem List  Patient Active Problem List    Diagnosis Date Noted    Right hemiplegia ([facility] [address] 03/13/2023    Ischemic stroke ([facility] [address] 03/13/2023    Dilated cardiomyopathy ([facility] [address] 08/17/2020    Hypertension 08/14/2020    Class 3 severe obesity in adult [facility]) 08/12/2020    Diverticulitis of colon with perforation 01/23/2018    Diverticulitis 01/23/2018     Past Medical History:   Diagnosis Date    Bell's palsy 01/25/2019    Diverticulitis of colon with perforation 01/2018    sigmoid    Diverticulitis of colon with perforation 08/09/2020    Hypertension     Kidney infection     [place] Hunt syndrome (geniculate herpes zoster)        Core Measures:  CVA: Yes Yes  CHF:No No  PNA:No No    Activity:  Activity Level: Dangle Side of Bed  Number times ambulated in hallways past shift: 0  Number of times OOB to chair past shift: 0    Cardiac:   Cardiac Monitoring: No          Access:   Current line(s): PIV       Genitourinary:   Urinary status: voiding    Respiratory:   O2 Device: None (Room air)  Chronic home O2 use?: NO  Incentive spirometer at bedside: YES       GI:  Last Bowel Movement Date: 03/17/23  Current diet:  ADULT DIET Dysphagia - Soft & Bite Sized  Passing flatus: YES  Tolerating current diet: YES Pain Management:   Patient states pain is manageable on current regimen: YES    Skin:  Ricardo Score: 18  Interventions: speciality bed refused to float heels    Patient Safety:  Fall Score:    Interventions: bed/chair alarm     @Rollbelt  @dexterity to release roll belt  Yes/No ( must document dexterity  here by stating Yes or No here, otherwise this is a restraint and must follow restraint documentation policy.)    DVT prophylaxis:  DVT prophylaxis Med- Yes  DVT prophylaxis SCD or TD- No     Wounds: (If Applicable)  Wounds- No  Location     Active Consults:  IP CONSULT TO NEUROLOGY  IP CONSULT TO NEUROLOGY    Length of Stay:  Expected LOS: 2d 21h  Actual LOS: 7  Discharge Plan: Yes Inpatient Cynthia Young RN

## 2023-03-20 LAB
GLUCOSE BLD STRIP.AUTO-MCNC: 103 MG/DL (ref 65–117)
GLUCOSE BLD STRIP.AUTO-MCNC: 107 MG/DL (ref 65–117)
GLUCOSE BLD STRIP.AUTO-MCNC: 113 MG/DL (ref 65–117)
GLUCOSE BLD STRIP.AUTO-MCNC: 120 MG/DL (ref 65–117)
SERVICE CMNT-IMP: ABNORMAL
SERVICE CMNT-IMP: NORMAL

## 2023-03-20 PROCEDURE — 97112 NEUROMUSCULAR REEDUCATION: CPT | Performed by: PHYSICAL THERAPIST

## 2023-03-20 PROCEDURE — 74011250637 HC RX REV CODE- 250/637: Performed by: NURSE PRACTITIONER

## 2023-03-20 PROCEDURE — 74011250637 HC RX REV CODE- 250/637: Performed by: INTERNAL MEDICINE

## 2023-03-20 PROCEDURE — 77010033678 HC OXYGEN DAILY

## 2023-03-20 PROCEDURE — 74011250636 HC RX REV CODE- 250/636: Performed by: NURSE PRACTITIONER

## 2023-03-20 PROCEDURE — 74011250637 HC RX REV CODE- 250/637: Performed by: STUDENT IN AN ORGANIZED HEALTH CARE EDUCATION/TRAINING PROGRAM

## 2023-03-20 PROCEDURE — 97112 NEUROMUSCULAR REEDUCATION: CPT | Performed by: OCCUPATIONAL THERAPIST

## 2023-03-20 PROCEDURE — 92507 TX SP LANG VOICE COMM INDIV: CPT

## 2023-03-20 PROCEDURE — 65270000046 HC RM TELEMETRY

## 2023-03-20 PROCEDURE — 82962 GLUCOSE BLOOD TEST: CPT

## 2023-03-20 PROCEDURE — 97530 THERAPEUTIC ACTIVITIES: CPT | Performed by: PHYSICAL THERAPIST

## 2023-03-20 PROCEDURE — 97530 THERAPEUTIC ACTIVITIES: CPT | Performed by: OCCUPATIONAL THERAPIST

## 2023-03-20 RX ADMIN — AMLODIPINE BESYLATE 10 MG: 5 TABLET ORAL at 09:36

## 2023-03-20 RX ADMIN — SACUBITRIL AND VALSARTAN 1 TABLET: 24; 26 TABLET, FILM COATED ORAL at 09:36

## 2023-03-20 RX ADMIN — CLONIDINE HYDROCHLORIDE 0.2 MG: 0.1 TABLET ORAL at 17:21

## 2023-03-20 RX ADMIN — ENOXAPARIN SODIUM 40 MG: 100 INJECTION SUBCUTANEOUS at 17:22

## 2023-03-20 RX ADMIN — CARVEDILOL 12.5 MG: 12.5 TABLET, FILM COATED ORAL at 09:36

## 2023-03-20 RX ADMIN — ATORVASTATIN CALCIUM 80 MG: 40 TABLET, FILM COATED ORAL at 21:29

## 2023-03-20 RX ADMIN — ASPIRIN 81 MG: 81 TABLET, CHEWABLE ORAL at 09:36

## 2023-03-20 RX ADMIN — ENOXAPARIN SODIUM 40 MG: 100 INJECTION SUBCUTANEOUS at 05:25

## 2023-03-20 RX ADMIN — CARVEDILOL 12.5 MG: 12.5 TABLET, FILM COATED ORAL at 17:22

## 2023-03-20 RX ADMIN — CLONIDINE HYDROCHLORIDE 0.2 MG: 0.1 TABLET ORAL at 09:36

## 2023-03-20 RX ADMIN — SACUBITRIL AND VALSARTAN 1 TABLET: 24; 26 TABLET, FILM COATED ORAL at 20:33

## 2023-03-20 RX ADMIN — CLOPIDOGREL BISULFATE 75 MG: 75 TABLET ORAL at 09:36

## 2023-03-20 RX ADMIN — Medication 1 AMPULE: at 09:41

## 2023-03-20 NOTE — PROGRESS NOTES
End of Shift Note    Bedside shift change report given to MARCI Wallis (oncoming nurse) by Dayron Lama RN (offgoing nurse). Report included the following information SBAR    Shift worked: 7884-1246   Shift summary and any significant changes:    Pt has went from a bariatric bed to a standard bed because he stated that the standard beds are more comfortable. However, pt was restless all night, insisting for staff to get him into the recliner. Educated pt on safety measures and that PT will have to evaluate him before that happens. Pt states that he does not want to attend inpatient rehab and that he would prefer for rehab to be done in home. Pt states that the Lovenox shots hinder his breathing. Will inform day shift to speak with provider about next steps.        Concerns for physician to address:  none   Zone phone for oncoming shift:   none     Patient Information  Shad Brandt  51 y.o.  3/12/2023  8:04 PM by Alex Reyes Janelle was admitted from Home    Problem List  Patient Active Problem List    Diagnosis Date Noted    Right hemiplegia ([address] 03/13/2023    Ischemic stroke ([facility] [address] 03/13/2023    Dilated cardiomyopathy ([facility] [address] 08/17/2020    Hypertension 08/14/2020    Class 3 severe obesity in adult [facility]) 08/12/2020    Diverticulitis of colon with perforation 01/23/2018    Diverticulitis 01/23/2018     Past Medical History:   Diagnosis Date    Bell's palsy 01/25/2019    Diverticulitis of colon with perforation 01/2018    sigmoid    Diverticulitis of colon with perforation 08/09/2020    Hypertension     Kidney infection     [place] Hunt syndrome (geniculate herpes zoster)        Core Measures:  CVA: Yes Yes  CHF:No No  PNA:No No    Activity:  Activity Level: Bed Rest  Number times ambulated in hallways past shift: 0  Number of times OOB to chair past shift: 0    Cardiac:   Cardiac Monitoring: No          Access:   Current line(s): PIV       Genitourinary:   Urinary status: voiding    Respiratory:   O2 Device: None (Room air)  Chronic home O2 use?: NO  Incentive spirometer at bedside: YES       GI:  Last Bowel Movement Date: 03/18/23  Current diet:  ADULT DIET Dysphagia - Soft & Bite Sized  DIET ONE TIME MESSAGE  Passing flatus: YES  Tolerating current diet: YES       Pain Management:   Patient states pain is manageable on current regimen: YES    Skin:  Ricardo Score: 17  Interventions: speciality bed refused to float heels    Patient Safety:  Fall Score:    Interventions: bed/chair alarm     @Rollbelt  @dexterity to release roll belt  Yes/No ( must document dexterity  here by stating Yes or No here, otherwise this is a restraint and must follow restraint documentation policy.)    DVT prophylaxis:  DVT prophylaxis Med- Yes  DVT prophylaxis SCD or TD- No     Wounds: (If Applicable)  Wounds- No  Location     Active Consults:  IP CONSULT TO NEUROLOGY  IP CONSULT TO NEUROLOGY    Length of Stay:  Expected LOS: 2d 21h  Actual LOS: 7  Discharge Plan: Yes Inpatient Rehabb      Jan Weems RN

## 2023-03-20 NOTE — PROGRESS NOTES
Transition of Care Plan:     RUR:13%    LOS: 7  GLOS: Blank. WILLARD: STABLE 3/22? Disposition: [facility] at Acute Rehab.     4:29 PM   Pt transferred to Neuro on 3/18/23. CM completed chart review. Pt needs [facility] at Acute Rehab. Referrals are going to be sent to [facility] and [facility] to see if they can accept Pt. CM talked to Pt Sister, Bobby Carrillo: [phone] and she indicated that she talked to [address] [place] [address] today and that they are working on [employer]. Noted indicated that Medicaid Application was SENT TO [address] #[id number]    Sister is willing to complete FA if needed once someone can provide one to her. If SNF or IPR: Date FOC offered:3/20  Date [facility] received:N/A  Date authorization started with reference number:N/A  Date authorization received and expires:N/A  Accepting facility:N/A     Follow up appointments: To be done prior to discharge     DME needed:None     Transportation at Discharge [address] or means to access home: Patient has keys         IM Medicare Letter:N/A     Is patient a [place] and connected with the [address]? No             If yes, was [employer] transfer form completed and VA notified? N/A     Caregiver Contact: Mother        Discharge Caregiver contacted prior to discharge? yes     Care Conference needed?: No    CM will continue to monitor discharge plan.      Cathryn Cordova, [address]  Ext 9388

## 2023-03-20 NOTE — PROGRESS NOTES
End of Shift Note     Bedside shift change report given to Esthela Rodriguez RN (oncoming nurse) by Gaby Foley RN (offgoing nurse). Report included the following information SBAR     Shift worked: 7am to General [employer] summary and any significant changes:     PT worked with patient. Patient able to move from chair to bed with [facility]. No significant changes.       Concerns for physician to address:  None   Zone phone for oncoming shift:   [phone]      Patient Information  Bradd Cast  51 y.o.  3/12/2023  8:04 PM by Vik Chan End was admitted from Home     Problem List       Patient Active Problem List     Diagnosis Date Noted    Right hemiplegia ([facility] [address] 03/13/2023    Ischemic stroke ([facility] [address] 03/13/2023    Dilated cardiomyopathy ([facility] [address] 08/17/2020    Hypertension 08/14/2020    Class 3 severe obesity in adult [facility]) 08/12/2020    Diverticulitis of colon with perforation 01/23/2018    Diverticulitis 01/23/2018           Past Medical History:   Diagnosis Date    Bell's palsy 01/25/2019    Diverticulitis of colon with perforation 01/2018     sigmoid    Diverticulitis of colon with perforation 08/09/2020    Hypertension      Kidney infection      [place] Hunt syndrome (geniculate herpes zoster)           Core Measures:  CVA: Yes Yes  CHF:No No  PNA:No No     Activity:  Activity Level: Bed Rest  Number times ambulated in hallways past shift: 0  Number of times OOB to chair past shift: 0     Cardiac:   Cardiac Monitoring: No            Access:   Current line(s): PIV         Genitourinary:   Urinary status: voiding     Respiratory:   O2 Device: None (Room air)  Chronic home O2 use?: NO  Incentive spirometer at bedside: YES     GI:  Last Bowel Movement Date: 03/18/23  Current diet:  ADULT DIET Dysphagia - Soft & Bite Sized  DIET ONE TIME MESSAGE  Passing flatus: YES  Tolerating current diet: YES     Pain Management:   Patient states pain is manageable on current regimen: YES     Skin:  Ricardo Score: 17  Interventions: speciality bed refused to float heels    Patient Safety:  Fall Score:  medium  Interventions: bed/chair alarm  @Rollbelt  @dexterity to release roll belt  Yes/No ( must document dexterity  here by stating Yes or No here, otherwise this is a restraint and must follow restraint documentation policy.)     DVT prophylaxis:  DVT prophylaxis Med- Yes  DVT prophylaxis SCD or TD- No      Wounds: (If Applicable)  Wounds- No  Location      Active Consults:  IP CONSULT TO NEUROLOGY  IP CONSULT TO NEUROLOGY     Length of Stay:  Expected LOS: 2d 21h  Actual LOS: 8  Discharge Plan: Yes Inpatient Rehab, waiting on placement.         Adriana Arango RN

## 2023-03-20 NOTE — PROGRESS NOTES
Problem: Self Care Deficits Care Plan (Adult)  Goal: *Acute Goals and Plan of Care (Insert Text)  Description: FUNCTIONAL STATUS PRIOR TO ADMISSION: Patient was independent and active without use of DME. Works at a for . Recently bought his own home and was managing a large garden. HOME SUPPORT PRIOR TO ADMISSION: The patient lived alone with sister and mother to provide assistance. Occupational Therapy Goals:  Initiated 3/15/2023  1. Patient will perform grooming with supervision/set-up within 7 days. 2. Patient will perform upper body dressing with minimal assistance within 7 days. 3. Patient will perform upper body bathing with minimal assistance within 7 days. 4. Patient will improve dynamic sitting balance to CGA for improved independence with ADLS within 7 days. 5. Patient will improve fugl haynes score by 5 points within 7 days. 6. Patient will transfer from drop arm recliner or drop arm bedside commode with best technique and max assist within 7 days. Outcome: Progressing Towards Goal   OCCUPATIONAL THERAPY TREATMENT  Patient: Kota Pena (57 y.o. male)  Date: 3/20/2023  Diagnosis: Right hemiplegia ([facility] [G81.91]  Ischemic stroke ([facility] [I63.9] <principal problem not specified>      Precautions: Fall (dense right norm)  Chart, occupational therapy assessment, plan of care, and goals were reviewed. ASSESSMENT  Patient continues with skilled OT services and is progressing towards goals. Pt was supine at bed level upon arrival and required assistance (but less than previous sessions) to perform supine to sit EOB. He demonstrated good static sitting balance unsupported at EOB, and was supervised closely during dynamic attempts. Educated on RUE care to facilitate neuromotor recovery and importance of protecting RUE during mobility. Pt verbalized understanding. Worked on AROM, AAROM, and PROM RUE to facilitate active movement.   Noted fleeting episodes of R shoulder elevation (I/5), gross flex hand (tonal influences) 7+/5/9, and able to elicit contraction elbow flexion, but unable to sustain. Pt tries very hard and compensatory patterns are noted. Pt educated on gentle self ROM to protect fingers and wrist;  RUE is nearly flaccid and is nonfunctional;  tonal influences are present. Used the iZettle device for safety to transfer from bed to chair this date---encouraged pt's  placement of, and safety of RUE during transfer. Current Level of Function Impacting Discharge (ADLs): up to max A for adls. Dependent on Best  device for transfer. Other factors to consider for discharge: use of Best , dense R hemiplegia         PLAN :  Patient continues to benefit from skilled intervention to address the above impairments. Continue treatment per established plan of care to address goals. Recommend with staff: encourage frequent position changes    Recommend next OT session: POC    Recommendation for discharge: (in order for the patient to meet his/her long term goals)  Therapy 3 hours per day 5-7 days per week    This discharge recommendation:  Has not yet been discussed the attending provider and/or case management    IF patient discharges home will need the following DME: bedside commode, gait belt, hospital bed, mechanical lift, transfer bench, wheelchair,        SUBJECTIVE:   Patient stated it feels better now.    (re: shoulder once seated in chair)    OBJECTIVE DATA SUMMARY:   Cognitive/Behavioral Status:  Neurologic State: Alert  Orientation Level: Oriented to person;Oriented to place;Oriented to situation  Cognition: Follows commands  Perception:  (cues to keep RUE positioned. good static trunk control seated EOB)  Perseveration: No perseveration noted  Safety/Judgement: Fall prevention;Decreased awareness of need for safety; Awareness of environment    Functional Mobility and Transfers for ADLs:  Bed Mobility:  Supine to Sit: Minimum assistance; Moderate assistance  Scooting: Total assistance;Assist x2 (up to total assist)    Transfers:  Sit to Stand: Assist x2     Bed to Chair: Maximum assistance (using BEST --nurse is knowledgeable in using device)    Balance:  Sitting - Static: Good (unsupported)  Sitting - Dynamic: Fair (occasional) (needs close S for safety)  Standing: Impaired  Standing - Static: Constant support;Poor; Fair (stood with assist X2 and holding bar on chair. stood on [employer] in prep for transfer bed to chair. support X2.  cues for managing and protecting RUE)  Standing - Dynamic :  (pt states that he is familiar with best )    ADL Intervention:  Feeding  Feeding Assistance:  (declines most of lunch at this time.)    Grooming  Washing Face: Set-up         Type of Bath: Chlorhexidine (CHG); Bath Pack                   Toileting  Toileting Assistance:  (has been using urinal)    Cognitive Retraining  Safety/Judgement: Fall prevention;Decreased awareness of need for safety; Awareness of environment    Therapeutic Exercises:   AROM, AAROM, PROM, self ROM - positioning education--R shoulder abduction, elevation of RUE  Encouraged pt to gently stimulate RUE with a variety of textures, wash cloth, tapping, gentle stroking    Pain:  Pt reported shoulder pain at bed level, however, once seated in chair reported that the pain had disipated. Activity Tolerance:   No c/o dizziness or lightheaded    After treatment patient left in no apparent distress:   Sitting in chair, Call bell within reach, and RUE positioning and nurse informed    COMMUNICATION/COLLABORATION:   The patients plan of care was discussed with: Physical therapist, Registered nurse, and Case management.      ANNE Talamantes/L  Time Calculation: 31 mins

## 2023-03-20 NOTE — PROGRESS NOTES
Problem: Aspiration - Risk of  Goal: *Absence of aspiration  Outcome: Progressing Towards Goal     Problem: Patient Education: Go to Patient Education Activity  Goal: Patient/Family Education  Outcome: Progressing Towards Goal     Problem: Pressure Injury - Risk of  Goal: *Prevention of pressure injury  Description: Document Ricardo Scale and appropriate interventions in the flowsheet.   Outcome: Progressing Towards Goal  Note: Pressure Injury Interventions:  Sensory Interventions: Assess changes in LOC    Moisture Interventions: Absorbent underpads    Activity Interventions: Pressure redistribution bed/mattress(bed type)    Mobility Interventions: HOB 30 degrees or less    Nutrition Interventions: Document food/fluid/supplement intake    Friction and Shear Interventions: Apply protective barrier, creams and emollients                Problem: Patient Education: Go to Patient Education Activity  Goal: Patient/Family Education  Outcome: Progressing Towards Goal     Problem: Patient Education: Go to Patient Education Activity  Goal: Patient/Family Education  Outcome: Progressing Towards Goal     Problem: TIA/CVA Stroke: 0-24 hours  Goal: Off Pathway (Use only if patient is Off Pathway)  Outcome: Progressing Towards Goal  Goal: Activity/Safety  Outcome: Progressing Towards Goal  Goal: Consults, if ordered  Outcome: Progressing Towards Goal  Goal: Diagnostic Test/Procedures  Outcome: Progressing Towards Goal  Goal: Nutrition/Diet  Outcome: Progressing Towards Goal  Goal: Discharge Planning  Outcome: Progressing Towards Goal  Goal: Medications  Outcome: Progressing Towards Goal  Goal: Respiratory  Outcome: Progressing Towards Goal  Goal: Treatments/Interventions/Procedures  Outcome: Progressing Towards Goal  Goal: Minimize risk of bleeding post-thrombolytic infusion  Outcome: Progressing Towards Goal  Goal: Monitor for complications post-thrombolytic infusion  Outcome: Progressing Towards Goal  Goal: Psychosocial  Outcome: Progressing Towards Goal  Goal: *Hemodynamically stable  Outcome: Progressing Towards Goal  Goal: *Neurologically stable  Description: Absence of additional neurological deficits    Outcome: Progressing Towards Goal  Goal: *Verbalizes anxiety and depression are reduced or absent  Outcome: Progressing Towards Goal  Goal: *Absence of Signs of Aspiration on Current Diet  Outcome: Progressing Towards Goal  Goal: *Absence of deep venous thrombosis signs and symptoms(Stroke Metric)  Outcome: Progressing Towards Goal  Goal: *Ability to perform ADLs and demonstrates progressive mobility and function  Outcome: Progressing Towards Goal  Goal: *Stroke education started(Stroke Metric)  Outcome: Progressing Towards Goal  Goal: *Dysphagia screen performed(Stroke Metric)  Outcome: Progressing Towards Goal  Goal: *Rehab consulted(Stroke Metric)  Outcome: Progressing Towards Goal     Problem: TIA/CVA Stroke: Day 2 Until Discharge  Goal: Off Pathway (Use only if patient is Off Pathway)  Outcome: Progressing Towards Goal  Goal: Activity/Safety  Outcome: Progressing Towards Goal  Goal: Diagnostic Test/Procedures  Outcome: Progressing Towards Goal  Goal: Nutrition/Diet  Outcome: Progressing Towards Goal  Goal: Discharge Planning  Outcome: Progressing Towards Goal  Goal: Medications  Outcome: Progressing Towards Goal  Goal: Respiratory  Outcome: Progressing Towards Goal  Goal: Treatments/Interventions/Procedures  Outcome: Progressing Towards Goal  Goal: Psychosocial  Outcome: Progressing Towards Goal  Goal: *Verbalizes anxiety and depression are reduced or absent  Outcome: Progressing Towards Goal  Goal: *Absence of aspiration  Outcome: Progressing Towards Goal  Goal: *Absence of deep venous thrombosis signs and symptoms(Stroke Metric)  Outcome: Progressing Towards Goal  Goal: *Optimal pain control at patient's stated goal  Outcome: Progressing Towards Goal  Goal: *Tolerating diet  Outcome: Progressing Towards Goal  Goal: *Ability to perform ADLs and demonstrates progressive mobility and function  Outcome: Progressing Towards Goal  Goal: *Stroke education continued(Stroke Metric)  Outcome: Progressing Towards Goal     Problem: Ischemic Stroke: Discharge Outcomes  Goal: *Verbalizes anxiety and depression are reduced or absent  Outcome: Progressing Towards Goal  Goal: *Verbalize understanding of risk factor modification(Stroke Metric)  Outcome: Progressing Towards Goal  Goal: *Hemodynamically stable  Outcome: Progressing Towards Goal  Goal: *Absence of aspiration pneumonia  Outcome: Progressing Towards Goal  Goal: *Aware of needed dietary changes  Outcome: Progressing Towards Goal  Goal: *Verbalize understanding of prescribed medications including anti-coagulants, anti-lipid, and/or anti-platelets(Stroke Metric)  Outcome: Progressing Towards Goal  Goal: *Tolerating diet  Outcome: Progressing Towards Goal  Goal: *Aware of follow-up diagnostics related to anticoagulants  Outcome: Progressing Towards Goal  Goal: *Ability to perform ADLs and demonstrates progressive mobility and function  Outcome: Progressing Towards Goal  Goal: *Absence of DVT(Stroke Metric)  Outcome: Progressing Towards Goal  Goal: *Absence of aspiration  Outcome: Progressing Towards Goal  Goal: *Optimal pain control at patient's stated goal  Outcome: Progressing Towards Goal  Goal: *Home safety concerns addressed  Outcome: Progressing Towards Goal  Goal: *Describes available resources and support systems  Outcome: Progressing Towards Goal  Goal: *Verbalizes understanding of activation of EMS(911) for stroke symptoms(Stroke Metric)  Outcome: Progressing Towards Goal  Goal: *Understands and describes signs and symptoms to report to providers(Stroke Metric)  Outcome: Progressing Towards Goal  Goal: *Neurolgocially stable (absence of additional neurological deficits)  Outcome: Progressing Towards Goal  Goal: *Verbalizes importance of follow-up with primary care physician(Stroke Metric)  Outcome: Progressing Towards Goal  Goal: *Smoking cessation discussed,if applicable(Stroke Metric)  Outcome: Progressing Towards Goal  Goal: *Depression screening completed(Stroke Metric)  Outcome: Progressing Towards Goal     Problem: Falls - Risk of  Goal: *Absence of Falls  Description: Document Dilcia Fall Risk and appropriate interventions in the flowsheet. Outcome: Progressing Towards Goal     Problem: Patient Education: Go to Patient Education Activity  Goal: Patient/Family Education  Outcome: Progressing Towards Goal     Problem: Falls - Risk of  Goal: *Absence of Falls  Description: Document [place] Fall Risk and appropriate interventions in the flowsheet.   Outcome: Progressing Towards Goal     Problem: Delirium Treatment  Goal: *Level of consciousness restored to baseline  Outcome: Progressing Towards Goal  Goal: *Level of environmental perceptions restored to baseline  Outcome: Progressing Towards Goal  Goal: *Sensory perception restored to baseline  Outcome: Progressing Towards Goal  Goal: *Emotional stability restored to baseline  Outcome: Progressing Towards Goal  Goal: *Functional assessment restored to baseline  Outcome: Progressing Towards Goal  Goal: *Absence of falls  Outcome: Progressing Towards Goal  Goal: *Will remain free of delirium, CAM Score negative  Outcome: Progressing Towards Goal  Goal: *Cognitive status will be restored to baseline  Outcome: Progressing Towards Goal  Goal: Interventions  Outcome: Progressing Towards Goal     Problem: Patient Education: Go to Patient Education Activity  Goal: Patient/Family Education  Outcome: Progressing Towards Goal     Problem: Patient Education: Go to Patient Education Activity  Goal: Patient/Family Education  Outcome: Progressing Towards Goal     Problem: Patient Education: Go to Patient Education Activity  Goal: Patient/Family Education  Outcome: Progressing Towards Goal     Problem: Patient Education: Go to Patient Education Activity  Goal: Patient/Family Education  Outcome: Progressing Towards Goal

## 2023-03-20 NOTE — PROGRESS NOTES
Problem: Mobility Impaired (Adult and Pediatric)  Goal: *Acute Goals and Plan of Care (Insert Text)  Description:   FUNCTIONAL STATUS PRIOR TO ADMISSION: Patient was independent and active without use of DME. Works full-time as a . Enjoys gardening and working on home projects. HOME SUPPORT PRIOR TO ADMISSION: The patient lived alone, sister and mother both live nearby and are available to assist.    Physical Therapy Goals  Initiated 3/15/2023  1. Patient will move from supine to sit and sit to supine , scoot up and down, and roll side to side in bed with minimal assistance/contact guard assist within 7 day(s). 2.  Patient will transfer from bed to chair and chair to bed with moderate assistance  using the least restrictive device within 7 day(s). 3.  Patient will perform sit to stand with moderate assistance  within 7 day(s). 4.  Patient will ambulate with moderate assistance  for 10 feet with the least restrictive device within 7 day(s). 5.  Patient will improve Owens Balance score by 7 points within 7 days. Outcome: Progressing Towards Goal   PHYSICAL THERAPY EVALUATION  Patient: Evaline Severe (9 y.o. male)  Date: 3/20/2023  Primary Diagnosis: Right hemiplegia ([facility]) [G81.91]  [facility] [I63.9]       Precautions:   Fall (dense right norm)    ASSESSMENT  Based on the objective data described below, the patient presents with decreased functional mobility from baseline level of function. Patient continues with R sided hemiplegia, impaired balance, inability to ambulate, R UE pain, some R inattention and decreased activity tolerance. Today continues with dense R hemiplegia with very slight return in R UE and able to do partial LAQ on the R with manual facilitation at the quad. Overall needing maxA to come to EOB with good sitting balance once seated. Tolerates perturbations without LOB. Worked on sit to stand with pulling up on recliner and blocking at R knee.   Sit to stand with best  and transferred to the chair with best . Patient continues to be well below his functional baseline and will benefit from IPR setting to progress him to more independent level of function. He is highly motivated and has supportive family. Other factors to consider for discharge: at risk for falls, below baseline, requires physical assistance for mobility     Patient will benefit from skilled therapy intervention to address the above noted impairments. PLAN :  Recommendations and Planned Interventions: bed mobility training, transfer training, gait training, therapeutic exercises, neuromuscular re-education, patient and family training/education, and therapeutic activities      Frequency/Duration: Patient will be followed by physical therapy:  5 times a week to address goals. Recommendation for discharge: (in order for the patient to meet his/her long term goals)  Therapy 3 hours per day 5-7 days per week      IF patient discharges home will need the following DME: to be determined (TBD)         SUBJECTIVE:   Patient stated I really want to get over to that chair.     OBJECTIVE DATA SUMMARY:   HISTORY:    Past Medical History:   Diagnosis Date    Bell's palsy 01/25/2019    Diverticulitis of colon with perforation 01/2018    sigmoid    Diverticulitis of colon with perforation 08/09/2020    Hypertension     Kidney infection     Haresh Hunt syndrome (geniculate herpes zoster)    No past surgical history on file.     Personal factors and/or comorbidities impacting plan of care:     Home Situation  Home Environment: Private residence  # Steps to Enter: 3  Rails to Enter: Yes  One/Two Story Residence: Two story, live on 1st floor ([facility] house)  Living Alone: Yes  Support Systems: Other Family Member(s) (Sister and Mother live nearby)  Patient Expects to be Discharged to[de-identified] Rehab hospital/unit acute  Current DME Used/Available at Home: None  Tub or Shower Type: Tub/Shower combination    EXAMINATION/PRESENTATION/DECISION MAKING:   Critical Behavior:  Neurologic State: Alert  Orientation Level: Oriented to person, Oriented to place, Oriented to situation  Cognition: Follows commands  Safety/Judgement: Fall prevention, Decreased awareness of need for safety, Awareness of environment  Hearing: Auditory  Auditory Impairment: None             Functional Mobility:  Bed Mobility:  Rolling: Moderate assistance  Supine to Sit: Minimum assistance; Moderate assistance     Scooting: Total assistance;Assist x2 (up to total assist)  Transfers:  Sit to Stand: Maximum assistance;Assist x2 (pull to stand at recliner, pull to stand [employer])  Stand to Sit: Assist x2        Bed to Chair: Maximum assistance (using BEST --nurse is knowledgeable in using device)              Balance:   Sitting - Static: Good (unsupported)  Sitting - Dynamic: Fair (occasional) (needs close S for safety)  Standing: Impaired  Standing - Static: Constant support;Poor; Fair (stood with assist X2 and holding bar on chair. stood on [employer] in prep for transfer bed to chair. support X2.  cues for managing and protecting RUE)  Standing - Dynamic :  (pt states that he is familiar with best )  Ambulation/Gait Training:          Pain Rating:  No c/o pain    Activity Tolerance:   Fair and requires rest breaks    After treatment patient left in no apparent distress:   Sitting in chair, Call bell within reach, and Caregiver / family present    COMMUNICATION/EDUCATION:   The patients plan of care was discussed with: Physical therapist, Occupational therapist, and Registered nurse. Fall prevention education was provided and the patient/caregiver indicated understanding., Patient/family have participated as able in goal setting and plan of care. , and Patient/family agree to work toward stated goals and plan of care.     Thank you for this referral.  Jimbo Lugo, PT, DPT   Time Calculation: 38 mins

## 2023-03-20 NOTE — PROGRESS NOTES
Hospitalist Progress Note    Subjective:   Daily Progress Note: 3/20/2023 10:46 AM    Hospital Course:  Pt admitted for progressive right facial droop, right side numbness and weakness. MRI was done , was a limited study showing an acute stroke in the left posterior corona radiata/basal ganglia on 3/14/23. He was on the medical floor but became increasingly agitated and was transferred to the ICU for Precedex drip and was weaned off ,transferred back to the medical floor. CTA of the head and neck does not reveal any large vessel stenosis. Aggressive control of lifestyle modifications, BP and weight loss are recommended. Subjective:   Overnight patient needed prns for anxiety. Would avoid geodone in the future. Reviewed anxiety exercises. Denies CP and SOB.      Current Facility-Administered Medications   Medication Dose Route Frequency    loratadine (CLARITIN) tablet 10 mg  10 mg Oral QHS    hydrOXYzine HCL (ATARAX) tablet 10 mg  10 mg Oral TID PRN    amLODIPine (NORVASC) tablet 10 mg  10 mg Oral DAILY    hydrALAZINE (APRESOLINE) 20 mg/mL injection 20 mg  20 mg IntraVENous Q6H PRN    labetaloL (NORMODYNE;TRANDATE) injection 20 mg  20 mg IntraVENous Q6H PRN    carvediloL (COREG) tablet 12.5 mg  12.5 mg Oral BID WITH MEALS    alum-mag hydroxide-simeth (MYLANTA) oral suspension 30 mL  30 mL Oral Q4H PRN    traZODone (DESYREL) tablet 50 mg  50 mg Oral QHS PRN    melatonin tablet 4.5 mg  4.5 mg Oral QHS PRN    atorvastatin (LIPITOR) tablet 80 mg  80 mg Oral QHS    aspirin chewable tablet 81 mg  81 mg Oral DAILY    cloNIDine HCL (CATAPRES) tablet 0.2 mg  0.2 mg Oral BID    clopidogreL (PLAVIX) tablet 75 mg  75 mg Oral DAILY    sacubitriL-valsartan (ENTRESTO) 24-26 mg tablet 1 Tablet  1 Tablet Oral Q12H    ziprasidone (GEODON) 10 mg in sterile water (preservative free) 0.5 mL injection  10 mg IntraMUSCular Q12H PRN    insulin lispro (HUMALOG) injection   SubCUTAneous AC&HS    glucose chewable tablet 16 g  4 Tablet Oral PRN    glucagon (GLUCAGEN) injection 1 mg  1 mg IntraMUSCular PRN    alcohol 62% (NOZIN) nasal  1 Ampule  1 Ampule Topical Q12H    albuterol-ipratropium (DUO-NEB) 2.5 MG-0.5 MG/3 ML  3 mL Nebulization Q4H PRN    ondansetron (ZOFRAN) injection 4 mg  4 mg IntraVENous Q4H PRN    acetaminophen (TYLENOL) tablet 650 mg  650 mg Oral Q4H PRN    Or    acetaminophen (TYLENOL) solution 650 mg  650 mg Per NG tube Q4H PRN    Or    acetaminophen (TYLENOL) suppository 650 mg  650 mg Rectal Q4H PRN    bisacodyL (DULCOLAX) suppository 10 mg  10 mg Rectal DAILY PRN    enoxaparin (LOVENOX) injection 40 mg  40 mg SubCUTAneous Q12H            Objective:     Visit Vitals  /78   Pulse 68   Temp 97.7 °F (36.5 °C)   Resp 18   Ht 5' 7\" (1.702 m)   Wt 147 kg (324 lb 1.2 oz)   SpO2 100%   BMI 50.76 kg/m²    O2 Flow Rate (L/min): 4 l/min O2 Device: None (Room air)    Temp (24hrs), Av.7 °F (36.5 °C), Min:97.3 °F (36.3 °C), Max:97.9 °F (36.6 °C)      No intake/output data recorded.    1901 -  0700  In: 840 [P.O.:840]  Out: 750 [Urine:750]    PHYSICAL EXAM:    General appearance: Comfortable not in distress  Heart: Regular rate and rhythm, no murmurs  HEENT: No thyroid enlargement  Lungs: Bilateral air entry present, no crackles or wheezing  Abdomen: Soft, nontender, no guarding  Extremities no edema  Neurological exam alert awake oriented x3, right-sided hemiplegia      Data Review    Recent Results (from the past 24 hour(s))   GLUCOSE, POC    Collection Time: 23  9:33 PM   Result Value Ref Range    Glucose (POC) 99 65 - 117 mg/dL    Performed by Martha Curry, POC    Collection Time: 23  6:53 AM   Result Value Ref Range    Glucose (POC) 120 (H) 65 - 117 mg/dL    Performed by Luis Yousif    GLUCOSE, POC    Collection Time: 23 11:06 AM   Result Value Ref Range    Glucose (POC) 113 65 - 117 mg/dL    Performed by Yogesh RODRIGUEZ    GLUCOSE, POC    Collection Time: 23  3:52 PM   Result Value Ref Range    Glucose (POC) 107 65 - 117 mg/dL    Performed by Nadiya Thompson PCT        XR ABD (KUB)   Final Result   Addendum (preliminary) 1 of 1   Addendum: Correction: Enteric tube traverses expected course to below the   diaphragm into the left upper quadrant. Final   Enteric tube tip lies just above the gastroesophageal junction. XR CHEST PORT   Final Result   Stable interstitial edema pattern with cardiomegaly. Gastric tube visualized but   tip not included. XR ABD PORT  1 V   Final Result   NG tube in place. MRI BRAIN WO CONT   Final Result      1. Acute infarct in the left posterior corona radiata/basal ganglia. XR CHEST PORT   Final Result      New mild interstitial edema         XR CHEST PORT   Final Result   New, moderate cardiomegaly. Grossly clear lungs. CTA CODE NEURO HEAD AND NECK W CONT   Final Result   1. No large vessel occlusion or hemodynamically significant carotid stenosis. 2.  Chronic left centrum semiovale hypodensity likely sequela of chronic   microvascular angiopathy. 3.  Decrease cerebral perfusion in the bilateral periventricular white matter   with 81 cc area of ischemic tissue in the bilateral frontal lobes right greater   than left without underlying vascular lesion to explain this perfusion   abnormality. CT CODE NEURO PERF W CBF   Final Result   1. No large vessel occlusion or hemodynamically significant carotid stenosis. 2.  Chronic left centrum semiovale hypodensity likely sequela of chronic   microvascular angiopathy. 3.  Decrease cerebral perfusion in the bilateral periventricular white matter   with 81 cc area of ischemic tissue in the bilateral frontal lobes right greater   than left without underlying vascular lesion to explain this perfusion   abnormality.       CT CODE NEURO HEAD WO CONTRAST   Final Result   No acute intracranial process seen          Active Problems:    Right hemiplegia ([address] (3/13/2023) Ischemic stroke ([facility] [address] (3/13/2023)      Assessment/Plan:   Acute ischemic stroke- in the  left basal ganglia, with right side hemiparesis: Continue aspirin, Plavix, statin. Hemoglobin A1c is 5.4 and LDL is 70 point two 2D echocardiogram showed ejection fraction of around 35 to 40% bubble study could not be performed due to patient's noncompliance. PT OT recommended inpatient rehab. Case management consulted-no payor source, working on [employer] bed placement    2. Hypertension urgency: Overnight patient noted hypertensive urgency and had to be started on Cardene drip. Now weaned from [address]. Coreg to 12.5 twice daily. Continue clonidine, Entresto and also added Norvasc. On as needed hydralazine and labetalol. DC tele monitoring    3. Systolic heart failure- EF 35-40%, on Entresto, Coreg. Stable    4. CAD-continue aspirin, Plavix and statin    WILLARD: 3/21?   Barriers: [facility] bed placement in inpatient rehab placement        DVT Prophylaxis: lovenox  Code Status:  Full Code  POA: [address] [facility]      [facility] discussed with:   _____patient, staff nurse, __________________________________________________________    Total time (including chart review and note writing): 37 mins  Neva Rangel MD

## 2023-03-20 NOTE — PROGRESS NOTES
Problem: Communication Impaired (Adult)  Goal: *Acute Goals and Plan of Care (Insert Text)  Description: 3/20/2023  Speech path goals  1. Patient will produce sentence level utterances with 90% intelligibility   2. Patient will produce conversational speech with 90% intelligibility. Outcome: Not Met   SPEECH LANGUAGE PATHOLOGY TREATMENT  Patient: David Newell (20 y.o. male)  Date: 3/20/2023  Diagnosis: Right hemiplegia ([facility] [address] [G81.91]  Ischemic stroke ([facility] [address] [I63.9] <principal problem not specified>        ASSESSMENT:  The patient is s/p L CVA with good basic language and mild dysarthria. He has imprecise artic and blended word boundaries. He was able to use strategies to improve overall intelligibility. At sentence level and in short conversation, he was at least 80% intelligible using slow, clear speech. Careful listening is required. He expressed his R face is tingling right now which is new. Has mod R facial weakness which is not worsened. This was shared with nsg. He is eager to go home and thinks he will be able to manage with family assisting. Roslyn Urmila to get in the chair and to have physical therapy. PT /OT are aware. PLAN:  Patient continues to benefit from skilled intervention to address the above impairments. Continue treatment per established plan of care. Discharge Recommendations: To Be Determined     SUBJECTIVE:   Patient stated he would like to talk to his . OBJECTIVE:   Mental Status:  Neurologic State: Alert  Orientation Level: Oriented X4  Cognition: Follows commands  Perception: Cues to maintain midline in sitting, Cues to attend to right side of body  Perseveration: No perseveration noted  Safety/Judgement: Fall prevention  Treatment & Interventions: Motor Speech:      Patient is producing sentence length utterances with slow speech and clear speech with 80% acc. In conversation he is about 80% intelligible with careful listening.    Slow speech is being modeled for him. Educated him that his goal is to have others understand him with no repetition. Language Comprehension and Expression:     Verbal Expression  Verbal Expression  Conversation: Dysarthric;Fluent  Overall Impairment: Mild  Cueing amount: Min-mod        Voice:   wnl     Response & Tolerance to Activities:     good  Pain:  Pain Scale 1: Numeric (0 - 10)  Pain Intensity 1: 0       After treatment:   Patient left in no apparent distress in bed    COMMUNICATION/EDUCATION:   Patient was educated regarding his deficit(s) of motor speech. Cued to talk slower and to be as clear as possible. He can demonstrate this so comprehension is good. The patient's plan of care including recommendations, planned interventions, and recommended diet changes were discussed with: Registered nurse.      RHONDA Luz  Time Calculation: 15 mins

## 2023-03-21 LAB
GLUCOSE BLD STRIP.AUTO-MCNC: 119 MG/DL (ref 65–117)
GLUCOSE BLD STRIP.AUTO-MCNC: 123 MG/DL (ref 65–117)
GLUCOSE BLD STRIP.AUTO-MCNC: 124 MG/DL (ref 65–117)
GLUCOSE BLD STRIP.AUTO-MCNC: 128 MG/DL (ref 65–117)
SERVICE CMNT-IMP: ABNORMAL

## 2023-03-21 PROCEDURE — 97110 THERAPEUTIC EXERCISES: CPT

## 2023-03-21 PROCEDURE — 82962 GLUCOSE BLOOD TEST: CPT

## 2023-03-21 PROCEDURE — 97530 THERAPEUTIC ACTIVITIES: CPT

## 2023-03-21 PROCEDURE — 74011250637 HC RX REV CODE- 250/637: Performed by: INTERNAL MEDICINE

## 2023-03-21 PROCEDURE — 74011250637 HC RX REV CODE- 250/637: Performed by: NURSE PRACTITIONER

## 2023-03-21 PROCEDURE — 97112 NEUROMUSCULAR REEDUCATION: CPT

## 2023-03-21 PROCEDURE — 92526 ORAL FUNCTION THERAPY: CPT

## 2023-03-21 PROCEDURE — 92507 TX SP LANG VOICE COMM INDIV: CPT

## 2023-03-21 PROCEDURE — 74011250637 HC RX REV CODE- 250/637: Performed by: STUDENT IN AN ORGANIZED HEALTH CARE EDUCATION/TRAINING PROGRAM

## 2023-03-21 PROCEDURE — 65270000046 HC RM TELEMETRY

## 2023-03-21 PROCEDURE — 74011250636 HC RX REV CODE- 250/636: Performed by: NURSE PRACTITIONER

## 2023-03-21 RX ORDER — TRAMADOL HYDROCHLORIDE 50 MG/1
50 TABLET ORAL
Status: DISCONTINUED | OUTPATIENT
Start: 2023-03-21 | End: 2023-03-27 | Stop reason: HOSPADM

## 2023-03-21 RX ADMIN — ENOXAPARIN SODIUM 40 MG: 100 INJECTION SUBCUTANEOUS at 17:20

## 2023-03-21 RX ADMIN — ATORVASTATIN CALCIUM 80 MG: 40 TABLET, FILM COATED ORAL at 22:20

## 2023-03-21 RX ADMIN — ASPIRIN 81 MG: 81 TABLET, CHEWABLE ORAL at 08:42

## 2023-03-21 RX ADMIN — SACUBITRIL AND VALSARTAN 1 TABLET: 24; 26 TABLET, FILM COATED ORAL at 08:41

## 2023-03-21 RX ADMIN — ACETAMINOPHEN 325MG 650 MG: 325 TABLET ORAL at 03:47

## 2023-03-21 RX ADMIN — ENOXAPARIN SODIUM 40 MG: 100 INJECTION SUBCUTANEOUS at 06:41

## 2023-03-21 RX ADMIN — SACUBITRIL AND VALSARTAN 1 TABLET: 24; 26 TABLET, FILM COATED ORAL at 20:40

## 2023-03-21 RX ADMIN — CARVEDILOL 12.5 MG: 12.5 TABLET, FILM COATED ORAL at 08:42

## 2023-03-21 RX ADMIN — AMLODIPINE BESYLATE 10 MG: 5 TABLET ORAL at 09:31

## 2023-03-21 RX ADMIN — TRAZODONE HYDROCHLORIDE 50 MG: 50 TABLET ORAL at 20:40

## 2023-03-21 RX ADMIN — TRAMADOL HYDROCHLORIDE 50 MG: 50 TABLET ORAL at 15:18

## 2023-03-21 RX ADMIN — Medication 1 AMPULE: at 08:44

## 2023-03-21 RX ADMIN — CLOPIDOGREL BISULFATE 75 MG: 75 TABLET ORAL at 08:42

## 2023-03-21 RX ADMIN — CARVEDILOL 12.5 MG: 12.5 TABLET, FILM COATED ORAL at 17:20

## 2023-03-21 RX ADMIN — CLONIDINE HYDROCHLORIDE 0.2 MG: 0.1 TABLET ORAL at 08:41

## 2023-03-21 RX ADMIN — CLONIDINE HYDROCHLORIDE 0.2 MG: 0.1 TABLET ORAL at 17:20

## 2023-03-21 NOTE — PROGRESS NOTES
Problem: Self Care Deficits Care Plan (Adult)  Goal: *Acute Goals and Plan of Care (Insert Text)  Description: FUNCTIONAL STATUS PRIOR TO ADMISSION: Patient was independent and active without use of DME. Works at a for . Recently bought his own home and was managing a large garden. HOME SUPPORT PRIOR TO ADMISSION: The patient lived alone with sister and mother to provide assistance. Occupational Therapy Goals:  Initiated 3/15/2023  1. Patient will perform grooming with supervision/set-up within 7 days. 2. Patient will perform upper body dressing with minimal assistance within 7 days. 3. Patient will perform upper body bathing with minimal assistance within 7 days. 4. Patient will improve dynamic sitting balance to CGA for improved independence with ADLS within 7 days. 5. Patient will improve fugl haynes score by 5 points within 7 days. 6. Patient will transfer from drop arm recliner or drop arm bedside commode with best technique and max assist within 7 days. Outcome: Progressing Towards Goal   OCCUPATIONAL THERAPY TREATMENT  Patient: Micheal Linn (47 y.o. male)  Date: 3/21/2023  Diagnosis: Right hemiplegia ([facility] [address] [G81.91]  Ischemic stroke ([facility] [address] [I63.9] <principal problem not specified>      Precautions: Fall (dense right norm)  Chart, occupational therapy assessment, plan of care, and goals were reviewed. ASSESSMENT  Patient continues with skilled OT services and is progressing towards goals. Patient received supine in bed exceedingly pleasant and agreeable to OT session today, eager to mobilize and progress towards goals as verbalized multiple times during session today. He continued to be limited by dense R hemiplegia and R sided inattention but is able to partially elevate and retract at R shoulder today.  Continues to require 2 person assist for bed mobility to come to supine>sitting EOB as dominant RUE nonfunctional and patient has difficulty attending to RUE during functional mobility needing increased support. Noted 1/2-1 fingerlength R anterior sublux of shoulder, educated patient on strategies to support R shoulder with LUE to maintain joint integrity. Patient demo'd carryover of AAROM technique for RUE with minimal cues today, very receptive to all education and eager to attempt all tasks today. Educated on norm-dressing technique for UB dressing which patient performed with Min Assist overall. Tolerated ~20 minutes of upright activity using Best Move for dynamic reaching, trunk rotation, and visual attention activities. Transferred to chair at end of session, educated and worked on [place] as gross stabilizer to manage containers and utensils with lunch tray at end of session. Highly recommend IPR as patient is very motivated to progress and works hard to achieve goals both in and outside of therapy sessions as demonstrated by carryover of education and compensatory strategies. Current Level of Function Impacting Discharge (ADLs): Max A transfers/mobility with BestMove, Min A UB dressing with norm dressing technique    Other factors to consider for discharge: PLOF, decline from baseline, CVA+, LOS, needs rehab - excellent candidate         PLAN :  Patient continues to benefit from skilled intervention to address the above impairments. Continue treatment per established plan of care to address goals. Recommend with staff: OOB to recliner as tolerated.  OOB to [facility] with assist x2 and best  for safety     Recommend next OT session: updated F-M, progress standing, progress weightshifting/balance    Recommendation for discharge: (in order for the patient to meet his/her long term goals)  Therapy 3 hours per day 5-7 days per week    This discharge recommendation:  Has been made in collaboration with the attending provider and/or case management    IF patient discharges home will need the following DME: bedside commode, gait belt, hospital bed, and mechanical lift SUBJECTIVE:   Patient stated I want to try to do everything I can, I at least always want to try.     OBJECTIVE DATA SUMMARY:   Cognitive/Behavioral Status:  Neurologic State: Alert  Orientation Level: Oriented X4  Cognition: Appropriate decision making; Appropriate for age attention/concentration; Appropriate safety awareness; Follows commands  Perception: Appears intact  Perseveration: No perseveration noted  Safety/Judgement: Fall prevention    Functional Mobility and Transfers for ADLs:  Bed Mobility:  Supine to Sit: Minimum assistance; Moderate assistance    Transfers:  Sit to Stand: Maximum assistance;Assist x2 (Best  to stand)     Bed to Chair: Maximum assistance;Assist x2 (Best )    Balance:  Sitting - Static: Good (unsupported)  Sitting - Dynamic: Occassional  Standing: Impaired  Standing - Static: Constant support; Fair (best )  Standing - Dynamic : Constant support; Fair (best )    ADL Intervention:  Feeding  Container Management: Minimum assistance  Cutting Food: Total assistance (dependent)  Food to Mouth: Modified independent  Drink to Mouth: Modified independent  Cues: Physical assistance; Tactile cues provided;Verbal cues provided;Visual cues provided;Visual/perceptual training/retraining (to use RLE as gross stabilizer)         Upper Body Dressing Assistance  Dressing Assistance: Minimum assistance  Hospital Gown: Minimum  assistance;Training to use affected extremity as a fine motor assistance;Training to use affected extremity as a gross motor assistance;Training to use affected extremity as a gross stabilizer (hemidressing technique)    Lower Body Dressing Assistance  Socks:  Total assistance (dependent)  Slip on Shoes with Back: Total assistance (dependent)         Cognitive Retraining  Safety/Judgement: Fall prevention    Neuro Re-Education:   PROM to RUE followed by education for AAROM to RUE at all joints in all planes using LUE as assist       Pain:  R shoulder pain and tightness reported. Patient stated R shoulder fell in crack between bet and bed rail last night and he was unable to remove and shoulder feels increasingly sore this AM.    Activity Tolerance:   Good, SpO2 stable on RA, and requires rest breaks    After treatment patient left in no apparent distress:   Sitting in chair, Call bell within reach, and Bed / chair alarm activated    COMMUNICATION/COLLABORATION:   The patients plan of care was discussed with: Physical therapist and Registered nurse. Patient was educated regarding His deficit(s) of R sided weakness as this relates to His diagnosis of L CVA. He demonstrated Good understanding as evidenced by verbal response and carryover of all education. Patient and/or family was verbally educated on the BE FAST acronym for signs/symptoms of CVA and TIA. BE FAST was written on patient's communication board  for visual education and reinforcement. All questions answered with patient indicating good understanding.      Jasvir Solitario OT  Time Calculation: 38 mins

## 2023-03-21 NOTE — PROGRESS NOTES
Problem: Mobility Impaired (Adult and Pediatric)  Goal: *Acute Goals and Plan of Care (Insert Text)  Description:   FUNCTIONAL STATUS PRIOR TO ADMISSION: Patient was independent and active without use of DME. Works full-time as a . Enjoys gardening and working on home projects. HOME SUPPORT PRIOR TO ADMISSION: The patient lived alone, sister and mother both live nearby and are available to assist.    Physical Therapy Goals  Initiated 3/15/2023  1. Patient will move from supine to sit and sit to supine , scoot up and down, and roll side to side in bed with minimal assistance/contact guard assist within 7 day(s). 2.  Patient will transfer from bed to chair and chair to bed with moderate assistance  using the least restrictive device within 7 day(s). 3.  Patient will perform sit to stand with moderate assistance  within 7 day(s). 4.  Patient will ambulate with moderate assistance  for 10 feet with the least restrictive device within 7 day(s). 5.  Patient will improve Owens Balance score by 7 points within 7 days. Outcome: Progressing Towards Goal   PHYSICAL THERAPY TREATMENT  Patient: Alina Fletcher (69 y.o. male)  Date: 3/21/2023  Diagnosis: Right hemiplegia ([facility] [address] [G81.91]  Ischemic stroke ([facility] [address] [I63.9] <principal problem not specified>      Precautions: Fall (dense right norm)  Chart, physical therapy assessment, plan of care and goals were reviewed. ASSESSMENT  Patient continues with skilled PT services and is progressing towards goals. Patient received in bed and agreeable to participate, remains very motivated and exerts good effort throughout session. Patient continues to present with dense right hemiplegia in UE, but demonstrated improved active movement in right LE and was able to slowly move through full range of LAQ in sitting position.   Patient came to sit EOB with min to mod assist and sitting balance intact in static position, was able to work on core activation , lateral leans and trunk rotation with good tolerance. Used Best  to come to stand, and tolerated approx 20 minutes of upright activities. Left up in chair with call bell in reach. Patient is well below indep baseline and presents with significant neurologic deficits, will be an excellent candidate for inpatient rehab. Current Level of Function Impacting Discharge (mobility/balance): Best  to stand and transfer to chair    Other factors to consider for discharge: needs [facility] bed for rehab         PLAN :  Patient continues to benefit from skilled intervention to address the above impairments. Continue treatment per established plan of care. to address goals. Recommendation for discharge: (in order for the patient to meet his/her long term goals)  Therapy 3 hours per day 5-7 days per week    This discharge recommendation:  Has been made in collaboration with the attending provider and/or case management    IF patient discharges home will need the following DME: bedside commode, mechanical lift, and wheelchair       SUBJECTIVE:   Patient stated Arjaden Iba else can I do for this arm? Branden Lemos    OBJECTIVE DATA SUMMARY:   Critical Behavior:  Neurologic State: Alert  Orientation Level: Oriented X4  Cognition: Appropriate safety awareness, Follows commands, Appropriate decision making, Appropriate for age attention/concentration  Safety/Judgement: Fall prevention, Decreased awareness of need for safety, Awareness of environment  Functional Mobility Training:  Bed Mobility:     Supine to Sit: Minimum assistance; Moderate assistance              Transfers:  Sit to Stand: Maximum assistance;Assist x2 (Best  to stand)  Stand to Sit: Maximum assistance;Assist x2        Bed to Chair: Maximum assistance;Assist x2 (Best )                    Balance:  Sitting - Static: Good (unsupported)  Sitting - Dynamic: Occassional  Standing: Impaired  Standing - Static: Constant support; Fair (best )  Standing - Dynamic : Constant support; Fair (best )  Ambulation/Gait Training:                                                        Stairs: Therapeutic Exercises:   Seated: weight shifts, head turns, trunk rotations, lateral leans  Standing in Best : weight shifts, reaching, nudge  Pain Rating:      Activity Tolerance:   Good    After treatment patient left in no apparent distress:   Sitting in chair and Call bell within reach    COMMUNICATION/COLLABORATION:   The patients plan of care was discussed with: Occupational therapist and Registered nurse.      Isabella Andujar, PT   Time Calculation: 37 mins

## 2023-03-21 NOTE — PROGRESS NOTES
Problem: Communication Impaired (Adult)  Goal: *Acute Goals and Plan of Care (Insert Text)  Description: 3/20/2023  Speech path goals  1. Patient will produce sentence level utterances with 90% intelligibility   2. Patient will produce conversational speech with 90% intelligibility. Outcome: Progressing Towards Goal   SPEECH LANGUAGE PATHOLOGY TREATMENT  Patient: Vickey Zayas (24 y.o. male)  Date: 3/21/2023  Diagnosis: Right hemiplegia ([facility] [address] [G81.91]  Ischemic stroke ([facility] [address] [I63.9] <principal problem not specified>        ASSESSMENT:  The patient is s/p L CVA with R hemiparesis and dysarthria. He has dysphagia for solids with some pocketing on the R. He was encouraged to use his finger or a toothbrush to clear his R anterior sulcus. With clear slow speech he was 90% intelligible at sentence level and in conversation. He was communicating well without verbal cues from SLP. PLAN:  Patient continues to benefit from skilled intervention to address the above impairments. Continue treatment per established plan of care. Discharge Recommendations:  Inpatient Rehab     SUBJECTIVE:   Patient stated his R shoulder hurt and nsg administered Tramadol. He also was asking for a sling for his R arm     OBJECTIVE:   Mental Status:  Neurologic State: Alert  Orientation Level: Oriented X4  Cognition: Appropriate decision making, Appropriate for age attention/concentration, Appropriate safety awareness, Follows commands  Perception: Appears intact  Perseveration: No perseveration noted  Safety/Judgement: Fall prevention  Treatment & Interventions: Motor Speech:      Practiced in conversation using slow, clear speech. He was 90% at sentence level and conversational level.                                 Response & Tolerance to Activities:   good    Pain:  Pain Scale 1: Numeric (0 - 10)  Pain Intensity 1: 7  Pain Location 1: Arm    After treatment:   Patient left in no apparent distress sitting up in chair    COMMUNICATION/EDUCATION:   Patient was educated regarding his deficit(s) of dysarthria and dysphagia. He can use his finger or tongue to clear pocketed food. The patient's plan of care including recommendations, planned interventions, and recommended diet changes were discussed with: Registered nurse.      RHONDA Doherty  Time Calculation: 15 mins

## 2023-03-21 NOTE — PROGRESS NOTES
Transition of Care Plan:     RUR:13%     LOS: 8  GLOS: 2.9   WILLARD: STABLE 3/22 ? Needs below      Disposition: [facility]ail Bed at [address]. BELOW COPY AND PASTE FROM PREVIOUS CM NOTES:      Pt transferred to Neuro on 3/18/23. CM completed chart review. Pt needs [facility] Bed at Acute Rehab. Referrals are going to be sent to [facility] and [facility] to see if they can accept Pt. CM talked to Pt Sister, Black Wise: [phone] and she indicated that she talked to [address] [place] [address] today and that they are working on [employer]. Noted indicated that Medicaid Application was SENT TO [address] [place] [address] Road #[id number]     Sister is willing to complete FA if needed once someone can provide one to her. If SNF or IPR: Date FOC offered:3/20  Date [facility] received:N/A  Date authorization started with reference number:N/A  Date authorization received and expires:N/A  Accepting facility:N/A     Follow up appointments: To be done prior to discharge     DME needed:None     Transportation at Discharge [address] or means to access home: Patient has keys         IM Medicare Letter:N/A    Gil Jordan from [address] was here-  she has gail application form for patient. I called sister and she will be stopping by later to  form and complete. [facility] is the first choice for acute rehab.     Neo Watson RN 1216 pm   Care manager

## 2023-03-21 NOTE — PROGRESS NOTES
End of Shift Note    Bedside shift change report given to [employer] (oncoming nurse) by Mei Alejandro RN (offgoing nurse). Report included the following information SBAR, Kardex, MAR, and Recent Results    Shift worked:  Days   Shift summary and any significant changes:     Patient complained of right arm pain - see MAR for medication given. Patient moved multiple times from bed to chair with [employer]. Family visited throughout day. No significant changes. Patient worked with PT today. Patient had cramp and voluntarily sat on the floor to relieve cramp. Concerns for physician to address:  None   Zone phone for oncoming shift:   [phone]     Patient Information  Gilda Pascual  51 y.o.  3/12/2023  8:04 PM by Mingo Newsome DO. Gilda Pascual was admitted from Home    Problem List  Patient Active Problem List    Diagnosis Date Noted    Right hemiplegia ([address] 03/13/2023    Ischemic stroke ([address] 03/13/2023    Dilated cardiomyopathy ([address] 08/17/2020    Hypertension 08/14/2020    Class 3 severe obesity in adult [facility]) 08/12/2020    Diverticulitis of colon with perforation 01/23/2018    Diverticulitis 01/23/2018     Past Medical History:   Diagnosis Date    Bell's palsy 01/25/2019    Diverticulitis of colon with perforation 01/2018    sigmoid    Diverticulitis of colon with perforation 08/09/2020    Hypertension     Kidney infection     [place] Hunt syndrome (geniculate herpes zoster)        Core Measures:  CVA: Yes Yes  CHF:No No  PNA:No No    Activity:  Activity Level:  Up with Assistance  Number times ambulated in hallways past shift: 0  Number of times OOB to chair past shift: 4    Cardiac:   Cardiac Monitoring: No      Cardiac Rhythm: Sinus Rhythm    Access:   Current line(s): PIV       Genitourinary:   Urinary status: voiding      Respiratory:   O2 Device: None (Room air)  Chronic home O2 use?: NO  Incentive spirometer at bedside: NO       GI:  Last Bowel Movement Date: 03/18/23  Current diet: ADULT DIET Dysphagia - Soft & Bite Sized  DIET ONE TIME MESSAGE  Passing flatus: YES  Tolerating current diet: YES       Pain Management:   Patient states pain is manageable on current regimen: YES    Skin:  Ricardo Score: 18  Interventions: float heels, increase time out of bed, and PT/OT consult    Patient Safety:  Fall Score:    Interventions: bed/chair alarm, assistive device (walker, cane, etc), gripper socks, pt to call before getting OOB, and stay with me (per policy)         DVT prophylaxis:  DVT prophylaxis Med- Yes      Wounds: (If Applicable)  Wounds- No      Active Consults:  IP CONSULT TO NEUROLOGY  IP CONSULT TO NEUROLOGY    Length of Stay:  Expected LOS: 2d 21h  Actual LOS: 8  Discharge Plan: Yes Inpatient rehab, waiting on placement.       Keith Meza RN

## 2023-03-21 NOTE — PROGRESS NOTES
Comprehensive Nutrition Assessment    Type and Reason for Visit: Initial, RD nutrition re-screen/LOS    Nutrition Recommendations/Plan:   Continue soft and bite sized diet per SLP     Malnutrition Assessment:  Malnutrition Status:  No malnutrition (03/21/23 1426)      Nutrition Assessment:    Patient medically noted for stroke and HTN. PMH CM, Diverticulitis, and CHF. Chart reviewed for length of stay. Patient receiving a soft and bite sized diet s/p SLP evaluation. He reports a good appetite and eating well. States he is still having some trouble chewing/swallowing but it's getting better. He's looking forward to getting to rehab. No nutrition questions or concerns reported. Encouraged PO intake of meals. Nutrition Related Findings:    [phone]   BM 3/18   Norvasc, Atorvastatin, Coreg, Plavix, humalog   Wound Type: None    Current Nutrition Intake & Therapies:  Average Meal Intake: %     ADULT DIET Dysphagia - Soft & Bite Sized  DIET ONE TIME MESSAGE    Anthropometric Measures:  Height: 5' 7\" (170.2 cm)  Ideal Body Weight (IBW): 148 lbs (67 kg)     Current Body Wt:  147 kg (324 lb 1.2 oz), 219 % IBW. Current BMI (kg/m2): 50.7                          BMI Category: Obese class 3 (BMI 40.0 or greater)    Estimated Daily Nutrient Needs:  Energy Requirements Based On: Formula  Weight Used for Energy Requirements: Current  Energy (kcal/day): 2254 kcals (BMR x 1. 2AF -500kcals)  Weight Used for Protein Requirements: Ideal  Protein (g/day): 101g (1.5 g/kg IBW)  Method Used for Fluid Requirements: 1 ml/kcal  Fluid (ml/day): 2200 mL    Nutrition Diagnosis:   No nutrition diagnosis at this time       Nutrition Interventions:   Food and/or Nutrient Delivery: Continue current diet  Nutrition Education/Counseling: No recommendations at this time  Coordination of Nutrition Care: Continue to monitor while inpatient       Goals:     Goals: PO intake 75% or greater, by next RD assessment       Nutrition Monitoring and Evaluation:   Behavioral-Environmental Outcomes: None identified  Food/Nutrient Intake Outcomes: Food and nutrient intake  Physical Signs/Symptoms Outcomes: Biochemical data, Chewing or swallowing, Weight    Discharge Planning:    Continue current diet    Jovany Spring, RD  Contact: ext [phone]

## 2023-03-21 NOTE — PROGRESS NOTES
Hospitalist Progress Note    Subjective:   Daily Progress Note: 3/21/2023 11:00 AM    Hospital Course:  Pt admitted for progressive right facial droop, right side numbness and weakness. MRI was done , was a limited study showing an acute stroke in the left posterior corona radiata/basal ganglia on 3/14/23. He was on the medical floor but became increasingly agitated and was transferred to the ICU for Precedex drip and was weaned off ,transferred back to the medical floor. CTA of the head and neck does not reveal any large vessel stenosis. Aggressive control of lifestyle modifications, BP and weight loss are recommended.      Subjective:   Overnight uneventful per pt, no new complains  R shoulder hurting as it got trapped in the railing of bed yesterday    Current Facility-Administered Medications   Medication Dose Route Frequency    loratadine (CLARITIN) tablet 10 mg  10 mg Oral QHS    hydrOXYzine HCL (ATARAX) tablet 10 mg  10 mg Oral TID PRN    amLODIPine (NORVASC) tablet 10 mg  10 mg Oral DAILY    hydrALAZINE (APRESOLINE) 20 mg/mL injection 20 mg  20 mg IntraVENous Q6H PRN    labetaloL (NORMODYNE;TRANDATE) injection 20 mg  20 mg IntraVENous Q6H PRN    carvediloL (COREG) tablet 12.5 mg  12.5 mg Oral BID WITH MEALS    alum-mag hydroxide-simeth (MYLANTA) oral suspension 30 mL  30 mL Oral Q4H PRN    traZODone (DESYREL) tablet 50 mg  50 mg Oral QHS PRN    melatonin tablet 4.5 mg  4.5 mg Oral QHS PRN    atorvastatin (LIPITOR) tablet 80 mg  80 mg Oral QHS    aspirin chewable tablet 81 mg  81 mg Oral DAILY    cloNIDine HCL (CATAPRES) tablet 0.2 mg  0.2 mg Oral BID    clopidogreL (PLAVIX) tablet 75 mg  75 mg Oral DAILY    sacubitriL-valsartan (ENTRESTO) 24-26 mg tablet 1 Tablet  1 Tablet Oral Q12H    ziprasidone (GEODON) 10 mg in sterile water (preservative free) 0.5 mL injection  10 mg IntraMUSCular Q12H PRN    insulin lispro (HUMALOG) injection   SubCUTAneous AC&HS    glucose chewable tablet 16 g  4 Tablet Oral PRN glucagon (GLUCAGEN) injection 1 mg  1 mg IntraMUSCular PRN    alcohol 62% (NOZIN) nasal  1 Ampule  1 Ampule Topical Q12H    albuterol-ipratropium (DUO-NEB) 2.5 MG-0.5 MG/3 ML  3 mL Nebulization Q4H PRN    ondansetron (ZOFRAN) injection 4 mg  4 mg IntraVENous Q4H PRN    acetaminophen (TYLENOL) tablet 650 mg  650 mg Oral Q4H PRN    Or    acetaminophen (TYLENOL) solution 650 mg  650 mg Per NG tube Q4H PRN    Or    acetaminophen (TYLENOL) suppository 650 mg  650 mg Rectal Q4H PRN    bisacodyL (DULCOLAX) suppository 10 mg  10 mg Rectal DAILY PRN    enoxaparin (LOVENOX) injection 40 mg  40 mg SubCUTAneous Q12H            Objective:     Visit Vitals  BP (!) 152/87   Pulse 71   Temp 97.3 °F (36.3 °C)   Resp 18   Ht 5' 7\" (1.702 m)   Wt 147 kg (324 lb 1.2 oz)   SpO2 99%   BMI 50.76 kg/m²    O2 Flow Rate (L/min): 4 l/min O2 Device: None (Room air)    Temp (24hrs), Av.6 °F (36.4 °C), Min:97.3 °F (36.3 °C), Max:97.9 °F (36.6 °C)       0701 -  1900  In: -   Out: 275 [Urine:275]  No intake/output data recorded.     PHYSICAL EXAM:    General appearance: Comfortable not in distress  Heart: Regular rate and rhythm, no murmurs  HEENT: No thyroid enlargement  Lungs: Bilateral air entry present, no crackles or wheezing  Abdomen: Soft, nontender, no guarding  Extremities no edema  Neurological exam alert awake oriented x3, right-sided hemiplegia      Data Review    Recent Results (from the past 24 hour(s))   GLUCOSE, POC    Collection Time: 23  3:52 PM   Result Value Ref Range    Glucose (POC) 107 65 - 117 mg/dL    Performed by Benedicto Silveira PCT    GLUCOSE, POC    Collection Time: 23  9:24 PM   Result Value Ref Range    Glucose (POC) 103 65 - 117 mg/dL    Performed by Guerrero Best, POC    Collection Time: 23  6:51 AM   Result Value Ref Range    Glucose (POC) 119 (H) 65 - 117 mg/dL    Performed by Guerrero Best, POC    Collection Time: 23 11:17 AM Result Value Ref Range    Glucose (POC) 128 (H) 65 - 117 mg/dL    Performed by Romain Gillespie        XR ABD (KUB)   Final Result   Addendum (preliminary) 1 of 1   Addendum: Correction: Enteric tube traverses expected course to below the   diaphragm into the left upper quadrant. Final   Enteric tube tip lies just above the gastroesophageal junction. XR CHEST PORT   Final Result   Stable interstitial edema pattern with cardiomegaly. Gastric tube visualized but   tip not included. XR ABD PORT  1 V   Final Result   NG tube in place. MRI BRAIN WO CONT   Final Result      1. Acute infarct in the left posterior corona radiata/basal ganglia. XR CHEST PORT   Final Result      New mild interstitial edema         XR CHEST PORT   Final Result   New, moderate cardiomegaly. Grossly clear lungs. CTA CODE NEURO HEAD AND NECK W CONT   Final Result   1. No large vessel occlusion or hemodynamically significant carotid stenosis. 2.  Chronic left centrum semiovale hypodensity likely sequela of chronic   microvascular angiopathy. 3.  Decrease cerebral perfusion in the bilateral periventricular white matter   with 81 cc area of ischemic tissue in the bilateral frontal lobes right greater   than left without underlying vascular lesion to explain this perfusion   abnormality. CT CODE NEURO PERF W CBF   Final Result   1. No large vessel occlusion or hemodynamically significant carotid stenosis. 2.  Chronic left centrum semiovale hypodensity likely sequela of chronic   microvascular angiopathy. 3.  Decrease cerebral perfusion in the bilateral periventricular white matter   with 81 cc area of ischemic tissue in the bilateral frontal lobes right greater   than left without underlying vascular lesion to explain this perfusion   abnormality.       CT CODE NEURO HEAD WO CONTRAST   Final Result   No acute intracranial process seen          Active Problems:    Right hemiplegia ([address] (3/13/2023)      Ischemic stroke ([address] (3/13/2023)      Assessment/Plan:   Acute ischemic stroke- in the  left basal ganglia, with right side hemiparesis: Continue aspirin, Plavix, statin. Hemoglobin A1c is 5.4 and LDL is 70 point two 2D echocardiogram showed ejection fraction of around 35 to 40% bubble study could not be performed due to patient's noncompliance. PT OT recommended inpatient rehab. Case management consulted-no payor source, working on [employer] bed placement- Financial paperwork per CM to be done by pt    2. Hypertension urgency: Overnight patient noted hypertensive urgency and had to be started on Cardene drip. Now [address]. Coreg to 12.5 twice daily. Continue clonidine, Entresto and also added Norvasc. On as needed hydralazine and labetalol. DCd tele monitoring    3. Systolic heart failure- EF 35-40%, on Entresto, Coreg. Stable    4. CAD-continue aspirin, Plavix and statin    WILLARD: 3/21?   Barriers: [facility]ail bed placement in inpatient rehab placement        DVT Prophylaxis: lovenox  Code Status:  Full Code  POA: [facility]      IEMN HZIR discussed with:   _____patient, staff nurse, __________________________________________________________    Total time (including chart review and note writing): 26 mins  Jake Savage MD

## 2023-03-21 NOTE — PROGRESS NOTES
End of Shift Note     Bedside shift change report given to MARCI Wallis (oncoming nurse) by Shamar Campbell RN (offgoing nurse).   Report included the following information SBAR     Shift worked: Nights    Shift summary and any significant changes:     PT in bed, no significant changes over nights      Concerns for physician to address:  None   Zone phone for oncoming shift:   [phone]      Patient Information  Derek Rodriguez  51 y.o.  3/12/2023  8:04 PM by Virgie Umaña Andrew was admitted from Home     Problem List       Patient Active Problem List     Diagnosis Date Noted    Right hemiplegia ([address] 03/13/2023    Ischemic stroke ([address] 03/13/2023    Dilated cardiomyopathy ([facility] 08/17/2020    Hypertension 08/14/2020    Class 3 severe obesity in adult [facility]) 08/12/2020    Diverticulitis of colon with perforation 01/23/2018    Diverticulitis 01/23/2018           Past Medical History:   Diagnosis Date    Bell's palsy 01/25/2019    Diverticulitis of colon with perforation 01/2018     sigmoid    Diverticulitis of colon with perforation 08/09/2020    Hypertension      Kidney infection      [place] Hunt syndrome (geniculate herpes zoster)           Core Measures:  CVA: Yes Yes  CHF:No No  PNA:No No     Activity:  Activity Level: Bed Rest  Number times ambulated in hallways past shift: 0  Number of times OOB to chair past shift: 0     Cardiac:   Cardiac Monitoring: No            Access:   Current line(s): PIV         Genitourinary:   Urinary status: voiding     Respiratory:   O2 Device: None (Room air)  Chronic home O2 use?: NO  Incentive spirometer at bedside: YES     GI:  Last Bowel Movement Date: 03/18/23  Current diet:  ADULT DIET Dysphagia - Soft & Bite Sized  DIET ONE TIME MESSAGE  Passing flatus: YES  Tolerating current diet: YES     Pain Management:   Patient states pain is manageable on current regimen: YES     Skin:  Ricardo Score: 17  Interventions: speciality bed refused to float heels    Patient Safety:  Fall Score:  medium  Interventions: bed/chair alarm  @Rollbelt  @dexterity to release roll belt  Yes/No ( must document dexterity  here by stating Yes or No here, otherwise this is a restraint and must follow restraint documentation policy.)     DVT prophylaxis:  DVT prophylaxis Med- Yes  DVT prophylaxis SCD or TD- No      Wounds: (If Applicable)  Wounds- No  Location      Active Consults:  IP CONSULT TO NEUROLOGY  IP CONSULT TO NEUROLOGY     Length of Stay:  Expected LOS: 2d 21h  Actual LOS: 8  Discharge Plan: Yes Inpatient Rehab, waiting on placement.         Attila Mccullough RN

## 2023-03-22 LAB
ANION GAP SERPL CALC-SCNC: 6 MMOL/L (ref 5–15)
BUN SERPL-MCNC: 18 MG/DL (ref 6–20)
BUN/CREAT SERPL: 20 (ref 12–20)
CALCIUM SERPL-MCNC: 9.4 MG/DL (ref 8.5–10.1)
CHLORIDE SERPL-SCNC: 102 MMOL/L (ref 97–108)
CO2 SERPL-SCNC: 28 MMOL/L (ref 21–32)
CREAT SERPL-MCNC: 0.9 MG/DL (ref 0.7–1.3)
ERYTHROCYTE [DISTWIDTH] IN BLOOD BY AUTOMATED COUNT: 20.1 % (ref 11.5–14.5)
GLUCOSE BLD STRIP.AUTO-MCNC: 117 MG/DL (ref 65–117)
GLUCOSE BLD STRIP.AUTO-MCNC: 120 MG/DL (ref 65–117)
GLUCOSE BLD STRIP.AUTO-MCNC: 140 MG/DL (ref 65–117)
GLUCOSE BLD STRIP.AUTO-MCNC: 96 MG/DL (ref 65–117)
GLUCOSE SERPL-MCNC: 99 MG/DL (ref 65–100)
HCT VFR BLD AUTO: 52.1 % (ref 36.6–50.3)
HGB BLD-MCNC: 17.5 G/DL (ref 12.1–17)
MCH RBC QN AUTO: 24.2 PG (ref 26–34)
MCHC RBC AUTO-ENTMCNC: 33.6 G/DL (ref 30–36.5)
MCV RBC AUTO: 72.2 FL (ref 80–99)
NRBC # BLD: 0 K/UL (ref 0–0.01)
NRBC BLD-RTO: 0 PER 100 WBC
PLATELET # BLD AUTO: 302 K/UL (ref 150–400)
PMV BLD AUTO: 10 FL (ref 8.9–12.9)
POTASSIUM SERPL-SCNC: 3.9 MMOL/L (ref 3.5–5.1)
RBC # BLD AUTO: 7.22 M/UL (ref 4.1–5.7)
SERVICE CMNT-IMP: ABNORMAL
SERVICE CMNT-IMP: ABNORMAL
SERVICE CMNT-IMP: NORMAL
SERVICE CMNT-IMP: NORMAL
SODIUM SERPL-SCNC: 136 MMOL/L (ref 136–145)
WBC # BLD AUTO: 6.4 K/UL (ref 4.1–11.1)

## 2023-03-22 PROCEDURE — 80048 BASIC METABOLIC PNL TOTAL CA: CPT

## 2023-03-22 PROCEDURE — 36415 COLL VENOUS BLD VENIPUNCTURE: CPT

## 2023-03-22 PROCEDURE — 74011250637 HC RX REV CODE- 250/637: Performed by: INTERNAL MEDICINE

## 2023-03-22 PROCEDURE — 74011250637 HC RX REV CODE- 250/637: Performed by: NURSE PRACTITIONER

## 2023-03-22 PROCEDURE — 97112 NEUROMUSCULAR REEDUCATION: CPT

## 2023-03-22 PROCEDURE — 74011636637 HC RX REV CODE- 636/637: Performed by: NURSE PRACTITIONER

## 2023-03-22 PROCEDURE — 85027 COMPLETE CBC AUTOMATED: CPT

## 2023-03-22 PROCEDURE — 97530 THERAPEUTIC ACTIVITIES: CPT

## 2023-03-22 PROCEDURE — 74011250636 HC RX REV CODE- 250/636: Performed by: NURSE PRACTITIONER

## 2023-03-22 PROCEDURE — 82962 GLUCOSE BLOOD TEST: CPT

## 2023-03-22 PROCEDURE — 74011250637 HC RX REV CODE- 250/637: Performed by: STUDENT IN AN ORGANIZED HEALTH CARE EDUCATION/TRAINING PROGRAM

## 2023-03-22 PROCEDURE — 65270000046 HC RM TELEMETRY

## 2023-03-22 RX ADMIN — Medication 1 AMPULE: at 21:34

## 2023-03-22 RX ADMIN — CARVEDILOL 12.5 MG: 12.5 TABLET, FILM COATED ORAL at 18:34

## 2023-03-22 RX ADMIN — Medication 3 UNITS: at 11:22

## 2023-03-22 RX ADMIN — ENOXAPARIN SODIUM 40 MG: 100 INJECTION SUBCUTANEOUS at 05:56

## 2023-03-22 RX ADMIN — ACETAMINOPHEN 325MG 650 MG: 325 TABLET ORAL at 01:14

## 2023-03-22 RX ADMIN — TRAMADOL HYDROCHLORIDE 50 MG: 50 TABLET ORAL at 18:34

## 2023-03-22 RX ADMIN — CLONIDINE HYDROCHLORIDE 0.2 MG: 0.1 TABLET ORAL at 18:34

## 2023-03-22 RX ADMIN — CARVEDILOL 12.5 MG: 12.5 TABLET, FILM COATED ORAL at 08:13

## 2023-03-22 RX ADMIN — CLOPIDOGREL BISULFATE 75 MG: 75 TABLET ORAL at 08:13

## 2023-03-22 RX ADMIN — SACUBITRIL AND VALSARTAN 1 TABLET: 24; 26 TABLET, FILM COATED ORAL at 21:34

## 2023-03-22 RX ADMIN — ACETAMINOPHEN 325MG 650 MG: 325 TABLET ORAL at 08:13

## 2023-03-22 RX ADMIN — LORATADINE 10 MG: 10 TABLET ORAL at 22:14

## 2023-03-22 RX ADMIN — ATORVASTATIN CALCIUM 80 MG: 40 TABLET, FILM COATED ORAL at 21:34

## 2023-03-22 RX ADMIN — ASPIRIN 81 MG: 81 TABLET, CHEWABLE ORAL at 08:13

## 2023-03-22 RX ADMIN — TRAMADOL HYDROCHLORIDE 50 MG: 50 TABLET ORAL at 11:22

## 2023-03-22 RX ADMIN — SACUBITRIL AND VALSARTAN 1 TABLET: 24; 26 TABLET, FILM COATED ORAL at 08:13

## 2023-03-22 RX ADMIN — CLONIDINE HYDROCHLORIDE 0.2 MG: 0.1 TABLET ORAL at 08:13

## 2023-03-22 RX ADMIN — AMLODIPINE BESYLATE 10 MG: 5 TABLET ORAL at 09:51

## 2023-03-22 RX ADMIN — ENOXAPARIN SODIUM 40 MG: 100 INJECTION SUBCUTANEOUS at 18:34

## 2023-03-22 NOTE — PROGRESS NOTES
Transition of Care Plan:     RUR:13%     LOS: 8  GLOS: 2.9   WILLARD: STABLE 3/22 ? Needs below      Disposition: [facility] Bed at [address]. BELOW COPY AND PASTE FROM PREVIOUS CM NOTES:       Pt transferred to Neuro on 3/18/23. CM completed chart review. Pt needs [facility] Bed at Acute Rehab. Referrals are going to be sent to [facility] and [facility] to see if they can accept Pt. CM talked to Pt SisterTyron Notice: [phone] and she indicated that she talked to [address] [place] [address] today and that they are working on [employer]. Noted indicated that Medicaid Application was SENT TO [address] [place] [address] Road #[id number]     Sister is willing to complete FA if needed once someone can provide one to her. If SNF or IPR: Date FOC offered:3/20  Date [facility] received:N/A  Date authorization started with reference number:N/A  Date authorization received and expires:N/A  Accepting facility:N/A     Follow up appointments: To be done prior to discharge     DME needed:None     Transportation at Discharge BLS    I spoke with sister she states she left all paperwork in room for [facility] and that [employer] was going to -  I left [employer] a message. I told sister I am now leaving financial form for [facility] in room and she verbalized understanding.      Care management,  Almita Dukes,  pm 3/22

## 2023-03-22 NOTE — PROGRESS NOTES
Problem: Mobility Impaired (Adult and Pediatric)  Goal: *Acute Goals and Plan of Care (Insert Text)  Description:   FUNCTIONAL STATUS PRIOR TO ADMISSION: Patient was independent and active without use of DME. Works full-time as a . Enjoys gardening and working on home projects. HOME SUPPORT PRIOR TO ADMISSION: The patient lived alone, sister and mother both live nearby and are available to assist.  2    Physical Therapy Goals  Goals continued 3/22/23    Physical Therapy Goals  Initiated 3/15/2023  1. Patient will move from supine to sit and sit to supine , scoot up and down, and roll side to side in bed with minimal assistance/contact guard assist within 7 day(s). 2.  Patient will transfer from bed to chair and chair to bed with moderate assistance  using the least restrictive device within 7 day(s). 3.  Patient will perform sit to stand with moderate assistance  within 7 day(s). 4.  Patient will ambulate with moderate assistance  for 10 feet with the least restrictive device within 7 day(s). 5.  Patient will improve Owens Balance score by 7 points within 7 days. PHYSICAL THERAPY TREATMENT: WEEKLY REASSESSMENT  Patient: Duyen Goodson (30 y.o. male)  Date: 3/22/2023  Primary Diagnosis: Right hemiplegia ([facility] [address] [G81.91]  Ischemic stroke [facility]) [I63.9]       Precautions:   Fall (dense right norm)      ASSESSMENT  Patient continues with skilled PT services and is progressing towards goals. He demonstrates improved R LE strength this session. Patient continues to report R UE pain. Sling is provided to help reduce traction and subluxation during sit to stand transfers and standing activity. Patient is advised to wear the sling only during transfers and when getting up, with assistance. Otherwise patient should prop UE on pillow for support. He is able to perform sit<>stand x5 with Min Ax2. R LE is blocked to prevent buckling but patient demonstrates only minor buckling as he fatigues.  He performs standing dynamic balance activities including reaching fwd, and reaching to weight shift L and R. Moderate buckling is noted with right weight shift requiring increased support. Sling is removed. PROM is provided to R UE for reduced discomfort. Patient R UE are propped on pillows for comfort and support. Patient's progression toward goals since last assessment: goals are continued. Patient continues to make progress     Current Level of Function Impacting Discharge (mobility/balance): Min A x2 sit<>stand     Other factors to consider for discharge: medical stability, increased need for assistance, inability to ambulate or transfer without assistance at this time, PLOF- independent         PLAN :  Goals have been updated based on progression since last assessment. Patient continues to benefit from skilled intervention to address the above impairments. Recommendations and Planned Interventions: bed mobility training, transfer training, gait training, therapeutic exercises, neuromuscular re-education, edema management/control, patient and family training/education, and therapeutic activities      Frequency/Duration: Patient will be followed by physical therapy:  5 times a week to address goals. Recommendation for discharge: (in order for the patient to meet his/her long term goals)  Therapy 3 hours per day 5-7 days per week    This discharge recommendation:  Has been made in collaboration with the attending provider and/or case management    IF patient discharges home will need the following DME: to be determined (TBD)         SUBJECTIVE:   Patient stated I'm very motivated.     OBJECTIVE DATA SUMMARY:   HISTORY:    Past Medical History:   Diagnosis Date    Bell's palsy 01/25/2019    Diverticulitis of colon with perforation 01/2018    sigmoid    Diverticulitis of colon with perforation 08/09/2020    Hypertension     Kidney infection     Haresh Hunt syndrome (geniculate herpes zoster)    No past surgical history on file. Personal factors and/or comorbidities impacting plan of care:     Home Situation  Home Environment: Private residence  # Steps to Enter: 3  Rails to Enter: Yes  One/Two Story Residence: Two story, live on 1st floor ([facility] house)  Living Alone: Yes  Support Systems: Other Family Member(s) (Sister and Mother live nearby)  Patient Expects to be Discharged to[de-identified] Rehab hospital/unit acute  Current DME Used/Available at Home: None  Tub or Shower Type: Tub/Shower combination    EXAMINATION/PRESENTATION/DECISION MAKING:   Critical Behavior:  Neurologic State: Alert  Orientation Level: Oriented X4  Cognition: Appropriate decision making, Appropriate for age attention/concentration, Follows commands  Safety/Judgement: Fall prevention  Hearing: Auditory  Auditory Impairment: None  Skin:    Edema:   Range Of Motion:  AROM: Grossly decreased, non-functional (R UE, R LE < L LE)           PROM: Within functional limits           Strength:    Strength: Grossly decreased, non-functional (R UE/LE)                    Tone & Sensation:   Tone: Abnormal (flaccid R UE)              Sensation: Impaired               Coordination:  Coordination: Grossly decreased, non-functional  Vision:      Functional Mobility:  Bed Mobility:              Transfers:  Sit to Stand: Minimum assistance  Stand to Sit: Minimum assistance                       Balance:   Sitting: Intact; Without support  Sitting - Static: Good (unsupported)  Sitting - Dynamic: Fair (occasional)  Standing: Impaired; With support  Standing - Static: Constant support; Fair  Standing - Dynamic : Constant support; Fair  Ambulation/Gait Training:                                                         Stairs:               Therapeutic Exercises:       Pain Rating:      Activity Tolerance:   Fair    After treatment patient left in no apparent distress:   Sitting in chair, Call bell within reach, and Bed / chair alarm activated    COMMUNICATION/EDUCATION: The patients plan of care was discussed with: Occupational therapist and Registered nurse. Fall prevention education was provided and the patient/caregiver indicated understanding., Patient/family have participated as able in goal setting and plan of care. , and Patient/family agree to work toward stated goals and plan of care.     Thank you for this referral.  Roverto Hutson, PT   Time Calculation: 32 mins

## 2023-03-22 NOTE — PROGRESS NOTES
End of Shift Note    Bedside [facility] by Rafi Vázquez RN (offgoing nurse). Report included the following information SBAR, Kardex, MAR, and Recent Results    Shift worked:  Days   Shift summary and any significant changes:     Patient complained of right arm pain , Tylenol given x1. Pt still complaining of cramps. Concerns for physician to address:  None   Zone phone for oncoming shift:   [id number]     Patient Information  Lian Cortes  51 y.o.  3/12/2023  8:04 PM by Skip Payne DO. Lian Cortes was admitted from Home    Problem List  Patient Active Problem List    Diagnosis Date Noted    Right hemiplegia ([facility] [address] 03/13/2023    Ischemic stroke ([facility] [address] 03/13/2023    Dilated cardiomyopathy ([facility] [address] 08/17/2020    Hypertension 08/14/2020    Class 3 severe obesity in adult [facility]) 08/12/2020    Diverticulitis of colon with perforation 01/23/2018    Diverticulitis 01/23/2018     Past Medical History:   Diagnosis Date    Bell's palsy 01/25/2019    Diverticulitis of colon with perforation 01/2018    sigmoid    Diverticulitis of colon with perforation 08/09/2020    Hypertension     Kidney infection     Haresh Hunt syndrome (geniculate herpes zoster)        Core Measures:  CVA: Yes Yes  CHF:No No  PNA:No No    Activity:  Activity Level:  Up with Assistance  Number times ambulated in hallways past shift: 0  Number of times OOB to chair past shift: 4    Cardiac:   Cardiac Monitoring: No      Cardiac Rhythm: Sinus Rhythm    Access:   Current line(s): PIV       Genitourinary:   Urinary status: voiding      Respiratory:   O2 Device: None (Room air)  Chronic home O2 use?: NO  Incentive spirometer at bedside: NO       GI:  Last Bowel Movement Date: 03/18/23  Current diet:  ADULT DIET Dysphagia - Soft & Bite Sized  DIET ONE TIME MESSAGE  Passing flatus: YES  Tolerating current diet: YES       Pain Management:   Patient states pain is manageable on current regimen: YES    Skin:  Ricardo Score: 17  Interventions: float heels, increase time out of bed, and PT/OT consult    Patient Safety:  Fall Score:    Interventions: bed/chair alarm, assistive device (walker, cane, etc), gripper socks, pt to call before getting OOB, and stay with me (per policy)         DVT prophylaxis:  DVT prophylaxis Med- Yes      Wounds: (If Applicable)  Wounds- No      Active Consults:  IP CONSULT TO NEUROLOGY  IP CONSULT TO NEUROLOGY    Length of Stay:  Expected LOS: 2d 21h  Actual LOS: 9  Discharge Plan: Yes Inpatient rehab, waiting on placement.       Angelica Hollingsworth RN

## 2023-03-22 NOTE — PROGRESS NOTES
Problem: Aspiration - Risk of  Goal: *Absence of aspiration  Outcome: Progressing Towards Goal     Problem: Patient Education: Go to Patient Education Activity  Goal: Patient/Family Education  Outcome: Progressing Towards Goal     Problem: Pressure Injury - Risk of  Goal: *Prevention of pressure injury  Description: Document Ricardo Scale and appropriate interventions in the flowsheet.   Outcome: Progressing Towards Goal  Note: Pressure Injury Interventions:  Sensory Interventions: Assess changes in LOC, Keep linens dry and wrinkle-free, Discuss PT/OT consult with provider    Moisture Interventions: Offer toileting Q_hr    Activity Interventions: PT/OT evaluation    Mobility Interventions: PT/OT evaluation    Nutrition Interventions: Document food/fluid/supplement intake    Friction and Shear Interventions: HOB 30 degrees or less                Problem: Patient Education: Go to Patient Education Activity  Goal: Patient/Family Education  Outcome: Progressing Towards Goal     Problem: Patient Education: Go to Patient Education Activity  Goal: Patient/Family Education  Outcome: Progressing Towards Goal     Problem: TIA/CVA Stroke: 0-24 hours  Goal: Off Pathway (Use only if patient is Off Pathway)  Outcome: Progressing Towards Goal  Goal: Activity/Safety  Outcome: Progressing Towards Goal  Goal: Consults, if ordered  Outcome: Progressing Towards Goal  Goal: Diagnostic Test/Procedures  Outcome: Progressing Towards Goal  Goal: Nutrition/Diet  Outcome: Progressing Towards Goal  Goal: Discharge Planning  Outcome: Progressing Towards Goal  Goal: Medications  Outcome: Progressing Towards Goal  Goal: Respiratory  Outcome: Progressing Towards Goal  Goal: Treatments/Interventions/Procedures  Outcome: Progressing Towards Goal  Goal: Minimize risk of bleeding post-thrombolytic infusion  Outcome: Progressing Towards Goal  Goal: Monitor for complications post-thrombolytic infusion  Outcome: Progressing Towards Goal  Goal: Psychosocial  Outcome: Progressing Towards Goal  Goal: *Hemodynamically stable  Outcome: Progressing Towards Goal  Goal: *Neurologically stable  Description: Absence of additional neurological deficits    Outcome: Progressing Towards Goal  Goal: *Verbalizes anxiety and depression are reduced or absent  Outcome: Progressing Towards Goal  Goal: *Absence of Signs of Aspiration on Current Diet  Outcome: Progressing Towards Goal  Goal: *Absence of deep venous thrombosis signs and symptoms(Stroke Metric)  Outcome: Progressing Towards Goal  Goal: *Ability to perform ADLs and demonstrates progressive mobility and function  Outcome: Progressing Towards Goal  Goal: *Stroke education started(Stroke Metric)  Outcome: Progressing Towards Goal  Goal: *Dysphagia screen performed(Stroke Metric)  Outcome: Progressing Towards Goal  Goal: *Rehab consulted(Stroke Metric)  Outcome: Progressing Towards Goal     Problem: TIA/CVA Stroke: Day 2 Until Discharge  Goal: Off Pathway (Use only if patient is Off Pathway)  Outcome: Progressing Towards Goal  Goal: Activity/Safety  Outcome: Progressing Towards Goal  Goal: Diagnostic Test/Procedures  Outcome: Progressing Towards Goal  Goal: Nutrition/Diet  Outcome: Progressing Towards Goal  Goal: Discharge Planning  Outcome: Progressing Towards Goal  Goal: Medications  Outcome: Progressing Towards Goal  Goal: Respiratory  Outcome: Progressing Towards Goal  Goal: Treatments/Interventions/Procedures  Outcome: Progressing Towards Goal  Goal: Psychosocial  Outcome: Progressing Towards Goal  Goal: *Verbalizes anxiety and depression are reduced or absent  Outcome: Progressing Towards Goal  Goal: *Absence of aspiration  Outcome: Progressing Towards Goal  Goal: *Absence of deep venous thrombosis signs and symptoms(Stroke Metric)  Outcome: Progressing Towards Goal  Goal: *Optimal pain control at patient's stated goal  Outcome: Progressing Towards Goal  Goal: *Tolerating diet  Outcome: Progressing Towards Goal  Goal: *Ability to perform ADLs and demonstrates progressive mobility and function  Outcome: Progressing Towards Goal  Goal: *Stroke education continued(Stroke Metric)  Outcome: Progressing Towards Goal     Problem: Ischemic Stroke: Discharge Outcomes  Goal: *Verbalizes anxiety and depression are reduced or absent  Outcome: Progressing Towards Goal  Goal: *Verbalize understanding of risk factor modification(Stroke Metric)  Outcome: Progressing Towards Goal  Goal: *Hemodynamically stable  Outcome: Progressing Towards Goal  Goal: *Absence of aspiration pneumonia  Outcome: Progressing Towards Goal  Goal: *Aware of needed dietary changes  Outcome: Progressing Towards Goal  Goal: *Verbalize understanding of prescribed medications including anti-coagulants, anti-lipid, and/or anti-platelets(Stroke Metric)  Outcome: Progressing Towards Goal  Goal: *Tolerating diet  Outcome: Progressing Towards Goal  Goal: *Aware of follow-up diagnostics related to anticoagulants  Outcome: Progressing Towards Goal  Goal: *Ability to perform ADLs and demonstrates progressive mobility and function  Outcome: Progressing Towards Goal  Goal: *Absence of DVT(Stroke Metric)  Outcome: Progressing Towards Goal  Goal: *Absence of aspiration  Outcome: Progressing Towards Goal  Goal: *Optimal pain control at patient's stated goal  Outcome: Progressing Towards Goal  Goal: *Home safety concerns addressed  Outcome: Progressing Towards Goal  Goal: *Describes available resources and support systems  Outcome: Progressing Towards Goal  Goal: *Verbalizes understanding of activation of EMS(911) for stroke symptoms(Stroke Metric)  Outcome: Progressing Towards Goal  Goal: *Understands and describes signs and symptoms to report to providers(Stroke Metric)  Outcome: Progressing Towards Goal  Goal: *Neurolgocially stable (absence of additional neurological deficits)  Outcome: Progressing Towards Goal  Goal: *Verbalizes importance of follow-up with primary care physician(Stroke Metric)  Outcome: Progressing Towards Goal  Goal: *Smoking cessation discussed,if applicable(Stroke Metric)  Outcome: Progressing Towards Goal  Goal: *Depression screening completed(Stroke Metric)  Outcome: Progressing Towards Goal     Problem: Falls - Risk of  Goal: *Absence of Falls  Description: Document Dilcia Fall Risk and appropriate interventions in the flowsheet. Outcome: Progressing Towards Goal     Problem: Patient Education: Go to Patient Education Activity  Goal: Patient/Family Education  Outcome: Progressing Towards Goal     Problem: Falls - Risk of  Goal: *Absence of Falls  Description: Document Jan Nava Fall Risk and appropriate interventions in the flowsheet.   Outcome: Progressing Towards Goal

## 2023-03-22 NOTE — PROGRESS NOTES
Problem: Self Care Deficits Care Plan (Adult)  Goal: *Acute Goals and Plan of Care (Insert Text)  Description: FUNCTIONAL STATUS PRIOR TO ADMISSION: Patient was independent and active without use of DME. Works at a for . Recently bought his own home and was managing a large garden. HOME SUPPORT PRIOR TO ADMISSION: The patient lived alone with sister and mother to provide assistance. Occupational Therapy Weekly Re-assessment:  Continue all goals below    Occupational Therapy Goals:  Initiated 3/15/2023  1. Patient will perform grooming with supervision/set-up within 7 days. 2. Patient will perform upper body dressing with minimal assistance within 7 days. 3. Patient will perform upper body bathing with minimal assistance within 7 days. 4. Patient will improve dynamic sitting balance to CGA for improved independence with ADLS within 7 days. 5. Patient will improve fugl haynes score by 5 points within 7 days. 6. Patient will transfer from drop arm recliner or drop arm bedside commode with best technique and max assist within 7 days. Outcome: Progressing Towards Goal     OCCUPATIONAL THERAPY TREATMENT  Patient: Micheal Linn (33 y.o. male)  Date: 3/22/2023  Diagnosis: Right hemiplegia ([facility] [address] [G81.91]  Ischemic stroke ([facility] [address] [I63.9] <principal problem not specified>      Precautions: Fall (dense right norm)  Chart, occupational therapy assessment, plan of care, and goals were reviewed. ASSESSMENT  Patient continues with skilled OT services and is progressing towards goals. Pt received OOB in chair, alert and oriented x4. Mother at bedside. Pt c/o mild RUE shoulder pain, recently medicated. Pt noted with RUE sling donned; education provided on indication of wearing sling only during functional transfers due to high risk for contracture and elbow extension tightness. Pt score on Fugl Haynes of RUE improved from 0/66 to 2/66 this date, indicative of severe impairment.  Pt tolerated neuromuscular re-education exercises of RUE with focus on repetitive movements, weight-bearing and visual motivation to aide in improving volitional movements with forward reaching on horizontal friction-reduced surface. Pt with trace movements in RUE scapular elevation/retraction/protraction and tightness at end PROM of R elbow extension. Pt and mother educated on positioning of RUE when sitting/supine and to avoid keeping RUE in sling for prolonged periods of time; pt able to doff sling independently. Pt completed sit<>stand with minAx2 via [address] and transferred to EOB via equipment; required modA to return to supine. Continue to strongly recommend IPR at discharge. Current Level of Function Impacting Discharge (ADLs): Dagoberto to maxA ADLs; min-modAx2 via Best Move for transfers    Other factors to consider for discharge: fall risk, \"assisted\" fall yesterday; far below ADL/IADL baseline         PLAN :  Patient continues to benefit from skilled intervention to address the above impairments. Continue treatment per established plan of care to address goals. Recommend with staff: OOB TID to chair/BSC via Best Move, RUE positioning with pillows in supine/sitting; do not pull on RUE!! Recommend next OT session: continue POC, neuromuscular recovery of RUE; k-tape of RUE shoulder due to shoulder subluxation    Recommendation for discharge: (in order for the patient to meet his/her long term goals)  Therapy 3 hours per day 5-7 days per week    This discharge recommendation:  Has not yet been discussed the attending provider and/or case management    IF patient discharges home will need the following DME: TBD at rehab       SUBJECTIVE:   Patient stated My shoulder and elbow still hurt but it's a little better.     OBJECTIVE DATA SUMMARY:   Cognitive/Behavioral Status:  Neurologic State: Alert; Appropriate for age  Orientation Level: Oriented X4  Cognition: Follows commands; Appropriate for age attention/concentration  Perception: Cues to maintain midline in standing;Verbal  Perseveration: No perseveration noted  Safety/Judgement: Awareness of environment; Fall prevention    Functional Mobility and Transfers for ADLs:  Bed Mobility:  Sit to Supine: Moderate assistance;Assist x1    Transfers:  Sit to Stand: Minimum assistance;Assist x2 (via Best Move)          Balance:  Sitting: Intact; Without support  Sitting - Static: Good (unsupported)  Sitting - Dynamic: Fair (occasional)  Standing: Impaired; With support  Standing - Static: Fair;Constant support  Standing - Dynamic : Constant support; Fair    ADL Intervention:            Cognitive Retraining  Safety/Judgement: Awareness of environment; Fall prevention    Neuro Re-Education:      Pt participated in repetitive AA/PROM of RUE for shoulder elevation, retraction, protraction; PROM elbow flexion/extension; on horizontal plane with non-friction sheet, focus on forward reaching - 1-2 sets of 10-15 repetitions each         Functional Measure:  Fugl-Engel Assessment of Motor Recovery after Stroke:     2/66     Percentage of impairment CH  0% CI  1-19% CJ  20-39% CK  40-59% CL  60-79% CM  80-99% CN  100%   Fugl-Engel score: 0-66 66 53-65 39-52 26-38 13-25 1-12   0      This is a reliable/valid measure of arm function after a neurological event. It has established value to characterize functional status and for measuring spontaneous and therapy-induced recovery; tests proximal and distal motor functions. Fugl-Engel Assessment - UE scores recorded between five and 30 days post neurologic event can be used to predict UE recovery at six months post neurologic event. Severe = 0-21 points   Moderately Severe = 22-33 points   Moderate = 34-47 points   Mild = 48-66 points  JAZZY Delicd, PATTI Jung, & ZEUS Adamson (1992). Measurement of motor recovery after stroke: Outcome assessment and sample size requirements. Stroke, 23, pp. 7231-7453. ------------------------------------------------------------------------------------------------------------------------------------------------------------------  MCID:  Stroke:   Riccardo Robins et al, 2001; n = 171; mean age 79 (6) years; assessed within 16 (12) days of stroke, Acute Stroke)  FMA Motor Scores from Admission to Discharge   10 point increase in FMA Upper Extremity = 1.5 change in discharge FIM   10 point increase in FMA Lower Extremity = 1.9 change in discharge FIM  MDC:   Stroke:   Doris Estrada et al, 2008, n = 14, mean age = 59.9 (14.6) years, assessed on average 14 (6.5) months post stroke, Chronic Stroke)   FMA = 5.2 points for the Upper Extremity portion of the assessment       Pain:  Did not rate pain, c/o RUE shoulder/elbow pain, recently medicated    Activity Tolerance:   Good    After treatment patient left in no apparent distress:   Supine in bed, Call bell within reach, Bed / chair alarm activated, Caregiver / family present, and Side rails x 3    COMMUNICATION/COLLABORATION:   The patients plan of care was discussed with: Physical therapist and Registered nurse. Patient was educated regarding His deficit(s) of RUE non-functional strength, subluxation, elbow tightness as this relates to His diagnosis of +CVA. He demonstrated Good understanding as evidenced by verbalizing/demonstrating understanding.     Leonard Seats, OT  Time Calculation: 29 mins

## 2023-03-22 NOTE — PROGRESS NOTES
Hospitalist Progress Note    Subjective:   Daily Progress Note: 3/22/2023 10:25 AM    Hospital Course:  Pt admitted for progressive right facial droop, right side numbness and weakness. MRI was done , was a limited study showing an acute stroke in the left posterior corona radiata/basal ganglia on 3/14/23. He was on the medical floor but became increasingly agitated and was transferred to the ICU for Precedex drip and was weaned off ,transferred back to the medical floor. CTA of the head and neck does not reveal any large vessel stenosis. Aggressive control of lifestyle modifications, BP and weight loss are recommended.      Subjective:   Overnight uneventful per pt, no new complains  R shoulder much improved , sitting up well on the chair today AM    Current Facility-Administered Medications   Medication Dose Route Frequency    traMADoL (ULTRAM) tablet 50 mg  50 mg Oral Q6H PRN    loratadine (CLARITIN) tablet 10 mg  10 mg Oral QHS    hydrOXYzine HCL (ATARAX) tablet 10 mg  10 mg Oral TID PRN    amLODIPine (NORVASC) tablet 10 mg  10 mg Oral DAILY    hydrALAZINE (APRESOLINE) 20 mg/mL injection 20 mg  20 mg IntraVENous Q6H PRN    labetaloL (NORMODYNE;TRANDATE) injection 20 mg  20 mg IntraVENous Q6H PRN    carvediloL (COREG) tablet 12.5 mg  12.5 mg Oral BID WITH MEALS    alum-mag hydroxide-simeth (MYLANTA) oral suspension 30 mL  30 mL Oral Q4H PRN    traZODone (DESYREL) tablet 50 mg  50 mg Oral QHS PRN    melatonin tablet 4.5 mg  4.5 mg Oral QHS PRN    atorvastatin (LIPITOR) tablet 80 mg  80 mg Oral QHS    aspirin chewable tablet 81 mg  81 mg Oral DAILY    cloNIDine HCL (CATAPRES) tablet 0.2 mg  0.2 mg Oral BID    clopidogreL (PLAVIX) tablet 75 mg  75 mg Oral DAILY    sacubitriL-valsartan (ENTRESTO) 24-26 mg tablet 1 Tablet  1 Tablet Oral Q12H    ziprasidone (GEODON) 10 mg in sterile water (preservative free) 0.5 mL injection  10 mg IntraMUSCular Q12H PRN    insulin lispro (HUMALOG) injection   SubCUTAneous AC&HS glucose chewable tablet 16 g  4 Tablet Oral PRN    glucagon (GLUCAGEN) injection 1 mg  1 mg IntraMUSCular PRN    alcohol 62% (NOZIN) nasal  1 Ampule  1 Ampule Topical Q12H    albuterol-ipratropium (DUO-NEB) 2.5 MG-0.5 MG/3 ML  3 mL Nebulization Q4H PRN    ondansetron (ZOFRAN) injection 4 mg  4 mg IntraVENous Q4H PRN    acetaminophen (TYLENOL) tablet 650 mg  650 mg Oral Q4H PRN    Or    acetaminophen (TYLENOL) solution 650 mg  650 mg Per NG tube Q4H PRN    Or    acetaminophen (TYLENOL) suppository 650 mg  650 mg Rectal Q4H PRN    bisacodyL (DULCOLAX) suppository 10 mg  10 mg Rectal DAILY PRN    enoxaparin (LOVENOX) injection 40 mg  40 mg SubCUTAneous Q12H            Objective:     Visit Vitals  /70 (BP 1 Location: Left upper arm, BP Patient Position: Sitting)   Pulse 77   Temp 97.9 °F (36.6 °C)   Resp 16   Ht 5' 7\" (1.702 m)   Wt 147 kg (324 lb 1.2 oz)   SpO2 100%   BMI 50.76 kg/m²    O2 Flow Rate (L/min): 4 l/min O2 Device: None (Room air)    Temp (24hrs), Av.9 °F (36.6 °C), Min:97.7 °F (36.5 °C), Max:98.1 °F (36.7 °C)      No intake/output data recorded.    1901 -  0700  In: -   Out: 275 [Urine:275]    PHYSICAL EXAM:    General appearance: Comfortable not in distress  Heart: Regular rate and rhythm, no murmurs  HEENT: No thyroid enlargement  Lungs: Bilateral air entry present, no crackles or wheezing  Abdomen: Soft, nontender, no guarding  Extremities no edema  Neurological exam alert awake oriented x3, right-sided hemiplegia      Data Review    Recent Results (from the past 24 hour(s))   GLUCOSE, POC    Collection Time: 23  4:32 PM   Result Value Ref Range    Glucose (POC) 123 (H) 65 - 117 mg/dL    Performed by Marco RODRIGUEZ    GLUCOSE, POC    Collection Time: 23  9:29 PM   Result Value Ref Range    Glucose (POC) 124 (H) 65 - 117 mg/dL    Performed by Neli Aguillon PCT    GLUCOSE, POC    Collection Time: 23  6:42 AM   Result Value Ref Range    Glucose (POC) 120 (H) 65 - 117 mg/dL    Performed by [employer] Clear PCT    GLUCOSE, POC    Collection Time: 03/22/23 11:14 AM   Result Value Ref Range    Glucose (POC) 140 (H) 65 - 117 mg/dL    Performed by Jaun Cummings RN        XR ABD (KUB)   Final Result   Addendum (preliminary) 1 of 1   Addendum: Correction: Enteric tube traverses expected course to below the   diaphragm into the left upper quadrant. Final   Enteric tube tip lies just above the gastroesophageal junction. XR CHEST PORT   Final Result   Stable interstitial edema pattern with cardiomegaly. Gastric tube visualized but   tip not included. XR ABD PORT  1 V   Final Result   NG tube in place. MRI BRAIN WO CONT   Final Result      1. Acute infarct in the left posterior corona radiata/basal ganglia. XR CHEST PORT   Final Result      New mild interstitial edema         XR CHEST PORT   Final Result   New, moderate cardiomegaly. Grossly clear lungs. CTA CODE NEURO HEAD AND NECK W CONT   Final Result   1. No large vessel occlusion or hemodynamically significant carotid stenosis. 2.  Chronic left centrum semiovale hypodensity likely sequela of chronic   microvascular angiopathy. 3.  Decrease cerebral perfusion in the bilateral periventricular white matter   with 81 cc area of ischemic tissue in the bilateral frontal lobes right greater   than left without underlying vascular lesion to explain this perfusion   abnormality. CT CODE NEURO PERF W CBF   Final Result   1. No large vessel occlusion or hemodynamically significant carotid stenosis. 2.  Chronic left centrum semiovale hypodensity likely sequela of chronic   microvascular angiopathy. 3.  Decrease cerebral perfusion in the bilateral periventricular white matter   with 81 cc area of ischemic tissue in the bilateral frontal lobes right greater   than left without underlying vascular lesion to explain this perfusion   abnormality.       CT CODE NEURO HEAD WO CONTRAST Final Result   No acute intracranial process seen          Active Problems:    Right hemiplegia ([facility] (3/13/2023)      Ischemic stroke ([facility] (3/13/2023)      Assessment/Plan:   Acute ischemic stroke- in the  left basal ganglia, with right side hemiparesis: Continue aspirin, Plavix, statin. Hemoglobin A1c is 5.4 and LDL is 70 point two 2D echocardiogram showed ejection fraction of around 35 to 40% bubble study could not be performed due to patient's noncompliance. PT OT recommended inpatient rehab. Case management consulted-no payor source, working on [employer] bed placement- Financial paperwork per CM to be done by pt    2. Hypertension urgency: Overnight patient noted hypertensive urgency and had to be started on Cardene drip. Now weaned from [address]. Coreg to 12.5 twice daily. Continue clonidine, Entresto and also added Norvasc. On as needed hydralazine and labetalol. DCd tele monitoring    3. Systolic heart failure- EF 35-40%, on Entresto, Coreg. Stable    4.  CAD-continue aspirin, Plavix and statin  5. R shoulder pain - tramadol prn for the positional (strain) pain per history    WILLARD: 3/22?-23  Barriers: Gail bed placement in inpatient rehab placement- [facility]        DVT Prophylaxis: lovenox  Code Status:  Full Code  POA: [address] [facility]      BVRV FXSX discussed with:   _____patient, staff nurse, __________________________________________________________    Total time (including chart review and note writing): 26 mins  Vicki Foley MD

## 2023-03-22 NOTE — PROGRESS NOTES
End of Shift Note    Bedside shift change report given to [address] (oncoming nurse) by Yeni Hallman RN (offgoing nurse). Report included the following information SBAR, Kardex, and MAR    Shift worked:  7a-7p   Shift summary and any significant changes:     Pain med given twice. See MAR. Labs drawn late due to patient being hard stick. Concerns for physician to address:  none   Zone phone for oncoming shift:   [phone]     Patient Information  Kota Pena  51 y.o.  3/12/2023  8:04 PM by Jolene Padilla Ankita was admitted from Home    Problem List  Patient Active Problem List    Diagnosis Date Noted    Right hemiplegia ([facility] 03/13/2023    Ischemic stroke ([facility] 03/13/2023    Dilated cardiomyopathy ([facility] 08/17/2020    Hypertension 08/14/2020    Class 3 severe obesity in adult [facility]) 08/12/2020    Diverticulitis of colon with perforation 01/23/2018    Diverticulitis 01/23/2018     Past Medical History:   Diagnosis Date    Bell's palsy 01/25/2019    Diverticulitis of colon with perforation 01/2018    sigmoid    Diverticulitis of colon with perforation 08/09/2020    Hypertension     Kidney infection     Haresh Hunt syndrome (geniculate herpes zoster)        Core Measures:  CVA: Yes Yes  CHF:No No  PNA:No No    Activity:  Activity Level: Up with Assistance  Number times ambulated in hallways past shift: 0  Number of times OOB to chair past shift: 4    Cardiac:   Cardiac Monitoring: No      Cardiac Rhythm: Sinus Rhythm    Access:   Current line(s): PIV   Central Line? No   PICC LINE? No     Genitourinary:   Urinary status: voiding  Urinary Catheter?  No     Respiratory:   O2 Device: None (Room air)  Chronic home O2 use?: NO  Incentive spirometer at bedside: NO       GI:  Last Bowel Movement Date: 03/18/23  Current diet:  ADULT DIET Dysphagia - Soft & Bite Sized  DIET ONE TIME MESSAGE  Passing flatus: YES  Tolerating current diet: YES       Pain Management:   Patient states pain is manageable on current regimen: YES    Skin:  Ricardo Score: 17  Interventions: increase time out of bed    Patient Safety:  Fall Score:    Interventions: bed/chair alarm, assistive device (walker, cane, etc), gripper socks, pt to call before getting OOB, and stay with me (per policy)     @Rollbelt  @dexterity to release roll belt  Yes/No ( must document dexterity  here by stating Yes or No here, otherwise this is a restraint and must follow restraint documentation policy.)    DVT prophylaxis:  DVT prophylaxis Med- Yes  DVT prophylaxis SCD or TD- No     Wounds: (If Applicable)  Wounds- No  Location     Active Consults:  IP CONSULT TO NEUROLOGY  IP CONSULT TO NEUROLOGY    Length of Stay:  Expected LOS: 2d 21h  Actual LOS: 9  Discharge Plan:  Yes       Kimber Brittle, RN

## 2023-03-23 LAB
GLUCOSE BLD STRIP.AUTO-MCNC: 112 MG/DL (ref 65–117)
GLUCOSE BLD STRIP.AUTO-MCNC: 119 MG/DL (ref 65–117)
GLUCOSE BLD STRIP.AUTO-MCNC: 129 MG/DL (ref 65–117)
GLUCOSE BLD STRIP.AUTO-MCNC: 130 MG/DL (ref 65–117)
SERVICE CMNT-IMP: ABNORMAL
SERVICE CMNT-IMP: NORMAL

## 2023-03-23 PROCEDURE — 74011250637 HC RX REV CODE- 250/637: Performed by: INTERNAL MEDICINE

## 2023-03-23 PROCEDURE — 97112 NEUROMUSCULAR REEDUCATION: CPT | Performed by: OCCUPATIONAL THERAPIST

## 2023-03-23 PROCEDURE — 97535 SELF CARE MNGMENT TRAINING: CPT | Performed by: OCCUPATIONAL THERAPIST

## 2023-03-23 PROCEDURE — 97530 THERAPEUTIC ACTIVITIES: CPT

## 2023-03-23 PROCEDURE — U0005 INFEC AGEN DETEC AMPLI PROBE: HCPCS

## 2023-03-23 PROCEDURE — 74011250637 HC RX REV CODE- 250/637: Performed by: NURSE PRACTITIONER

## 2023-03-23 PROCEDURE — 97112 NEUROMUSCULAR REEDUCATION: CPT

## 2023-03-23 PROCEDURE — 74011250637 HC RX REV CODE- 250/637: Performed by: STUDENT IN AN ORGANIZED HEALTH CARE EDUCATION/TRAINING PROGRAM

## 2023-03-23 PROCEDURE — 65270000046 HC RM TELEMETRY

## 2023-03-23 PROCEDURE — 97530 THERAPEUTIC ACTIVITIES: CPT | Performed by: OCCUPATIONAL THERAPIST

## 2023-03-23 PROCEDURE — 74011250636 HC RX REV CODE- 250/636: Performed by: NURSE PRACTITIONER

## 2023-03-23 PROCEDURE — 82962 GLUCOSE BLOOD TEST: CPT

## 2023-03-23 RX ADMIN — CARVEDILOL 12.5 MG: 12.5 TABLET, FILM COATED ORAL at 17:01

## 2023-03-23 RX ADMIN — MELATONIN 4.5 MG: at 21:06

## 2023-03-23 RX ADMIN — ATORVASTATIN CALCIUM 80 MG: 40 TABLET, FILM COATED ORAL at 21:07

## 2023-03-23 RX ADMIN — SACUBITRIL AND VALSARTAN 1 TABLET: 24; 26 TABLET, FILM COATED ORAL at 08:43

## 2023-03-23 RX ADMIN — ENOXAPARIN SODIUM 40 MG: 100 INJECTION SUBCUTANEOUS at 06:09

## 2023-03-23 RX ADMIN — CARVEDILOL 12.5 MG: 12.5 TABLET, FILM COATED ORAL at 08:43

## 2023-03-23 RX ADMIN — ASPIRIN 81 MG: 81 TABLET, CHEWABLE ORAL at 08:43

## 2023-03-23 RX ADMIN — CLONIDINE HYDROCHLORIDE 0.2 MG: 0.1 TABLET ORAL at 08:43

## 2023-03-23 RX ADMIN — ENOXAPARIN SODIUM 40 MG: 100 INJECTION SUBCUTANEOUS at 17:01

## 2023-03-23 RX ADMIN — TRAMADOL HYDROCHLORIDE 50 MG: 50 TABLET ORAL at 03:16

## 2023-03-23 RX ADMIN — CLOPIDOGREL BISULFATE 75 MG: 75 TABLET ORAL at 08:42

## 2023-03-23 RX ADMIN — TRAZODONE HYDROCHLORIDE 50 MG: 50 TABLET ORAL at 21:06

## 2023-03-23 RX ADMIN — Medication 1 AMPULE: at 21:15

## 2023-03-23 RX ADMIN — CLONIDINE HYDROCHLORIDE 0.2 MG: 0.1 TABLET ORAL at 17:01

## 2023-03-23 RX ADMIN — TRAMADOL HYDROCHLORIDE 50 MG: 50 TABLET ORAL at 08:43

## 2023-03-23 RX ADMIN — TRAMADOL HYDROCHLORIDE 50 MG: 50 TABLET ORAL at 17:01

## 2023-03-23 RX ADMIN — AMLODIPINE BESYLATE 10 MG: 5 TABLET ORAL at 10:46

## 2023-03-23 RX ADMIN — LORATADINE 10 MG: 10 TABLET ORAL at 21:06

## 2023-03-23 RX ADMIN — SACUBITRIL AND VALSARTAN 1 TABLET: 24; 26 TABLET, FILM COATED ORAL at 21:07

## 2023-03-23 NOTE — PROGRESS NOTES
Problem: Self Care Deficits Care Plan (Adult)  Goal: *Acute Goals and Plan of Care (Insert Text)  Description: FUNCTIONAL STATUS PRIOR TO ADMISSION: Patient was independent and active without use of DME. Works at a for . Recently bought his own home and was managing a large garden. HOME SUPPORT PRIOR TO ADMISSION: The patient lived alone with sister and mother to provide assistance. Occupational Therapy Weekly Re-assessment:  Continue all goals below    Occupational Therapy Goals:  Initiated 3/15/2023  1. Patient will perform grooming with supervision/set-up within 7 days. 2. Patient will perform upper body dressing with minimal assistance within 7 days. 3. Patient will perform upper body bathing with minimal assistance within 7 days. 4. Patient will improve dynamic sitting balance to CGA for improved independence with ADLS within 7 days. 5. Patient will improve fugl haynes score by 5 points within 7 days. 6. Patient will transfer from drop arm recliner or drop arm bedside commode with best technique and max assist within 7 days. Outcome: Progressing Towards Goal    OCCUPATIONAL THERAPY TREATMENT  Patient: Alina Fletcher (5 y.o. male)  Date: 3/23/2023  Diagnosis: Right hemiplegia ([facility] [G81.91]  Ischemic stroke ([facility] [I63.9] <principal problem not specified>      Precautions: Fall (dense right norm)  Chart, occupational therapy assessment, plan of care, and goals were reviewed. ASSESSMENT  Patient continues with skilled OT services and is progressing towards goals. Patient is extremely motivated and participates well in therapy session. Patient is demonstrating improvements in bed mobility and dynamic sitting balance. He participated in facilitation techniques for R UE while seated edge of bed, with success for shoulder elevation, protraction, and retraction. Patient returned to bed at the end of the session.  Emphasized the importance of positioning his R UE on pillows to protect the integrity of his shoulder joint. Continue skilled OT treatment to maximize independence and safety in ADL. PLAN :  Patient continues to benefit from skilled intervention to address the above impairments. Continue treatment per established plan of care to address goals. Recommendation for discharge: (in order for the patient to meet his/her long term goals)  Therapy 3 hours per day 5-7 days per week    This discharge recommendation:  Has been made in collaboration with the attending provider and/or case management    IF patient discharges home will need the following DME: TBD by rehab       SUBJECTIVE:   Patient stated I wish I could go over there, like now!   (to rehab)    OBJECTIVE DATA SUMMARY:   Cognitive/Behavioral Status:  Neurologic State: Alert  Orientation Level: Oriented X4  Cognition: Follows commands     Perseveration: No perseveration noted  Safety/Judgement: Home safety; Fall prevention    Functional Mobility and Transfers for ADLs:  Bed Mobility:  Rolling: Minimum assistance  Supine to Sit: Contact guard assistance; Additional time (head of bed slightly elevated)  Sit to Supine: Minimum assistance; Additional time (assist with R LE)  Scooting: Minimum assistance;Contact guard assistance (while seated edge of bed)      Balance:  Sitting - Static: Good (unsupported)  Sitting - Dynamic: Good (unsupported)    ADL Intervention:  Educated on the importance of positioning to minimize complication to R shoulder, like subluxation; he indicates understanding. Left him with his R UE propped on multiple pillows to enable approximation of the gleno-humeral joint. Upper Body [address]: Minimum  assistance; Compensatory technique training  Cues: Verbal cues provided;Visual cues provided      Cognitive Retraining  Safety/Judgement: Home safety; Fall prevention    Neuro Re-Education:   Patient participated in R UE management/exercises while sitting edge of bed.  He tolerated facilitation techniques to attempt to elicit increased active ROM. He demonstrates partial active shoulder elevation, protraction, and retraction. No active elbow, forearm, or wrist movement noted, despite facilitation. Patient exhibits a minimal finger flexion. Passive ROM completed in muscle groups without active ROM. Encouraged him to complete exercises using L UE to assist throughout the day. Pain:  No complaints    Activity Tolerance:   Good    After treatment patient left in no apparent distress:   Supine in bed, Call bell within reach, Bed / chair alarm activated, and Side rails x 3    COMMUNICATION/COLLABORATION:   The patients plan of care was discussed with: Physical therapist, Registered nurse, and Case management.      ANNE Martin/L  Time Calculation: 55 mins

## 2023-03-23 NOTE — PROGRESS NOTES
Problem: Aspiration - Risk of  Goal: *Absence of aspiration  Outcome: Progressing Towards Goal     Problem: Patient Education: Go to Patient Education Activity  Goal: Patient/Family Education  Outcome: Progressing Towards Goal     Problem: Pressure Injury - Risk of  Goal: *Prevention of pressure injury  Description: Document Ricardo Scale and appropriate interventions in the flowsheet.   Outcome: Progressing Towards Goal  Note: Pressure Injury Interventions:  Sensory Interventions: Assess changes in LOC    Moisture Interventions: Minimize layers    Activity Interventions: Increase time out of bed    Mobility Interventions: HOB 30 degrees or less    Nutrition Interventions: Document food/fluid/supplement intake    Friction and Shear Interventions: Apply protective barrier, creams and emollients                Problem: Patient Education: Go to Patient Education Activity  Goal: Patient/Family Education  Outcome: Progressing Towards Goal     Problem: Patient Education: Go to Patient Education Activity  Goal: Patient/Family Education  Outcome: Progressing Towards Goal     Problem: TIA/CVA Stroke: 0-24 hours  Goal: Off Pathway (Use only if patient is Off Pathway)  Outcome: Progressing Towards Goal  Goal: Activity/Safety  Outcome: Progressing Towards Goal  Goal: Consults, if ordered  Outcome: Progressing Towards Goal  Goal: Diagnostic Test/Procedures  Outcome: Progressing Towards Goal  Goal: Nutrition/Diet  Outcome: Progressing Towards Goal  Goal: Discharge Planning  Outcome: Progressing Towards Goal  Goal: Medications  Outcome: Progressing Towards Goal  Goal: Respiratory  Outcome: Progressing Towards Goal  Goal: Treatments/Interventions/Procedures  Outcome: Progressing Towards Goal  Goal: Minimize risk of bleeding post-thrombolytic infusion  Outcome: Progressing Towards Goal  Goal: Monitor for complications post-thrombolytic infusion  Outcome: Progressing Towards Goal  Goal: Psychosocial  Outcome: Progressing Towards Goal  Goal: *Hemodynamically stable  Outcome: Progressing Towards Goal  Goal: *Neurologically stable  Description: Absence of additional neurological deficits    Outcome: Progressing Towards Goal  Goal: *Verbalizes anxiety and depression are reduced or absent  Outcome: Progressing Towards Goal  Goal: *Absence of Signs of Aspiration on Current Diet  Outcome: Progressing Towards Goal  Goal: *Absence of deep venous thrombosis signs and symptoms(Stroke Metric)  Outcome: Progressing Towards Goal  Goal: *Ability to perform ADLs and demonstrates progressive mobility and function  Outcome: Progressing Towards Goal  Goal: *Stroke education started(Stroke Metric)  Outcome: Progressing Towards Goal  Goal: *Dysphagia screen performed(Stroke Metric)  Outcome: Progressing Towards Goal  Goal: *Rehab consulted(Stroke Metric)  Outcome: Progressing Towards Goal     Problem: TIA/CVA Stroke: Day 2 Until Discharge  Goal: Off Pathway (Use only if patient is Off Pathway)  Outcome: Progressing Towards Goal  Goal: Activity/Safety  Outcome: Progressing Towards Goal  Goal: Diagnostic Test/Procedures  Outcome: Progressing Towards Goal  Goal: Nutrition/Diet  Outcome: Progressing Towards Goal  Goal: Discharge Planning  Outcome: Progressing Towards Goal  Goal: Medications  Outcome: Progressing Towards Goal  Goal: Respiratory  Outcome: Progressing Towards Goal  Goal: Treatments/Interventions/Procedures  Outcome: Progressing Towards Goal  Goal: Psychosocial  Outcome: Progressing Towards Goal  Goal: *Verbalizes anxiety and depression are reduced or absent  Outcome: Progressing Towards Goal  Goal: *Absence of aspiration  Outcome: Progressing Towards Goal  Goal: *Absence of deep venous thrombosis signs and symptoms(Stroke Metric)  Outcome: Progressing Towards Goal  Goal: *Optimal pain control at patient's stated goal  Outcome: Progressing Towards Goal  Goal: *Tolerating diet  Outcome: Progressing Towards Goal  Goal: *Ability to perform ADLs and demonstrates progressive mobility and function  Outcome: Progressing Towards Goal  Goal: *Stroke education continued(Stroke Metric)  Outcome: Progressing Towards Goal     Problem: Ischemic Stroke: Discharge Outcomes  Goal: *Verbalizes anxiety and depression are reduced or absent  Outcome: Progressing Towards Goal  Goal: *Verbalize understanding of risk factor modification(Stroke Metric)  Outcome: Progressing Towards Goal  Goal: *Hemodynamically stable  Outcome: Progressing Towards Goal  Goal: *Absence of aspiration pneumonia  Outcome: Progressing Towards Goal  Goal: *Aware of needed dietary changes  Outcome: Progressing Towards Goal  Goal: *Verbalize understanding of prescribed medications including anti-coagulants, anti-lipid, and/or anti-platelets(Stroke Metric)  Outcome: Progressing Towards Goal  Goal: *Tolerating diet  Outcome: Progressing Towards Goal  Goal: *Aware of follow-up diagnostics related to anticoagulants  Outcome: Progressing Towards Goal  Goal: *Ability to perform ADLs and demonstrates progressive mobility and function  Outcome: Progressing Towards Goal  Goal: *Absence of DVT(Stroke Metric)  Outcome: Progressing Towards Goal  Goal: *Absence of aspiration  Outcome: Progressing Towards Goal  Goal: *Optimal pain control at patient's stated goal  Outcome: Progressing Towards Goal  Goal: *Home safety concerns addressed  Outcome: Progressing Towards Goal  Goal: *Describes available resources and support systems  Outcome: Progressing Towards Goal  Goal: *Verbalizes understanding of activation of EMS(911) for stroke symptoms(Stroke Metric)  Outcome: Progressing Towards Goal  Goal: *Understands and describes signs and symptoms to report to providers(Stroke Metric)  Outcome: Progressing Towards Goal  Goal: *Neurolgocially stable (absence of additional neurological deficits)  Outcome: Progressing Towards Goal  Goal: *Verbalizes importance of follow-up with primary care physician(Stroke Metric)  Outcome: Progressing Towards Goal  Goal: *Smoking cessation discussed,if applicable(Stroke Metric)  Outcome: Progressing Towards Goal  Goal: *Depression screening completed(Stroke Metric)  Outcome: Progressing Towards Goal     Problem: Falls - Risk of  Goal: *Absence of Falls  Description: Document Dilcia Fall Risk and appropriate interventions in the flowsheet. Outcome: Progressing Towards Goal     Problem: Patient Education: Go to Patient Education Activity  Goal: Patient/Family Education  Outcome: Progressing Towards Goal     Problem: Falls - Risk of  Goal: *Absence of Falls  Description: Document Radha Betty Fall Risk and appropriate interventions in the flowsheet.   Outcome: Progressing Towards Goal

## 2023-03-23 NOTE — PROGRESS NOTES
Transition of Care Plan:     RUR:13%     LOS: 10  GLOS: 2.9   WILLARD: STABLE 3/24 ? SEE BELOW       Disposition: Gail Bed at Acute Rehab. BELOW COPY AND PASTE FROM PREVIOUS CM NOTES:       Pt transferred to Neuro on 3/18/23. CM completed chart review. Pt needs Gail Bed at Acute Rehab. Referrals are going to be sent to [facility] and [facility] to see if they can accept Pt. CM talked to Pt Sister, Colleen Sandrine: [phone] and she indicated that she talked to [address] [place] [address] today and that they are working on [facility]. Noted indicated that Medicaid Application was SENT TO [address] [place] [address] Road #[id number]      is willing to complete FA if needed once someone can provide one to her. If SNF or IPR: Date FOC offered:3/20  Date [facility] received:N/A  Date authorization started with reference number:N/A  Date authorization received and expires:N/A  Accepting facility:N/A     Follow up appointments: To be done prior to discharge     DME needed:None     Transportation at Discharge BLS    I SPOKE with Jory Mtz this am from [address] [place] [address] they have all the financial information from  and the patient has been approved for a gail bed pending 75% cost responsbility-  Jory Mtz is reaching out to .  is also completing paperwork for [facility]. She will be bringing in completed in today. I reached out to [facility] 1205 pm-  they now are not offering any gail beds until April in [place]. [place] does not offer any gail beds. Per Mady Adams would require a deposit and she will be discussing with  today. Will need a Covid once we have official acceptance. Care manager to follow.         Hector Whitman, RN   1206 pm   3-23-23

## 2023-03-23 NOTE — PROGRESS NOTES
Hospitalist Progress Note    Subjective:   Daily Progress Note: 3/23/2023 09:15 AM    Hospital Course:  Pt admitted for progressive right facial droop, right side numbness and weakness. MRI was done , was a limited study showing an acute stroke in the left posterior corona radiata/basal ganglia on 3/14/23. He was on the medical floor but became increasingly agitated and was transferred to the ICU for Precedex drip and was weaned off ,transferred back to the medical floor. CTA of the head and neck does not reveal any large vessel stenosis. Aggressive control of lifestyle modifications, BP and weight loss are recommended.      Subjective:   Overnight uneventful per pt, no new complains  R shoulder much improved , sitting up well on the chair again today AM , wants to get back into the bed    Current Facility-Administered Medications   Medication Dose Route Frequency    traMADoL (ULTRAM) tablet 50 mg  50 mg Oral Q6H PRN    loratadine (CLARITIN) tablet 10 mg  10 mg Oral QHS    hydrOXYzine HCL (ATARAX) tablet 10 mg  10 mg Oral TID PRN    amLODIPine (NORVASC) tablet 10 mg  10 mg Oral DAILY    hydrALAZINE (APRESOLINE) 20 mg/mL injection 20 mg  20 mg IntraVENous Q6H PRN    labetaloL (NORMODYNE;TRANDATE) injection 20 mg  20 mg IntraVENous Q6H PRN    carvediloL (COREG) tablet 12.5 mg  12.5 mg Oral BID WITH MEALS    alum-mag hydroxide-simeth (MYLANTA) oral suspension 30 mL  30 mL Oral Q4H PRN    traZODone (DESYREL) tablet 50 mg  50 mg Oral QHS PRN    melatonin tablet 4.5 mg  4.5 mg Oral QHS PRN    atorvastatin (LIPITOR) tablet 80 mg  80 mg Oral QHS    aspirin chewable tablet 81 mg  81 mg Oral DAILY    cloNIDine HCL (CATAPRES) tablet 0.2 mg  0.2 mg Oral BID    clopidogreL (PLAVIX) tablet 75 mg  75 mg Oral DAILY    sacubitriL-valsartan (ENTRESTO) 24-26 mg tablet 1 Tablet  1 Tablet Oral Q12H    ziprasidone (GEODON) 10 mg in sterile water (preservative free) 0.5 mL injection  10 mg IntraMUSCular Q12H PRN    insulin lispro (HUMALOG) injection   SubCUTAneous AC&HS    glucose chewable tablet 16 g  4 Tablet Oral PRN    glucagon (GLUCAGEN) injection 1 mg  1 mg IntraMUSCular PRN    alcohol 62% (NOZIN) nasal  1 Ampule  1 Ampule Topical Q12H    albuterol-ipratropium (DUO-NEB) 2.5 MG-0.5 MG/3 ML  3 mL Nebulization Q4H PRN    ondansetron (ZOFRAN) injection 4 mg  4 mg IntraVENous Q4H PRN    acetaminophen (TYLENOL) tablet 650 mg  650 mg Oral Q4H PRN    Or    acetaminophen (TYLENOL) solution 650 mg  650 mg Per NG tube Q4H PRN    Or    acetaminophen (TYLENOL) suppository 650 mg  650 mg Rectal Q4H PRN    bisacodyL (DULCOLAX) suppository 10 mg  10 mg Rectal DAILY PRN    enoxaparin (LOVENOX) injection 40 mg  40 mg SubCUTAneous Q12H            Objective:     Visit Vitals  /72   Pulse 75   Temp 98.4 °F (36.9 °C)   Resp 18   Ht 5' 7\" (1.702 m)   Wt 147 kg (324 lb 1.2 oz)   SpO2 98%   BMI 50.76 kg/m²    O2 Flow Rate (L/min): 4 l/min O2 Device: None (Room air)    Temp (24hrs), Av.7 °F (36.5 °C), Min:97.3 °F (36.3 °C), Max:98.4 °F (36.9 °C)      No intake/output data recorded. No intake/output data recorded.     PHYSICAL EXAM:    General appearance: Comfortable not in distress  Heart: Regular rate and rhythm, no murmurs  HEENT: No thyroid enlargement  Lungs: Bilateral air entry present, no crackles or wheezing  Abdomen: Soft, nontender, no guarding  Extremities no edema  Neurological exam alert awake oriented x3, right-sided hemiplegia      Data Review    Recent Results (from the past 24 hour(s))   GLUCOSE, POC    Collection Time: 23  3:51 PM   Result Value Ref Range    Glucose (POC) 96 65 - 117 mg/dL    Performed by Hannah Roper RN    GLUCOSE, POC    Collection Time: 23  9:39 PM   Result Value Ref Range    Glucose (POC) 117 65 - 117 mg/dL    Performed by Janee Perez RN    GLUCOSE, POC    Collection Time: 23  6:12 AM   Result Value Ref Range    Glucose (POC) 129 (H) 65 - 117 mg/dL    Performed by Xiomara VILLAGRANV RN)        XR ABD (KUB)   Final Result   Addendum (preliminary) 1 of 1   Addendum: Correction: Enteric tube traverses expected course to below the   diaphragm into the left upper quadrant. Final   Enteric tube tip lies just above the gastroesophageal junction. XR CHEST PORT   Final Result   Stable interstitial edema pattern with cardiomegaly. Gastric tube visualized but   tip not included. XR ABD PORT  1 V   Final Result   NG tube in place. MRI BRAIN WO CONT   Final Result      1. Acute infarct in the left posterior corona radiata/basal ganglia. XR CHEST PORT   Final Result      New mild interstitial edema         XR CHEST PORT   Final Result   New, moderate cardiomegaly. Grossly clear lungs. CTA CODE NEURO HEAD AND NECK W CONT   Final Result   1. No large vessel occlusion or hemodynamically significant carotid stenosis. 2.  Chronic left centrum semiovale hypodensity likely sequela of chronic   microvascular angiopathy. 3.  Decrease cerebral perfusion in the bilateral periventricular white matter   with 81 cc area of ischemic tissue in the bilateral frontal lobes right greater   than left without underlying vascular lesion to explain this perfusion   abnormality. CT CODE NEURO PERF W CBF   Final Result   1. No large vessel occlusion or hemodynamically significant carotid stenosis. 2.  Chronic left centrum semiovale hypodensity likely sequela of chronic   microvascular angiopathy. 3.  Decrease cerebral perfusion in the bilateral periventricular white matter   with 81 cc area of ischemic tissue in the bilateral frontal lobes right greater   than left without underlying vascular lesion to explain this perfusion   abnormality.       CT CODE NEURO HEAD WO CONTRAST   Final Result   No acute intracranial process seen          Active Problems:    Right hemiplegia ([address] (3/13/2023)      Ischemic stroke ([address] (3/13/2023)      Assessment/Plan:   Acute ischemic stroke- in the  left basal ganglia, with right side hemiparesis: Continue aspirin, Plavix, statin. Hemoglobin A1c is 5.4 and LDL is 70 point two 2D echocardiogram showed ejection fraction of around 35 to 40% bubble study could not be performed due to patient's noncompliance. PT OT recommended inpatient rehab. Case management consulted-no payor source, working on [employer] bed placement- Financial paperwork per CM to be done by pt    2. Hypertension urgency: Overnight patient noted hypertensive urgency and had to be started on Cardene drip. Now weaned from [address]. Coreg to 12.5 twice daily. Continue clonidine, Entresto and also added Norvasc. On as needed hydralazine and labetalol. DCd tele monitoring    3. Systolic heart failure- EF 35-40%, on Entresto, Coreg. Stable    4. CAD-continue aspirin, Plavix and statin  5. R shoulder pain - tramadol prn for the positional (strain) pain per history    WILLARD: 3/23?-24  Barriers: Gail bed placement in inpatient rehab placement- [facility]? ? TBD        DVT Prophylaxis: lovenox  Code Status:  Full Code  POA: [address]      YCK[facility] THPM discussed with:   _____patient, staff nurse, __________________________________________________________    Total time (including chart review and note writing): 26 mins  Chente Covarrubias MD

## 2023-03-23 NOTE — PROGRESS NOTES
Problem: Mobility Impaired (Adult and Pediatric)  Goal: *Acute Goals and Plan of Care (Insert Text)  Description:   FUNCTIONAL STATUS PRIOR TO ADMISSION: Patient was independent and active without use of DME. Works full-time as a . Enjoys gardening and working on home projects. HOME SUPPORT PRIOR TO ADMISSION: The patient lived alone, sister and mother both live nearby and are available to assist.  2    Physical Therapy Goals  Goals continued 3/22/23    Physical Therapy Goals  Initiated 3/15/2023  1. Patient will move from supine to sit and sit to supine , scoot up and down, and roll side to side in bed with minimal assistance/contact guard assist within 7 day(s). 2.  Patient will transfer from bed to chair and chair to bed with moderate assistance  using the least restrictive device within 7 day(s). 3.  Patient will perform sit to stand with moderate assistance  within 7 day(s). 4.  Patient will ambulate with moderate assistance  for 10 feet with the least restrictive device within 7 day(s). 5.  Patient will improve Owens Balance score by 7 points within 7 days. Outcome: Progressing Towards Goal   PHYSICAL THERAPY TREATMENT  Patient: Bunny Barahona (67 y.o. male)  Date: 3/23/2023  Diagnosis: Right hemiplegia ([facility] [G81.91]  Ischemic stroke ([facility] [I63.9] <principal problem not specified>      Precautions: Fall (dense right norm)  Chart, physical therapy assessment, plan of care and goals were reviewed. ASSESSMENT  Patient continues with skilled PT services and is progressing towards goals. Patient received in bed and agreeable to participate, came to sit EOB with SBA and performed LAQ through full range on right with tactile facilitation x 4 reps. Donned sling for right UE and came to stand using Best  and min assist x 2, tolerated balance activities, weight shifting and was able to lift right LE off foot plate in preparation for gait.   Good effort throughout session and patient remains motivated to improve, continue to recommend IPR. Left in supine with call bell in reach. Current Level of Function Impacting Discharge (mobility/balance): min assist x 2 to stand with [email or web address], remains non-ambulatory    Other factors to consider for discharge: needs eh bed         PLAN :  Patient continues to benefit from skilled intervention to address the above impairments. Continue treatment per established plan of care. to address goals. Recommendation for discharge: (in order for the patient to meet his/her long term goals)  Therapy 3 hours per day 5-7 days per week    This discharge recommendation:  Has been made in collaboration with the attending provider and/or case management    IF patient discharges home will need the following DME: hospital bed and wheelchair       SUBJECTIVE:   Patient stated I finally slept last night.     OBJECTIVE DATA SUMMARY:   Critical Behavior:  Neurologic State: Alert  Orientation Level: Oriented X4  Cognition: Follows commands  Safety/Judgement: Home safety, Fall prevention  Functional Mobility Training:  Bed Mobility:  Rolling: Minimum assistance  Supine to Sit: Stand-by assistance  Sit to Supine: Minimum assistance  Scooting: Stand-by assistance        Transfers:  Sit to Stand: Minimum assistance; Other (comment); Assist x2 (best )  Stand to Sit: Minimum assistance;Assist x2 (best )                             Balance:  Sitting - Static: Good (unsupported)  Sitting - Dynamic: Good (unsupported)  Standing: Impaired; With support  Standing - Static: Fair;Constant support  Standing - Dynamic : Fair;Constant support  Ambulation/Gait Training:                                                        Stairs:               Therapeutic Exercises:   Supine leg press, ankle pf.df with assist, LAQ  Standing weight shift, small arc knee bend, stepping with right LE  Pain Rating:      Activity Tolerance:   Good    After treatment patient left in no apparent distress:   Supine in bed, Call bell within reach, Bed / chair alarm activated, and Side rails x 3    COMMUNICATION/COLLABORATION:   The patients plan of care was discussed with: Occupational therapist and Registered nurse.      Jose Raul Carvalho, PT   Time Calculation: 34 mins

## 2023-03-23 NOTE — PROGRESS NOTES
End of Shift Note    Bedside shift change report given to Marquita RN (oncoming nurse) by Jeny Gamboa RN (offgoing nurse). Report included the following information SBAR and MAR    Shift worked:  7a-7p   Shift summary and any significant changes:     Pain med given twice per pt request. See MAR. Covid test performed per MD order. Concerns for physician to address:  none   Zone phone for oncoming shift:   [phone]     Patient Information  Ariel Lucas  51 y.o.  3/12/2023  8:04 PM by Donis Esteban was admitted from Home    Problem List  Patient Active Problem List    Diagnosis Date Noted    Right hemiplegia ([facility] 03/13/2023    Ischemic stroke ([facility] 03/13/2023    Dilated cardiomyopathy ([facility] 08/17/2020    Hypertension 08/14/2020    Class 3 severe obesity in adult [facility]) 08/12/2020    Diverticulitis of colon with perforation 01/23/2018    Diverticulitis 01/23/2018     Past Medical History:   Diagnosis Date    Bell's palsy 01/25/2019    Diverticulitis of colon with perforation 01/2018    sigmoid    Diverticulitis of colon with perforation 08/09/2020    Hypertension     Kidney infection     Haresh Hunt syndrome (geniculate herpes zoster)        Core Measures:  CVA: Yes Yes  CHF:No No  PNA:No No    Activity:  Activity Level: Chair, Up with Assistance  Number times ambulated in hallways past shift: 0  Number of times OOB to chair past shift: 3    Cardiac:   Cardiac Monitoring: No      Cardiac Rhythm: Sinus Rhythm    Access:   Current line(s): PIV   Central Line? No   PICC LINE? No     Genitourinary:   Urinary status: voiding  Urinary Catheter?  No     Respiratory:   O2 Device: None (Room air)  Chronic home O2 use?: NO  Incentive spirometer at bedside: NO       GI:  Last Bowel Movement Date: 03/18/23  Current diet:  ADULT DIET Dysphagia - Soft & Bite Sized  DIET ONE TIME MESSAGE  Passing flatus: YES  Tolerating current diet: YES       Pain Management:   Patient states pain is manageable on current regimen: YES    Skin:  Ricardo Score: 17  Interventions: PT/OT consult and limit briefs    Patient Safety:  Fall Score:    Interventions: bed/chair alarm, assistive device (walker, cane, etc), gripper socks, pt to call before getting OOB, and stay with me (per policy)     @Rollbelt  @dexterity to release roll belt  Yes/No ( must document dexterity  here by stating Yes or No here, otherwise this is a restraint and must follow restraint documentation policy.)    DVT prophylaxis:  DVT prophylaxis Med- Yes  DVT prophylaxis SCD or TD- No     Wounds: (If Applicable)  Wounds- No  Location     Active Consults:  IP CONSULT TO NEUROLOGY  IP CONSULT TO NEUROLOGY    Length of Stay:  Expected LOS: 2d 21h  Actual LOS: 10  [facility]      Perri Quinones RN

## 2023-03-23 NOTE — PROGRESS NOTES
End of Shift Note    Bedside shift change report given to Paco Dumont RN (oncoming nurse) by Ke Alexander RN (offgoing nurse). Report included the following information SBAR, Kardex, and MAR    Shift worked:  Nights    Shift summary and any significant changes:     C/o pain medicated with prn per mar x1. Pt slept between care. No significant shift events. Concerns for physician to address:  none   Zone phone for oncoming shift:   [phone]     Patient Information  Anne Palma  51 y.o.  3/12/2023  8:04 PM by Chau Tran, Hema Killian was admitted from Home    Problem List  Patient Active Problem List    Diagnosis Date Noted    Right hemiplegia ([facility] 03/13/2023    Ischemic stroke ([facility] 03/13/2023    Dilated cardiomyopathy ([facility] 08/17/2020    Hypertension 08/14/2020    Class 3 severe obesity in adult [facility]) 08/12/2020    Diverticulitis of colon with perforation 01/23/2018    Diverticulitis 01/23/2018     Past Medical History:   Diagnosis Date    Bell's palsy 01/25/2019    Diverticulitis of colon with perforation 01/2018    sigmoid    Diverticulitis of colon with perforation 08/09/2020    Hypertension     Kidney infection     Haresh Hunt syndrome (geniculate herpes zoster)        Core Measures:  CVA: Yes Yes  CHF:No No  PNA:No No    Activity:  Activity Level: Up with Assistance  Number times ambulated in hallways past shift: 0  Number of times OOB to chair past shift: 4    Cardiac:   Cardiac Monitoring: No      Cardiac Rhythm: Sinus Rhythm    Access:   Current line(s): PIV   Central Line? No   PICC LINE? No     Genitourinary:   Urinary status: voiding  Urinary Catheter?  No     Respiratory:   O2 Device: None (Room air)  Chronic home O2 use?: NO  Incentive spirometer at bedside: NO       GI:  Last Bowel Movement Date: 03/18/23  Current diet:  ADULT DIET Dysphagia - Soft & Bite Sized  DIET ONE TIME MESSAGE  Passing flatus: YES  Tolerating current diet: YES       Pain Management:   Patient states pain is manageable on current regimen: YES    Skin:  Ricardo Score: 17  Interventions: increase time out of bed    Patient Safety:  Fall Score:    Interventions: bed/chair alarm, assistive device (walker, cane, etc), gripper socks, pt to call before getting OOB, and stay with me (per policy)     @Rollbelt  @dexterity to release roll belt  Yes/No ( must document dexterity  here by stating Yes or No here, otherwise this is a restraint and must follow restraint documentation policy.)    DVT prophylaxis:  DVT prophylaxis Med- Yes  DVT prophylaxis SCD or TD- No     Wounds: (If Applicable)  Wounds- No  Location     Active Consults:  IP CONSULT TO NEUROLOGY  IP CONSULT TO NEUROLOGY    Length of Stay:  Expected LOS: 2d 21h  Actual LOS: 10  Discharge Plan: Yes       Guanako Le RN

## 2023-03-24 LAB
GLUCOSE BLD STRIP.AUTO-MCNC: 105 MG/DL (ref 65–117)
GLUCOSE BLD STRIP.AUTO-MCNC: 114 MG/DL (ref 65–117)
GLUCOSE BLD STRIP.AUTO-MCNC: 125 MG/DL (ref 65–117)
GLUCOSE BLD STRIP.AUTO-MCNC: 145 MG/DL (ref 65–117)
SARS-COV-2 RNA RESP QL NAA+PROBE: NOT DETECTED
SERVICE CMNT-IMP: ABNORMAL
SERVICE CMNT-IMP: ABNORMAL
SERVICE CMNT-IMP: NORMAL
SERVICE CMNT-IMP: NORMAL
SOURCE, COVRS: NORMAL

## 2023-03-24 PROCEDURE — 74011250637 HC RX REV CODE- 250/637: Performed by: STUDENT IN AN ORGANIZED HEALTH CARE EDUCATION/TRAINING PROGRAM

## 2023-03-24 PROCEDURE — 82962 GLUCOSE BLOOD TEST: CPT

## 2023-03-24 PROCEDURE — 65270000046 HC RM TELEMETRY

## 2023-03-24 PROCEDURE — 74011250637 HC RX REV CODE- 250/637: Performed by: INTERNAL MEDICINE

## 2023-03-24 PROCEDURE — 74011250636 HC RX REV CODE- 250/636: Performed by: NURSE PRACTITIONER

## 2023-03-24 PROCEDURE — 97110 THERAPEUTIC EXERCISES: CPT

## 2023-03-24 PROCEDURE — 97112 NEUROMUSCULAR REEDUCATION: CPT

## 2023-03-24 PROCEDURE — 74011250637 HC RX REV CODE- 250/637: Performed by: NURSE PRACTITIONER

## 2023-03-24 RX ADMIN — SACUBITRIL AND VALSARTAN 1 TABLET: 24; 26 TABLET, FILM COATED ORAL at 09:20

## 2023-03-24 RX ADMIN — AMLODIPINE BESYLATE 10 MG: 5 TABLET ORAL at 09:20

## 2023-03-24 RX ADMIN — ATORVASTATIN CALCIUM 80 MG: 40 TABLET, FILM COATED ORAL at 21:30

## 2023-03-24 RX ADMIN — ENOXAPARIN SODIUM 40 MG: 100 INJECTION SUBCUTANEOUS at 05:43

## 2023-03-24 RX ADMIN — CLONIDINE HYDROCHLORIDE 0.2 MG: 0.1 TABLET ORAL at 17:58

## 2023-03-24 RX ADMIN — Medication 1 AMPULE: at 21:30

## 2023-03-24 RX ADMIN — HYDROXYZINE HYDROCHLORIDE 10 MG: 10 TABLET, FILM COATED ORAL at 03:32

## 2023-03-24 RX ADMIN — ENOXAPARIN SODIUM 40 MG: 100 INJECTION SUBCUTANEOUS at 17:58

## 2023-03-24 RX ADMIN — Medication 1 AMPULE: at 09:22

## 2023-03-24 RX ADMIN — CLONIDINE HYDROCHLORIDE 0.2 MG: 0.1 TABLET ORAL at 09:20

## 2023-03-24 RX ADMIN — TRAMADOL HYDROCHLORIDE 50 MG: 50 TABLET ORAL at 03:31

## 2023-03-24 RX ADMIN — CARVEDILOL 12.5 MG: 12.5 TABLET, FILM COATED ORAL at 09:20

## 2023-03-24 RX ADMIN — CLOPIDOGREL BISULFATE 75 MG: 75 TABLET ORAL at 09:20

## 2023-03-24 RX ADMIN — SACUBITRIL AND VALSARTAN 1 TABLET: 24; 26 TABLET, FILM COATED ORAL at 21:30

## 2023-03-24 RX ADMIN — CARVEDILOL 12.5 MG: 12.5 TABLET, FILM COATED ORAL at 17:58

## 2023-03-24 RX ADMIN — TRAMADOL HYDROCHLORIDE 50 MG: 50 TABLET ORAL at 21:34

## 2023-03-24 RX ADMIN — ASPIRIN 81 MG: 81 TABLET, CHEWABLE ORAL at 09:20

## 2023-03-24 NOTE — PROGRESS NOTES
End of Shift Note    Bedside shift change report given to MARCI MICHELLE (oncoming nurse) by Benjamin Wasserman RN (offgoing nurse). Report included the following information SBAR, Kardex, and MAR    Shift worked:  7a-7p   Shift summary and any significant changes:     -PATIENT SAT UP IN CHAIR     Concerns for physician to address:  NONE   Zone phone for oncoming shift:   [phone]     Patient Information  Arlie Siemens  51 y.o.  3/12/2023  8:04 PM by Kelsi Rizo Robyn was admitted from Home    Problem List  Patient Active Problem List    Diagnosis Date Noted    Right hemiplegia ([facility] 03/13/2023    Ischemic stroke ([facility] 03/13/2023    Dilated cardiomyopathy ([facility] 08/17/2020    Hypertension 08/14/2020    Class 3 severe obesity in adult [facility]) 08/12/2020    Diverticulitis of colon with perforation 01/23/2018    Diverticulitis 01/23/2018     Past Medical History:   Diagnosis Date    Bell's palsy 01/25/2019    Diverticulitis of colon with perforation 01/2018    sigmoid    Diverticulitis of colon with perforation 08/09/2020    Hypertension     Kidney infection     Haresh Hunt syndrome (geniculate herpes zoster)        Core Measures:  CVA: Yes Yes  CHF:No No  PNA:No No    Activity:  Activity Level: Up with Assistance  Number times ambulated in hallways past shift: 0  Number of times OOB to chair past shift: 4    Cardiac:   Cardiac Monitoring: No      Cardiac Rhythm: Sinus Rhythm    Access:   Current line(s): PIV   Central Line? No   PICC LINE? No     Genitourinary:   Urinary status: voiding  Urinary Catheter?  No     Respiratory:   O2 Device: None (Room air)  Chronic home O2 use?: NO  Incentive spirometer at bedside: NO       GI:  Last Bowel Movement Date: 03/18/23  Current diet:  ADULT DIET Dysphagia - Soft & Bite Sized  DIET ONE TIME MESSAGE  Passing flatus: YES  Tolerating current diet: YES       Pain Management:   Patient states pain is manageable on current regimen: YES    Skin:  Ricardo Score: 17  Interventions: increase time out of bed    Patient Safety:  Fall Score:    Interventions: bed/chair alarm, assistive device (walker, cane, etc), gripper socks, pt to call before getting OOB, and stay with me (per policy)     @Rollbelt  @dexterity to release roll belt  Yes/No ( must document dexterity  here by stating Yes or No here, otherwise this is a restraint and must follow restraint documentation policy.)    DVT prophylaxis:  DVT prophylaxis Med- Yes  DVT prophylaxis SCD or TD- No     Wounds: (If Applicable)  Wounds- No  Location     Active Consults:  IP CONSULT TO NEUROLOGY  IP CONSULT TO NEUROLOGY    Length of Stay:  Expected LOS: 2d 21h  Actual LOS: 11  Discharge Plan: Yes       Yolie Rios RN

## 2023-03-24 NOTE — PROGRESS NOTES
Transition of Care Plan:     RUR:13%     LOS: 11  GLOS: 2.9   WILLARD: STABLE 3/24 ? SEE BELOW       Disposition: Gail Bed at Acute Rehab. To clarify [employer] would cover approx 75% rehab with patient responsibility 25%. BELOW COPY AND PASTE FROM PREVIOUS CM NOTES:       Pt transferred to Neuro on 3/18/23. CM completed chart review. Pt needs Gail Bed at Acute Rehab. Referrals are going to be sent to [facility] and [facility] to see if they can accept Pt. CM talked to Pt Sister, Inder Santos: [phone] and she indicated that she talked to [address] [place] [address] today and that they are working on [facility]. Noted indicated that Medicaid Application was SENT TO [address] [place] [address] Road #[id number]     Sister is willing to complete FA if needed once someone can provide one to her. If SNF or IPR: Date FOC offered:3/20  Date [address] received:N/A  Date authorization started with reference number:N/A  Date authorization received and expires:N/A  Accepting facility:N/A     Follow up appointments: To be done prior to discharge     DME needed:None     Transportation at Discharge BLS     1500 I SPOKE WITH sister they are still exploring whether they can devise a plan to financially go forward. They need to give 2800.00 to [address] per sister deposit and they are looking into how they can do this. Covid was ordered. ---------------------------------------------------------------------    [facility] approved for a gial bed pending 25% cost responsbility. Sister is also completing paperwork for [facility]. However they are not accepting any gail patients in [place] until April. [facility] in [place] does not accept gail cases.   Sister and family were going to speak last night to see if they can assist patient with the above mentioned cost.  [facility] would require a deposit of approx 2000.00    Xuan Hopson, RN   944 am   Care manager

## 2023-03-24 NOTE — PROGRESS NOTES
Problem: Mobility Impaired (Adult and Pediatric)  Goal: *Acute Goals and Plan of Care (Insert Text)  Description:   FUNCTIONAL STATUS PRIOR TO ADMISSION: Patient was independent and active without use of DME. Works full-time as a . Enjoys gardening and working on home projects. HOME SUPPORT PRIOR TO ADMISSION: The patient lived alone, sister and mother both live nearby and are available to assist.  2    Physical Therapy Goals  Goals continued 3/22/23    Physical Therapy Goals  Initiated 3/15/2023  1. Patient will move from supine to sit and sit to supine , scoot up and down, and roll side to side in bed with minimal assistance/contact guard assist within 7 day(s). 2.  Patient will transfer from bed to chair and chair to bed with moderate assistance  using the least restrictive device within 7 day(s). 3.  Patient will perform sit to stand with moderate assistance  within 7 day(s). 4.  Patient will ambulate with moderate assistance  for 10 feet with the least restrictive device within 7 day(s). 5.  Patient will improve Owens Balance score by 7 points within 7 days. Outcome: Progressing Towards Goal   PHYSICAL THERAPY TREATMENT  Patient: Deja Ram (59 y.o. male)  Date: 3/24/2023  Diagnosis: Right hemiplegia ([facility] [address] [G81.91]  Ischemic stroke ([facility] [address] [I63.9] <principal problem not specified>      Precautions: Fall (dense right norm)  Chart, physical therapy assessment, plan of care and goals were reviewed. ASSESSMENT  Patient continues with skilled PT services and is progressing towards goals. Patient received up in chair and agreeable to participate, came to stand with Best  and min assist x 2 (to guard right UE and right knee position). Stood x approx 15 minutes to work on strengthening, balance and core activation exercises with good tolerance. Reviewed seated exercises and gave written copy to practice over the weekend.   Left up in chair with call bell in reach, patient is very motivated to improve and exerts good effort during therapy, needs IPR to advance to gait training. Current Level of Function Impacting Discharge (mobility/balance): min x 2 to stand with Best     Other factors to consider for discharge: needs eh bed         PLAN :  Patient continues to benefit from skilled intervention to address the above impairments. Continue treatment per established plan of care. to address goals. Recommendation for discharge: (in order for the patient to meet his/her long term goals)  Therapy 3 hours per day 5-7 days per week    This discharge recommendation:  Has been made in collaboration with the attending provider and/or case management    IF patient discharges home will need the following DME: to be determined (TBD)       SUBJECTIVE:   Patient stated what can I work on myself? Nacho Alfaro    OBJECTIVE DATA SUMMARY:   Critical Behavior:  Neurologic State: Alert  Orientation Level: Oriented X4  Cognition: Follows commands, Appropriate safety awareness  Safety/Judgement: Home safety, Fall prevention  Functional Mobility Training:  Bed Mobility:                    Transfers:  Sit to Stand: Minimum assistance;Assist x2; Other (comment) (best )  Stand to Sit: Minimum assistance;Assist x2; Other (comment) (best )                             Balance:  Sitting: Intact  Standing: Impaired  Standing - Static: Fair;Constant support  Standing - Dynamic : Fair;Constant support  Ambulation/Gait Training:                                                        Stairs: Therapeutic Exercises:   Standing:  head turns, shoulder rolls, reaching, weigh shifts, knee bends, heel raises, trunk rotations  Pain Rating:      Activity Tolerance:   Good    After treatment patient left in no apparent distress:   Sitting in chair, Call bell within reach, and Caregiver / family present    COMMUNICATION/COLLABORATION:   The patients plan of care was discussed with: Registered nurse. Walker Finnegans, PT   Time Calculation: 28 mins

## 2023-03-24 NOTE — PROGRESS NOTES
End of Shift Note    Bedside shift change report given to Norma Max RN (oncoming nurse) by Geraldine Nguyen RN (offgoing nurse). Report included the following information SBAR and MAR    Shift worked:  Nights   Shift summary and any significant changes:     Tramadol and atarax given X1. Pt complained of painful cramping over back and abdomen. VSS and no significant events over night. Pt wants 4 bed rails    Patient refused bed alarm:    Is the patient alert and oriented Yes     Is patient education documented on risk/injury related to falls Patient verbalizes Yes     Voices understanding of education provided Yes          Concerns for physician to address:  none   Zone phone for oncoming shift:   [phone]     Patient Information  Anne Ny  51 y.o.  3/12/2023  8:04 PM by Hema Galvezaac was admitted from Home    Problem List  Patient Active Problem List    Diagnosis Date Noted    Right hemiplegia ([facility] [address] 03/13/2023    Ischemic stroke ([facility] [address] 03/13/2023    Dilated cardiomyopathy ([facility] [address] 08/17/2020    Hypertension 08/14/2020    Class 3 severe obesity in adult [facility]) 08/12/2020    Diverticulitis of colon with perforation 01/23/2018    Diverticulitis 01/23/2018     Past Medical History:   Diagnosis Date    Bell's palsy 01/25/2019    Diverticulitis of colon with perforation 01/2018    sigmoid    Diverticulitis of colon with perforation 08/09/2020    Hypertension     Kidney infection     Haresh Hunt syndrome (geniculate herpes zoster)        Core Measures:  CVA: Yes Yes  CHF:No No  PNA:No No    Activity:  Activity Level: Up with Assistance  Number times ambulated in hallways past shift: 0  Number of times OOB to chair past shift: 3    Cardiac:   Cardiac Monitoring: No      Cardiac Rhythm: Sinus Rhythm    Access:   Current line(s): PIV   Central Line? No   PICC LINE? No     Genitourinary:   Urinary status: voiding  Urinary Catheter?  No     Respiratory:   O2 Device: None (Room air)  Chronic home O2 use?: NO  Incentive spirometer at bedside: NO       GI:  Last Bowel Movement Date: 03/23/23  Current diet:  ADULT DIET Dysphagia - Soft & Bite Sized  DIET ONE TIME MESSAGE  Passing flatus: YES  Tolerating current diet: YES       Pain Management:   Patient states pain is manageable on current regimen: YES    Skin:  Ricardo Score: 18  Interventions: PT/OT consult and limit briefs    Patient Safety:  Fall Score:    Interventions: bed/chair alarm, assistive device (walker, cane, etc), gripper socks, pt to call before getting OOB, and stay with me (per policy)     @Rollbelt  @dexterity to release roll belt  Yes/No ( must document dexterity  here by stating Yes or No here, otherwise this is a restraint and must follow restraint documentation policy.)    DVT prophylaxis:  DVT prophylaxis Med- Yes  DVT prophylaxis SCD or TD- No     Wounds: (If Applicable)  Wounds- No  Location     Active Consults:  IP CONSULT TO NEUROLOGY  IP CONSULT TO NEUROLOGY    Length of Stay:  Expected LOS: 2d 21h  Actual LOS: 11  [facility]charge Plan[facility]      Burak Leal RN

## 2023-03-24 NOTE — PROGRESS NOTES
Hospitalist Progress Note    Subjective:   Daily Progress Note: 3/24/2023 08:39 AM    Hospital Course:  Pt admitted for progressive right facial droop, right side numbness and weakness. MRI was done , was a limited study showing an acute stroke in the left posterior corona radiata/basal ganglia on 3/14/23. He was on the medical floor but became increasingly agitated and was transferred to the ICU for Precedex drip and was weaned off ,transferred back to the medical floor. CTA of the head and neck does not reveal any large vessel stenosis. Aggressive control of lifestyle modifications, BP and weight loss are recommended.      Subjective:   Overnight uneventful per pt, no new complains  R shoulder much improved , sitting up well on the chair again today AM , getting out of bed to chair most of the day, improving slowly    Current Facility-Administered Medications   Medication Dose Route Frequency    traMADoL (ULTRAM) tablet 50 mg  50 mg Oral Q6H PRN    loratadine (CLARITIN) tablet 10 mg  10 mg Oral QHS    hydrOXYzine HCL (ATARAX) tablet 10 mg  10 mg Oral TID PRN    amLODIPine (NORVASC) tablet 10 mg  10 mg Oral DAILY    hydrALAZINE (APRESOLINE) 20 mg/mL injection 20 mg  20 mg IntraVENous Q6H PRN    labetaloL (NORMODYNE;TRANDATE) injection 20 mg  20 mg IntraVENous Q6H PRN    carvediloL (COREG) tablet 12.5 mg  12.5 mg Oral BID WITH MEALS    alum-mag hydroxide-simeth (MYLANTA) oral suspension 30 mL  30 mL Oral Q4H PRN    traZODone (DESYREL) tablet 50 mg  50 mg Oral QHS PRN    melatonin tablet 4.5 mg  4.5 mg Oral QHS PRN    atorvastatin (LIPITOR) tablet 80 mg  80 mg Oral QHS    aspirin chewable tablet 81 mg  81 mg Oral DAILY    cloNIDine HCL (CATAPRES) tablet 0.2 mg  0.2 mg Oral BID    clopidogreL (PLAVIX) tablet 75 mg  75 mg Oral DAILY    sacubitriL-valsartan (ENTRESTO) 24-26 mg tablet 1 Tablet  1 Tablet Oral Q12H    ziprasidone (GEODON) 10 mg in sterile water (preservative free) 0.5 mL injection  10 mg IntraMUSCular Q12H PRN    insulin lispro (HUMALOG) injection   SubCUTAneous AC&HS    glucose chewable tablet 16 g  4 Tablet Oral PRN    glucagon (GLUCAGEN) injection 1 mg  1 mg IntraMUSCular PRN    alcohol 62% (NOZIN) nasal  1 Ampule  1 Ampule Topical Q12H    albuterol-ipratropium (DUO-NEB) 2.5 MG-0.5 MG/3 ML  3 mL Nebulization Q4H PRN    ondansetron (ZOFRAN) injection 4 mg  4 mg IntraVENous Q4H PRN    acetaminophen (TYLENOL) tablet 650 mg  650 mg Oral Q4H PRN    Or    acetaminophen (TYLENOL) solution 650 mg  650 mg Per NG tube Q4H PRN    Or    acetaminophen (TYLENOL) suppository 650 mg  650 mg Rectal Q4H PRN    bisacodyL (DULCOLAX) suppository 10 mg  10 mg Rectal DAILY PRN    enoxaparin (LOVENOX) injection 40 mg  40 mg SubCUTAneous Q12H            Objective:     Visit Vitals  /70   Pulse 67   Temp 97.3 °F (36.3 °C)   Resp 16   Ht 5' 7\" (1.702 m)   Wt 147 kg (324 lb 1.2 oz)   SpO2 100%   BMI 50.76 kg/m²    O2 Flow Rate (L/min): 4 l/min O2 Device: None (Room air)    Temp (24hrs), Av.6 °F (36.4 °C), Min:97.3 °F (36.3 °C), Max:98.4 °F (36.9 °C)      No intake/output data recorded. No intake/output data recorded.     PHYSICAL EXAM:    General appearance: Comfortable not in distress  Heart: Regular rate and rhythm, no murmurs  HEENT: No thyroid enlargement  Lungs: Bilateral air entry present, no crackles or wheezing  Abdomen: Soft, nontender, no guarding  Extremities no edema  Neurological exam alert awake oriented x3, right-sided hemiplegia      Data Review    Recent Results (from the past 24 hour(s))   GLUCOSE, POC    Collection Time: 23 12:36 PM   Result Value Ref Range    Glucose (POC) 130 (H) 65 - 117 mg/dL    Performed by Erika Nine PCT    GLUCOSE, POC    Collection Time: 23  4:00 PM   Result Value Ref Range    Glucose (POC) 112 65 - 117 mg/dL    Performed by Erika Cline PCT    GLUCOSE, POC    Collection Time: 23  9:02 PM   Result Value Ref Range    Glucose (POC) 119 (H) 65 - 117 mg/dL Performed by Abner Pichardo PCT    GLUCOSE, POC    Collection Time: 03/24/23  7:23 AM   Result Value Ref Range    Glucose (POC) 114 65 - 117 mg/dL    Performed by Abner Pichardo PCT        XR ABD (KUB)   Final Result   Addendum (preliminary) 1 of 1   Addendum: Correction: Enteric tube traverses expected course to below the   diaphragm into the left upper quadrant. Final   Enteric tube tip lies just above the gastroesophageal junction. XR CHEST PORT   Final Result   Stable interstitial edema pattern with cardiomegaly. Gastric tube visualized but   tip not included. XR ABD PORT  1 V   Final Result   NG tube in place. MRI BRAIN WO CONT   Final Result      1. Acute infarct in the left posterior corona radiata/basal ganglia. XR CHEST PORT   Final Result      New mild interstitial edema         XR CHEST PORT   Final Result   New, moderate cardiomegaly. Grossly clear lungs. CTA CODE NEURO HEAD AND NECK W CONT   Final Result   1. No large vessel occlusion or hemodynamically significant carotid stenosis. 2.  Chronic left centrum semiovale hypodensity likely sequela of chronic   microvascular angiopathy. 3.  Decrease cerebral perfusion in the bilateral periventricular white matter   with 81 cc area of ischemic tissue in the bilateral frontal lobes right greater   than left without underlying vascular lesion to explain this perfusion   abnormality. CT CODE NEURO PERF W CBF   Final Result   1. No large vessel occlusion or hemodynamically significant carotid stenosis. 2.  Chronic left centrum semiovale hypodensity likely sequela of chronic   microvascular angiopathy. 3.  Decrease cerebral perfusion in the bilateral periventricular white matter   with 81 cc area of ischemic tissue in the bilateral frontal lobes right greater   than left without underlying vascular lesion to explain this perfusion   abnormality.       CT CODE NEURO HEAD WO CONTRAST   Final Result   No acute intracranial process seen          Active Problems:    Right hemiplegia ([facility] [address] (3/13/2023)      Ischemic stroke ([facility] [address] (3/13/2023)      Assessment/Plan:   Acute ischemic stroke- in the  left basal ganglia, with right side hemiparesis: Continue aspirin, Plavix, statin. Hemoglobin A1c is 5.4 and LDL is 70 point two 2D echocardiogram showed ejection fraction of around 35 to 40% bubble study could not be performed due to patient's noncompliance. PT OT recommended inpatient rehab. Case management consulted-no payor source, [facility]- Financial paperwork per CM to be done by pt/sister-- may still have some share of deposit $$    2. Hypertension urgency: Overnight patient noted hypertensive urgency and had to be started on Cardene drip. Now weaned from [address]. Coreg to 12.5 twice daily. Continue clonidine, Entresto and also added Norvasc. On as needed hydralazine and labetalol. DCd tele monitoring    3. Systolic heart failure- EF 35-40%, on Entresto, Coreg. Stable    4. CAD-continue aspirin, Plavix and statin  5. R shoulder pain - tramadol prn for the positional (strain) pain per history    WILLARD: 3/24?-25  Barriers: [facility] bed placement in inpatient rehab placement- [facility] versus [facility]? ? TBD  PCP COVID test (3/23) sent/pending        DVT Prophylaxis: lovenox  Code Status:  Full Code  POA: [address]      Care Plan discussed with:   _____patient, staff nurse, __________________________________________________________    Total time (including chart review and note writing): 26 mins  Roxann Pacheco MD

## 2023-03-25 LAB
GLUCOSE BLD STRIP.AUTO-MCNC: 107 MG/DL (ref 65–117)
GLUCOSE BLD STRIP.AUTO-MCNC: 121 MG/DL (ref 65–117)
GLUCOSE BLD STRIP.AUTO-MCNC: 121 MG/DL (ref 65–117)
GLUCOSE BLD STRIP.AUTO-MCNC: 123 MG/DL (ref 65–117)
SERVICE CMNT-IMP: ABNORMAL
SERVICE CMNT-IMP: NORMAL

## 2023-03-25 PROCEDURE — 74011250637 HC RX REV CODE- 250/637: Performed by: STUDENT IN AN ORGANIZED HEALTH CARE EDUCATION/TRAINING PROGRAM

## 2023-03-25 PROCEDURE — 74011250636 HC RX REV CODE- 250/636: Performed by: NURSE PRACTITIONER

## 2023-03-25 PROCEDURE — 74011250637 HC RX REV CODE- 250/637: Performed by: NURSE PRACTITIONER

## 2023-03-25 PROCEDURE — 74011250637 HC RX REV CODE- 250/637: Performed by: INTERNAL MEDICINE

## 2023-03-25 PROCEDURE — 65270000046 HC RM TELEMETRY

## 2023-03-25 PROCEDURE — 82962 GLUCOSE BLOOD TEST: CPT

## 2023-03-25 RX ADMIN — TRAMADOL HYDROCHLORIDE 50 MG: 50 TABLET ORAL at 20:03

## 2023-03-25 RX ADMIN — CLOPIDOGREL BISULFATE 75 MG: 75 TABLET ORAL at 09:01

## 2023-03-25 RX ADMIN — AMLODIPINE BESYLATE 10 MG: 5 TABLET ORAL at 09:03

## 2023-03-25 RX ADMIN — ENOXAPARIN SODIUM 40 MG: 100 INJECTION SUBCUTANEOUS at 17:15

## 2023-03-25 RX ADMIN — Medication 1 AMPULE: at 21:52

## 2023-03-25 RX ADMIN — ENOXAPARIN SODIUM 40 MG: 100 INJECTION SUBCUTANEOUS at 05:22

## 2023-03-25 RX ADMIN — CARVEDILOL 12.5 MG: 12.5 TABLET, FILM COATED ORAL at 17:14

## 2023-03-25 RX ADMIN — LORATADINE 10 MG: 10 TABLET ORAL at 05:22

## 2023-03-25 RX ADMIN — MELATONIN 4.5 MG: at 21:55

## 2023-03-25 RX ADMIN — LORATADINE 10 MG: 10 TABLET ORAL at 22:00

## 2023-03-25 RX ADMIN — Medication 1 AMPULE: at 09:07

## 2023-03-25 RX ADMIN — ATORVASTATIN CALCIUM 80 MG: 40 TABLET, FILM COATED ORAL at 21:57

## 2023-03-25 RX ADMIN — CARVEDILOL 12.5 MG: 12.5 TABLET, FILM COATED ORAL at 09:02

## 2023-03-25 RX ADMIN — SACUBITRIL AND VALSARTAN 1 TABLET: 24; 26 TABLET, FILM COATED ORAL at 09:03

## 2023-03-25 RX ADMIN — ASPIRIN 81 MG: 81 TABLET, CHEWABLE ORAL at 09:01

## 2023-03-25 RX ADMIN — ACETAMINOPHEN 325MG 650 MG: 325 TABLET ORAL at 09:11

## 2023-03-25 RX ADMIN — ACETAMINOPHEN 325MG 650 MG: 325 TABLET ORAL at 15:08

## 2023-03-25 RX ADMIN — SACUBITRIL AND VALSARTAN 1 TABLET: 24; 26 TABLET, FILM COATED ORAL at 22:00

## 2023-03-25 NOTE — PROGRESS NOTES
End of Shift Note    Bedside shift change report given to Ed Connor RN (oncoming nurse) by Sidra Oviedo RN (offgoing nurse). Report included the following information SBAR, Kardex, and MAR    Shift worked:  7a-7p   Shift summary and any significant changes:     -PATIENT SAT UP IN CHAIR MOST OF DAY   -FORMED BM TODAY 3/25/23     Concerns for physician to address:  NONE   Zone phone for oncoming shift:   [phone]     Patient Information  Evaline Severe  51 y.o.  3/12/2023  8:04 PM by Hailey Mayes DO. Aye Severe was admitted from Home    Problem List  Patient Active Problem List    Diagnosis Date Noted    Right hemiplegia ([facility] [address] 03/13/2023    Ischemic stroke ([facility] [address] 03/13/2023    Dilated cardiomyopathy ([facility] [address] 08/17/2020    Hypertension 08/14/2020    Class 3 severe obesity in adult [facility]) 08/12/2020    Diverticulitis of colon with perforation 01/23/2018    Diverticulitis 01/23/2018     Past Medical History:   Diagnosis Date    Bell's palsy 01/25/2019    Diverticulitis of colon with perforation 01/2018    sigmoid    Diverticulitis of colon with perforation 08/09/2020    Hypertension     Kidney infection     Gilman Hunt syndrome (geniculate herpes zoster)        Core Measures:  CVA: Yes Yes  CHF:No No  PNA:No No    Activity:  Activity Level: Up with Assistance  Number times ambulated in hallways past shift: 0  Number of times OOB to chair past shift: 4    Cardiac:   Cardiac Monitoring: No      Cardiac Rhythm: Sinus Rhythm    Access:   Current line(s): PIV   Central Line? No   PICC LINE? No     Genitourinary:   Urinary status: voiding  Urinary Catheter?  No     Respiratory:   O2 Device: None (Room air)  Chronic home O2 use?: NO  Incentive spirometer at bedside: NO       GI:  Last Bowel Movement Date: 03/22/23  Current diet:  ADULT DIET Dysphagia - Soft & Bite Sized  DIET ONE TIME MESSAGE  Passing flatus: YES  Tolerating current diet: YES       Pain Management:   Patient states pain is manageable on current regimen: YES    Skin:  Ricardo Score: 17  Interventions: increase time out of bed    Patient Safety:  Fall Score:    Interventions: bed/chair alarm, assistive device (walker, cane, etc), gripper socks, pt to call before getting OOB, and stay with me (per policy)     @Rollbelt  @dexterity to release roll belt  Yes/No ( must document dexterity  here by stating Yes or No here, otherwise this is a restraint and must follow restraint documentation policy.)    DVT prophylaxis:  DVT prophylaxis Med- Yes  DVT prophylaxis SCD or TD- No     Wounds: (If Applicable)  Wounds- No  Location     Active Consults:  IP CONSULT TO NEUROLOGY  IP CONSULT TO NEUROLOGY    Length of Stay:  Expected LOS: 2d 21h  Actual LOS: 12  Discharge Plan: Yes       Madisyn Summers RN

## 2023-03-25 NOTE — PROGRESS NOTES
Hospitalist Progress Note    Subjective:   Daily Progress Note: 3/25/2023 10:14 AM    Hospital Course:  Pt admitted for progressive right facial droop, right side numbness and weakness. MRI was done , was a limited study showing an acute stroke in the left posterior corona radiata/basal ganglia on 3/14/23. He was on the medical floor but became increasingly agitated and was transferred to the ICU for Precedex drip and was weaned off ,transferred back to the medical floor. CTA of the head and neck does not reveal any large vessel stenosis. Aggressive control of lifestyle modifications, BP and weight loss are recommended.      Subjective:   Overnight uneventful per pt, no new complains  R shoulder much improved , sitting up well on the chair again today AM , getting out of bed to chair most of the day, improving slowly as per pt- not much change    Current Facility-Administered Medications   Medication Dose Route Frequency    traMADoL (ULTRAM) tablet 50 mg  50 mg Oral Q6H PRN    loratadine (CLARITIN) tablet 10 mg  10 mg Oral QHS    hydrOXYzine HCL (ATARAX) tablet 10 mg  10 mg Oral TID PRN    amLODIPine (NORVASC) tablet 10 mg  10 mg Oral DAILY    hydrALAZINE (APRESOLINE) 20 mg/mL injection 20 mg  20 mg IntraVENous Q6H PRN    labetaloL (NORMODYNE;TRANDATE) injection 20 mg  20 mg IntraVENous Q6H PRN    carvediloL (COREG) tablet 12.5 mg  12.5 mg Oral BID WITH MEALS    alum-mag hydroxide-simeth (MYLANTA) oral suspension 30 mL  30 mL Oral Q4H PRN    traZODone (DESYREL) tablet 50 mg  50 mg Oral QHS PRN    melatonin tablet 4.5 mg  4.5 mg Oral QHS PRN    atorvastatin (LIPITOR) tablet 80 mg  80 mg Oral QHS    aspirin chewable tablet 81 mg  81 mg Oral DAILY    cloNIDine HCL (CATAPRES) tablet 0.2 mg  0.2 mg Oral BID    clopidogreL (PLAVIX) tablet 75 mg  75 mg Oral DAILY    sacubitriL-valsartan (ENTRESTO) 24-26 mg tablet 1 Tablet  1 Tablet Oral Q12H    ziprasidone (GEODON) 10 mg in sterile water (preservative free) 0.5 mL injection  10 mg IntraMUSCular Q12H PRN    insulin lispro (HUMALOG) injection   SubCUTAneous AC&HS    glucose chewable tablet 16 g  4 Tablet Oral PRN    glucagon (GLUCAGEN) injection 1 mg  1 mg IntraMUSCular PRN    alcohol 62% (NOZIN) nasal  1 Ampule  1 Ampule Topical Q12H    albuterol-ipratropium (DUO-NEB) 2.5 MG-0.5 MG/3 ML  3 mL Nebulization Q4H PRN    ondansetron (ZOFRAN) injection 4 mg  4 mg IntraVENous Q4H PRN    acetaminophen (TYLENOL) tablet 650 mg  650 mg Oral Q4H PRN    Or    acetaminophen (TYLENOL) solution 650 mg  650 mg Per NG tube Q4H PRN    Or    acetaminophen (TYLENOL) suppository 650 mg  650 mg Rectal Q4H PRN    bisacodyL (DULCOLAX) suppository 10 mg  10 mg Rectal DAILY PRN    enoxaparin (LOVENOX) injection 40 mg  40 mg SubCUTAneous Q12H            Objective:     Visit Vitals  BP (!) 113/50   Pulse 77   Temp 97.7 °F (36.5 °C)   Resp 18   Ht 5' 7\" (1.702 m)   Wt 147 kg (324 lb 1.2 oz)   SpO2 98%   BMI 50.76 kg/m²    O2 Flow Rate (L/min): 4 l/min O2 Device: None (Room air)    Temp (24hrs), Av.7 °F (36.5 °C), Min:97.2 °F (36.2 °C), Max:98.1 °F (36.7 °C)      No intake/output data recorded. No intake/output data recorded.     PHYSICAL EXAM:    General appearance: Comfortable not in distress  Heart: Regular rate and rhythm, no murmurs  HEENT: No thyroid enlargement  Lungs: Bilateral air entry present, no crackles or wheezing  Abdomen: Soft, nontender, no guarding  Extremities no edema  Neurological exam alert awake oriented x3, right-sided hemiplegia      Data Review    Recent Results (from the past 24 hour(s))   GLUCOSE, POC    Collection Time: 23 10:46 AM   Result Value Ref Range    Glucose (POC) 125 (H) 65 - 117 mg/dL    Performed by Micah Eddy RN    GLUCOSE, POC    Collection Time: 23  4:08 PM   Result Value Ref Range    Glucose (POC) 105 65 - 117 mg/dL    Performed by Desmond Lovett PCT    GLUCOSE, POC    Collection Time: 23  9:23 PM   Result Value Ref Range Glucose (POC) 145 (H) 65 - 117 mg/dL    Performed by Pranav Diane PCT    GLUCOSE, POC    Collection Time: 03/25/23  6:28 AM   Result Value Ref Range    Glucose (POC) 121 (H) 65 - 117 mg/dL    Performed by Mejia Clay PCT        XR ABD (KUB)   Final Result   Addendum (preliminary) 1 of 1   Addendum: Correction: Enteric tube traverses expected course to below the   diaphragm into the left upper quadrant. Final   Enteric tube tip lies just above the gastroesophageal junction. XR CHEST PORT   Final Result   Stable interstitial edema pattern with cardiomegaly. Gastric tube visualized but   tip not included. XR ABD PORT  1 V   Final Result   NG tube in place. MRI BRAIN WO CONT   Final Result      1. Acute infarct in the left posterior corona radiata/basal ganglia. XR CHEST PORT   Final Result      New mild interstitial edema         XR CHEST PORT   Final Result   New, moderate cardiomegaly. Grossly clear lungs. CTA CODE NEURO HEAD AND NECK W CONT   Final Result   1. No large vessel occlusion or hemodynamically significant carotid stenosis. 2.  Chronic left centrum semiovale hypodensity likely sequela of chronic   microvascular angiopathy. 3.  Decrease cerebral perfusion in the bilateral periventricular white matter   with 81 cc area of ischemic tissue in the bilateral frontal lobes right greater   than left without underlying vascular lesion to explain this perfusion   abnormality. CT CODE NEURO PERF W CBF   Final Result   1. No large vessel occlusion or hemodynamically significant carotid stenosis. 2.  Chronic left centrum semiovale hypodensity likely sequela of chronic   microvascular angiopathy. 3.  Decrease cerebral perfusion in the bilateral periventricular white matter   with 81 cc area of ischemic tissue in the bilateral frontal lobes right greater   than left without underlying vascular lesion to explain this perfusion   abnormality.       CT CODE NEURO HEAD WO CONTRAST   Final Result   No acute intracranial process seen          Active Problems:    Right hemiplegia (HCC) (3/13/2023)      Ischemic stroke ([address] (3/13/2023)      Assessment/Plan:   Acute ischemic stroke- in the  left basal ganglia, with right side hemiparesis: Continue aspirin, Plavix, statin. Hemoglobin A1c is 5.4 and LDL is 70 point two 2D echocardiogram showed ejection fraction of around 35 to 40% bubble study could not be performed due to patient's noncompliance. PT OT recommended inpatient rehab. Case management consulted-no payor source, [facility]- Financial paperwork per CM to be done by pt/sister-- may still have some share of deposit $$- ~ 2000- sister working on it    2. Hypertension urgency: Overnight patient noted hypertensive urgency and had to be started on Cardene drip. Now weaned from [address]. Coreg to 12.5 twice daily. Continue clonidine, Entresto and also added Norvasc. On as needed hydralazine and labetalol. DCd tele monitoring    3. Systolic heart failure- EF 35-40%, on Entresto, Coreg. Stable    4. CAD-continue aspirin, Plavix and statin  5. R shoulder pain - tramadol prn for the positional (strain) pain per history    WILLARD: 3/26?-27  Barriers: [facility] bed placement in [facility] rehab placement- [facility]? ? TBD  PCP COVID test (3/23) came back negative (for placement)        DVT Prophylaxis: lovenox  Code Status:  Full Code  POA: [address]      SUQD ZGYA discussed with:   _____patient, staff nurse, __________________________________________________________    Total time (including chart review and note writing): 26 mins  Tom Shipley MD

## 2023-03-26 LAB
GLUCOSE BLD STRIP.AUTO-MCNC: 104 MG/DL (ref 65–117)
GLUCOSE BLD STRIP.AUTO-MCNC: 114 MG/DL (ref 65–117)
GLUCOSE BLD STRIP.AUTO-MCNC: 118 MG/DL (ref 65–117)
GLUCOSE BLD STRIP.AUTO-MCNC: 124 MG/DL (ref 65–117)
GLUCOSE BLD STRIP.AUTO-MCNC: 143 MG/DL (ref 65–117)
SERVICE CMNT-IMP: ABNORMAL
SERVICE CMNT-IMP: NORMAL
SERVICE CMNT-IMP: NORMAL

## 2023-03-26 PROCEDURE — 82962 GLUCOSE BLOOD TEST: CPT

## 2023-03-26 PROCEDURE — 74011250637 HC RX REV CODE- 250/637: Performed by: INTERNAL MEDICINE

## 2023-03-26 PROCEDURE — 74011250637 HC RX REV CODE- 250/637: Performed by: STUDENT IN AN ORGANIZED HEALTH CARE EDUCATION/TRAINING PROGRAM

## 2023-03-26 PROCEDURE — 74011250636 HC RX REV CODE- 250/636: Performed by: NURSE PRACTITIONER

## 2023-03-26 PROCEDURE — 65270000046 HC RM TELEMETRY

## 2023-03-26 PROCEDURE — 74011250637 HC RX REV CODE- 250/637: Performed by: NURSE PRACTITIONER

## 2023-03-26 RX ADMIN — CLONIDINE HYDROCHLORIDE 0.2 MG: 0.1 TABLET ORAL at 09:05

## 2023-03-26 RX ADMIN — MELATONIN 4.5 MG: at 21:23

## 2023-03-26 RX ADMIN — ASPIRIN 81 MG: 81 TABLET, CHEWABLE ORAL at 09:06

## 2023-03-26 RX ADMIN — CLOPIDOGREL BISULFATE 75 MG: 75 TABLET ORAL at 09:06

## 2023-03-26 RX ADMIN — Medication 1 AMPULE: at 09:10

## 2023-03-26 RX ADMIN — AMLODIPINE BESYLATE 10 MG: 5 TABLET ORAL at 09:05

## 2023-03-26 RX ADMIN — SACUBITRIL AND VALSARTAN 1 TABLET: 24; 26 TABLET, FILM COATED ORAL at 21:26

## 2023-03-26 RX ADMIN — TRAMADOL HYDROCHLORIDE 50 MG: 50 TABLET ORAL at 21:20

## 2023-03-26 RX ADMIN — LORATADINE 10 MG: 10 TABLET ORAL at 21:55

## 2023-03-26 RX ADMIN — CARVEDILOL 12.5 MG: 12.5 TABLET, FILM COATED ORAL at 17:21

## 2023-03-26 RX ADMIN — CARVEDILOL 12.5 MG: 12.5 TABLET, FILM COATED ORAL at 09:05

## 2023-03-26 RX ADMIN — ENOXAPARIN SODIUM 40 MG: 100 INJECTION SUBCUTANEOUS at 17:21

## 2023-03-26 RX ADMIN — Medication 1 AMPULE: at 21:25

## 2023-03-26 RX ADMIN — ENOXAPARIN SODIUM 40 MG: 100 INJECTION SUBCUTANEOUS at 05:08

## 2023-03-26 RX ADMIN — ATORVASTATIN CALCIUM 80 MG: 40 TABLET, FILM COATED ORAL at 21:22

## 2023-03-26 RX ADMIN — SACUBITRIL AND VALSARTAN 1 TABLET: 24; 26 TABLET, FILM COATED ORAL at 09:06

## 2023-03-26 RX ADMIN — TRAMADOL HYDROCHLORIDE 50 MG: 50 TABLET ORAL at 11:45

## 2023-03-26 NOTE — PROGRESS NOTES
End of Shift Note    Bedside shift change report given to Ivan Arellano RN (oncoming nurse) by Mary Salcedo RN (offgoing nurse). Report included the following information SBAR, Kardex, and MAR    Shift worked:  7p - 7a   Shift summary and any significant changes:    None     Concerns for physician to address:  NONE   Zone phone for oncoming shift:   [phone]     Patient Information  Salisbury Liner  51 y.o.  3/12/2023  8:04 PM by Luis Baca Bev Mccullough was admitted from Home    Problem List  Patient Active Problem List    Diagnosis Date Noted    Right hemiplegia ([address] 03/13/2023    Ischemic stroke ([address] 03/13/2023    Dilated cardiomyopathy ([address] 08/17/2020    Hypertension 08/14/2020    Class 3 severe obesity in adult [facility]) 08/12/2020    Diverticulitis of colon with perforation 01/23/2018    Diverticulitis 01/23/2018     Past Medical History:   Diagnosis Date    Bell's palsy 01/25/2019    Diverticulitis of colon with perforation 01/2018    sigmoid    Diverticulitis of colon with perforation 08/09/2020    Hypertension     Kidney infection     Haresh Hunt syndrome (geniculate herpes zoster)        Core Measures:  CVA: Yes Yes  CHF:No No  PNA:No No    Activity:  Activity Level: Up with Assistance  Number times ambulated in hallways past shift: 0  Number of times OOB to chair past shift: 4    Cardiac:   Cardiac Monitoring: No      Cardiac Rhythm: Sinus Rhythm    Access:   Current line(s): PIV   Central Line? No   PICC LINE? No     Genitourinary:   Urinary status: voiding  Urinary Catheter?  No     Respiratory:   O2 Device: None (Room air)  Chronic home O2 use?: NO  Incentive spirometer at bedside: NO       GI:  Last Bowel Movement Date: 03/25/23  Current diet:  ADULT DIET Dysphagia - Soft & Bite Sized  DIET ONE TIME MESSAGE  Passing flatus: YES  Tolerating current diet: YES       Pain Management:   Patient states pain is manageable on current regimen: YES    Skin:  Ricardo Score: 17  Interventions: increase time out of bed    Patient Safety:  Fall Score:    Interventions: bed/chair alarm, assistive device (walker, cane, etc), gripper socks, pt to call before getting OOB, and stay with me (per policy)     @Rollbelt  @dexterity to release roll belt  Yes/No ( must document dexterity  here by stating Yes or No here, otherwise this is a restraint and must follow restraint documentation policy.)    DVT prophylaxis:  DVT prophylaxis Med- Yes  DVT prophylaxis SCD or TD- No     Wounds: (If Applicable)  Wounds- No  Location     Active Consults:  IP CONSULT TO NEUROLOGY  IP CONSULT TO NEUROLOGY    Length of Stay:  Expected LOS: 2d 21h  Actual LOS: 12  Discharge Plan: Yes, when stable       Ana Lilia Kaufman RN

## 2023-03-26 NOTE — PROGRESS NOTES
End of Shift Note    Bedside shift change report given to David Glez RN (oncoming nurse) by Jorge Calderon RN (offgoing nurse). Report included the following information SBAR, Kardex, and MAR    Shift worked:  7a-7p   Shift summary and any significant changes:     -PATIENT SAT UP IN CHAIR MOST OF DAY   -FORMED BM TODAY 3/26/23     Concerns for physician to address:  NONE   Zone phone for oncoming shift:   [phone]     Patient Information  Liliam Moody  51 y.o.  3/12/2023  8:04 PM by Ivana Martinez Fede was admitted from Home    Problem List  Patient Active Problem List    Diagnosis Date Noted    Right hemiplegia ([facility] 03/13/2023    Ischemic stroke ([facility] 03/13/2023    Dilated cardiomyopathy ([facility] 08/17/2020    Hypertension 08/14/2020    Class 3 severe obesity in adult [facility]) 08/12/2020    Diverticulitis of colon with perforation 01/23/2018    Diverticulitis 01/23/2018     Past Medical History:   Diagnosis Date    Bell's palsy 01/25/2019    Diverticulitis of colon with perforation 01/2018    sigmoid    Diverticulitis of colon with perforation 08/09/2020    Hypertension     Kidney infection     Haresh Hunt syndrome (geniculate herpes zoster)        Core Measures:  CVA: Yes Yes  CHF:No No  PNA:No No    Activity:  Activity Level: Up with Assistance  Number times ambulated in hallways past shift: 0  Number of times OOB to chair past shift: 4    Cardiac:   Cardiac Monitoring: No      Cardiac Rhythm: Sinus Rhythm    Access:   Current line(s): PIV   Central Line? No   PICC LINE? No     Genitourinary:   Urinary status: voiding  Urinary Catheter?  No     Respiratory:   O2 Device: None (Room air)  Chronic home O2 use?: NO  Incentive spirometer at bedside: NO       GI:  Last Bowel Movement Date: 03/26/23  Current diet:  ADULT DIET Dysphagia - Soft & Bite Sized  DIET ONE TIME MESSAGE  Passing flatus: YES  Tolerating current diet: YES       Pain Management:   Patient states pain is manageable on current regimen: YES    Skin:  Ricardo Score: 18  Interventions: increase time out of bed    Patient Safety:  Fall Score:    Interventions: bed/chair alarm, assistive device (walker, cane, etc), gripper socks, pt to call before getting OOB, and stay with me (per policy)     @Rollbelt  @dexterity to release roll belt  Yes/No ( must document dexterity  here by stating Yes or No here, otherwise this is a restraint and must follow restraint documentation policy.)    DVT prophylaxis:  DVT prophylaxis Med- Yes  DVT prophylaxis SCD or TD- No     Wounds: (If Applicable)  Wounds- No  Location     Active Consults:  IP CONSULT TO NEUROLOGY  IP CONSULT TO NEUROLOGY    Length of Stay:  Expected LOS: 2d 21h  Actual LOS: 13  Discharge Plan: Yes       Aisha Mccormack RN

## 2023-03-26 NOTE — PROGRESS NOTES
Hospitalist Progress Note    Subjective:   Daily Progress Note: 3/26/2023      Hospital Course:  Pt admitted for progressive right facial droop, right side numbness and weakness. MRI was done , was a limited study showing an acute stroke in the left posterior corona radiata/basal ganglia on 3/14/23. He was on the medical floor but became increasingly agitated and was transferred to the ICU for Precedex drip and was weaned off ,transferred back to the medical floor. CTA of the head and neck does not reveal any large vessel stenosis. Aggressive control of lifestyle modifications, BP and weight loss are recommended.      Subjective:   Overnight uneventful per pt, no new complains  R shoulder much improved , sitting up well on the chair, getting out of bed to chair, improving slowly as per pt, on off biting tongue, no trouble swallowing     Current Facility-Administered Medications   Medication Dose Route Frequency    traMADoL (ULTRAM) tablet 50 mg  50 mg Oral Q6H PRN    loratadine (CLARITIN) tablet 10 mg  10 mg Oral QHS    hydrOXYzine HCL (ATARAX) tablet 10 mg  10 mg Oral TID PRN    amLODIPine (NORVASC) tablet 10 mg  10 mg Oral DAILY    hydrALAZINE (APRESOLINE) 20 mg/mL injection 20 mg  20 mg IntraVENous Q6H PRN    labetaloL (NORMODYNE;TRANDATE) injection 20 mg  20 mg IntraVENous Q6H PRN    carvediloL (COREG) tablet 12.5 mg  12.5 mg Oral BID WITH MEALS    alum-mag hydroxide-simeth (MYLANTA) oral suspension 30 mL  30 mL Oral Q4H PRN    traZODone (DESYREL) tablet 50 mg  50 mg Oral QHS PRN    melatonin tablet 4.5 mg  4.5 mg Oral QHS PRN    atorvastatin (LIPITOR) tablet 80 mg  80 mg Oral QHS    aspirin chewable tablet 81 mg  81 mg Oral DAILY    cloNIDine HCL (CATAPRES) tablet 0.2 mg  0.2 mg Oral BID    clopidogreL (PLAVIX) tablet 75 mg  75 mg Oral DAILY    sacubitriL-valsartan (ENTRESTO) 24-26 mg tablet 1 Tablet  1 Tablet Oral Q12H    ziprasidone (GEODON) 10 mg in sterile water (preservative free) 0.5 mL injection  10 mg IntraMUSCular Q12H PRN    insulin lispro (HUMALOG) injection   SubCUTAneous AC&HS    glucose chewable tablet 16 g  4 Tablet Oral PRN    glucagon (GLUCAGEN) injection 1 mg  1 mg IntraMUSCular PRN    alcohol 62% (NOZIN) nasal  1 Ampule  1 Ampule Topical Q12H    albuterol-ipratropium (DUO-NEB) 2.5 MG-0.5 MG/3 ML  3 mL Nebulization Q4H PRN    ondansetron (ZOFRAN) injection 4 mg  4 mg IntraVENous Q4H PRN    acetaminophen (TYLENOL) tablet 650 mg  650 mg Oral Q4H PRN    Or    acetaminophen (TYLENOL) solution 650 mg  650 mg Per NG tube Q4H PRN    Or    acetaminophen (TYLENOL) suppository 650 mg  650 mg Rectal Q4H PRN    bisacodyL (DULCOLAX) suppository 10 mg  10 mg Rectal DAILY PRN    enoxaparin (LOVENOX) injection 40 mg  40 mg SubCUTAneous Q12H            Objective:     Visit Vitals  /72   Pulse 64   Temp 97.5 °F (36.4 °C)   Resp 18   Ht 5' 7\" (1.702 m)   Wt 147 kg (324 lb 1.2 oz)   SpO2 98%   BMI 50.76 kg/m²    O2 Flow Rate (L/min): 4 l/min O2 Device: None (Room air)    Temp (24hrs), Av.9 °F (36.6 °C), Min:97.5 °F (36.4 °C), Max:98.1 °F (36.7 °C)      No intake/output data recorded.    1901 -  0700  In: -   Out: 900 [Urine:900]    PHYSICAL EXAM:    General appearance: Comfortable not in distress  Heart: Regular rate and rhythm, no murmurs  HEENT: No thyroid enlargement  Lungs: Bilateral air entry present, no crackles or wheezing  Abdomen: Soft, nontender, no guarding  Extremities no edema  Neurological exam alert awake oriented x3, right-sided hemiplegia, facial drop      Data Review    Recent Results (from the past 24 hour(s))   GLUCOSE, POC    Collection Time: 23  9:40 PM   Result Value Ref Range    Glucose (POC) 121 (H) 65 - 117 mg/dL    Performed by Calli RODRIGUEZ    GLUCOSE, POC    Collection Time: 23  6:37 AM   Result Value Ref Range    Glucose (POC) 124 (H) 65 - 117 mg/dL    Performed by Calli Falcon PCT    GLUCOSE, POC    Collection Time: 23 11:07 AM   Result Value Ref Range    Glucose (POC) 143 (H) 65 - 117 mg/dL    Performed by Yasmine Treviño RN    GLUCOSE, POC    Collection Time: 03/26/23  4:10 PM   Result Value Ref Range    Glucose (POC) 104 65 - 117 mg/dL    Performed by Abhishek Godinez PCT        XR ABD (KUB)   Final Result   Addendum (preliminary) 1 of 1   Addendum: Correction: Enteric tube traverses expected course to below the   diaphragm into the left upper quadrant. Final   Enteric tube tip lies just above the gastroesophageal junction. XR CHEST PORT   Final Result   Stable interstitial edema pattern with cardiomegaly. Gastric tube visualized but   tip not included. XR ABD PORT  1 V   Final Result   NG tube in place. MRI BRAIN WO CONT   Final Result      1. Acute infarct in the left posterior corona radiata/basal ganglia. XR CHEST PORT   Final Result      New mild interstitial edema         XR CHEST PORT   Final Result   New, moderate cardiomegaly. Grossly clear lungs. CTA CODE NEURO HEAD AND NECK W CONT   Final Result   1. No large vessel occlusion or hemodynamically significant carotid stenosis. 2.  Chronic left centrum semiovale hypodensity likely sequela of chronic   microvascular angiopathy. 3.  Decrease cerebral perfusion in the bilateral periventricular white matter   with 81 cc area of ischemic tissue in the bilateral frontal lobes right greater   than left without underlying vascular lesion to explain this perfusion   abnormality. CT CODE NEURO PERF W CBF   Final Result   1. No large vessel occlusion or hemodynamically significant carotid stenosis. 2.  Chronic left centrum semiovale hypodensity likely sequela of chronic   microvascular angiopathy. 3.  Decrease cerebral perfusion in the bilateral periventricular white matter   with 81 cc area of ischemic tissue in the bilateral frontal lobes right greater   than left without underlying vascular lesion to explain this perfusion   abnormality. CT CODE NEURO HEAD WO CONTRAST   Final Result   No acute intracranial process seen          Active Problems:    Right hemiplegia ([facility] (3/13/2023)      Ischemic stroke ([facility] (3/13/2023)      Assessment/Plan:   Acute ischemic stroke- in the  left basal ganglia, with right side hemiparesis: Continue aspirin, Plavix, statin. Hemoglobin A1c is 5.4 and LDL is 70 point two 2D echocardiogram showed ejection fraction of around 35 to 40% bubble study could not be performed due to patient's noncompliance. PT OT recommended inpatient rehab. Case management consulted-no payor source, working on [facility] bed placement- Financial paperwork per CM to be done by pt/sister-- may still have some share of deposit $$- ~ 2000- sister working on it    2. Hypertension urgency: Overnight patient noted hypertensive urgency and had to be started on Cardene drip. Now weaned from [address]. Coreg to 12.5 twice daily. Continue clonidine, Entresto and also added Norvasc. On as needed hydralazine and labetalol. DCd tele monitoring    3. Systolic heart failure- EF 35-40%, on Entresto, Coreg. Stable    4. CAD-continue aspirin, Plavix and statin  5. R shoulder pain - tramadol prn for the positional (strain) pain per history    WILLARD: 3/27-28  Barriers: [facility] bed placement in inpatient rehab placement- [facility]? ? TBD  PCP COVID test (3/23) came back negative (for placement)        DVT Prophylaxis: lovenox  Code Status:  Full Code  POA: [address]      [facility] discussed with:   _____patient, staff nurse, family__________________________________________________________    Total time (including chart review and note writing): 26 mins  Leslye Gutiérrez MD

## 2023-03-27 VITALS
HEART RATE: 66 BPM | WEIGHT: 315 LBS | HEIGHT: 67 IN | SYSTOLIC BLOOD PRESSURE: 126 MMHG | DIASTOLIC BLOOD PRESSURE: 77 MMHG | BODY MASS INDEX: 49.44 KG/M2 | TEMPERATURE: 97.7 F | OXYGEN SATURATION: 99 % | RESPIRATION RATE: 18 BRPM

## 2023-03-27 LAB
GLUCOSE BLD STRIP.AUTO-MCNC: 126 MG/DL (ref 65–117)
GLUCOSE BLD STRIP.AUTO-MCNC: 131 MG/DL (ref 65–117)
SERVICE CMNT-IMP: ABNORMAL
SERVICE CMNT-IMP: ABNORMAL

## 2023-03-27 PROCEDURE — 74011250637 HC RX REV CODE- 250/637: Performed by: STUDENT IN AN ORGANIZED HEALTH CARE EDUCATION/TRAINING PROGRAM

## 2023-03-27 PROCEDURE — 74011250637 HC RX REV CODE- 250/637: Performed by: NURSE PRACTITIONER

## 2023-03-27 PROCEDURE — 74011250636 HC RX REV CODE- 250/636: Performed by: NURSE PRACTITIONER

## 2023-03-27 PROCEDURE — 74011250637 HC RX REV CODE- 250/637: Performed by: INTERNAL MEDICINE

## 2023-03-27 PROCEDURE — 82962 GLUCOSE BLOOD TEST: CPT

## 2023-03-27 RX ORDER — CARVEDILOL 12.5 MG/1
12.5 TABLET ORAL 2 TIMES DAILY WITH MEALS
Qty: 60 TABLET | Refills: 2 | Status: SHIPPED | OUTPATIENT
Start: 2023-03-27 | End: 2023-04-26

## 2023-03-27 RX ORDER — AMLODIPINE BESYLATE 10 MG/1
10 TABLET ORAL DAILY
Qty: 30 TABLET | Refills: 1 | Status: SHIPPED | OUTPATIENT
Start: 2023-03-28 | End: 2023-04-27

## 2023-03-27 RX ORDER — CLOPIDOGREL BISULFATE 75 MG/1
75 TABLET ORAL DAILY
Qty: 30 TABLET | Refills: 1 | Status: SHIPPED | OUTPATIENT
Start: 2023-03-28 | End: 2023-04-27

## 2023-03-27 RX ORDER — CLONIDINE HYDROCHLORIDE 0.2 MG/1
0.1 TABLET ORAL 2 TIMES DAILY
Qty: 30 TABLET | Refills: 1 | Status: SHIPPED | OUTPATIENT
Start: 2023-03-27 | End: 2023-04-26

## 2023-03-27 RX ORDER — TRAMADOL HYDROCHLORIDE 50 MG/1
50 TABLET ORAL
Qty: 10 TABLET | Refills: 0 | Status: SHIPPED | OUTPATIENT
Start: 2023-03-27 | End: 2023-03-30

## 2023-03-27 RX ORDER — POTASSIUM CHLORIDE 20 MEQ/1
20 TABLET, EXTENDED RELEASE ORAL DAILY
Qty: 30 TABLET | Refills: 1 | Status: SHIPPED | OUTPATIENT
Start: 2023-03-27 | End: 2023-04-26

## 2023-03-27 RX ORDER — FUROSEMIDE 40 MG/1
40 TABLET ORAL DAILY
Qty: 30 TABLET | Refills: 1 | Status: SHIPPED | OUTPATIENT
Start: 2023-03-27 | End: 2023-04-26

## 2023-03-27 RX ORDER — TRAZODONE HYDROCHLORIDE 50 MG/1
50 TABLET ORAL
Qty: 10 TABLET | Refills: 0 | Status: SHIPPED | OUTPATIENT
Start: 2023-03-27 | End: 2023-03-27

## 2023-03-27 RX ADMIN — TRAMADOL HYDROCHLORIDE 50 MG: 50 TABLET ORAL at 11:09

## 2023-03-27 RX ADMIN — AMLODIPINE BESYLATE 10 MG: 5 TABLET ORAL at 10:57

## 2023-03-27 RX ADMIN — ASPIRIN 81 MG: 81 TABLET, CHEWABLE ORAL at 10:57

## 2023-03-27 RX ADMIN — CLOPIDOGREL BISULFATE 75 MG: 75 TABLET ORAL at 10:57

## 2023-03-27 RX ADMIN — CLONIDINE HYDROCHLORIDE 0.2 MG: 0.1 TABLET ORAL at 10:57

## 2023-03-27 RX ADMIN — CARVEDILOL 12.5 MG: 12.5 TABLET, FILM COATED ORAL at 10:57

## 2023-03-27 RX ADMIN — Medication 1 AMPULE: at 10:59

## 2023-03-27 RX ADMIN — ENOXAPARIN SODIUM 40 MG: 100 INJECTION SUBCUTANEOUS at 05:15

## 2023-03-27 RX ADMIN — SACUBITRIL AND VALSARTAN 1 TABLET: 24; 26 TABLET, FILM COATED ORAL at 10:57

## 2023-03-27 NOTE — DISCHARGE INSTRUCTIONS
Patient Follow Up Instructions: Activity: Activity as tolerated  Diet: ADULT DIET Dysphagia - Soft & Bite Sized  DIET ONE TIME MESSAGE  Wound Care: None needed  Follow-up with PCP in  1 week. Follow-up tests/labs None  If you have any concerns that you feel you need to come back to the hospital, please do not hesitate. Medicines for Heart Failure: Care Instructions  Your Care Instructions     Most people with heart failure are helped by taking several medicines to protect their heart. These medicines can help you feel better and live longer. It is important to take all your medicines exactly as prescribed to get the best results. They can also cause side effects. If you think that any of your medicines are causing side effects, talk with your doctor. Follow-up care is a key part of your treatment and safety. Be sure to make and go to all appointments. Call your doctor if you are having problems. It's also a good idea to know your test results and keep a list of the medicines you take. What medicines are used for heart failure? Aldosterone receptor antagonists. These are a type of diuretic. They make the kidneys get rid of extra fluid. Angiotensin-converting enzyme (ACE) inhibitors help blood flow. They relax the blood vessels and lower your blood pressure. The heart can then pump more blood through your body without working harder. Angiotensin II receptor blockers (ARBs) work like ACE inhibitors. Some people use them instead. Angiotensin receptor neprilysin inhibitor (ARNI) medicine works like ARBs and ACE inhibitors. Some people take an ARNI medicine instead. Beta-blockers can slow the heart rate and decrease blood pressure. Digoxin relieves symptoms in some people with heart failure. Diuretics reduce swelling. They do this by helping the kidneys get rid of excess fluid. They are also called water pills. Hydralazine. This may be taken with a nitrate to widen blood vessels.  It can lower blood pressure and reduce the workload on the heart. Ivabradine slows the heart rate. SGLT2 inhibitors help remove extra glucose through the urine. What should you know about these medicines? This is not a complete list of medicines used for heart failure. If you have questions about your medicine, ask your doctor or pharmacist.  ACE inhibitors  Before you start to take an ACE inhibitor, talk to your doctor. Tell him or her if:  You take any anti-inflammatory medicines. These include ibuprofen (Advil, Motrin) and naproxen (Aleve). You take antacids, potassium pills or a salt substitute, or lithium. You take a diuretic. You are pregnant or breastfeeding or you plan to get pregnant. You have kidney disease. Side effects include:  A dry cough. If the cough is bad enough to make you stop the medicine, talk to your doctor. You may need to take a different one. Feeling lightheaded. This may happen when you stand up too fast. It usually gets better with time. Angiotensin II receptor blockers (ARBs)  Before you start to take an ARB, talk to your doctor. Tell him or her if:  You take any anti-inflammatory medicines. These include ibuprofen (Advil, Motrin) and naproxen (Aleve). You take antacids, potassium pills or a salt substitute, or lithium. You have kidney disease. You take a diuretic. You are pregnant or breastfeeding or you plan to get pregnant. Side effects include:  Feeling lightheaded or dizzy. These are the most common side effects. Angiotensin receptor neprilysin inhibitor (ARNI)  Before you start to take an ARNI, talk to your doctor. Tell him or her if:  You take any anti-inflammatory medicines. These include ibuprofen (Advil, Motrin) and naproxen (Aleve). You take antacids, potassium pills or a salt substitute, or lithium. You take an ACE inhibitor or an ARB. You have kidney or liver problems. You take a diuretic. You are pregnant or breastfeeding or you plan to get pregnant.   Side effects include:  Feeling lightheaded or dizzy. These are the most common side effects. Diuretics (water pills)  Before you start to take a diuretic, talk to your doctor. Tell him or her if:  You take lithium or anti-inflammatory medicines such as Advil or Aleve. Side effects include:  Urinating often. Ask your doctor about timing these pills so that you don't have to use the bathroom at a bad time. Muscle cramps. This may mean that you are losing too much potassium. It is an important mineral. Call your doctor if you get muscle cramps. Tender breasts. This may occur in men who take spironolactone. Call your doctor if you get tender breasts that bother you. Beta-blockers  Before you start to take a beta-blocker, talk to your doctor. Tell him or her if:  You have asthma or diabetes. Side effects include:  Feeling dizzy or tired. This usually gets better with time. Digoxin  Before you start to take digoxin, talk to your doctor. Tell him or her if:  You have kidney disease. You take a diuretic or other medicines. This includes over-the-counter medicines. Side effects include:  Nausea or vomiting. Confusion, changes in vision, a racing or slowed heartbeat, or dizziness. Call your doctor right away if you have any of these side effects. Where can you learn more? Go to http://www.gray.com/  Enter F132 in the search box to learn more about \"Medicines for Heart Failure: Care Instructions. \"  Current as of: January 10, 2022               Content Version: 13.4  © 2006-2022 [employer]. Care instructions adapted under license by [employer] (which disclaims liability or warranty for this information). If you have questions about a medical condition or this instruction, always ask your healthcare professional. [place] [address] any warranty or liability for your use of this information. Learning About Heart Failure  What is heart failure?      Heart failure means that your heart muscle doesn't pump as much blood as your body needs. Failure doesn't mean that your heart has stopped. It means that your heart isn't pumping as well as it should. Because your heart cannot pump well, your body tries to make up for it. To do this:  Your body holds on to salt and water. This increases the amount of blood in your bloodstream.  Your heart beats faster. Your heart might get bigger. Your body has an amazing ability to make up for heart failure. It may do such a good job that you don't know you have a disease. But at some point, your heart and body will no longer be able to keep up. Then fluid starts to build up in your lungs and other parts of your body. This fluid buildup is called congestion. It's why some doctors call the disease congestive heart failure. What can you expect when you have heart failure? Heart failure is a lifelong (chronic) disease. Treatment may be able to slow the disease and help you feel better. But heart failure tends to get worse over time. Despite this, there are many steps you can take to feel better and stay healthy longer. Early on, your symptoms may not be too bad. As heart failure gets worse, symptoms typically get worse, and you may need to limit your activities. Heart failure can also get worse suddenly. If this happens, you need emergency care. Then, after treatment, your symptoms may go back to being stable (which means they stay the same) for a long time. Heart failure can lead to other health problems, such as heart rhythm problems. Over time, your treatment options may change, especially as your symptoms get worse. You may want to think about what kind of care you want at the end of your life. What are the symptoms? Symptoms of heart failure start to happen when your heart can't pump enough blood to the rest of your body. In the early stages of heart failure, you may:  Feel tired easily.   Be short of breath when you exert yourself. Feel like your heart is pounding or racing (palpitations). Feel weak or dizzy. As heart failure gets worse, fluid starts to build up in your lungs and other parts of your body. This may cause you to:  Feel short of breath even at rest.  Have swelling (edema), especially in your legs, ankles, and feet. Gain weight. This may happen over just a day or two, or more slowly. Cough or wheeze, especially when you lie down. Feel bloated or sick to your stomach. How is heart failure treated? Heart failure is treated with medicines and steps you take to make lifestyle changes and check your symptoms. Treatment can slow the disease and help you feel better and live longer. Roxanne Poes probably take several medicines. You'll take steps to care for yourself at home. Roxanne Poes watch for changes in your symptoms. You may need to make lifestyle changes, such as limiting sodium, getting regular exercise, not smoking, and eating healthy foods. You might attend cardiac rehabilitation (rehab) to get education and support that help you make lifestyle changes and stay as healthy as possible. You may get a heart device. A pacemaker helps your heart pump blood. An ICD can stop abnormal heart rhythms. As heart failure gets worse, palliative care can help improve your quality of life. You can do advance care planning to decide what kind of care you want at the end of your life. How can you care for yourself? There are many steps you can take to feel better and live longer. They can help you stay active and enjoy life. Take your medicine the right way. Avoid medicines that can make your symptoms worse. Check your weight and symptoms every day. Know what to do if your symptoms get worse. Limit sodium. This helps keep fluid from building up. It may help you feel better. Be active. Exercise regularly, but don't exercise too hard. Be heart-healthy.   Eat healthy foods, stay at a healthy weight, limit alcohol, and don't smoke.  Stay as healthy as possible. Manage other health problems such as diabetes and high blood pressure. Avoid colds and flu, get help for depression and anxiety, and manage stress. If you think you may have a problem with alcohol or drug use, talk to your doctor. Ask your doctor if you need to limit the amount of fluids you drink. Follow-up care is a key part of your treatment and safety. Be sure to make and go to all appointments, and call your doctor if you are having problems. It's also a good idea to know your test results and keep a list of the medicines you take. Where can you learn more? Go to http://www.gray.com/  Enter [id number] in the search box to learn more about \"Learning About Heart Failure. \"  Current as of: January 10, 2022               Content Version: 13.4  © 2006-2022 [address]. Care instructions adapted under license by [employer] (which disclaims liability or warranty for this information). If you have questions about a medical condition or this instruction, always ask your healthcare professional. [place] [address] any warranty or liability for your use of this information. Stroke: Care Instructions  Overview     A stroke is damage to the brain that occurs when a blood vessel in the brain bursts or is blocked by a blood clot. Without blood and the oxygen it carries, part of the brain is damaged. The part of your body controlled by that part of your brain may not function properly now. The brain is an amazing organ that can heal itself to some degree. The stroke you had damaged part of your brain. But other parts of your brain may take over in some way for the damaged areas. Your doctor will talk with you about what you can do to prevent another stroke. You can help by managing other health problems that raise your risk, such as atrial fibrillation or high blood pressure.  Have a heart-healthy lifestyle which includes being active, eating healthy foods, staying at a healthy weight, and not smoking. You may also take medicine that prevents blood clots. Enter a stroke rehabilitation (rehab) program if your doctor recommends it. Stroke rehab is training and therapy to help you recover, prevent problems, and relearn how to do everyday things you have not been able to do since your stroke. The focus will depend on how the stroke has affected your ability to do the things you want and need to do. Follow-up care is a key part of your treatment and safety. Be sure to make and go to all appointments, and call your doctor if you are having problems. It's also a good idea to know your test results and keep a list of the medicines you take. How can you care for yourself at home? Attend stroke rehabilitation (rehab) if your doctor recommends it. Your rehab plan will be based on your goals and how the stroke affected you. You will get instructions on how to manage specific problems that you might have because of the stroke. Manage other health problems that raise your risk of another stroke. These include atrial fibrillation, diabetes, high blood pressure, and high cholesterol. Have a heart-healthy lifestyle. Don't smoke and avoid secondhand smoke. Limit alcohol to 2 drinks a day for men and 1 drink a day for women. Stay at a healthy weight. Lose weight if you need to. Be active. Ask your doctor what type and level of activity is safe for you. Eat heart-healthy foods. These include vegetables, fruits, nuts, beans, lean meat, fish, and whole grains. Limit sodium and sugar. If you think you may have a problem with alcohol or drug use, talk to your doctor. Medicines    Be safe with medicines. Take your medicines exactly as prescribed. Call your doctor if you think you are having a problem with your medicine. You will get more details on the specific medicines your doctor prescribes.      You may take a few medicines to help lower your risk of another stroke. These include:  Blood pressure medicine such as an ACE (angiotensin-converting enzyme) inhibitor, angiotensin II receptor blocker (ARBs), or diuretic. Cholesterol medicine such as a statin. Aspirin or another blood thinner to prevent blood clots. If your doctor prescribed a blood thinner, be sure you get instructions about how to take your medicine safely. Blood thinners can cause serious bleeding problems. Do not take any over-the-counter medicines or herbal products without talking to your doctor first.     If you take hormonal birth control or hormone therapy, talk to your doctor about whether they are right for you. They may raise the risk of stroke in some people. For caregivers    Make the home safe. You may get advice from the stroke rehab team about what changes might be needed. Here are some examples. Set up a bedroom that does not require climbing stairs. Be sure the bathroom is on the same floor. Move throw rugs and furniture that could cause falls. Make sure that the lighting is good. Put grab bars and seats in tubs and showers. Provide transportation until they can drive again. Find out what they can do and what they need help with. Try not to do things that they can do on their own. Help them learn and practice new skills. Visit and talk with them often. Try doing activities together that you both enjoy, such as playing cards or board games. Encourage other people to visit too. Take care of yourself. Here are some tips that might help. Do not try to do everything yourself. Ask the stroke rehab team for help. Ask friends and family members to help. Eat well, get enough rest, and take time to do things that you enjoy. Keep up with your own doctor visits, and make sure to take your medicines regularly. Join a local support group. Find out if you qualify for home health care visits to help with rehab or for adult day care. When should you call for help? Call 911 anytime you think you may need emergency care. For example, call if:    You have signs of another stroke. These may include:  Sudden numbness, tingling, weakness, or loss of movement in your face, arm, or leg, especially on only one side of your body. Sudden vision changes. Sudden trouble speaking. Sudden confusion or trouble understanding simple statements. Sudden problems with walking or balance. A sudden, severe headache that is different from past headaches. Fainting. A seizure. Call 911 even if these symptoms go away in a few minutes. Call your doctor now or seek immediate medical care if:    You have new symptoms that may be related to your stroke, such as falls or trouble swallowing. Watch and call if:    You have been feeling sad, depressed, or hopeless, or you have lost interest in things that you usually enjoy. You have anxiety or fear that affects your life. Watch closely for changes in your health, and be sure to contact your doctor if you have any problems. Where can you learn more? Go to http://www.gray.com/  Enter C294 in the search box to learn more about \"Stroke: Care Instructions. \"  Current as of: March 28, 2022               Content Version: 13.4  © 2006-2022 [employer]. Care instructions adapted under license by [employer] (which disclaims liability or warranty for this information). If you have questions about a medical condition or this instruction, always ask your healthcare professional. [place] [address] any warranty or liability for your use of this information.

## 2023-03-27 NOTE — DISCHARGE SUMMARY
Hospitalist Discharge Summary     Patient ID:  Anne Palma  [id number]  63 y.o.  1975  3/12/2023    PCP on record: Leonora Godoy MD    Admit date: 3/12/2023  Discharge date and time: 3/27/2023    DISCHARGE DIAGNOSIS:  Acute ischemic stroke-  Hypertension urgency:  Systolic heart failure, acute on chronic , POA, mild  non compliance with medication  CAD  R shoulder pain     CONSULTATIONS:  IP CONSULT TO NEUROLOGY  IP CONSULT TO NEUROLOGY    Excerpted HPI from H&P of Chau Tran, DO:  Vernal Nyhan is a 51 y.o.  male who presents with progressive right facial droop, right sided numbness and weakness that started this past Monday. As per record, symptoms was initially intermittent but got pronounced on the day of admission. Patient also initially related it to his Bell's Palsy. Accompanying symptoms include muscle twitching of his right extremity. EMS brought him as code stroke. Tele-neuro assessment, neurologist noted the twitching movement of patient's right side and Keppra, and Ativan was administered. Patient also got Haldol for increasing agitation and physical aggression. EEG monitoring was started but was discontinued prior to completion as patient is uncooperative. Information obtained from EMS and ED record as patient  is unable to provided information secondary to lethargy s/p Ativan and Haldol administration. Past medical history is significant for CHF, ischemic cardiomyopathy, hypertension and severe obesity. We were asked to admit for work up and evaluation of the above problems. ____________________________________________________________________  DISCHARGE SUMMARY/HOSPITAL COURSE:  for full details see H&P, daily progress notes, labs, consult notes. Pt admitted for progressive right facial droop, right side numbness and weakness. MRI was done , was a limited study showing an acute stroke in the left posterior corona radiata/basal ganglia on 3/14/23. He was on the medical floor but became increasingly agitated and was transferred to the ICU for Precedex drip and was weaned off ,transferred back to the medical floor. CTA of the head and neck does not reveal any large vessel stenosis. Aggressive control of lifestyle modifications, BP and weight loss are recommended. 1.Acute ischemic stroke- in the  left basal ganglia, with right side hemiparesis: Continue aspirin, Plavix, statin. Hemoglobin A1c is 5.4 and LDL is 70.2.   2D echocardiogram showed ejection fraction of around 35 to 40% bubble study could not be performed due to patient's noncompliance. PT OT recommended inpatient rehab. Case management consulted-no payor source. Accepted at [facility]     2. Hypertension urgency: started on Cardene drip, weaned from that. Coreg 12.5 twice daily. Continue clonidine, Entresto, and norvasc. Given findings of Pulm edema on CXR, will Start lasix with KCL supplement on DC. F/up with Pulm for sleep study and r/o SREE    3. Systolic heart failure, acute on chronic , POA, mild  non compliance with medication  EF 35-40%, Increased wall thickness,   CXR with Stable interstitial edema pattern with cardiomegalyconsistent with moderate concentric hypertrophy. On Entresto, Coreg, and lasix  Cardio f/up as outpt    4. CAD-continue aspirin, Plavix and statin    5. R shoulder pain - tramadol prn for the positional (strain) pain per history        ECHO ADULT COMPLETE 03/13/2023 3/13/2023  Interpretation Summary    Left Ventricle: Reduced left ventricular systolic function with a visually estimated EF of 35 - 40%. Left ventricle size is normal. Increased wall thickness. Findings consistent with moderate concentric hypertrophy. Technical qualifiers: Echo study was limited due to patient tolerance.  _______________________________________________________________________  Patient seen and examined by me on discharge day.   Pertinent Findings:  Gen:    Not in distress  Chest: Clear lungs  CVS:   Regular rhythm. No edema  Abd/: Soft, not distended, not tender  Neuro:  Alert, oriented, right-sided hemiplegia, facial drop  _______________________________________________________________________  DISCHARGE MEDICATIONS:   Current Discharge Medication List        START taking these medications    Details   clopidogreL (PLAVIX) 75 mg tab Take 1 Tablet by mouth daily for 30 days. Qty: 30 Tablet, Refills: 1  Start date: 3/28/2023, End date: 4/27/2023      cloNIDine HCL (CATAPRES) 0.2 mg tablet Take 0.5 Tablets by mouth two (2) times a day for 30 days. Qty: 30 Tablet, Refills: 1  Start date: 3/27/2023, End date: 4/26/2023      furosemide (Lasix) 40 mg tablet Take 1 Tablet by mouth daily for 30 days. Qty: 30 Tablet, Refills: 1  Start date: 3/27/2023, End date: 4/26/2023      potassium chloride (K-DUR, KLOR-CON M20) 20 mEq tablet Take 1 Tablet by mouth daily for 30 days. Qty: 30 Tablet, Refills: 1  Start date: 3/27/2023, End date: 4/26/2023      amLODIPine (NORVASC) 10 mg tablet Take 1 Tablet by mouth daily for 30 days. Qty: 30 Tablet, Refills: 1  Start date: 3/28/2023, End date: 4/27/2023      traMADoL (ULTRAM) 50 mg tablet Take 1 Tablet by mouth every six (6) hours as needed for Pain for up to 3 days. Max Daily Amount: 200 mg. Qty: 10 Tablet, Refills: 0  Start date: 3/27/2023, End date: 3/30/2023    Associated Diagnoses: Acute pain of right shoulder           CONTINUE these medications which have CHANGED    Details   sacubitril-valsartan (ENTRESTO) 24 mg/26 mg tablet Take 1 Tablet by mouth every twelve (12) hours for 30 days. Qty: 60 Tablet, Refills: 2  Start date: 3/27/2023, End date: 4/26/2023      carvediloL (COREG) 12.5 mg tablet Take 1 Tablet by mouth two (2) times daily (with meals) for 30 days. Qty: 60 Tablet, Refills: 2  Start date: 3/27/2023, End date: 4/26/2023               Patient Follow Up Instructions:    Activity: Activity as tolerated  Diet: ADULT DIET Dysphagia - Soft & Bite Sized  DIET ONE TIME MESSAGE  Wound Care: None needed  Follow-up with PCP in  1 week. Follow-up tests/labs None  If you have any concerns that you feel you need to come back to the hospital, please do not hesitate.     Follow-up Information       Follow up With Specialties Details Why Contact Chente Florence MD Internal Medicine Physician Follow up in 1 week(s)  [address] [facility] [address]  [address] [facility],  [address] [facility] Vascular Surgery, Nurse Practitioner, Cardiovascular Disease Physician Follow up in 2 week(s)  [address] [place] [address]  [phone]      Viki Roy MD Neurology Follow up in 2 week(s)  [address]  [phone]            ________________________________________________________________    Risk of deterioration: Low    Condition at Discharge:  Stable  __________________________________________________________________    Disposition  IP Rehab    ____________________________________________________________________    Code Status: Full Code  ___________________________________________________________________      Total time in minutes spent coordinating this discharge (includes going over instructions, follow-up, prescriptions, and preparing report for sign off to her PCP) :  42 minutes    Signed:  Jacqueline Ritchie MD

## 2023-03-27 NOTE — ROUTINE PROCESS
End of Shift Note    Bedside shift change report given to Radha Mitchell (oncoming nurse) by Maria Esther Huntley RN (offgoing nurse). Report included the following information SBAR, Kardex, and MAR    Shift worked:  7p - 7a   Shift summary and any significant changes:     None     Concerns for physician to address:  None   Zone phone for oncoming shift:  [phone]     Patient Information  Oneil Clark  51 y.o.  3/12/2023  8:04 PM by Jennifer Nathan Roc Murray was admitted from Home    Problem List  Patient Active Problem List    Diagnosis Date Noted    Right hemiplegia ([address] 03/13/2023    Ischemic stroke ([facility] 03/13/2023    Dilated cardiomyopathy ([facility] 08/17/2020    Hypertension 08/14/2020    Class 3 severe obesity in adult [facility]) 08/12/2020    Diverticulitis of colon with perforation 01/23/2018    Diverticulitis 01/23/2018     Past Medical History:   Diagnosis Date    Bell's palsy 01/25/2019    Diverticulitis of colon with perforation 01/2018    sigmoid    Diverticulitis of colon with perforation 08/09/2020    Hypertension     Kidney infection     [place] Hunt syndrome (geniculate herpes zoster)        Core Measures:  CVA: Yes Yes  CHF:No No  PNA:No No    Activity:  Activity Level: Up with Assistance  Number times ambulated in hallways past shift: 0  Number of times OOB to chair past shift: 4    Cardiac:   Cardiac Monitoring: No      Cardiac Rhythm: Sinus Rhythm    Access:   Current line(s): PIV   Central Line? No   PICC LINE? No     Genitourinary:   Urinary status: voiding  Urinary Catheter?  No     Respiratory:   O2 Device: None (Room air)  Chronic home O2 use?: NO  Incentive spirometer at bedside: NO       GI:  Last Bowel Movement Date: 03/26/23  Current diet:  ADULT DIET Dysphagia - Soft & Bite Sized  DIET ONE TIME MESSAGE  Passing flatus: YES  Tolerating current diet: YES       Pain Management:   Patient states pain is manageable on current regimen: YES    Skin:  Ricardo Score: 18  Interventions: increase time out of bed    Patient Safety:  Fall Score:    Interventions: bed/chair alarm, assistive device (walker, cane, etc), gripper socks, pt to call before getting OOB, and stay with me (per policy)     @Rollbelt  @dexterity to release roll belt  Yes/No ( must document dexterity  here by stating Yes or No here, otherwise this is a restraint and must follow restraint documentation policy.)    DVT prophylaxis:  DVT prophylaxis Med- Yes  DVT prophylaxis SCD or TD- No     Wounds: (If Applicable)  Wounds- No  Location     Active Consults:  IP CONSULT TO NEUROLOGY  IP CONSULT TO NEUROLOGY    Length of Stay:  Expected LOS: 2d 21h  Actual LOS: 14  Discharge Plan: Yes       João Abreu RN

## 2023-03-27 NOTE — PROGRESS NOTES
Transition of Care Plan:     RUR:13%     LOS: 14  GLOS: 2.9   WILLARD: STABLE 3/27      Disposition: [facility] at [address]. To clarify WILFRID would cover approx 75% rehab with patient responsibility 25%. BELOW COPY AND PASTE FROM PREVIOUS CM NOTES:       CM talked to Pt Sister, Yadi Pacheco: [phone] and she indicated that she talked to [facility] today and that they are working on [employer]. Noted indicated that Medicaid Application was SENT TO [address] #[id number]     Sister is willing to complete FA if needed once someone can provide one to her. If SNF or IPR: Date FOC offered:3/20  Date [facility] received:N/A  Date authorization started with reference number:N/A  Date authorization received and expires:N/A  Accepting facility:N/A     Follow up appointments: To be done prior to discharge     DME needed:None     Transportation at Discharge BLS    Transition of Care Plan to SNF/Rehab    SNF/Rehab Transition:  Patient has been accepted to [facility] and meets criteria for admission. Patient will transported by Hospital to Home  and expected to leave at 1400 . Communication to Patient/Family:  Met with patient and family  sister  (identified care giver) and they are agreeable to the transition plan. Communication to SNF/Rehab:  Bedside RN, , has been notified to update the transition plan to the facility and call report (phone number [phone]). Discharge information has been updated on the AVS.        Nursing Please include all hard scripts for controlled substances, med rec and dc summary, and AVS in packet.      Reviewed and confirmed with facility,  can manage the patient care needs for the following:     Enrico Lozano with (X) only those applicable:    Medication:  [x]  Medications will be available at the facility  []  IV Antibiotics   []  Controlled Substance - hard copy to be sent with patient   []  Weekly Labs   Documents:  [] Hard RX  [] MAR  [] Kardex  [x] AVS  [x]Transfer Summary  [x]Discharge   Equipment:  []  CPAP/BiPAP  []  Wound Vacuum  []  [place] or Urinary Device  []  PICC/Central Line  []  Nebulizer  []  Ventilator   Treatment:  []Isolation (for MRSA, VRE, etc.)  []Surgical Drain Management  []Tracheostomy Care  []Dressing Changes  []Dialysis with transportation and chair time   []PEG Care  []Oxygen  []Daily Weights for Heart Failure   Dietary:  []Any diet limitations  []Tube Feedings   []Total Parenteral Management (TPN)   Eligible for Medicaid Long Term Services and Supports  Yes:  [] Eligible for medical assistance or will become eligible within 180 days and UAI completed. [x] Provider/Patient and/or support system has requested screening. [] UAI copy provided to patient or responsible party,  [] UAI unavailable at discharge will send once processed to SNF provider. [] UAI unavailable at discharged mailed to patient  No:   [] Private pay and is not financially eligible for Medicaid within the next 180 days. [] Reside out-of-state. [] A residents of a state owned/operated facility that is licensed  by [place] [address] [employer] or [facility]  [] Enrollment in [facility] hospice services  [] [address]  [] Patient /Family declines to have screening completed or provide financial information for screening     Financial Resources:  Medicaid    [x] Initiated and application pending   [] Full coverage     Advanced Care Plan:  []Surrogate Decision Maker of Care  []POA  [x]Communicated Code Status full  (DDNR\", \"Full\")    Other  Has T number for Medicaid. No UAI needed. Transfer to [address] [place] [address] today 2154 room. PCR was negative 3-23. Sister updated with transport time. Dc order received. Pt agreeeable. EMTALA TO [facility]. He is assigned to  [address] & report # [phone].

## 2023-03-27 NOTE — PROGRESS NOTES
Attempted to call to give the report, the nurse was not available. I left the phone number to call me back.

## 2023-03-27 NOTE — PROGRESS NOTES
Report given to Gertrudis about Lola Monae  who is going to [facility] in room 1880. Opportunity for questions given. BELLA to take the patient to rehab.

## 2023-04-24 PROBLEM — I61.9 ICH (INTRACEREBRAL HEMORRHAGE) (HCC): Status: ACTIVE | Noted: 2023-01-01

## 2023-04-25 NOTE — TELEPHONE ENCOUNTER
Called patient to schedule hospital follow up appointment.   Second call to the patient they were still in rehab.  4/13/23

## 2023-04-25 NOTE — PROGRESS NOTES
Spiritual Care Assessment/Progress Note  [facility]      NAME: Mary Mendoza      MRN: [id number]  AGE: 51 y.o. SEX: male  Religion Affiliation: Non Synagogue   Language: English     4/25/2023     Total Time (in minutes): 17     Spiritual Assessment begun in [address] [facility] through conversation with:         []Patient        [x] Family    [] Friend(s)        Reason for Consult: Request by family/friend(s)     Spiritual beliefs: (Please include comment if needed)     [x] Identifies with a tiffanie tradition:  Non Denomination       [] Supported by a tiffanie community:            [] Claims no spiritual orientation:           [] Seeking spiritual identity:                [] Adheres to an individual form of spirituality:           [] Not able to assess:                           Identified resources for coping:      [x] Prayer                               [] Music                  [] Guided Imagery     [x] Family/friends                 [] Pet visits     [] Devotional reading                         [] Unknown     [] Other:                                               Interventions offered during this visit: (See comments for more details)    Patient Interventions: Anointing of the sick (other than Jehovah's witness), Iconic (affirming the presence of God/Higher Power), Prayer (actual), Initial/Spiritual assessment, Critical care, Prayer (assurance of)     Family/Friend(s):  Affirmation of emotions/emotional suffering, Catharsis/review of pertinent events in supportive environment, Initial Assessment, Iconic (affirming the presence of God/Higher Power), Prayer (actual), Prayer (assurance of)     Plan of Care:     [x] Support spiritual and/or cultural needs    [] Support AMD and/or advance care planning process      [] Support grieving process   [x] Coordinate Rites and/or Rituals    [] Coordination with community clergy   [] No spiritual needs identified at this time   [] Detailed Plan of Care below (See Comments)  [] Make referral to Music Therapy  [] Make referral to Pet Therapy     [] Make referral to Addiction services  [] Make referral to [facility]  [] Make referral to Spiritual Care Partner  [] No future visits requested        [x] Contact Spiritual Care for further referrals     Comments:    Referral source:   Yao Moran at [facility] in [facility]. I reviewed the medical record prior to this encounter. Spiritual Assessment:   Mr Tyson Shipley was lying quietly in bed and multiple healthcare personnel were attending to him; patient was unresponsive. Mr Griselda Li mother and one of his sisters were in the ICU waiting area and appeared pensive. Patient's mother and sister shared their current feelings/concerns and spiritual perspectives. They reported that they had been so shocked in patient's sudden deterioration in condition. Sister stated that he had been preparing to be discharged soon from a rehab facility and had been looking forward to getting home and working in his garden. Family shared that Mr Tyson Shipley had never been  and had no children; patient's 45 y/o brother  last year from a stroke. Said that patient had another sister in addition to the one present. Family requested that  anoint patient and have a prayer on his behalf. Stated that Mr Tyson Shipley belonged to a non-[employer]aritan Zoroastrianism.  joined with his mother and sister and prayed on behalf of patient and family.  proceeded to patient's bedside and anointed him with oil as requested. Physician and NP updated  on patient's condition and POC. Informed family that anointing had taken place and reassured them of continued prayers on their behalf and of ongoing  availability for support.     Outcome: Mr Griselda Li family clarified beliefs related to illness, healing, and medical care; they expressed appreciation for pastoral support and the opportunity to share their thoughts and feelings. Plan of Care: Patient's family is aware of  availability and was encouraged to request support as needed. The  on-call can be reached at [phone]). Rev.  Karthik Lozano MS, [facility]  Staff  Pupils equal, round and reactive to light, Extra-ocular movement intact, eyes are clear b/l

## 2023-04-25 NOTE — ED PROVIDER NOTES
The history is provided by the EMS personnel. No  was used. Seizure   This is a new problem. The current episode started less than 1 hour ago. The problem has been resolved. There was 1 seizure. The most recent episode lasted less than 30 seconds. Associated symptoms include confusion. Characteristics include rhythmic jerking and loss of consciousness. The episode was Witnessed. There was no return to baseline postseizure. The seizures Did not continue in the ED. The seizure(s) had no focality. He reports Confusion. Meds prior to arrival: versed. Past Medical History:   Diagnosis Date    Bell's palsy 01/25/2019    Diverticulitis of colon with perforation 01/2018    sigmoid    Diverticulitis of colon with perforation 08/09/2020    Hypertension     Kidney infection     Haresh Hunt syndrome (geniculate herpes zoster)        No past surgical history on file.       Family History:   Problem Relation Age of Onset    Hypertension Mother     Cancer Father         multiple myeloma       Social History     Socioeconomic History    Marital status: SINGLE     Spouse name: Not on file    Number of children: Not on file    Years of education: Not on file    Highest education level: Not on file   Occupational History    Not on file   Tobacco Use    Smoking status: Every Day     Packs/day: 1.00     Years: 15.00     Pack years: 15.00     Types: Cigarettes    Smokeless tobacco: Never   Substance and Sexual Activity    Alcohol use: Not Currently     Comment: rarely    Drug use: No    Sexual activity: Not on file   Other Topics Concern    Not on file   Social History Narrative    Not on file     Social Determinants of Health     Financial Resource Strain: Not on file   Food Insecurity: Not on file   Transportation Needs: Not on file   Physical Activity: Not on file   Stress: Not on file   Social Connections: Not on file   Intimate Partner Violence: Not on file   Housing Stability: Not on file ALLERGIES: Meclizine    Review of Systems   Unable to perform ROS: Patient unresponsive   Neurological:  Positive for loss of consciousness. Psychiatric/Behavioral:  Positive for confusion. Vitals:    04/24/23 2204   BP: (!) 208/99   Pulse: (!) 108   Resp: 29   SpO2: 96%            Physical Exam  Vitals and nursing note reviewed. Constitutional:       General: He is not in acute distress. Appearance: He is well-developed. He is toxic-appearing. He is not diaphoretic. Interventions: Nasal cannula in place. HENT:      Head: Normocephalic and atraumatic. Eyes:      Pupils: Pupils are equal, round, and reactive to light. Cardiovascular:      Rate and Rhythm: Normal rate and regular rhythm. Heart sounds: Normal heart sounds. No murmur heard. No friction rub. No gallop. Pulmonary:      Effort: Pulmonary effort is normal. No respiratory distress. Breath sounds: Normal breath sounds. No wheezing. Abdominal:      General: Bowel sounds are normal. There is no distension. Palpations: Abdomen is soft. Tenderness: There is no abdominal tenderness. There is no guarding or rebound. Musculoskeletal:         General: Normal range of motion. Cervical back: Normal range of motion and neck supple. Skin:     General: Skin is warm. Findings: No rash. Neurological:      Mental Status: He is unresponsive. Medical Decision Making  Total critical care time (not including time spent performing separately reportable procedures): 66miin      Amount and/or Complexity of Data Reviewed  Labs: ordered. Radiology: ordered and independent interpretation performed. ECG/medicine tests: ordered and independent interpretation performed. Risk  Prescription drug management. Decision regarding hospitalization.            Procedures    This is a 62-year-old male with past medical history, review of systems, physical exam as above, presenting from rehab for complaints of bradycardia, hypotension, altered mental status and seizure. EMS states they were called for bradycardia and hypotension, however mother evaluating the patient he began to seize actively. Patient received Versed, 5 mg, prior to arrival here to the emergency room. Chart review indicates he had a recent admission and discharge from outside hospital for progressive right-sided facial droop and numbness, work-up notable for acute ischemic stroke of the left basal ganglia with right-sided hemiparesis. Chart review indicates he was increasingly agitated and physically aggressive at the outside hospital, EEG not completed. His diagnosis at that time included hypertensive urgency, systolic heart failure, coronary disease. Upon arrival he is noted to be extremely hypertensive, mildly tachycardic, satting well on room air. He is unresponsive, protecting his airway with pinpoint pupils, soft abdomen, clear breath sounds to auscultation, unable to participate in neuro exam.  Plan to proceed with emergent imaging, for concern of hemorrhagic conversion versus recurrent stroke, start nicardipine for blood pressure, obtain CMP, CBC, EKG, cardiac enzymes, BMP, coags and ABG. We will reassess, and make a disposition, however the patient will likely require admission for further care and evaluation. EKG interpretation: (Preliminary)  Rhythm: normal sinus rhythm; and regular . Rate (approx.): 75; Axis: normal; P wave: normal; QRS interval: normal ; ST/T wave: non-specific changes; Other findings: abnormal ekg. Update:  CT imaging with acute hemorrhage, initiated blood pressure control, patient discussed with Dr. Kelly Burroughs (neurosurgery) who states no acute surgical intervention, recommends platelet supplementation. Patient discussed with the ICU who will evaluate the patient for admission.

## 2023-04-25 NOTE — PROCEDURES
[facility]      Procedure Note - Intubation:   Performed by Pantera Jeronimo DO .   Staff Anesthesiologist/Intensivist    Diagnosis: Respiratory Compromise   Insertion Date: 8/31/22 Time:0130   Obtained Consent? No, emergent   Procedure Location:  ICU. Immediately prior to the procedure, the patient was reevaluated and found suitable for the planned procedure and any planned medications. Immediately prior to the procedure a time out was called to verify the correct patient, procedure, equipment, staff, and marking as appropriate. Medications given were 0 Aric, 200mcg Fentanyl, 300mg Propofol   A number 7.5 ETT was placed to 25 cm at the teeth. Placement was evaluated by noting bilateral, symmetric breath sounds, good end-tidal CO2 detector color change , no breath sounds over stomach, and bulb aspirator expands promptly. Attempts required: 1 . Complications: 0 .    RSI was used. .  The procedure was tolerated well. CXR is pending.     Courtney Nugent DO  Staff Intensivist  [facility]  4/25/2023

## 2023-04-25 NOTE — ACP (ADVANCE CARE PLANNING)
Dr Alma Delia May and I had extensive discussions with the patient's mother and sister who are his primary decision-makers. We discussed his current health status, prognosis which is grim and treatment options available at this time. Additionally, they contacted other family members by phone and we answered all of the family's questions. At this time, family would like to proceed with full medical management with the exception of chest compressions. They verbalized understanding of his prognosis and would like family to have an opportunity to see the patient prior to further decisions regarding his care. ACP time spent: 33 minutes.

## 2023-04-25 NOTE — PROGRESS NOTES
Reason for Readmission:    Seizures           RUR Score/Risk Level:    18%     PCP: First and Last name:  Dr Ly Smith   Name of Practice:    Are you a current patient: Yes/No: Unsure   Approximate date of last visit:    Can you participate in a virtual visit with your PCP:     Is a Care Conference indicated: No      Did you attend your follow up appointment (s): If not, why not:   Seen by MD at the facility         Resources/supports as identified by patient/family:  Sister         Top Challenges facing patient (as identified by patient/family and CM): Finances/Medication cost?   None voiced  Transportation    Family   Support system or lack thereof? Sister Kriss Sue, Mother Mary Baird      Living arrangements? Lives alone, 2 level home with 2-3 GELY       Self-care/ADLs/Cognition? Was A&O x 4 prior to admission         Current Advanced Directive/Advance Care Plan:  Partial code           Plan for utilizing home health:  NA              Transition of Care Plan:    Based on readmission, the patient's previous Plan of Care   has been evaluated and/or modified. The current Transition of Care Plan is:         Patient in ICU, remains intubated. Care manager met with patient's sister Kriss Sue to introduce self and explain role. Patient has been at [facility] and was scheduled to be discharged Thursday. Patient was ambulating with a 4 point cane. Per sister she and their mother were going to be staying with patient once he was home. Per sister she thinks it's been quite a while since patient has seen his PCP, he uses the [facility] on [address] [address]  as his pharmacy. Per sister Medicaid is pending they applied about a month ago. Patient's mother and another sister are en route. Care management will follow for needs.    Molly Betancourt RN Care Management   Readmission Assessment  Number of days since last admission?: 8-30 days (Previous DOS 3/13/23 - 3/27/23)  Previous disposition: Acute Rehab ([facility])  Who is being interviewed?: Caregiver (Sister Kriss Sue)  What was the patient's/caregiver's perception as to why they think they needed to return back to the hospital?: Other (Comment) (witnessed sz)  Did you visit your Primary Care Physician after you left the hospital, before you returned this time?:  (Seen by facility MD)  Did you see a specialist, such as Cardiac, Pulmonary, Orthopedic Physician, etc. after you left the hospital?: No  Who advised the patient to return to the hospital?: Acute Rehab  Does the patient report anything that got in the way of taking their medications?: No

## 2023-04-25 NOTE — PROGRESS NOTES
CRITICAL CARE NOTE      Name: Messi Nguyen   : 1975   MRN: [id number]   Date: 2023      REASON FOR ICU ADMISSION:   [address]     PRINCIPAL ICU DIAGNOSIS   ICH with IVH, midline shift  Respiratory failure 2/2 airway compromise  Central DI    BRIEF PATIENT SUMMARY   50 y/o male hx HTN, and recent left basal ganglia stroke (admitted 3/12-3/23, discharged to rehab on DAPT, now admitted  for large ICH with IVH, midline shift, not a candidate for intervention or EVD after presenting from acute rehab facility with acute onset of HA with subsequent witnessed seizure activity,  requiring mechanical ventilation for airway protection. (-current). Neurosurgery consulted->not a candidate for intervention or EVD. COMPREHENSIVE ASSESSMENT & PLAN:SYSTEM BASED     24 HOUR EVENTS:   Continued poor neurological exam, not on sedation, ongoing goals of care with family, waiting on patients mother and other sister to arrive from out of town, sister at bedside aware of poor prognosis and likely ongoing neurologic decline/herniation.  She would like to continue current care till other family arrives and re-visit potential palliative extubation in next 24-48/ hrs  + polyuria, DDAVP administered, serial BMP    NEUROLOGICAL:  Hx of recent L basal ganglia stroke (admitted 3/12-3/23, discharged to acute rehab on DAPT)   Large ICH with IVH, midline extension  CTH: Right thalamic hemorrhage with significant IVH and midline shift  Neurological checks, seizure precautions  Keep SBP < 140 mm Hg  Keprra for seizure prophylaxis  HOB elevated, midline    Per NSGY consult: large intracerebral hemorrhage->surgery not indicated 2/2 risk for further hemorrhage vs benefit, advised likely palliative at this stage  Not a candidate for intervention or EVD    PULMONOLOGY:   Intubated for airway protection (-current) in setting of large ICH with IVH extention  CXR- no acute findings  Lung protective ventilation with goal plateau pressure < 30, driving pressure < 15  HOB elevated, Aspiration precautions    CARDIOVASCULAR:   Sinus Rhythm on telemetry  Nicardipine for SBP < 140 mm Hg, keep map > 65    GASTROINTESTINAL   Famotidine for GI prophylaxis     RENAL/ELECTROLYTE/FLUIDS:   Polyuria with concern for central DI  DDAVP administered, serial BMP, replacing volume with IVF  Goal urine output > 0.5 cc/kg/hr, Strict I/Os, avoid nephrotoxins  Replete electrolytes as indicated    ENDOCRINE:   Blood Sugar Goal 120-180, Avoid hypo/hyperglycemia  Glucose checks, SSI    HEMATOLOGY/ONCOLOGY:   H/H Stable  SCDs for DVT prophylaxis    INFECTIOUS DISEASE:   Leukocytosis- likely stress response  Afebrile    ANTIBIOTICS TO DATE:  Not indicated  CULTURES TO DATE:  None    ICU DAILY CHECKLIST     Code Status:FULL  DVT Prophylaxis:SCDs  T/L/D: Tubes: ETT and Orogastric Tube  Lines: Peripheral IV  Drains: Moulton Catheter  SUP: Famotidine  Diet: NPO  Activity Level:Bedrest  ABCDEF Bundle/Checklist Completed:Yes  Disposition: Stay in ICU  Multidisciplinary Rounds Completed:  Yes  Goals of Care Discussion/Palliative: Yes  Patient/Family Updated: Yes      HOSPITAL COURSE/DAILY EVENT LOG       SUBJECTIVE   Review of Systems   Unable to perform ROS: Intubated      OBJECTIVE     Labs and Data: Reviewed 04/25/23  Medications: Reviewed 04/25/23  Imaging: Reviewed 04/25/23    Physical Exam  Vitals and nursing note reviewed. Constitutional:       Appearance: He is ill-appearing. Interventions: He is intubated. HENT:      Head: Normocephalic. Mouth/Throat:      Mouth: Mucous membranes are moist.   Eyes:      Comments: Dilated, fixed   Cardiovascular:      Rate and Rhythm: Normal rate and regular rhythm. Pulmonary:      Effort: He is intubated. Breath sounds: Normal breath sounds. No wheezing. Abdominal:      General: Bowel sounds are normal.      Palpations: Abdomen is soft.    Musculoskeletal:      Cervical back: Normal range of motion and neck supple. Right lower leg: No edema. Left lower leg: No edema. Skin:     General: Skin is warm and dry. Neurological:      Comments: Withdraws to stimuli LUE/LLE  Slight withdrawal to stimuli LUE/LLE  Pupils 4 mm fixed  +gag,    Psychiatric:      Comments: Unable to assess 2/2 acuity        Visit Vitals  /64   Pulse 97   Temp 99.6 °F (37.6 °C)   Resp 25   Wt 152.2 kg (335 lb 8.6 oz)   SpO2 94%   BMI 52.55 kg/m²    O2 Flow Rate (L/min): 6 l/min O2 Device: Endotracheal tube, Ventilator Temp (24hrs), Av.8 °F (37.1 °C), Min:98.5 °F (36.9 °C), Max:99.6 °F (37.6 °C)           Intake/Output:     Intake/Output Summary (Last 24 hours) at 2023 0831  Last data filed at 2023 0700  Gross per 24 hour   Intake 1012.58 ml   Output 3525 ml   Net -2512.42 ml       Imaging    23    ECHO ADULT COMPLETE 2023 3/13/2023    Interpretation Summary    Left Ventricle: Reduced left ventricular systolic function with a visually estimated EF of 35 - 40%. Left ventricle size is normal. Increased wall thickness. Findings consistent with moderate concentric hypertrophy. Technical qualifiers: Echo study was limited due to patient tolerance. Signed by: Malik York MD on 3/13/2023 12:29 PM         CRITICAL CARE DOCUMENTATION  I had a face to face encounter with the patient, reviewed and interpreted patient data including clinical events, labs, images, vital signs, I/O's, and examined patient. I have discussed the case and the plan and management of the patient's care with the consulting services, the bedside nurses and the respiratory therapist.      NOTE OF PERSONAL INVOLVEMENT IN CARE   This patient has a high probability of imminent, clinically significant deterioration, which requires the highest level of preparedness to intervene urgently.  I participated in the decision-making and personally managed or directed the management of the following life and organ supporting interventions that required my frequent assessment to treat or prevent imminent deterioration. I personally spent 49 minutes of critical care time. This is time spent at this critically ill patient's bedside actively involved in patient care as well as the coordination of care. This does not include any procedural time which has been billed separately.     Dionna Badillo NP   Critical Care Medicine  [facility]

## 2023-04-25 NOTE — PROGRESS NOTES
0010: TRANSFER - IN REPORT:    Did not receive report. ER RN brought patient to room and left. Assessment completed upon patients arrival to unit and care assumed. Shift Summary: Patient arrived from ER unresponsive to any stimuli with snoring respirations. Pupils fixed with decerebrate posturing. + cough and corneal reflex. Subha José MD & Shaw Sofia NP at bedside to emergently intubate for airway protection. Patient is Partial Code - intubation and shock only. BP control difficult; max Cardene gtt and PRN Hydralazine utilized. ~ 0300 patient had significant increase in UO to 800cc/hr. Notified Subha José MD - no additional orders received. Mother and sister at bedside and updated by RN, NP, and MD. No belongings at bedside. 0700: Patient began withdrawing in all four extremities. Pupils remain fixed with bilateral NPI of 0. Shaw Sofia NP notified.

## 2023-04-25 NOTE — PROGRESS NOTES
0730 Bedside shift change report given to Glenda Small RN (oncoming nurse) by Malissa Henriquez RN (offgoing nurse). Report included the following information SBAR, Kardex, Intake/Output, MAR, Recent Results, Cardiac Rhythm SR, and Alarm Parameters .

## 2023-04-25 NOTE — H&P
CRITICAL CARE NOTE      Name: Loreto Tovar   : 1975   MRN: [id number]   Date: 2023      REASON FOR ICU ADMISSION:   [address]     PRINCIPAL ICU DIAGNOSIS   ICH  IVH  AMS  Seizure  Hyponatremia  CVA  Lactic acidosis  Hyperglycemia  HTN  HFrEF    BRIEF PATIENT SUMMARY     Mr Ashok Marinelli had recent CVA last month and was at a rehab facility for same. Reportedly he was on DAPT (ASA/Plavix) and began to complain of a headache to family earlier in the day on 23. Later on in the evening on  he was noted to have decreased mentation by rehab facility staff and EMS was summoned. EMS gave midazolam for suspected seizure activity. Upon arrival in ER patient underwent head CT and was noted to have large right-sided thalmic hemorrhage and IVH with shift. Neurosurgery was consulted by ER physician and they advised no operative options and this time and recommended PLT transfusion/medical management. Patient was intubated electively for airway protection in the ICU. Patient's sister and mother are at bedside and have been advised of grim prognosis, likely rapid progression to brain death and POC options. Please see ACP note. COMPREHENSIVE ASSESSMENT & PLAN:SYSTEM BASED     24 HOUR EVENTS: Presented to ER, admitted to ICU.     NEUROLOGICAL:   Q1H neuro exams  Neurosurgery consulted/following; welcome further recommendations  HOB elevated   Keppra BID  Lorazepam as needed  Maintain SBP <140  Nicardipine    PULMONOLOGY:   Intubated electively  Propofol infusion  Serial ABG  SAT/SBT if appropriate  Oral care/pulmonary toiletry    CARDIOVASCULAR:   Continuous EKG  Maintain MAP >65    GASTROINTESTINAL   SUP  NG tube to LIS    RENAL/ELECTROLYTE/FLUIDS:   Monitor renal and electrolyte status closely  Maintain euvolemic state  LR  Strict I&O    ENDOCRINE:   SSI  Maintain BG <140-180    HEMATOLOGY/ONCOLOGY:   CBC daily  PLT transfusion x 1  Monitor for signs of bleeding    INFECTIOUS DISEASE:   Monitor for signs of infection    ANTIBIOTICS TO DATE:  N/A    CULTURES TO DATE:  N/A    ICU DAILY CHECKLIST     Code Status:No CPR  DVT Prophylaxis: SCD  T/L/D: Tubes: ETT and Nasogastric Tube  Lines: Peripheral IV  Drains: Moul[place] Catheter  SUP: Pantoprazole  Diet: NPO  Activity Level:Bedrest  ABCDEF Bundle/Checklist Completed:Yes  Disposition: Stay in ICU  Multidisciplinary Rounds Completed:  Pending  Goals of Care Discussion/Palliative: Yes  Patient/Family Updated: [address]   4/24/23-  Admitted to ICU, ACP. SUBJECTIVE   Review of Systems   Unable to perform ROS: Mental status change      OBJECTIVE     Labs and Data: Reviewed 04/25/23  Medications: Reviewed 04/25/23  Imaging: Reviewed 04/25/23    Physical Exam  Vitals and nursing note reviewed. Constitutional:       Appearance: He is obese. He is ill-appearing. HENT:      Head: Normocephalic and atraumatic. Right Ear: External ear normal.      Left Ear: External ear normal.      Nose: Nose normal.      Mouth/Throat:      Mouth: Mucous membranes are moist.      Pharynx: Oropharynx is clear. Eyes:      Conjunctiva/sclera: Conjunctivae normal.      Comments: Right pupil dilated, left pinpoint   Cardiovascular:      Rate and Rhythm: Normal rate and regular rhythm. Pulses: Normal pulses. Heart sounds: Normal heart sounds. Pulmonary:      Effort: Pulmonary effort is normal.      Breath sounds: Rhonchi present. Abdominal:      General: Abdomen is flat. Palpations: Abdomen is soft. Genitourinary:     Comments: No Moulton  Musculoskeletal:         General: No swelling or deformity. Cervical back: Normal range of motion and neck supple. Right lower leg: Edema present. Left lower leg: Edema present. Skin:     General: Skin is warm and dry. Capillary Refill: Capillary refill takes less than 2 seconds.    Neurological:      Comments: Unresponsive, decerebrate posture to deep painful stimuli   Psychiatric: Comments: Unable to assess due to AMS        Visit Vitals  BP (!) 163/86   Pulse 71   Temp 98.6 °F (37 °C)   Resp 27   SpO2 94%    O2 Flow Rate (L/min): 2 l/min O2 Device: Nasal cannula Temp (24hrs), Av.6 °F (37 °C), Min:98.5 °F (36.9 °C), Max:98.6 °F (37 °C)      Intake/Output:   No intake or output data in the 24 hours ending 23 0004    Imaging    23    ECHO ADULT COMPLETE 2023 3/13/2023    Interpretation Summary    Left Ventricle: Reduced left ventricular systolic function with a visually estimated EF of 35 - 40%. Left ventricle size is normal. Increased wall thickness. Findings consistent with moderate concentric hypertrophy. Technical qualifiers: Echo study was limited due to patient tolerance. Signed by: Lilli Trotter MD on 3/13/2023 12:29 PM         CRITICAL CARE DOCUMENTATION  I had a face to face encounter with the patient, reviewed and interpreted patient data including clinical events, labs, images, vital signs, I/O's, and examined patient. I have discussed the case and the plan and management of the patient's care with the consulting services, the bedside nurses and the respiratory therapist.      NOTE OF PERSONAL INVOLVEMENT IN CARE   This patient has a high probability of imminent, clinically significant deterioration, which requires the highest level of preparedness to intervene urgently. I participated in the decision-making and personally managed or directed the management of the following life and organ supporting interventions that required my frequent assessment to treat or prevent imminent deterioration. I personally spent >80 minutes of critical care time. This is time spent at this critically ill patient's bedside actively involved in patient care as well as the coordination of care. This does not include any procedural time which has been billed separately.     Matilde Salguero NP   Critical Care Medicine  [facility]

## 2023-04-25 NOTE — PROGRESS NOTES
Referral source:    was following up on a hand off from yesterday. Hasmukh Deluca at [facility] in [address] [place] [address]. I reviewed the medical record prior to this encounter. Spiritual Assessment:     Mr. Jey Connolly was lying in bed intubated at this time. There was three family member at bedside very tearful. They shared their shock about how quickly all of the events have taken place. They shared they are waiting for their Auntie. They shared is a member of a [employer].  oriented family at bedside about our roll and support able to provide.  spoke with Ivelisse Echevarria who is the bedside nurse. Outcome: Hasmukh Deluca  family expressed appreciation for pastoral support and the opportunity to share their thoughts and feelings. Plan of Care: Family is aware of  availability and was encouraged to request   support as needed. Advised nurse to contact [facility] for any further referrals. The  on-call can be reached at [phone]). Rev.  Lorna Koch MDiv, MAPT  Staff

## 2023-04-25 NOTE — CONSULTS
I spoke to ER MD or [facility] earlier , and learned this pt had been in a rehab hospital recovering from an earlier hemorraghic stroke when he suffered another intracerebral hemorrhage that is quite large while on Plavix and ASA, In my opinion, no surgery would reverse the injury from this new hemorrhage and in the face of antiplatelet therapy I would create even more bleeding.  Family and medical staff understand this, I will defer care to ICU staff which is mostly palliative at this stage  Remain available for questions

## 2023-04-25 NOTE — ED TRIAGE NOTES
Pt arrives via EMS after 2 witnessed seizures, one by stroke rehab facility w/ episode of vomiting and reported aspiration and a 2nd when EMS arrived. Pt received 5mg IM versed by EMS. Pt arrives w/ snoring respirations, responsive to painful stimuli.  with EMS.

## 2023-04-26 NOTE — DISCHARGE SUMMARY
Discharge Summary     Patient: Cammie Babinski       MRN: [id number]       YOB: 1975       Age: 51 y.o. Date of admission:  2023    Date of discharge:  2023    Primary care provider:  Omar Stock MD     Admitting provider:  Elsy Adler DO    Discharging provider(s): Mariana Hartmann, NP - Staff [address]     Consultations  IP CONSULT TO NEUROSURGERY  IP CONSULT TO INTENSIVIST        Discharge Condition: . Time of Death [id number] 2023      Admission diagnosis  ICH (intracerebral hemorrhage) ([facility] [I61.9]    Please refer to the admission history and physical for details on the presenting problem. Final discharge diagnoses and brief hospital course: '  Intracerebral hemorrhage with intraventricular extension and midline shift  Per H & P: 48 with HTN presenting after worsening HA leading to collapse, seizure, aspiration. Found to have large [address] with IVH, midline shift on CTH. NSG consulted, not candidate for intervention nor EVD. Family updated on patients poor prognosis, and expectation that over the next hours/days he will progress towards brain death. They elected to pursue medical treatment including intubation -- with the main goal of allowing family to travel to visit him. They understand that in all likelihood he will pass - and that the main priority is to keep him comfortable. Exam notable for dilated fixed R pupil, no dolls eye reflex. Minimal gag, no cough. Spontaneous respiratory effort. No grimacing or WD to pain, decorticate posturing intermittently. : Continued poor neurological exam, not on sedation, ongoing goals of care with family, waiting on patients mother and other sister to arrive from out of town, sister at bedside aware of poor prognosis and likely ongoing neurologic decline/herniation.  She would like to continue current care till other family arrives and re-visit potential palliative extubation in next 24-48/ hrs, + polyuria, DDAVP administered, serial BMP  After ongoing goals of care discussion with patient mother and 2 sisters as well as other family members on poor prognosis, severe neurological injury with no likelihood meaningful recovery and likely would progress to brain death, as well as current physical exam with no withdrawal to stimuli no gag or cough reflex. They decided to proceed with a palliative extubation. Time of Death 1604      Physical examination at discharge  Visit Vitals  BP 92/61   Pulse 94   Temp 98.7 °F (37.1 °C)   Resp 18   Wt 146 kg (321 lb 14 oz)   SpO2 93%   BMI 50.41 kg/m²          Recent Labs     04/26/23 0317 04/25/23  0401 04/24/23 2206   WBC 9.5 13.0* 9.9   HGB 14.1 15.0 15.8   HCT 43.7 45.0 48.0    297 266     Recent Labs     04/26/23 0317 04/25/23  2129 04/25/23  1640 04/25/23  1159 04/25/23  0401   * 150* 152* 149* 137   K 3.6 3.6 4.2 3.6 4.0   * 119* 120* 118* 107   CO2 29 29 29 28 26   BUN 13 13 13 12 13   CREA 1.41* 1.46* 1.52* 0.90 1.01   * 164* 151* 148* 243*   CA 9.2 8.9 9.2 9.7 9.0   MG 2.1  --   --  2.2 1.8   PHOS 2.4*  --   --  2.0* 2.3*     Recent Labs     04/24/23 2206   AP 79   TP 8.0   ALB 3.1*   GLOB 4.9*     Recent Labs     04/24/23 2206   INR 1.0   PTP 10.7   APTT 26.2      No results for input(s): FE, TIBC, PSAT, FERR in the last 72 hours. No results for input(s): PH, PCO2, PO2 in the last 72 hours. No results for input(s): CPK, CKMB in the last 72 hours. No lab exists for component: TROPONINI  No components found for: [place]    Pertinent imaging studies:    CT Head 4/24/20023  FINDINGS:     There is a small 5 mm hemorrhage in the right posterior thalamus (series 2,  images 16 and 17). This is associated with a significant amount of blood in the  intraventricular system.  The right lateral ventricle is completely ballooned out  resulting marked distention of the occipital horn and temporal horn and a  midline shift of 6.2 mm there is an addition, there is a small amount of  hemorrhage in the left lateral ventricle. Blood has traveled into the third and  fourth ventricles and out into the basilar cisterns. IMPRESSION  Right thalamic hemorrhage with significant intraventricular hemorrhage and  midline shift as described  ---------------------------------    Chronic Diagnoses:    Problem List as of 4/26/2023 Date Reviewed: 8/27/2020            Codes Class Noted - Resolved    ICH (intracerebral hemorrhage) ([facility] ICD-10-CM: I61.9  ICD-9-CM: 604  4/24/2023 - Present        Right hemiplegia ([facility] ICD-10-CM: G81.91  ICD-9-CM: 342.90  3/13/2023 - Present        Ischemic stroke ([facility] ICD-10-CM: I63.9  ICD-9-CM: 434.91  3/13/2023 - Present        Dilated cardiomyopathy ([facility] ICD-10-CM: I42.0  ICD-9-CM: 425.4  8/17/2020 - Present        Hypertension ICD-10-CM: I10  ICD-9-CM: 401.9  8/14/2020 - Present        Class 3 severe obesity in adult [facility]) ICD-10-CM: E66.01  ICD-9-CM: 278.01  8/12/2020 - Present        Diverticulitis of colon with perforation ICD-10-CM: K57.20  ICD-9-CM: 562.11  1/23/2018 - Present        Diverticulitis ICD-10-CM: K57.92  ICD-9-CM: 562.11  1/23/2018 - Present        RESOLVED: Diverticulitis of large intestine with perforation ICD-10-CM: K57.20  ICD-9-CM: 562.11  8/14/2020 - 8/18/2020           Time spent on discharge related activities today greater than 30 minutes.       Signed:      Katie Farooq NP   Staff Intensivist  [facility]    4/26/2023   5:08 PM    Dr. Nadira Rosa   Attending        Cc: Joanna Otero MD

## 2023-04-26 NOTE — PROGRESS NOTES
Responded to notification of patient's death in ICU. Consulted with his nurse. His family had already left at time of visit.    Chaplain Chapman MDiv, MS, [facility]

## 2023-04-26 NOTE — PROGRESS NOTES
Referral source:    respond to a page for end of life support. So Alvarenga at [facility] in [facility]. I reviewed the medical record prior to this encounter. Spiritual Assessment:     Patient was intubated, sisters, mother and nephew were at bedside. We discussed current feelings/concerns and spiritual perspectives. The family reported that he is a faithful man. The family request anointing and prayer.  joined the family in anointing and praying over the family.  provide active listening as they shared stories.  spoke with bedside staff and updated her on what the family shared. Outcome: So Alvarenga family expressed appreciation for pastoral support and the opportunity to share their thoughts and feelings. Plan of Care: Family is aware of  availability and was encouraged to request   support as needed. Advised nurse to contact [facility] for any further referrals. The  on-call can be reached at [phone]). .  Agustina Garcia MDiv, MAPT  Staff

## 2023-04-26 NOTE — DEATH NOTE
Pt Name  Jovita Bueno   Admit date:  4/24/2023   Date and time of death:  4/26/23 @ 9633 [id number]   Room Number  7104/01    Medical Record Number  [id number] @ . [address]   Age  51 y.o. Date of Birth 1975   PCP Henrik Redmond MD   Attending physician Arthur Cleveland MD      Code Status  DNR    Patient seen and examined     Mental status   Unresponsive    Pupils Dilated and Fixed    Respiration Nil    Pulse  Absent     Heart Sounds  Absent    Rhythm  Flat line   Family  Notified by Nursing staff    Chaplan Service  Notified by Nursing staff     Death certificate and discharge summary completion remain  Rylan Mcduffie NP responsibility.                                  4/26/2023

## 2023-04-26 NOTE — PROGRESS NOTES
Physician Progress Note      Lisset Calvo  CSN #:                  [id number]  :                       1975  ADMIT DATE:       2023 9:57 PM  [address] [place] DATE:  RESPONDING  PROVIDER #:        Snow Arreola MD          QUERY TEXT:    Pt admitted with ICH and IVH. Pt noted to have radiology finding of midline shift. If clinically significant, please document in progress notes and discharge summary if you are evaluating/treating any of the following: The medical record reflects the following:  Risk Factors: 49yo with ICH and IVH    Clinical Indicators:   Head CT  Right thalamic hemorrhage with significant intraventricular hemorrhage and midline shift as described     ICU  Large ICH with IVH, midline extension  - CTH: Right thalamic hemorrhage with significant IVH and midline shift    Treatment: head CT, Neurological checks, seizure precautions, Keep SBP < 140 mm Hg, Keprra for seizure prophylaxis, HOB elevated, midline    Thank you,  obiwon  [phone]  I am also available via Perfect Serve. Options provided:  -- Cerebral edema  -- Brain compression  -- Cerebral edema and Brain compression  -- Other - I will add my own diagnosis  -- Disagree - Not applicable / Not valid  -- Disagree - Clinically unable to determine / Unknown  -- Refer to Clinical Documentation Reviewer    PROVIDER RESPONSE TEXT:    This patient has cerebral edema and brain compression.     Query created by: Castro Riddle on 2023 10:05 AM      Electronically signed by:  Snow Arreola MD 2023 12:52 PM

## 2023-04-26 NOTE — PROGRESS NOTES
CRITICAL CARE NOTE      Name: Robin Pino   : 1975   MRN: [id number]   Date: 2023      REASON FOR ICU ADMISSION:   [address]     PRINCIPAL ICU DIAGNOSIS   ICH with IVH, midline shift  Respiratory failure 2/2 airway compromise  Central DI    BRIEF PATIENT SUMMARY   52 y/o male hx HTN, and recent left basal ganglia stroke (admitted 3/12-3/23, discharged to rehab on DAPT, now admitted  for large ICH with IVH, midline shift, not a candidate for intervention or EVD after presenting from acute rehab facility with acute onset of HA with subsequent witnessed seizure activity,  requiring mechanical ventilation for airway protection. (-current). Neurosurgery consulted->not a candidate for intervention or EVD.     COMPREHENSIVE ASSESSMENT & PLAN:SYSTEM BASED     24 HOUR EVENTS:   Urine output decreased overnight, on Levophed/Vasopressin to keep Map > 65  No withdrawal to stimuli, no gag, no cough, pupils fixed un-reactive, negative dolls eyes  Likely palliative extubation today    NEUROLOGICAL:  Hx of recent L basal ganglia stroke (admitted 3/12-3/23, discharged to acute rehab on DAPT)   Large ICH with IVH, midline extension  CTH: Right thalamic hemorrhage with significant IVH and midline shift  Neurological checks, seizure precautions  Keep SBP < 140 mm Hg  Keprra for seizure prophylaxis  HOB elevated, midline    Per NSGY consult: large intracerebral hemorrhage->surgery not indicated 2/2 risk for further hemorrhage vs benefit, advised likely palliative at this stage  Not a candidate for intervention or EVD    PULMONOLOGY:   Intubated for airway protection (-current) in setting of large ICH with IVH extention  CXR- no acute findings  Lung protective ventilation with goal plateau pressure < 30, driving pressure < 15  HOB elevated, Aspiration precautions    CARDIOVASCULAR:   Sinus Rhythm on telemetry  On Levophed, and Phenylephrine to keep Map > 65, SBP goal less then 140 mm Hg    GASTROINTESTINAL Famotidine for GI prophylaxis     RENAL/ELECTROLYTE/FLUIDS:   Polyuria with concern for central DI  DDAVP administered 4/25, serial BMP, replacing volume with IVF  Goal urine output > 0.5 cc/kg/hr, Strict I/Os, avoid nephrotoxins  Replete electrolytes as indicated    ENDOCRINE:   Blood Sugar Goal 120-180, Avoid hypo/hyperglycemia  Glucose checks, SSI    HEMATOLOGY/ONCOLOGY:   H/H Stable  SCDs for DVT prophylaxis    INFECTIOUS DISEASE:   Leukocytosis- likely stress response  Afebrile    ANTIBIOTICS TO DATE:  Not indicated  CULTURES TO DATE:  None    ICU DAILY CHECKLIST     Code Status:FULL  DVT Prophylaxis:SCDs  T/L/D: Tubes: ETT and Orogastric Tube  Lines: Peripheral IV  Drains: [place] Catheter  SUP: Famotidine  Diet: NPO  Activity Level:Bedrest  ABCDEF Bundle/Checklist Completed:Yes  Disposition: Stay in ICU  Multidisciplinary Rounds Completed:  Yes  Goals of Care Discussion/Palliative: Yes  Patient/Family Updated: Yes      HOSPITAL COURSE/DAILY EVENT LOG     4/25: Continued poor neurological exam, not on sedation, ongoing goals of care with family, waiting on patients mother and other sister to arrive from out of town, sister at bedside aware of poor prognosis and likely ongoing neurologic decline/herniation. She would like to continue current care till other family arrives and re-visit potential palliative extubation in next 24-48/ hrs, + polyuria, DDAVP administered, serial BMP  SUBJECTIVE   Review of Systems   Unable to perform ROS: Intubated      OBJECTIVE     Labs and Data: Reviewed 04/26/23  Medications: Reviewed 04/26/23  Imaging: Reviewed 04/26/23    Physical Exam  Vitals and nursing note reviewed. Constitutional:       Appearance: He is ill-appearing. Interventions: He is intubated. HENT:      Head: Normocephalic. Mouth/Throat:      Mouth: Mucous membranes are moist.   Eyes:      Comments: Pupils 4 mm fixed.  Non reactive   Cardiovascular:      Rate and Rhythm: Normal rate and regular rhythm. Pulmonary:      Effort: He is intubated. Breath sounds: Normal breath sounds. No wheezing. Abdominal:      General: Bowel sounds are normal.      Palpations: Abdomen is soft. Musculoskeletal:      Cervical back: Normal range of motion and neck supple. Right lower leg: No edema. Left lower leg: No edema. Skin:     General: Skin is warm and dry. Neurological:      Comments: No withdrawal to stimuli, no gag, no cough, pupils fixed un-reactive, negative dolls eyes   Psychiatric:      Comments: Unable to assess 2/2 acuity        Visit Vitals  /68   Pulse 86   Temp 98.6 °F (37 °C)   Resp 18   Wt 146 kg (321 lb 14 oz)   SpO2 95%   BMI 50.41 kg/m²    O2 Flow Rate (L/min): 6 l/min O2 Device: Endotracheal tube, Ventilator Temp (24hrs), Av.3 °F (37.4 °C), Min:98.6 °F (37 °C), Max:99.8 °F (37.7 °C)           Intake/Output:     Intake/Output Summary (Last 24 hours) at 2023 0647  Last data filed at 2023 0500  Gross per 24 hour   Intake 7252.11 ml   Output 6300 ml   Net 952.11 ml         Imaging    23    ECHO ADULT COMPLETE 2023 3/13/2023    Interpretation Summary    Left Ventricle: Reduced left ventricular systolic function with a visually estimated EF of 35 - 40%. Left ventricle size is normal. Increased wall thickness. Findings consistent with moderate concentric hypertrophy. Technical qualifiers: Echo study was limited due to patient tolerance. Signed by: Andre Macdonald MD on 3/13/2023 12:29 PM         CRITICAL CARE DOCUMENTATION  I had a face to face encounter with the patient, reviewed and interpreted patient data including clinical events, labs, images, vital signs, I/O's, and examined patient.   I have discussed the case and the plan and management of the patient's care with the consulting services, the bedside nurses and the respiratory therapist.      NOTE OF PERSONAL INVOLVEMENT IN CARE   This patient has a high probability of imminent, clinically significant deterioration, which requires the highest level of preparedness to intervene urgently. I participated in the decision-making and personally managed or directed the management of the following life and organ supporting interventions that required my frequent assessment to treat or prevent imminent deterioration. I personally spent 45 minutes of critical care time. This is time spent at this critically ill patient's bedside actively involved in patient care as well as the coordination of care. This does not include any procedural time which has been billed separately.     Ion Beth NP   Critical Care Medicine  [facility]

## 2023-04-26 NOTE — PROGRESS NOTES
Family has made the decision to transition to comfort care with palliative extubation. Orders placed, Code status changed to comfort care.   [facility]

## 2023-04-27 LAB
ABO + RH BLD: NORMAL
ANTI-COMPLEMENT (C3B,C3D): NORMAL
ANTIGENS PRESENT BLD: NORMAL
ANTIGENS PRESENT RBC DONR: NORMAL
ANTIGENS PRESENT RBC DONR: NORMAL
BLD PROD TYP BPU: NORMAL
BLD PROD TYP BPU: NORMAL
BLOOD GROUP ANTIBODIES SERPL: NORMAL
BLOOD GROUP ANTIBODIES SERPL: NORMAL
BPU ID: NORMAL
BPU ID: NORMAL
CROSSMATCH RESULT,%XM: NORMAL
CROSSMATCH RESULT,%XM: NORMAL
DAT IGG-SP REAG RBC QL: NORMAL
DAT POLY-SP REAG RBC QL: NORMAL
SPECIMEN EXP DATE BLD: NORMAL
STATUS OF UNIT,%ST: NORMAL
STATUS OF UNIT,%ST: NORMAL
UNIT DIVISION, %UDIV: 0
UNIT DIVISION, %UDIV: 0

## 2023-04-27 NOTE — PROGRESS NOTES
Physician Progress Note      Miguel Cade  [facility] #:                  [id number]  :                       1975  ADMIT DATE:       2023 9:57 PM  [address] [place] DATE:        2023 4:04 PM  RESPONDING  PROVIDER #:        Syed Ann MD          QUERY TEXT:    Critical Care MD and NP,    Patient admitted with [address] and IVH. Noted documentation of intubation for airway protection in - PNs  and acute respiratory failure in - PNs. Please indicate one of the following and document in the medical record: The medical record reflects the following:  Risk Factors: 47yo with ICH and IVH    Clinical Indicators:  Blood Gases   pH (POC): 7.46  pCO2 (POC): 31.9  pO2 (POC): 68  HCO3 (POC): 22.6  Base deficit (POC): 0.2  Device[de-identified] ROOM AIR    Blood Gases   pH (POC): 7.42  pCO2 (POC): 39.0  pO2 (POC): 303  HCO3 (POC): 25.5  Base excess (POC): 1.1  FIO2 (POC): 100  Set Rate: 18  Device[de-identified] ADULT VENT  Tidal volume: 450  PEEP/CPAP (POC): 5    ED  General: He is not in acute distress. No respiratory distress    - PNs  Respiratory failure 2/2 airway compromise  Intubated for airway protection (-current) in setting of large ICH with IVH extention  - CXR- no acute findings  - Lung protective ventilation with goal plateau pressure < 30, driving pressure < 15  - HOB elevated, Aspiration precautions    Treatment: Blood gases, pulmonary assessments, mechanical ventilation with ETT    Thank you,  Karen Valencia  [phone]  I am also available via Perfect Serve. Options provided:  -- Intubated for Acute Respiratory Failure as evidenced by, Please document evidence.   -- Intubated for airway protection only, Acute Respiratory Failure ruled out after study  -- Other - I will add my own diagnosis  -- Disagree - Not applicable / Not valid  -- Disagree - Clinically unable to determine / Unknown  -- Refer to Clinical Documentation Reviewer    PROVIDER RESPONSE TEXT:    This patient was intubated for airway protection only, Acute Respiratory Failure has been ruled out after study. Query created by: Leida Moore on 4/26/2023 9:55 AM      QUERY TEXT:    Critical Care MD and NP,    Pt admitted with ICH and IVH and has HFrEF documented. If possible, please document in progress notes and discharge summary further specificity regarding the type and acuity of CHF:    The medical record reflects the following:  Risk Factors: 49yo with HTN and CHF    Clinical Indicators:  3/13/23 Echo  Left Ventricle: Reduced left ventricular systolic function with a visually estimated EF of 35 - 40%. Left ventricle size is normal. Increased wall thickness. Findings consistent with moderate concentric hypertrophy. ED  His diagnosis at that time included hypertensive urgency, systolic heart failure, coronary disease. H&P  Principle ICU Diagnoses  - HFrEF    Treatment: daily weight, q8hr I&Os, Cardene, Levophed, Hari-Synephrine    Thank you,  Karen Valencia  [phone]  I am also available via Perfect Serve. Options provided:  -- Chronic Systolic CHF/HFrEF  -- Other - I will add my own diagnosis  -- Disagree - Not applicable / Not valid  -- Disagree - Clinically unable to determine / Unknown  -- Refer to Clinical Documentation Reviewer    PROVIDER RESPONSE TEXT:    This patient has chronic systolic CHF/HFrEF.     Query created by: Leida Moore on 4/26/2023 10:05 AM      Electronically signed by:  Shannon Reyes MD 4/27/2023 10:05 AM

## 2023-05-01 NOTE — PROGRESS NOTES
Physician Progress Note      Jennifer Mccallum  [facility] #:                  [id number]  :                       1975  ADMIT DATE:       2023 9:57 PM  [address] [place] DATE:        2023 4:04 PM  RESPONDING  PROVIDER #:        Alysa Garcia MD          QUERY TEXT:      Dear attending,    Patient noted to be unresponsive with a GCS range of 3-6 during stay. Please document in progress notes and discharge summary if you are treating and/or evaluating any of the following: The medical record reflects the following:  Risk Factors: hx. recent L basal ganglia stroke    Clinical Indicators: -GCS 6, -GCS-3  Per H&P- Exam notable for dilated fixed R pupil, no dolls eye reflex. Minimal gag, no cough. Spontaneous respiratory effort. No grimacing or WD to pain, decorticate posturing intermittently. -Per PN- Large ICH with IVH, midline extension  CTH: Right thalamic hemorrhage with significant IVH and midline shift  Neurological:  Comments:  Withdraws to stimuli LUE/LLE  Slight withdrawal to stimuli LUE/LLE  Pupils 4 mm fixed  +gag,  -Per PN- Neurological:  Comments: No withdrawal to stimuli, no gag, no cough, pupils fixed un-reactive, negative dolls eyes    Treatment: Monitor neuro status, supportive care        Thank you,    Kadi Galan RN, BSN, CRCR, CCDS  Clinical Documentation Improvement  [phone] or via Perfect Serve  Options provided:  -- Coma, POA  -- no Coma  -- Other - I will add my own diagnosis  -- Disagree - Not applicable / Not valid  -- Disagree - Clinically unable to determine / Unknown  -- Refer to Clinical Documentation Reviewer    PROVIDER RESPONSE TEXT:    Pt is in a coma, POA    Query created by: Jolie Shelley on 2023 11:44 AM      Electronically signed by:  Alysa Garcia MD 2023 2:23 PM

## 2023-05-01 NOTE — PROGRESS NOTES
Physician Progress Note      PATIENTMeridifrance ROYAL #:                  [id number]  :                       1975  ADMIT DATE:       2023 9:57 PM  [address] [place] DATE:        2023 4:04 PM  RESPONDING  PROVIDER #:        Cj Jade MD          QUERY TEXT:    Dear attending,    Pt noted to have an increase in creatinine during this stay from 0.95 to 1.52 mg/dL. If possible, please document in the progress notes and discharge summary if you are evaluating and/or treating any of the following: The medical record reflects the following:  Risk Factors: no hx. CKD    Clinical Indicators: -Cr 0.95, -Cr 1.01, -Cr 1.52, -Cr 1.46  Per H&P- Monitor renal and electrolyte status closely  -Per PN-Polyuria with concern for central DI  DDAVP administered , serial BMP, replacing volume with IVF  Goal urine output > 0.5 cc/kg/hr, Strict I/Os, avoid nephrotoxins  Replete electrolytes as indicated  Maintain euvolemic state  Treatment: Strict I&O, monitor labwork, LR 1 liter bolus x 3 on , IV LR @ 150 cc/hour      Defined by Kidney Disease Improving Global Outcomes (KDIGO) clinical practice guideline for acute kidney injury:  -Increase in SCr by greater than or equal to 0.3 mg/dl within 48 hours; or  -Increase or decrease in SCr to greater than or equal to 1.5 times baseline, which is known or presumed to have occurred within the prior 7 days; or  -Urine volume < 0.5ml/kg/h for 6 hours      Thank you,    Kadi Galan RN, BSN, CRCR, CCDS  Clinical Documentation Improvement  [phone] or via Perfect Serve  Options provided:  -- Acute kidney injury  -- no Acute kidney injury  -- Other - I will add my own diagnosis  -- Disagree - Not applicable / Not valid  -- Disagree - Clinically unable to determine / Unknown  -- Refer to Clinical Documentation Reviewer    PROVIDER RESPONSE TEXT:    This patient has an Acute kidney injury.     Query created by: Brianna Richards on 2023 11:29 AM      Electronically signed by:  Carolyn Sheikh MD 5/1/2023 11:32 AM

## 2023-09-04 NOTE — PROGRESS NOTES
End of Shift Note    Bedside shift change report given to Nicole Moscoso RN (oncoming nurse) by Davin Reyes RN (offgoing nurse). Report included the following information SBAR, Kardex, and MAR    Shift worked:  Nights    Shift summary and any significant changes:     Medicated for pain x1. Slept between care. OOB to chair this am. No significant shift events. Concerns for physician to address:  NONE   Zone phone for oncoming shift:   2045     Patient Information  David Newell  51 y.o.  3/12/2023  8:04 PM by Ruthie Henning was admitted from Home    Problem List  Patient Active Problem List    Diagnosis Date Noted    Right hemiplegia ([facility] [address] 03/13/2023    Ischemic stroke ([facility] [address] 03/13/2023    Dilated cardiomyopathy ([facility] [address] 08/17/2020    Hypertension 08/14/2020    Class 3 severe obesity in adult [facility]) 08/12/2020    Diverticulitis of colon with perforation 01/23/2018    Diverticulitis 01/23/2018     Past Medical History:   Diagnosis Date    Bell's palsy 01/25/2019    Diverticulitis of colon with perforation 01/2018    sigmoid    Diverticulitis of colon with perforation 08/09/2020    Hypertension     Kidney infection     Haresh Hunt syndrome (geniculate herpes zoster)        Core Measures:  CVA: Yes Yes  CHF:No No  PNA:No No    Activity:  Activity Level: Up with Assistance  Number times ambulated in hallways past shift: 0  Number of times OOB to chair past shift: 4    Cardiac:   Cardiac Monitoring: No      Cardiac Rhythm: Sinus Rhythm    Access:   Current line(s): PIV   Central Line? No   PICC LINE? No     Genitourinary:   Urinary status: voiding  Urinary Catheter?  No     Respiratory:   O2 Device: None (Room air)  Chronic home O2 use?: NO  Incentive spirometer at bedside: NO       GI:  Last Bowel Movement Date: 03/22/23 (PER PATIENT)  Current diet:  ADULT DIET Dysphagia - Soft & Bite Sized  DIET ONE TIME MESSAGE  Passing flatus: YES  Tolerating current diet: YES       Pain Management:   Patient states pain is manageable on current regimen: YES    Skin:  Ricardo Score: 17  Interventions: increase time out of bed    Patient Safety:  Fall Score:    Interventions: bed/chair alarm, assistive device (walker, cane, etc), gripper socks, pt to call before getting OOB, and stay with me (per policy)     @Rollbelt  @dexterity to release roll belt  Yes/No ( must document dexterity  here by stating Yes or No here, otherwise this is a restraint and must follow restraint documentation policy.)    DVT prophylaxis:  DVT prophylaxis Med- Yes  DVT prophylaxis SCD or TD- No     Wounds: (If Applicable)  Wounds- No  Location     Active Consults:  IP CONSULT TO NEUROLOGY  IP CONSULT TO NEUROLOGY    Length of Stay:  Expected LOS: 2d 21h  Actual LOS: 11  Discharge Plan: Yes       Juve Benavidez RN Alert and oriented to person, place and time

## 2024-11-18 NOTE — PROGRESS NOTES
Physical Therapy  PT consult received and chart reviewed. Patient currently resting and on precedex. Patient has been intermittently agitated and RN prefers we defer this AM and check back this PM.  Will follow up. Fatimah Davis, PT, DPT    1430:  Returned to check on patient. Spoke with RN who states patient is still on precedex and is not appropriate for evaluation. May ween overnight and will follow up tmrw for evaluation. normal mood with appropriate affect , speech clear